# Patient Record
Sex: FEMALE | Race: WHITE | Employment: UNEMPLOYED | URBAN - METROPOLITAN AREA
[De-identification: names, ages, dates, MRNs, and addresses within clinical notes are randomized per-mention and may not be internally consistent; named-entity substitution may affect disease eponyms.]

---

## 2017-01-19 ENCOUNTER — TELEPHONE (OUTPATIENT)
Dept: FAMILY MEDICINE CLINIC | Age: 72
End: 2017-01-19

## 2017-03-02 ENCOUNTER — HOSPITAL ENCOUNTER (OUTPATIENT)
Dept: GENERAL RADIOLOGY | Age: 72
Discharge: HOME | End: 2017-03-02
Attending: FAMILY MEDICINE | Admitting: FAMILY MEDICINE

## 2017-03-02 DIAGNOSIS — Z13.820 SCREENING FOR OSTEOPOROSIS: ICD-10-CM

## 2017-03-02 DIAGNOSIS — Z13.820 ENCOUNTER FOR SCREENING FOR OSTEOPOROSIS: ICD-10-CM

## 2017-03-06 ENCOUNTER — OFFICE VISIT (OUTPATIENT)
Dept: FAMILY MEDICINE CLINIC | Age: 72
End: 2017-03-06

## 2017-03-06 VITALS
RESPIRATION RATE: 16 BRPM | OXYGEN SATURATION: 97 % | HEART RATE: 65 BPM | HEIGHT: 63 IN | DIASTOLIC BLOOD PRESSURE: 78 MMHG | TEMPERATURE: 98 F | SYSTOLIC BLOOD PRESSURE: 122 MMHG

## 2017-03-06 DIAGNOSIS — G89.29 CHRONIC LEFT-SIDED LOW BACK PAIN WITH LEFT-SIDED SCIATICA: ICD-10-CM

## 2017-03-06 DIAGNOSIS — F41.9 ANXIETY: ICD-10-CM

## 2017-03-06 DIAGNOSIS — J06.9 UPPER RESPIRATORY TRACT INFECTION, UNSPECIFIED TYPE: Primary | ICD-10-CM

## 2017-03-06 DIAGNOSIS — M54.42 CHRONIC LEFT-SIDED LOW BACK PAIN WITH LEFT-SIDED SCIATICA: ICD-10-CM

## 2017-03-06 DIAGNOSIS — M81.0 OSTEOPOROSIS: ICD-10-CM

## 2017-03-06 PROCEDURE — 99214 OFFICE O/P EST MOD 30 MIN: CPT | Performed by: FAMILY MEDICINE

## 2017-03-06 RX ORDER — DIAZEPAM 5 MG/1
5 TABLET ORAL EVERY 8 HOURS PRN
Qty: 30 TABLET | Refills: 0 | Status: SHIPPED | OUTPATIENT
Start: 2017-03-06 | End: 2017-07-13 | Stop reason: SDUPTHER

## 2017-03-06 RX ORDER — CIPROFLOXACIN 500 MG/1
500 TABLET, FILM COATED ORAL 2 TIMES DAILY
Qty: 20 TABLET | Refills: 0 | Status: SHIPPED | OUTPATIENT
Start: 2017-03-06 | End: 2017-03-16

## 2017-03-06 ASSESSMENT — ENCOUNTER SYMPTOMS
STRIDOR: 0
TROUBLE SWALLOWING: 0
CHEST TIGHTNESS: 0
GASTROINTESTINAL NEGATIVE: 1
SHORTNESS OF BREATH: 0
WHEEZING: 0
EYES NEGATIVE: 1
BACK PAIN: 1
RHINORRHEA: 1
CHOKING: 0
SINUS PRESSURE: 0
APNEA: 0
FACIAL SWELLING: 0
COUGH: 1
SORE THROAT: 1

## 2017-03-06 ASSESSMENT — PATIENT HEALTH QUESTIONNAIRE - PHQ9
SUM OF ALL RESPONSES TO PHQ9 QUESTIONS 1 & 2: 0
2. FEELING DOWN, DEPRESSED OR HOPELESS: 0
SUM OF ALL RESPONSES TO PHQ QUESTIONS 1-9: 0
1. LITTLE INTEREST OR PLEASURE IN DOING THINGS: 0

## 2017-04-10 ENCOUNTER — OFFICE VISIT (OUTPATIENT)
Dept: FAMILY MEDICINE CLINIC | Age: 72
End: 2017-04-10

## 2017-04-10 VITALS
DIASTOLIC BLOOD PRESSURE: 70 MMHG | HEART RATE: 79 BPM | SYSTOLIC BLOOD PRESSURE: 122 MMHG | TEMPERATURE: 97.9 F | OXYGEN SATURATION: 98 % | HEIGHT: 63 IN | RESPIRATION RATE: 16 BRPM

## 2017-04-10 DIAGNOSIS — M54.42 LOW BACK PAIN WITH LEFT-SIDED SCIATICA, UNSPECIFIED BACK PAIN LATERALITY, UNSPECIFIED CHRONICITY: ICD-10-CM

## 2017-04-10 DIAGNOSIS — L30.9 DERMATITIS: Primary | ICD-10-CM

## 2017-04-10 PROCEDURE — 99212 OFFICE O/P EST SF 10 MIN: CPT | Performed by: FAMILY MEDICINE

## 2017-04-10 ASSESSMENT — ENCOUNTER SYMPTOMS
BACK PAIN: 1
EYES NEGATIVE: 1
COLOR CHANGE: 0

## 2017-07-10 ENCOUNTER — TELEPHONE (OUTPATIENT)
Dept: FAMILY MEDICINE CLINIC | Age: 72
End: 2017-07-10

## 2017-07-13 ENCOUNTER — OFFICE VISIT (OUTPATIENT)
Dept: FAMILY MEDICINE CLINIC | Age: 72
End: 2017-07-13

## 2017-07-13 VITALS
WEIGHT: 205 LBS | BODY MASS INDEX: 36.32 KG/M2 | OXYGEN SATURATION: 97 % | DIASTOLIC BLOOD PRESSURE: 80 MMHG | SYSTOLIC BLOOD PRESSURE: 122 MMHG | TEMPERATURE: 98 F | RESPIRATION RATE: 16 BRPM | HEIGHT: 63 IN | HEART RATE: 81 BPM

## 2017-07-13 DIAGNOSIS — Z85.3 HISTORY OF BREAST CANCER: ICD-10-CM

## 2017-07-13 DIAGNOSIS — I10 ESSENTIAL HYPERTENSION: ICD-10-CM

## 2017-07-13 DIAGNOSIS — F41.9 ANXIETY: ICD-10-CM

## 2017-07-13 DIAGNOSIS — R31.9 HEMATURIA: Primary | ICD-10-CM

## 2017-07-13 DIAGNOSIS — R60.0 BILATERAL EDEMA OF LOWER EXTREMITY: ICD-10-CM

## 2017-07-13 LAB
BILIRUBIN, POC: NORMAL
BLOOD URINE, POC: NORMAL
CLARITY, POC: NORMAL
COLOR, POC: YELLOW
GLUCOSE URINE, POC: NORMAL
KETONES, POC: NORMAL
LEUKOCYTE EST, POC: NORMAL
NITRITE, POC: NORMAL
PH, POC: 6
PROTEIN, POC: NORMAL
SPECIFIC GRAVITY, POC: 1.01
UROBILINOGEN, POC: 0.2

## 2017-07-13 PROCEDURE — 81002 URINALYSIS NONAUTO W/O SCOPE: CPT | Performed by: FAMILY MEDICINE

## 2017-07-13 PROCEDURE — 99214 OFFICE O/P EST MOD 30 MIN: CPT | Performed by: FAMILY MEDICINE

## 2017-07-13 RX ORDER — FUROSEMIDE 40 MG/1
TABLET ORAL
Qty: 90 TABLET | Refills: 2 | Status: SHIPPED | OUTPATIENT
Start: 2017-07-13 | End: 2018-04-06 | Stop reason: SDUPTHER

## 2017-07-13 RX ORDER — DIAZEPAM 5 MG/1
5 TABLET ORAL EVERY 8 HOURS PRN
Qty: 60 TABLET | Refills: 0 | Status: SHIPPED | OUTPATIENT
Start: 2017-07-13 | End: 2019-11-26

## 2017-07-13 RX ORDER — ATENOLOL 25 MG/1
TABLET ORAL
Qty: 90 TABLET | Refills: 2 | Status: SHIPPED | OUTPATIENT
Start: 2017-07-13 | End: 2018-04-06 | Stop reason: SDUPTHER

## 2017-07-24 ENCOUNTER — HOSPITAL ENCOUNTER (OUTPATIENT)
Dept: ULTRASOUND IMAGING | Age: 72
Discharge: HOME | End: 2017-07-24
Attending: OBSTETRICS & GYNECOLOGY | Admitting: OBSTETRICS & GYNECOLOGY

## 2017-07-24 DIAGNOSIS — N95.0 HEMORRHAGE, POSTMENOPAUSAL: ICD-10-CM

## 2017-07-24 DIAGNOSIS — N93.8 OTHER SPECIFIED ABNORMAL UTERINE AND VAGINAL BLEEDING: ICD-10-CM

## 2017-08-03 ENCOUNTER — OFFICE VISIT (OUTPATIENT)
Dept: FAMILY MEDICINE CLINIC | Age: 72
End: 2017-08-03

## 2017-08-03 VITALS
RESPIRATION RATE: 16 BRPM | HEART RATE: 74 BPM | DIASTOLIC BLOOD PRESSURE: 78 MMHG | SYSTOLIC BLOOD PRESSURE: 120 MMHG | OXYGEN SATURATION: 97 % | WEIGHT: 204.8 LBS | HEIGHT: 63 IN | TEMPERATURE: 98.3 F | BODY MASS INDEX: 36.29 KG/M2

## 2017-08-03 DIAGNOSIS — N93.9 VAGINAL BLEEDING: ICD-10-CM

## 2017-08-03 DIAGNOSIS — Z78.0 POST-MENOPAUSAL: ICD-10-CM

## 2017-08-03 DIAGNOSIS — Z01.818 PREOPERATIVE TESTING: Primary | ICD-10-CM

## 2017-08-03 LAB
BASOPHILS ABSOLUTE: 0.1 K/UL (ref 0–0.2)
BASOPHILS RELATIVE PERCENT: 0.8 %
EOSINOPHILS ABSOLUTE: 0.1 K/UL (ref 0–0.6)
EOSINOPHILS RELATIVE PERCENT: 1.3 %
HCT VFR BLD CALC: 42.2 % (ref 36–48)
HEMOGLOBIN: 14.2 G/DL (ref 12–16)
INR BLD: 1.11 (ref 0.85–1.15)
LYMPHOCYTES ABSOLUTE: 2.6 K/UL (ref 1–5.1)
LYMPHOCYTES RELATIVE PERCENT: 30.8 %
MCH RBC QN AUTO: 31.2 PG (ref 26–34)
MCHC RBC AUTO-ENTMCNC: 33.7 G/DL (ref 31–36)
MCV RBC AUTO: 92.5 FL (ref 80–100)
MONOCYTES ABSOLUTE: 0.5 K/UL (ref 0–1.3)
MONOCYTES RELATIVE PERCENT: 5.4 %
NEUTROPHILS ABSOLUTE: 5.2 K/UL (ref 1.7–7.7)
NEUTROPHILS RELATIVE PERCENT: 61.7 %
PDW BLD-RTO: 13.2 % (ref 12.4–15.4)
PLATELET # BLD: 268 K/UL (ref 135–450)
PMV BLD AUTO: 9.3 FL (ref 5–10.5)
PROTHROMBIN TIME: 12.5 SEC (ref 9.6–13)
RBC # BLD: 4.56 M/UL (ref 4–5.2)
WBC # BLD: 8.5 K/UL (ref 4–11)

## 2017-08-03 PROCEDURE — 36415 COLL VENOUS BLD VENIPUNCTURE: CPT | Performed by: FAMILY MEDICINE

## 2017-08-03 PROCEDURE — 93000 ELECTROCARDIOGRAM COMPLETE: CPT | Performed by: FAMILY MEDICINE

## 2017-08-03 PROCEDURE — 99214 OFFICE O/P EST MOD 30 MIN: CPT | Performed by: FAMILY MEDICINE

## 2017-08-04 ENCOUNTER — TELEPHONE (OUTPATIENT)
Dept: FAMILY MEDICINE CLINIC | Age: 72
End: 2017-08-04

## 2017-08-30 ENCOUNTER — OFFICE VISIT (OUTPATIENT)
Dept: FAMILY MEDICINE CLINIC | Age: 72
End: 2017-08-30

## 2017-08-30 VITALS
TEMPERATURE: 98.1 F | WEIGHT: 203 LBS | OXYGEN SATURATION: 97 % | SYSTOLIC BLOOD PRESSURE: 122 MMHG | DIASTOLIC BLOOD PRESSURE: 78 MMHG | HEART RATE: 77 BPM | BODY MASS INDEX: 35.97 KG/M2 | HEIGHT: 63 IN | RESPIRATION RATE: 16 BRPM

## 2017-08-30 DIAGNOSIS — M94.0 COSTOCHONDRITIS, ACUTE: ICD-10-CM

## 2017-08-30 DIAGNOSIS — K29.00 ACUTE GASTRITIS WITHOUT HEMORRHAGE, UNSPECIFIED GASTRITIS TYPE: Primary | ICD-10-CM

## 2017-08-30 PROCEDURE — 99212 OFFICE O/P EST SF 10 MIN: CPT | Performed by: FAMILY MEDICINE

## 2017-09-26 ENCOUNTER — OFFICE VISIT (OUTPATIENT)
Dept: FAMILY MEDICINE CLINIC | Age: 72
End: 2017-09-26

## 2017-09-26 VITALS
OXYGEN SATURATION: 97 % | TEMPERATURE: 98.2 F | SYSTOLIC BLOOD PRESSURE: 148 MMHG | BODY MASS INDEX: 36.32 KG/M2 | DIASTOLIC BLOOD PRESSURE: 76 MMHG | HEIGHT: 63 IN | HEART RATE: 75 BPM | WEIGHT: 205 LBS | RESPIRATION RATE: 18 BRPM

## 2017-09-26 DIAGNOSIS — J06.9 UPPER RESPIRATORY TRACT INFECTION, UNSPECIFIED TYPE: Primary | ICD-10-CM

## 2017-09-26 PROCEDURE — 99213 OFFICE O/P EST LOW 20 MIN: CPT | Performed by: FAMILY MEDICINE

## 2017-09-26 RX ORDER — CIPROFLOXACIN 500 MG/1
500 TABLET, FILM COATED ORAL 2 TIMES DAILY
Qty: 20 TABLET | Refills: 0 | Status: SHIPPED | OUTPATIENT
Start: 2017-09-26 | End: 2017-10-06

## 2017-11-27 ENCOUNTER — OFFICE VISIT (OUTPATIENT)
Dept: INTERNAL MEDICINE CLINIC | Age: 72
End: 2017-11-27

## 2017-11-27 VITALS
BODY MASS INDEX: 36.14 KG/M2 | WEIGHT: 204 LBS | RESPIRATION RATE: 20 BRPM | SYSTOLIC BLOOD PRESSURE: 160 MMHG | HEIGHT: 63 IN | HEART RATE: 80 BPM | DIASTOLIC BLOOD PRESSURE: 90 MMHG

## 2017-11-27 DIAGNOSIS — K21.00 GASTROESOPHAGEAL REFLUX DISEASE WITH ESOPHAGITIS: Primary | ICD-10-CM

## 2017-11-27 DIAGNOSIS — J30.89 ENVIRONMENTAL AND SEASONAL ALLERGIES: ICD-10-CM

## 2017-11-27 DIAGNOSIS — M81.8 OTHER OSTEOPOROSIS WITHOUT CURRENT PATHOLOGICAL FRACTURE: ICD-10-CM

## 2017-11-27 DIAGNOSIS — R60.0 BILATERAL LEG EDEMA: ICD-10-CM

## 2017-11-27 DIAGNOSIS — E55.9 VITAMIN D DEFICIENCY: ICD-10-CM

## 2017-11-27 DIAGNOSIS — Z85.3 H/O MALIGNANT NEOPLASM OF BREAST: ICD-10-CM

## 2017-11-27 DIAGNOSIS — F41.0 ANXIETY ATTACK: ICD-10-CM

## 2017-11-27 DIAGNOSIS — I10 ESSENTIAL HYPERTENSION: Chronic | ICD-10-CM

## 2017-11-27 PROCEDURE — 99215 OFFICE O/P EST HI 40 MIN: CPT | Performed by: INTERNAL MEDICINE

## 2017-11-27 RX ORDER — MELATONIN
COMMUNITY
End: 2019-11-26

## 2017-11-27 RX ORDER — LANSOPRAZOLE 30 MG/1
30 CAPSULE, DELAYED RELEASE ORAL 2 TIMES DAILY
COMMUNITY
End: 2021-09-23 | Stop reason: CLARIF

## 2017-11-27 NOTE — PROGRESS NOTES
systems was negative except for what was noted in the HPI. Physical Exam:   Vitals:    11/27/17 1121   BP: (!) 172/100   Pulse: 80   Resp: 20   Weight: 204 lb (92.5 kg)   Height: 5' 2.5\" (1.588 m)     Body mass index is 36.72 kg/m². Constitutional: She is oriented to person, place, and time. She appears well-developed and well-nourished. No distress. HEENT:   Head: Normocephalic and atraumatic. Right Ear: Tympanic membrane, external ear and ear canal normal.   Left Ear: Tympanic membrane, external ear and ear canal normal.   Mouth/Throat: Oropharynx is clear and moist, and mucous membranes are normal.  There is no cervical adenopathy. Eyes: Conjunctivae and extraocular motions are normal. Pupils are equal, round, and reactive to light. Neck: Supple. No JVD present. Carotid bruit is not present. No mass and no thyromegaly present. Cardiovascular: Normal rate, regular rhythm, normal heart sounds and intact distal pulses. Exam reveals no gallop and no friction rub. No murmur heard. Pulmonary/Chest: Effort normal and breath sounds normal. No respiratory distress. She has no wheezes, rhonchi or rales. Abdominal: Soft, non-tender. Bowel sounds and aorta are normal. She exhibits no organomegaly, mass or bruit. Genitourinary: performed by gynecologist.  Breast exam:  performed by specialist.  Musculoskeletal: Normal range of motion, no synovitis. She exhibits no edema. Neurological: She is alert and oriented to person, place, and time. She has normal reflexes. No cranial nerve deficit. Coordination normal.   Skin: Skin is warm and dry. There is no rash or erythema. No suspicious lesions noted. Psychiatric: She has a normal mood and affect.  Her speech is normal and behavior is normal. Judgment, cognition and memory are normal.       Lab Results   Component Value Date    LABMICR SEE BELOW 12/29/2015     Lab Results   Component Value Date     12/08/2016    K 4.4 12/08/2016    CL 98 (L) 12/08/2016    CO2 27 12/08/2016    BUN 8 12/08/2016    CREATININE <0.5 (L) 12/08/2016    GLUCOSE 99 12/08/2016    CALCIUM 9.3 12/08/2016     Lab Results   Component Value Date    CHOL 197 12/08/2016    TRIG 104 12/08/2016    HDL 35 12/08/2016    LDLCALC 141 12/08/2016     Lab Results   Component Value Date    ALT 17 12/08/2016    AST 15 12/08/2016     Lab Results   Component Value Date    TSH 1.38 12/08/2016    TSH 1.25 12/07/2015    T4FREE 1.1 12/08/2016    T4FREE 1.2 12/07/2015     Lab Results   Component Value Date    WBC 8.5 08/03/2017    HGB 14.2 08/03/2017    HCT 42.2 08/03/2017    MCV 92.5 08/03/2017     08/03/2017     Lab Results   Component Value Date    INR 1.11 08/03/2017      No results found for: PSA   No results found for: [facility]         Preventive Care:  Health Maintenance   Topic Date Due    Hepatitis C screen  1945    DTaP/Tdap/Td vaccine (1 - Tdap) 12/13/1964    Zostavax vaccine  12/13/2005    Pneumococcal low/med risk (1 of 2 - PCV13) 12/13/2010    Flu vaccine (1) 09/01/2017    Breast cancer screen  04/17/2018    Colon cancer screen colonoscopy  12/28/2019    Lipid screen  12/08/2021    DEXA (modify frequency per FRAX score)  Completed      Hx abnormal PAP: no  Sexual activity: none   Last eye exam: 2017, normal  Exercise: walks 3 time(s) per week  Seatbelt use: yes       Preventive plan of care for Sanjana Silver        11/27/2017           Preventive Measures Status       Recommendations for screening   Colon Cancer Screen   Colonoscopy Repeat yearly   Breast Cancer Screen  Mammogram Repeat yearly   Cervical Cancer Screen   PAP smear:  Test is due per GYN   Osteoporosis Screen   DEXA scan  Repeat every 3 years   Diabetes Screen  Glucose (mg/dL)   Date Value   12/08/2016 99    Test recommended and ordered   Cholesterol Screen  Lab Results   Component Value Date    CHOL 197 12/08/2016    TRIG 104 12/08/2016    HDL 35 (L) 12/08/2016    LDLCALC 141 (H) 12/08/2016    Test

## 2017-12-07 ENCOUNTER — OFFICE VISIT (OUTPATIENT)
Dept: INTERNAL MEDICINE CLINIC | Age: 72
End: 2017-12-07

## 2017-12-07 VITALS
DIASTOLIC BLOOD PRESSURE: 90 MMHG | HEIGHT: 63 IN | BODY MASS INDEX: 36.14 KG/M2 | HEART RATE: 78 BPM | TEMPERATURE: 98.6 F | SYSTOLIC BLOOD PRESSURE: 162 MMHG | RESPIRATION RATE: 20 BRPM | WEIGHT: 204 LBS

## 2017-12-07 DIAGNOSIS — B96.89 SINUSITIS, BACTERIAL: Primary | ICD-10-CM

## 2017-12-07 DIAGNOSIS — J32.9 SINUSITIS, BACTERIAL: Primary | ICD-10-CM

## 2017-12-07 PROCEDURE — 99213 OFFICE O/P EST LOW 20 MIN: CPT | Performed by: INTERNAL MEDICINE

## 2017-12-07 RX ORDER — CIPROFLOXACIN 500 MG/1
500 TABLET, FILM COATED ORAL 2 TIMES DAILY
Qty: 20 TABLET | Refills: 0 | Status: SHIPPED | OUTPATIENT
Start: 2017-12-07 | End: 2017-12-17

## 2017-12-13 ENCOUNTER — OFFICE VISIT (OUTPATIENT)
Dept: INTERNAL MEDICINE CLINIC | Age: 72
End: 2017-12-13

## 2017-12-13 VITALS
DIASTOLIC BLOOD PRESSURE: 90 MMHG | BODY MASS INDEX: 36.5 KG/M2 | HEIGHT: 63 IN | SYSTOLIC BLOOD PRESSURE: 156 MMHG | WEIGHT: 206 LBS | HEART RATE: 78 BPM | RESPIRATION RATE: 18 BRPM

## 2017-12-13 DIAGNOSIS — J32.9 SINUSITIS, BACTERIAL: Primary | ICD-10-CM

## 2017-12-13 DIAGNOSIS — B96.89 SINUSITIS, BACTERIAL: Primary | ICD-10-CM

## 2017-12-13 PROCEDURE — 99213 OFFICE O/P EST LOW 20 MIN: CPT | Performed by: INTERNAL MEDICINE

## 2017-12-13 RX ORDER — LEVOFLOXACIN 500 MG/1
500 TABLET, FILM COATED ORAL DAILY
Qty: 10 TABLET | Refills: 0 | Status: SHIPPED | OUTPATIENT
Start: 2017-12-13 | End: 2018-02-28 | Stop reason: SDUPTHER

## 2017-12-13 NOTE — PROGRESS NOTES
Sanjana Silver  YOB: 1945    Date of Service:  12/13/2017    Chief Complaint:      Chief Complaint   Patient presents with    Sinus Problem       HPI:  Sanjana Silver is a 67 y.o. She's complain of left neck pain, left ear pain and left cheek pain. Cough has improved to a light yellow phlegm. No fever/chills. Still has about 3 days left.     Lab Results   Component Value Date    LABMICR SEE BELOW 12/29/2015     Lab Results   Component Value Date     12/08/2016    K 4.4 12/08/2016    CL 98 (L) 12/08/2016    CO2 27 12/08/2016    BUN 8 12/08/2016    CREATININE <0.5 (L) 12/08/2016    GLUCOSE 99 12/08/2016    CALCIUM 9.3 12/08/2016     Lab Results   Component Value Date    CHOL 197 12/08/2016    TRIG 104 12/08/2016    HDL 35 12/08/2016    LDLCALC 141 12/08/2016     Lab Results   Component Value Date    ALT 17 12/08/2016    AST 15 12/08/2016     Lab Results   Component Value Date    TSH 1.38 12/08/2016    TSH 1.25 12/07/2015    T4FREE 1.1 12/08/2016    T4FREE 1.2 12/07/2015     Lab Results   Component Value Date    WBC 8.5 08/03/2017    HGB 14.2 08/03/2017    HCT 42.2 08/03/2017    MCV 92.5 08/03/2017     08/03/2017     Lab Results   Component Value Date    INR 1.11 08/03/2017      No results found for: PSA   No results found for: LABURIC     Patient Active Problem List   Diagnosis    Gastroesophageal reflux disease with esophagitis    Essential hypertension    H/O malignant neoplasm of breast    Allergic reaction    Anxiety attack    Pes anserine bursitis    Right knee pain    Tear of right rotator cuff    Shoulder impingement syndrome    Shoulder pain, right    Peripheral neuropathic pain    Vitamin D deficiency    Environmental and seasonal allergies    Bilateral leg edema    Other osteoporosis without current pathological fracture       Allergies   Allergen Reactions    Actifed Cold-Allergy [Chlorpheniramine-Phenylephrine] Other (See Comments)     Tachy,head rush    Medrol [Methylprednisolone]      Chest pain    Prednisone      Chest pain    Amoxicillin      Had hives when taken with Lisinopril and not sure what caused what since she was on both in the past for many years    Antihistamines, Chlorpheniramine-Type     Benadryl [Diphenhydramine]     Ceftin [Cefuroxime]      bleeding    Cefuroxime Axetil Diarrhea    Desonide Crea-Wound Dress Crea     Iv Contrast [Iodides] Hives    Lisinopril Hives    Naprelan [Naproxen]     Norvasc [Amlodipine Besylate] Swelling    Other      Sultin vaginal cream    Pcn [Penicillins]     Singulair [Montelukast Sodium]     Sudafed [Pseudoephedrine Hcl] Other (See Comments)     Tachy,head rush    Tape [Adhesive Tape]     Augmentin [Amoxicillin-Pot Clavulanate] Rash    Augmentin [Amoxicillin-Pot Clavulanate]      Pt states it makes her break out in red patches    Keflex [Cephalexin] Rash    Lidocaine Anxiety    Zithromax [Azithromycin]      Pt states it makes her break out in red patches  It has the least reaction  And she still can take it.      Outpatient Prescriptions Marked as Taking for the 12/13/17 encounter (Office Visit) with Miguel Dominguez MD   Medication Sig Dispense Refill    ciprofloxacin (CIPRO) 500 MG tablet Take 1 tablet by mouth 2 times daily for 10 days 20 tablet 0    Cholecalciferol (VITAMIN D3) 1000 units TABS Take by mouth      lansoprazole (PREVACID) 30 MG delayed release capsule Take 30 mg by mouth 2 times daily       atenolol (TENORMIN) 25 MG tablet TAKE ONE TABLET BY MOUTH DAILY 90 tablet 2    diazepam (VALIUM) 5 MG tablet Take 1 tablet by mouth every 8 hours as needed for Anxiety or Sleep 60 tablet 0    furosemide (LASIX) 40 MG tablet TAKE ONE-HALF TABLET BY MOUTH EVERY MORNING AND TAKE ONE- HALF (1/2) TABLET BY MOUTH ONCE NIGHTLY 90 tablet 2    acetaminophen (TYLENOL) 325 MG tablet Take 650 mg by mouth every 6 hours as needed for Pain      sucralfate (CARAFATE) 1 GM tablet Take 1 tablet by mouth 4

## 2017-12-18 ENCOUNTER — OFFICE VISIT (OUTPATIENT)
Dept: INTERNAL MEDICINE CLINIC | Age: 72
End: 2017-12-18

## 2017-12-18 VITALS
WEIGHT: 206 LBS | HEIGHT: 63 IN | DIASTOLIC BLOOD PRESSURE: 80 MMHG | RESPIRATION RATE: 20 BRPM | SYSTOLIC BLOOD PRESSURE: 170 MMHG | BODY MASS INDEX: 36.5 KG/M2 | HEART RATE: 88 BPM

## 2017-12-18 DIAGNOSIS — I10 ESSENTIAL HYPERTENSION: Primary | Chronic | ICD-10-CM

## 2017-12-18 PROCEDURE — 99213 OFFICE O/P EST LOW 20 MIN: CPT | Performed by: INTERNAL MEDICINE

## 2017-12-18 NOTE — PROGRESS NOTES
Chest pain    Prednisone      Chest pain    Amoxicillin      Had hives when taken with Lisinopril and not sure what caused what since she was on both in the past for many years    Antihistamines, Chlorpheniramine-Type     Benadryl [Diphenhydramine]     Ceftin [Cefuroxime]      bleeding    Cefuroxime Axetil Diarrhea    Desonide Crea-Wound Dress Crea     Iv Contrast [Iodides] Hives    Lisinopril Hives    Naprelan [Naproxen]     Norvasc [Amlodipine Besylate] Swelling    Other      Sultin vaginal cream    Pcn [Penicillins]     Singulair [Montelukast Sodium]     Sudafed [Pseudoephedrine Hcl] Other (See Comments)     Tachy,head rush    Tape [Adhesive Tape]     Augmentin [Amoxicillin-Pot Clavulanate] Rash    Augmentin [Amoxicillin-Pot Clavulanate]      Pt states it makes her break out in red patches    Keflex [Cephalexin] Rash    Lidocaine Anxiety    Zithromax [Azithromycin]      Pt states it makes her break out in red patches  It has the least reaction  And she still can take it.      Outpatient Prescriptions Marked as Taking for the 12/18/17 encounter (Office Visit) with Ron Dominguez MD   Medication Sig Dispense Refill    levofloxacin (LEVAQUIN) 500 MG tablet Take 1 tablet by mouth daily for 10 days 10 tablet 0    Cholecalciferol (VITAMIN D3) 1000 units TABS Take by mouth      lansoprazole (PREVACID) 30 MG delayed release capsule Take 30 mg by mouth 2 times daily       atenolol (TENORMIN) 25 MG tablet TAKE ONE TABLET BY MOUTH DAILY 90 tablet 2    diazepam (VALIUM) 5 MG tablet Take 1 tablet by mouth every 8 hours as needed for Anxiety or Sleep 60 tablet 0    furosemide (LASIX) 40 MG tablet TAKE ONE-HALF TABLET BY MOUTH EVERY MORNING AND TAKE ONE- HALF (1/2) TABLET BY MOUTH ONCE NIGHTLY 90 tablet 2    acetaminophen (TYLENOL) 325 MG tablet Take 650 mg by mouth every 6 hours as needed for Pain      sucralfate (CARAFATE) 1 GM tablet Take 1 tablet by mouth 4 times daily 120 tablet 3    dicyclomine (BENTYL) 10 MG capsule Take 10 mg by mouth as needed       docusate sodium (COLACE) 100 MG capsule Take 100 mg by mouth daily.  azelastine (ASTELIN) 137 MCG/SPRAY nasal spray 1 spray by Nasal route 2 times daily. Use in each nostril as directed (Patient taking differently: 1 spray by Nasal route as needed Use in each nostril as directed) 1 Bottle 5         Review of Systems: 14 systems were negative except of what was stated on HPI    Nursing note and vitals reviewed. Vitals:    12/18/17 1501 12/18/17 1513   BP: (!) 168/90 (!) 170/80   Pulse: 88    Resp: 20    Weight: 206 lb (93.4 kg)    Height: 5' 2.5\" (1.588 m)      Wt Readings from Last 3 Encounters:   12/18/17 206 lb (93.4 kg)   12/13/17 206 lb (93.4 kg)   12/07/17 204 lb (92.5 kg)     BP Readings from Last 3 Encounters:   12/18/17 (!) 170/80   12/13/17 (!) 156/90   12/07/17 (!) 162/90     Body mass index is 37.08 kg/m². Constitutional: Patient appears well-developed and well-nourished. No distress. Head: Normocephalic and atraumatic. Neck: Normal range of motion. Neck supple. No thyroidmegaly. Cardiovascular: Normal rate, regular rhythm, normal heart sounds and intact distal pulses. Pulmonary/Chest: Effort normal and breath sounds normal. No stridor. No respiratory distress. No wheezes and no rales. Abdominal: Soft. Bowel sounds are normal. No distension and no mass. No tenderness. No rebound and no guarding. Musculoskeletal: No edema and no tenderness. Skin: No rash or erythema. Psychiatric: Normal mood and affect. Behavior is normal.     Assessment/Plan:  Lurena Dance was seen today for blood pressure check. Diagnoses and all orders for this visit:    Essential hypertension    increase Atenolol 25 mg bid temporary and monitor BP daily. Return if symptoms worsen or fail to improve.

## 2017-12-21 ENCOUNTER — HOSPITAL ENCOUNTER (OUTPATIENT)
Dept: GENERAL RADIOLOGY | Age: 72
Discharge: HOME | End: 2017-12-21
Attending: INTERNAL MEDICINE | Admitting: INTERNAL MEDICINE

## 2017-12-21 DIAGNOSIS — R60.0 BILATERAL LEG EDEMA: ICD-10-CM

## 2017-12-21 DIAGNOSIS — I10 ESSENTIAL HYPERTENSION: Chronic | ICD-10-CM

## 2017-12-21 LAB
A/G RATIO: 1.3 (ref 1.1–2.2)
ALBUMIN SERPL-MCNC: 4.3 G/DL (ref 3.4–5)
ALP BLD-CCNC: 88 U/L (ref 40–129)
ALT SERPL-CCNC: 13 U/L (ref 10–40)
ANION GAP SERPL CALCULATED.3IONS-SCNC: 16 MMOL/L (ref 3–16)
AST SERPL-CCNC: 16 U/L (ref 15–37)
BASOPHILS ABSOLUTE: 0 K/UL (ref 0–0.2)
BASOPHILS RELATIVE PERCENT: 0.5 %
BILIRUB SERPL-MCNC: 0.5 MG/DL (ref 0–1)
BUN BLDV-MCNC: 7 MG/DL (ref 7–20)
CALCIUM SERPL-MCNC: 9.2 MG/DL (ref 8.3–10.6)
CHLORIDE BLD-SCNC: 98 MMOL/L (ref 99–110)
CHOLESTEROL, TOTAL: 172 MG/DL (ref 0–199)
CO2: 28 MMOL/L (ref 21–32)
CREAT SERPL-MCNC: <0.5 MG/DL (ref 0.6–1.2)
EOSINOPHILS ABSOLUTE: 0.2 K/UL (ref 0–0.6)
EOSINOPHILS RELATIVE PERCENT: 1.7 %
GFR AFRICAN AMERICAN: >60
GFR NON-AFRICAN AMERICAN: >60
GLOBULIN: 3.2 G/DL
GLUCOSE BLD-MCNC: 91 MG/DL (ref 70–99)
HCT VFR BLD CALC: 38.8 % (ref 36–48)
HDLC SERPL-MCNC: 32 MG/DL (ref 40–60)
HEMOGLOBIN: 13.3 G/DL (ref 12–16)
LDL CHOLESTEROL CALCULATED: 120 MG/DL
LYMPHOCYTES ABSOLUTE: 2.4 K/UL (ref 1–5.1)
LYMPHOCYTES RELATIVE PERCENT: 25.7 %
MCH RBC QN AUTO: 31 PG (ref 26–34)
MCHC RBC AUTO-ENTMCNC: 34.3 G/DL (ref 31–36)
MCV RBC AUTO: 90.3 FL (ref 80–100)
MONOCYTES ABSOLUTE: 0.6 K/UL (ref 0–1.3)
MONOCYTES RELATIVE PERCENT: 6 %
NEUTROPHILS ABSOLUTE: 6.2 K/UL (ref 1.7–7.7)
NEUTROPHILS RELATIVE PERCENT: 66.1 %
PDW BLD-RTO: 13 % (ref 12.4–15.4)
PLATELET # BLD: 286 K/UL (ref 135–450)
PMV BLD AUTO: 9.1 FL (ref 5–10.5)
POTASSIUM SERPL-SCNC: 4.1 MMOL/L (ref 3.5–5.1)
RBC # BLD: 4.29 M/UL (ref 4–5.2)
SODIUM BLD-SCNC: 142 MMOL/L (ref 136–145)
TOTAL PROTEIN: 7.5 G/DL (ref 6.4–8.2)
TRIGL SERPL-MCNC: 102 MG/DL (ref 0–150)
VLDLC SERPL CALC-MCNC: 20 MG/DL
WBC # BLD: 9.4 K/UL (ref 4–11)

## 2017-12-26 NOTE — PROGRESS NOTES
Pt has been informed. Regarding-  Your blood count, sugar, kidneys, liver and cholesterol are normal so continue the same medications.

## 2018-01-02 ENCOUNTER — OFFICE VISIT (OUTPATIENT)
Dept: INTERNAL MEDICINE CLINIC | Age: 73
End: 2018-01-02

## 2018-01-02 VITALS
DIASTOLIC BLOOD PRESSURE: 94 MMHG | WEIGHT: 206 LBS | RESPIRATION RATE: 18 BRPM | HEART RATE: 80 BPM | BODY MASS INDEX: 36.5 KG/M2 | HEIGHT: 63 IN | SYSTOLIC BLOOD PRESSURE: 130 MMHG

## 2018-01-02 DIAGNOSIS — I10 ESSENTIAL HYPERTENSION: Chronic | ICD-10-CM

## 2018-01-02 DIAGNOSIS — R60.0 BILATERAL LEG EDEMA: ICD-10-CM

## 2018-01-02 DIAGNOSIS — K21.00 GASTROESOPHAGEAL REFLUX DISEASE WITH ESOPHAGITIS: ICD-10-CM

## 2018-01-02 DIAGNOSIS — E55.9 VITAMIN D DEFICIENCY: ICD-10-CM

## 2018-01-02 DIAGNOSIS — Z00.00 MEDICARE ANNUAL WELLNESS VISIT, INITIAL: Primary | ICD-10-CM

## 2018-01-02 DIAGNOSIS — F41.0 ANXIETY ATTACK: ICD-10-CM

## 2018-01-02 PROCEDURE — G0438 PPPS, INITIAL VISIT: HCPCS | Performed by: INTERNAL MEDICINE

## 2018-01-02 ASSESSMENT — PATIENT HEALTH QUESTIONNAIRE - PHQ9: SUM OF ALL RESPONSES TO PHQ QUESTIONS 1-9: 0

## 2018-01-02 ASSESSMENT — LIFESTYLE VARIABLES: HOW OFTEN DO YOU HAVE A DRINK CONTAINING ALCOHOL: 0

## 2018-01-02 ASSESSMENT — ANXIETY QUESTIONNAIRES: GAD7 TOTAL SCORE: 2

## 2018-01-02 NOTE — PROGRESS NOTES
Early Death Paternal Grandfather 52    Breast Cancer Paternal Aunt     Breast Cancer Paternal Aunt        CareTeam (Including outside providers/suppliers regularly involved in providing care):   Patient Care Team:  Vicenta Tyler MD as PCP - Sandy Stapleton MD as PCP - SHANNON Attributed Provider    Wt Readings from Last 3 Encounters:   01/02/18 206 lb (93.4 kg)   12/18/17 206 lb (93.4 kg)   12/13/17 206 lb (93.4 kg)     Vitals:    01/02/18 1049 01/02/18 1128   BP: (!) 140/100 (!) 130/94   Pulse: 80    Resp: 18    Weight: 206 lb (93.4 kg)    Height: 5' 2.5\" (1.588 m)        General Appearance: alert and oriented to person, place and time, well developed and well- nourished, in no acute distress  Skin: warm and dry, no rash or erythema  Head: normocephalic and atraumatic  Eyes: pupils equal, round, and reactive to light, extraocular eye movements intact, conjunctivae normal  ENT: tympanic membrane, external ear and ear canal normal bilaterally, nose without deformity, nasal mucosa and turbinates normal without polyps  Neck: supple and non-tender without mass, no thyromegaly or thyroid nodules, no cervical lymphadenopathy  Pulmonary/Chest: clear to auscultation bilaterally- no wheezes, rales or rhonchi, normal air movement, no respiratory distress  Cardiovascular: normal rate, regular rhythm, normal S1 and S2, no murmurs, rubs, clicks, or gallops, distal pulses intact, no carotid bruits  Abdomen: soft, non-tender, non-distended, normal bowel sounds, no masses or organomegaly  Extremities: no cyanosis, clubbing or edema  Musculoskeletal: normal range of motion, no joint swelling, deformity or tenderness  Neurologic: reflexes normal and symmetric, no cranial nerve deficit, gait, coordination and speech normal    Patient's complete Health Risk Assessment and screening values have been reviewed and are found in Flowsheets.  The following problems were reviewed today and where indicated follow up appointments were made and/or referrals ordered. Positive Risk Factor Screenings with Interventions:     General Health:  General  In general, how would you say your health is?: Good  In the past 7 days, have you experienced any of the following?: None of These  Do you get the social and emotional support that you need?: Yes  Do you have a Living Will?: (!) No  General Health Risk Interventions:  · None indicated    Health Habits/Nutrition:  Health Habits/Nutrition  Do you exercise for at least 20 minutes 2-3 times per week?: Yes  Have you lost any weight without trying in the past 3 months?: No  Do you eat fewer than 2 meals per day?: No  Have you seen a dentist within the past year?: Yes  Body mass index is 37.08 kg/m². Health Habits/Nutrition Interventions:  · None indicated    Hearing/Vision:  Hearing/Vision  Do you or your family notice any trouble with your hearing?: No  Do you have difficulty driving, watching TV, or doing any of your daily activities because of your eyesight?: (!) Yes  Have you had an eye exam within the past year?: Yes  Hearing/Vision Interventions:  · Hearing concerns:  ENT referral provided    ADL:  ADLs  In the past 7 days, did you need help from others to perform any of the following everyday activities?: None  In the past 7 days, did you need help from others to take care of any of the following?: (!) Housekeeping  ADL Interventions:  · None indicated      Personalized Preventive Plan   Current Health Maintenance Status    There is no immunization history on file for this patient.      Health Maintenance   Topic Date Due    Hepatitis C screen  1945    DTaP/Tdap/Td vaccine (1 - Tdap) 12/13/1964    Zostavax vaccine  12/13/2005    Pneumococcal low/med risk (1 of 2 - PCV13) 12/13/2010    Flu vaccine (1) 09/01/2017    Breast cancer screen  04/17/2018    Colon cancer screen colonoscopy  12/28/2019    Lipid screen  12/21/2022    DEXA (modify frequency per FRAX score)  Completed Recommendations for Preventive Services Due: see orders. Recommended screening schedule for the next 5-10 years is provided to the patient in written form: see Patient Sarabjit Martini was seen today for medicare awv. Diagnoses and all orders for this visit:    Medicare annual wellness visit, initial    Essential hypertension    Gastroesophageal reflux disease with esophagitis    Vitamin D deficiency    Bilateral leg edema    Anxiety attack    Return 6 months BP.

## 2018-02-28 ENCOUNTER — OFFICE VISIT (OUTPATIENT)
Dept: INTERNAL MEDICINE CLINIC | Age: 73
End: 2018-02-28

## 2018-02-28 VITALS
SYSTOLIC BLOOD PRESSURE: 168 MMHG | BODY MASS INDEX: 37.03 KG/M2 | OXYGEN SATURATION: 98 % | TEMPERATURE: 98.3 F | HEIGHT: 63 IN | DIASTOLIC BLOOD PRESSURE: 70 MMHG | WEIGHT: 209 LBS | HEART RATE: 89 BPM | RESPIRATION RATE: 16 BRPM

## 2018-02-28 DIAGNOSIS — R68.89 FLU-LIKE SYMPTOMS: ICD-10-CM

## 2018-02-28 DIAGNOSIS — J40 BRONCHITIS: Primary | ICD-10-CM

## 2018-02-28 LAB
INFLUENZA A ANTIBODY: NORMAL
INFLUENZA B ANTIBODY: NORMAL

## 2018-02-28 PROCEDURE — 99213 OFFICE O/P EST LOW 20 MIN: CPT | Performed by: INTERNAL MEDICINE

## 2018-02-28 PROCEDURE — 87804 INFLUENZA ASSAY W/OPTIC: CPT | Performed by: INTERNAL MEDICINE

## 2018-02-28 RX ORDER — LEVOFLOXACIN 500 MG/1
500 TABLET, FILM COATED ORAL DAILY
Qty: 10 TABLET | Refills: 0 | Status: SHIPPED | OUTPATIENT
Start: 2018-02-28 | End: 2018-03-10

## 2018-02-28 NOTE — PROGRESS NOTES
Faye Amado  YOB: 1945    Date of Service:  2/28/2018    Chief Complaint:      Chief Complaint   Patient presents with    Cough     pt states she has this cough for 8-9 days        HPI:  Faye Amado is a 67 y.o. She complain of coughing up yellowish phlegm for couple of days. Feels cold all the time but not sure if had a temperature. No myalgia.     Lab Results   Component Value Date    LABMICR SEE BELOW 12/29/2015     Lab Results   Component Value Date     12/21/2017    K 4.1 12/21/2017    CL 98 (L) 12/21/2017    CO2 28 12/21/2017    BUN 7 12/21/2017    CREATININE <0.5 (L) 12/21/2017    GLUCOSE 91 12/21/2017    CALCIUM 9.2 12/21/2017     Lab Results   Component Value Date    CHOL 172 12/21/2017    TRIG 102 12/21/2017    HDL 32 12/21/2017    LDLCALC 120 12/21/2017     Lab Results   Component Value Date    ALT 13 12/21/2017    AST 16 12/21/2017     Lab Results   Component Value Date    TSH 1.38 12/08/2016    TSH 1.25 12/07/2015    T4FREE 1.1 12/08/2016    T4FREE 1.2 12/07/2015     Lab Results   Component Value Date    WBC 9.4 12/21/2017    HGB 13.3 12/21/2017    HCT 38.8 12/21/2017    MCV 90.3 12/21/2017     12/21/2017     Lab Results   Component Value Date    INR 1.11 08/03/2017      No results found for: PSA   No results found for: LABURIC     Patient Active Problem List   Diagnosis    Gastroesophageal reflux disease with esophagitis    Essential hypertension    H/O malignant neoplasm of breast    Allergic reaction    Anxiety attack    Pes anserine bursitis    Right knee pain    Tear of right rotator cuff    Shoulder impingement syndrome    Shoulder pain, right    Peripheral neuropathic pain    Vitamin D deficiency    Environmental and seasonal allergies    Bilateral leg edema    Other osteoporosis without current pathological fracture       Allergies   Allergen Reactions    Actifed Cold-Allergy [Chlorpheniramine-Phenylephrine] Other (See Comments) each nostril as directed (Patient taking differently: 1 spray by Nasal route as needed Use in each nostril as directed) 1 Bottle 5         Review of Systems: 14 systems were negative except of what was stated on HPI    Nursing note and vitals reviewed. Vitals:    02/28/18 1220   BP: (!) 168/70   Site: Left Arm   Position: Sitting   Cuff Size: Large Adult   Pulse: 89   Resp: 16   Temp: 98.3 °F (36.8 °C)   SpO2: 98%   Weight: 209 lb (94.8 kg)   Height: 5' 2.5\" (1.588 m)     Wt Readings from Last 3 Encounters:   02/28/18 209 lb (94.8 kg)   01/02/18 206 lb (93.4 kg)   12/18/17 206 lb (93.4 kg)     BP Readings from Last 3 Encounters:   02/28/18 (!) 168/70   01/02/18 (!) 130/94   12/18/17 (!) 170/80     Body mass index is 37.62 kg/m². Constitutional: Patient appears well-developed and well-nourished. No distress. Head: Normocephalic and atraumatic. Neck: Normal range of motion. Neck supple. No thyroidmegaly. Cardiovascular: Normal rate, regular rhythm, normal heart sounds and intact distal pulses. Pulmonary/Chest: Effort normal and breath sounds normal. No stridor. No respiratory distress. No wheezes and no rales. Abdominal: Soft. Bowel sounds are normal. No distension and no mass. No tenderness. No rebound and no guarding. Musculoskeletal: No edema and no tenderness. Skin: No rash or erythema. Psychiatric: Normal mood and affect. Behavior is normal.     Assessment/Plan:  Elina Irby was seen today for cough. Diagnoses and all orders for this visit:    Bronchitis  -     levofloxacin (LEVAQUIN) 500 MG tablet; Take 1 tablet by mouth daily for 10 days    Flu-like symptoms  -     POCT Influenza A/B        Return if symptoms worsen or fail to improve.

## 2018-04-06 DIAGNOSIS — R60.0 BILATERAL EDEMA OF LOWER EXTREMITY: ICD-10-CM

## 2018-04-06 DIAGNOSIS — I10 ESSENTIAL HYPERTENSION: ICD-10-CM

## 2018-04-06 RX ORDER — FUROSEMIDE 40 MG/1
TABLET ORAL
Qty: 90 TABLET | Refills: 2 | Status: SHIPPED | OUTPATIENT
Start: 2018-04-06 | End: 2019-01-03 | Stop reason: SDUPTHER

## 2018-04-06 RX ORDER — ATENOLOL 25 MG/1
TABLET ORAL
Qty: 90 TABLET | Refills: 2 | Status: SHIPPED | OUTPATIENT
Start: 2018-04-06 | End: 2018-12-04 | Stop reason: DRUGHIGH

## 2018-11-29 ENCOUNTER — TELEPHONE (OUTPATIENT)
Dept: INTERNAL MEDICINE CLINIC | Age: 73
End: 2018-11-29

## 2018-12-04 ENCOUNTER — OFFICE VISIT (OUTPATIENT)
Dept: INTERNAL MEDICINE CLINIC | Age: 73
End: 2018-12-04
Payer: MEDICARE

## 2018-12-04 VITALS
DIASTOLIC BLOOD PRESSURE: 86 MMHG | OXYGEN SATURATION: 98 % | SYSTOLIC BLOOD PRESSURE: 176 MMHG | HEIGHT: 63 IN | HEART RATE: 81 BPM | WEIGHT: 216 LBS | BODY MASS INDEX: 38.27 KG/M2

## 2018-12-04 DIAGNOSIS — R60.0 BILATERAL LEG EDEMA: ICD-10-CM

## 2018-12-04 DIAGNOSIS — E55.9 VITAMIN D DEFICIENCY: ICD-10-CM

## 2018-12-04 DIAGNOSIS — J30.89 ENVIRONMENTAL AND SEASONAL ALLERGIES: ICD-10-CM

## 2018-12-04 DIAGNOSIS — K21.00 GASTROESOPHAGEAL REFLUX DISEASE WITH ESOPHAGITIS: ICD-10-CM

## 2018-12-04 DIAGNOSIS — I10 ESSENTIAL HYPERTENSION: Primary | Chronic | ICD-10-CM

## 2018-12-04 PROCEDURE — 99214 OFFICE O/P EST MOD 30 MIN: CPT | Performed by: INTERNAL MEDICINE

## 2018-12-04 RX ORDER — ATENOLOL 50 MG/1
50 TABLET ORAL DAILY
Qty: 30 TABLET | Refills: 0 | Status: SHIPPED | OUTPATIENT
Start: 2018-12-04 | End: 2019-01-03 | Stop reason: SDUPTHER

## 2018-12-04 RX ORDER — TRIAMCINOLONE ACETONIDE 55 UG/1
2 SPRAY, METERED NASAL DAILY
COMMUNITY

## 2018-12-17 ENCOUNTER — OFFICE VISIT (OUTPATIENT)
Dept: INTERNAL MEDICINE CLINIC | Age: 73
End: 2018-12-17
Payer: MEDICARE

## 2018-12-17 VITALS
BODY MASS INDEX: 37.92 KG/M2 | OXYGEN SATURATION: 99 % | DIASTOLIC BLOOD PRESSURE: 84 MMHG | SYSTOLIC BLOOD PRESSURE: 160 MMHG | HEART RATE: 102 BPM | WEIGHT: 214 LBS | HEIGHT: 63 IN

## 2018-12-17 DIAGNOSIS — F41.9 ANXIETY: Primary | ICD-10-CM

## 2018-12-17 DIAGNOSIS — I10 ESSENTIAL HYPERTENSION: Chronic | ICD-10-CM

## 2018-12-17 PROCEDURE — 99213 OFFICE O/P EST LOW 20 MIN: CPT | Performed by: INTERNAL MEDICINE

## 2018-12-17 RX ORDER — SERTRALINE HYDROCHLORIDE 25 MG/1
25 TABLET, FILM COATED ORAL DAILY
Qty: 30 TABLET | Refills: 0 | Status: SHIPPED | OUTPATIENT
Start: 2018-12-17 | End: 2019-02-05 | Stop reason: DRUGHIGH

## 2018-12-17 NOTE — PROGRESS NOTES
Blessing Wilson  YOB: 1945    Date of Service:  12/17/2018    Chief Complaint:      Chief Complaint   Patient presents with    Anxiety       HPI:  Blessing Wilson is a 68 y.o. Anxiety:  She's been more anxious daily and more than usual with  being ill and she's now considering starting Zoloft we talked about last time but she is hesitant due to fear of side effects. He BP has been high due to anxiety. She's been on Valium 5 mg 1/4 prn 3-4 times a week.      Hypertension:  Home blood pressure monitoring: Yes - 130-140/70-80 on Atenolol 50 mg PM.  She is adherent to a low sodium diet. Patient denies chest pain, shortness of breath, headache, lightheadedness, blurred vision, peripheral edema, palpitations, dry cough and fatigue.  Antihypertensive medication side effects: no medication side effects noted.  Use of agents associated with hypertension: none.    Bilateral leg edema:  Stable on Lasix 40 mg 1/2 bid. Vit D Def:  Unable to take the Vit D 50,000 due to constipation                                 GERD:  Stable on Prevacid 30 mg bid per Dr. Conception Krabbe  Abd spasm:  Stable on Bentyl 10 mg prn about 2 a month.     Lab Results   Component Value Date    LABMICR SEE BELOW 12/29/2015     Lab Results   Component Value Date     12/21/2017    K 4.1 12/21/2017    CL 98 (L) 12/21/2017    CO2 28 12/21/2017    BUN 7 12/21/2017    CREATININE <0.5 (L) 12/21/2017    GLUCOSE 91 12/21/2017    CALCIUM 9.2 12/21/2017     Lab Results   Component Value Date    CHOL 172 12/21/2017    TRIG 102 12/21/2017    HDL 32 12/21/2017    LDLCALC 120 12/21/2017     Lab Results   Component Value Date    ALT 13 12/21/2017    AST 16 12/21/2017     Lab Results   Component Value Date    TSH 1.38 12/08/2016    TSH 1.25 12/07/2015    T4FREE 1.1 12/08/2016    T4FREE 1.2 12/07/2015     Lab Results   Component Value Date    WBC 9.4 12/21/2017    HGB 13.3 12/21/2017    HCT 38.8 12/21/2017    MCV 90.3 12/21/2017     12/21/2017

## 2019-01-03 ENCOUNTER — OFFICE VISIT (OUTPATIENT)
Dept: INTERNAL MEDICINE CLINIC | Age: 74
End: 2019-01-03
Payer: MEDICARE

## 2019-01-03 VITALS
OXYGEN SATURATION: 99 % | HEART RATE: 113 BPM | SYSTOLIC BLOOD PRESSURE: 152 MMHG | BODY MASS INDEX: 37.56 KG/M2 | DIASTOLIC BLOOD PRESSURE: 88 MMHG | WEIGHT: 212 LBS | HEIGHT: 63 IN

## 2019-01-03 DIAGNOSIS — Z00.00 ROUTINE GENERAL MEDICAL EXAMINATION AT A HEALTH CARE FACILITY: ICD-10-CM

## 2019-01-03 DIAGNOSIS — I10 ESSENTIAL HYPERTENSION: Chronic | ICD-10-CM

## 2019-01-03 DIAGNOSIS — Z00.00 MEDICARE ANNUAL WELLNESS VISIT, SUBSEQUENT: Primary | ICD-10-CM

## 2019-01-03 DIAGNOSIS — K21.00 GASTROESOPHAGEAL REFLUX DISEASE WITH ESOPHAGITIS: ICD-10-CM

## 2019-01-03 DIAGNOSIS — F41.9 ANXIETY: ICD-10-CM

## 2019-01-03 DIAGNOSIS — R60.0 BILATERAL LEG EDEMA: ICD-10-CM

## 2019-01-03 LAB
A/G RATIO: 1.7 (ref 1.1–2.2)
ALBUMIN SERPL-MCNC: 4.5 G/DL (ref 3.4–5)
ALP BLD-CCNC: 96 U/L (ref 40–129)
ALT SERPL-CCNC: 17 U/L (ref 10–40)
ANION GAP SERPL CALCULATED.3IONS-SCNC: 18 MMOL/L (ref 3–16)
AST SERPL-CCNC: 14 U/L (ref 15–37)
BASOPHILS ABSOLUTE: 0.1 K/UL (ref 0–0.2)
BASOPHILS RELATIVE PERCENT: 0.8 %
BILIRUB SERPL-MCNC: 0.5 MG/DL (ref 0–1)
BUN BLDV-MCNC: 9 MG/DL (ref 7–20)
CALCIUM SERPL-MCNC: 9.2 MG/DL (ref 8.3–10.6)
CHLORIDE BLD-SCNC: 101 MMOL/L (ref 99–110)
CHOLESTEROL, TOTAL: 186 MG/DL (ref 0–199)
CO2: 28 MMOL/L (ref 21–32)
CREAT SERPL-MCNC: <0.5 MG/DL (ref 0.6–1.2)
EOSINOPHILS ABSOLUTE: 0.1 K/UL (ref 0–0.6)
EOSINOPHILS RELATIVE PERCENT: 1 %
GFR AFRICAN AMERICAN: >60
GFR NON-AFRICAN AMERICAN: >60
GLOBULIN: 2.7 G/DL
GLUCOSE BLD-MCNC: 96 MG/DL (ref 70–99)
HCT VFR BLD CALC: 43.7 % (ref 36–48)
HDLC SERPL-MCNC: 38 MG/DL (ref 40–60)
HEMOGLOBIN: 14.5 G/DL (ref 12–16)
LDL CHOLESTEROL CALCULATED: 133 MG/DL
LYMPHOCYTES ABSOLUTE: 2.3 K/UL (ref 1–5.1)
LYMPHOCYTES RELATIVE PERCENT: 24.8 %
MCH RBC QN AUTO: 30.1 PG (ref 26–34)
MCHC RBC AUTO-ENTMCNC: 33.2 G/DL (ref 31–36)
MCV RBC AUTO: 90.6 FL (ref 80–100)
MONOCYTES ABSOLUTE: 0.4 K/UL (ref 0–1.3)
MONOCYTES RELATIVE PERCENT: 4.7 %
NEUTROPHILS ABSOLUTE: 6.3 K/UL (ref 1.7–7.7)
NEUTROPHILS RELATIVE PERCENT: 68.7 %
PDW BLD-RTO: 13.3 % (ref 12.4–15.4)
PLATELET # BLD: 279 K/UL (ref 135–450)
PMV BLD AUTO: 9.3 FL (ref 5–10.5)
POTASSIUM SERPL-SCNC: 3.5 MMOL/L (ref 3.5–5.1)
RBC # BLD: 4.82 M/UL (ref 4–5.2)
SODIUM BLD-SCNC: 147 MMOL/L (ref 136–145)
TOTAL PROTEIN: 7.2 G/DL (ref 6.4–8.2)
TRIGL SERPL-MCNC: 75 MG/DL (ref 0–150)
VLDLC SERPL CALC-MCNC: 15 MG/DL
WBC # BLD: 9.1 K/UL (ref 4–11)

## 2019-01-03 PROCEDURE — G0439 PPPS, SUBSEQ VISIT: HCPCS | Performed by: INTERNAL MEDICINE

## 2019-01-03 PROCEDURE — 99214 OFFICE O/P EST MOD 30 MIN: CPT | Performed by: INTERNAL MEDICINE

## 2019-01-03 PROCEDURE — 36415 COLL VENOUS BLD VENIPUNCTURE: CPT | Performed by: INTERNAL MEDICINE

## 2019-01-03 RX ORDER — ATENOLOL 50 MG/1
50 TABLET ORAL DAILY
Qty: 90 TABLET | Refills: 3 | Status: SHIPPED | OUTPATIENT
Start: 2019-01-03 | End: 2019-06-18

## 2019-01-03 RX ORDER — FUROSEMIDE 40 MG/1
40 TABLET ORAL DAILY
Qty: 90 TABLET | Refills: 3 | Status: SHIPPED | OUTPATIENT
Start: 2019-01-03 | End: 2020-01-07 | Stop reason: SDUPTHER

## 2019-01-03 ASSESSMENT — LIFESTYLE VARIABLES: HOW OFTEN DO YOU HAVE A DRINK CONTAINING ALCOHOL: 0

## 2019-01-03 ASSESSMENT — PATIENT HEALTH QUESTIONNAIRE - PHQ9
SUM OF ALL RESPONSES TO PHQ QUESTIONS 1-9: 2
SUM OF ALL RESPONSES TO PHQ QUESTIONS 1-9: 2

## 2019-01-03 ASSESSMENT — ANXIETY QUESTIONNAIRES: GAD7 TOTAL SCORE: 6

## 2019-02-05 ENCOUNTER — OFFICE VISIT (OUTPATIENT)
Dept: INTERNAL MEDICINE CLINIC | Age: 74
End: 2019-02-05
Payer: MEDICARE

## 2019-02-05 VITALS
OXYGEN SATURATION: 98 % | SYSTOLIC BLOOD PRESSURE: 150 MMHG | HEIGHT: 63 IN | BODY MASS INDEX: 38.09 KG/M2 | HEART RATE: 84 BPM | DIASTOLIC BLOOD PRESSURE: 86 MMHG | WEIGHT: 215 LBS

## 2019-02-05 DIAGNOSIS — R60.0 BILATERAL LEG EDEMA: ICD-10-CM

## 2019-02-05 DIAGNOSIS — F41.9 ANXIETY: Primary | ICD-10-CM

## 2019-02-05 DIAGNOSIS — I10 ESSENTIAL HYPERTENSION: Chronic | ICD-10-CM

## 2019-02-05 DIAGNOSIS — K21.00 GASTROESOPHAGEAL REFLUX DISEASE WITH ESOPHAGITIS: ICD-10-CM

## 2019-02-05 PROCEDURE — 99214 OFFICE O/P EST MOD 30 MIN: CPT | Performed by: INTERNAL MEDICINE

## 2019-02-05 RX ORDER — PROMETHAZINE HYDROCHLORIDE 25 MG/1
25 TABLET ORAL EVERY 6 HOURS PRN
COMMUNITY
End: 2019-11-26

## 2019-02-18 ENCOUNTER — TELEPHONE (OUTPATIENT)
Dept: INTERNAL MEDICINE CLINIC | Age: 74
End: 2019-02-18

## 2019-02-20 ENCOUNTER — OFFICE VISIT (OUTPATIENT)
Dept: INTERNAL MEDICINE CLINIC | Age: 74
End: 2019-02-20
Payer: MEDICARE

## 2019-02-20 VITALS
BODY MASS INDEX: 38.27 KG/M2 | SYSTOLIC BLOOD PRESSURE: 132 MMHG | WEIGHT: 216 LBS | HEART RATE: 71 BPM | OXYGEN SATURATION: 98 % | DIASTOLIC BLOOD PRESSURE: 76 MMHG | HEIGHT: 63 IN

## 2019-02-20 DIAGNOSIS — F41.9 ANXIETY: ICD-10-CM

## 2019-02-20 PROCEDURE — 99213 OFFICE O/P EST LOW 20 MIN: CPT | Performed by: INTERNAL MEDICINE

## 2019-03-19 ENCOUNTER — OFFICE VISIT (OUTPATIENT)
Dept: INTERNAL MEDICINE CLINIC | Age: 74
End: 2019-03-19
Payer: MEDICARE

## 2019-03-19 VITALS
HEART RATE: 76 BPM | DIASTOLIC BLOOD PRESSURE: 80 MMHG | BODY MASS INDEX: 38.52 KG/M2 | SYSTOLIC BLOOD PRESSURE: 150 MMHG | OXYGEN SATURATION: 97 % | WEIGHT: 214 LBS

## 2019-03-19 DIAGNOSIS — M54.31 RIGHT SIDED SCIATICA: ICD-10-CM

## 2019-03-19 DIAGNOSIS — F41.9 ANXIETY: Primary | ICD-10-CM

## 2019-03-19 PROCEDURE — 99213 OFFICE O/P EST LOW 20 MIN: CPT | Performed by: INTERNAL MEDICINE

## 2019-04-15 ENCOUNTER — OFFICE VISIT (OUTPATIENT)
Dept: INTERNAL MEDICINE CLINIC | Age: 74
End: 2019-04-15
Payer: MEDICARE

## 2019-04-15 VITALS
WEIGHT: 215 LBS | BODY MASS INDEX: 38.09 KG/M2 | HEART RATE: 74 BPM | OXYGEN SATURATION: 98 % | HEIGHT: 63 IN | TEMPERATURE: 97.9 F | SYSTOLIC BLOOD PRESSURE: 144 MMHG | DIASTOLIC BLOOD PRESSURE: 72 MMHG

## 2019-04-15 DIAGNOSIS — I10 ESSENTIAL HYPERTENSION: Chronic | ICD-10-CM

## 2019-04-15 DIAGNOSIS — F41.9 ANXIETY: Primary | ICD-10-CM

## 2019-04-15 DIAGNOSIS — J06.9 URI, ACUTE: ICD-10-CM

## 2019-04-15 PROCEDURE — 99214 OFFICE O/P EST MOD 30 MIN: CPT | Performed by: INTERNAL MEDICINE

## 2019-04-15 RX ORDER — LEVOFLOXACIN 500 MG/1
500 TABLET, FILM COATED ORAL DAILY
Qty: 7 TABLET | Refills: 0 | Status: SHIPPED | OUTPATIENT
Start: 2019-04-15 | End: 2019-04-22

## 2019-04-15 RX ORDER — AZITHROMYCIN 250 MG/1
250 TABLET, FILM COATED ORAL SEE ADMIN INSTRUCTIONS
Qty: 6 TABLET | Refills: 0 | Status: SHIPPED | OUTPATIENT
Start: 2019-04-15 | End: 2019-04-15 | Stop reason: SINTOL

## 2019-04-15 RX ORDER — SERTRALINE HYDROCHLORIDE 100 MG/1
100 TABLET, FILM COATED ORAL DAILY
Qty: 30 TABLET | Refills: 0 | Status: SHIPPED | OUTPATIENT
Start: 2019-04-15 | End: 2019-05-20 | Stop reason: SDUPTHER

## 2019-04-15 NOTE — PROGRESS NOTES
Santosh Ortez  YOB: 1945    Date of Service:  4/15/2019    Chief Complaint:      Chief Complaint   Patient presents with    Anxiety    Cough    Otalgia     bilateral        HPI:  Santosh Ortez is a 68 y.o. She also complains of coughing up some greenish phlegm for 3 days and postnasal drainage. No fever or chills. Been taking Robitussin prn and Nasacort and Astelin prn. Anxiety:  improved on Zoloft 50 mg 1 1/2 qd and would like to increase dose since still feel anxious but not last as long as it used to since being on medication. Still has Valium 5 mg 1/4 prn        She complains of flare of right buttocks pain shooting down right leg for the past 3 days with helping lift her son and going to grocery more often. She gets this intermittently about 3x a year. She can't take prednisone, NSAID or flexeril due to side effects. She can take Tyl but not beeen using it.     Hypertension:  Home blood pressure monitoring: No on Atenolol 50 mg PM.  She is adherent to a low sodium diet. Patient denies chest pain, shortness of breath, headache, lightheadedness, blurred vision, peripheral edema, palpitations, dry cough and fatigue.  Antihypertensive medication side effects: no medication side effects noted.  Use of agents associated with hypertension: none.    Bilateral leg edema:  Stable on Lasix 40 mg 1/2-1 qd.   Vit D Def:  Unable to take the Vit D 50,000 due to constipation                                 GERD:  Stable on Prevacid 30 mg bid but will change to OTC Nexium 20 mg bid and Carafate and phenergan 25 mg prn per Dr. Edilia Jimenez  The 10-year ASCVD risk score (Binu Hartley, et al., 2013) is: 23.6%    Lab Results   Component Value Date    LABMICR SEE BELOW 12/29/2015     Lab Results   Component Value Date     (H) 01/03/2019    K 3.5 01/03/2019     01/03/2019    CO2 28 01/03/2019    BUN 9 01/03/2019    CREATININE <0.5 (L) 01/03/2019    GLUCOSE 96 01/03/2019    CALCIUM 9.2 01/03/2019 Lab Results   Component Value Date    CHOL 186 01/03/2019    TRIG 75 01/03/2019    HDL 38 01/03/2019    LDLCALC 133 01/03/2019     Lab Results   Component Value Date    ALT 17 01/03/2019    AST 14 (L) 01/03/2019     Lab Results   Component Value Date    TSH 1.38 12/08/2016    TSH 1.25 12/07/2015    T4FREE 1.1 12/08/2016    T4FREE 1.2 12/07/2015     Lab Results   Component Value Date    WBC 9.1 01/03/2019    HGB 14.5 01/03/2019    HCT 43.7 01/03/2019    MCV 90.6 01/03/2019     01/03/2019     Lab Results   Component Value Date    INR 1.11 08/03/2017      No results found for: PSA   No results found for: [facility]     Patient Active Problem List   Diagnosis    Gastroesophageal reflux disease with esophagitis    Essential hypertension    H/O malignant neoplasm of breast    Allergic reaction    Anxiety attack    Pes anserine bursitis    Right knee pain    Tear of right rotator cuff    Shoulder impingement syndrome    Shoulder pain, right    Peripheral neuropathic pain    Vitamin D deficiency    Environmental and seasonal allergies    Bilateral leg edema    Other osteoporosis without current pathological fracture    Anxiety       Allergies   Allergen Reactions    Actifed Cold-Allergy [Chlorpheniramine-Phenylephrine] Other (See Comments)     Tachy,head rush    Medrol [Methylprednisolone]      Chest pain    Prednisone      Chest pain    Amoxicillin      Had hives when taken with Lisinopril and not sure what caused what since she was on both in the past for many years    Antihistamines, Chlorpheniramine-Type     Benadryl [Diphenhydramine]     Ceftin [Cefuroxime]      bleeding    Cefuroxime Axetil Diarrhea    Desonide Crea-Wound Dress Crea     Iv Contrast [Iodides] Hives    Lisinopril Hives    Naprelan [Naproxen]     Norvasc [Amlodipine Besylate] Swelling    Other      Sultin vaginal cream    Pcn [Penicillins]     Singulair [Montelukast Sodium]     Sudafed [Pseudoephedrine Hcl] Other (See Comments)     Tachy,head iqbal    Tape Donzell Cobb Tape]     Augmentin [Amoxicillin-Pot Clavulanate] Rash    Augmentin [Amoxicillin-Pot Clavulanate]      Pt states it makes her break out in red patches    Keflex [Cephalexin] Rash    Lidocaine Anxiety    Zithromax [Azithromycin]      Pt states it makes her break out in red patches  It has the least reaction  And she still can take it. Outpatient Medications Marked as Taking for the 4/15/19 encounter (Office Visit) with Tasha Chen MD   Medication Sig Dispense Refill    sertraline (ZOLOFT) 50 MG tablet Take 1 tablet by mouth 2 times daily 60 tablet 0    promethazine (PHENERGAN) 25 MG tablet Take 25 mg by mouth every 6 hours as needed for Nausea      atenolol (TENORMIN) 50 MG tablet Take 1 tablet by mouth daily 90 tablet 3    furosemide (LASIX) 40 MG tablet Take 1 tablet by mouth daily 90 tablet 3    triamcinolone (NASACORT) 55 MCG/ACT nasal inhaler 2 sprays by Nasal route daily      Cholecalciferol (VITAMIN D3) 1000 units TABS Take by mouth      lansoprazole (PREVACID) 30 MG delayed release capsule Take 30 mg by mouth 2 times daily       diazepam (VALIUM) 5 MG tablet Take 1 tablet by mouth every 8 hours as needed for Anxiety or Sleep 60 tablet 0    acetaminophen (TYLENOL) 325 MG tablet Take 650 mg by mouth every 6 hours as needed for Pain      sucralfate (CARAFATE) 1 GM tablet Take 1 tablet by mouth 4 times daily 120 tablet 3    docusate sodium (COLACE) 100 MG capsule Take 100 mg by mouth daily.  azelastine (ASTELIN) 137 MCG/SPRAY nasal spray 1 spray by Nasal route 2 times daily. Use in each nostril as directed (Patient taking differently: 1 spray by Nasal route as needed Use in each nostril as directed) 1 Bottle 5         Review of Systems: 14 systems were negative except of what was stated on HPI    Nursing note and vitals reviewed.     Vitals:    04/15/19 1028   BP: (!) 144/72   Pulse: 74   Temp: 97.9 °F (36.6 °C)   TempSrc: Oral SpO2: 98%   Weight: 215 lb (97.5 kg)   Height: 5' 2.5\" (1.588 m)     Wt Readings from Last 3 Encounters:   04/15/19 215 lb (97.5 kg)   03/19/19 214 lb (97.1 kg)   02/20/19 216 lb (98 kg)     BP Readings from Last 3 Encounters:   04/15/19 (!) 144/72   03/19/19 (!) 150/80   02/20/19 132/76     Body mass index is 38.7 kg/m². Constitutional: Patient appears well-developed and well-nourished. No distress. Head: Normocephalic and atraumatic. Neck: Normal range of motion. Neck supple. No thyroidmegaly. Cardiovascular: Normal rate, regular rhythm, normal heart sounds and intact distal pulses. Pulmonary/Chest: Effort normal and breath sounds normal. No stridor. No respiratory distress. No wheezes and no rales. Abdominal: Soft. Bowel sounds are normal. No distension and no mass. No tenderness. No rebound and no guarding. Musculoskeletal: No edema and no tenderness. Skin: No rash or erythema. Psychiatric: Normal mood and affect. Behavior is normal.     Assessment/Plan:  Yunior Mendoza was seen today for anxiety, cough and otalgia. Diagnoses and all orders for this visit:    Anxiety  Increase sertraline (ZOLOFT) 100 MG tablet; Take 1 tablet by mouth daily    Essential hypertension  Continue Atenolol 50 mg qd    URI, acute  -     Discontinue: azithromycin (ZITHROMAX) 250 MG tablet; Take 1 tablet by mouth See Admin Instructions for 5 days 500mg on day 1 followed by 250mg on days 2 - 5  -     levofloxacin (LEVAQUIN) 500 MG tablet; Take 1 tablet by mouth daily for 7 days        Return 1 month on anxiety and BP.

## 2019-05-20 ENCOUNTER — OFFICE VISIT (OUTPATIENT)
Dept: INTERNAL MEDICINE CLINIC | Age: 74
End: 2019-05-20
Payer: MEDICARE

## 2019-05-20 VITALS
WEIGHT: 214.8 LBS | DIASTOLIC BLOOD PRESSURE: 88 MMHG | SYSTOLIC BLOOD PRESSURE: 172 MMHG | BODY MASS INDEX: 38.06 KG/M2 | HEIGHT: 63 IN | OXYGEN SATURATION: 95 % | HEART RATE: 84 BPM

## 2019-05-20 DIAGNOSIS — F41.9 ANXIETY: Primary | ICD-10-CM

## 2019-05-20 DIAGNOSIS — I10 ESSENTIAL HYPERTENSION: Chronic | ICD-10-CM

## 2019-05-20 PROCEDURE — 99213 OFFICE O/P EST LOW 20 MIN: CPT | Performed by: INTERNAL MEDICINE

## 2019-05-20 RX ORDER — SERTRALINE HYDROCHLORIDE 100 MG/1
100 TABLET, FILM COATED ORAL 2 TIMES DAILY
Qty: 60 TABLET | Refills: 0 | Status: SHIPPED | OUTPATIENT
Start: 2019-05-20 | End: 2019-06-18 | Stop reason: SDUPTHER

## 2019-05-20 NOTE — PROGRESS NOTES
Xiomara Plata  YOB: 1945    Date of Service:  5/20/2019    Chief Complaint:      Chief Complaint   Patient presents with    Anxiety     pt is herefor 1 mo f/u     Hypertension       HPI:  Xiomara Plata is a 68 y.o. Anxiety:  improved on Zoloft 100 mg qhs since she thinks it may make her tired.  Still has Valium 5 mg 1/4 prn        She complains of flare of right buttocks pain shooting down right leg for the past 3 days with helping lift her son and going to grocery more often. Aydin Goldberg gets this intermittently about 3x a year. Aydin Goldberg can't take prednisone, NSAID or flexeril due to side effects.  She can take Tyl but not beeen using it.     Hypertension:  Home blood pressure monitoring: No on Atenolol 50 mg PM.  She is adherent to a low sodium diet. Patient denies chest pain, shortness of breath, headache, lightheadedness, blurred vision, peripheral edema, palpitations, dry cough and fatigue.  Antihypertensive medication side effects: no medication side effects noted.  Use of agents associated with hypertension: none.    Bilateral leg edema:  Stable on Lasix 40 mg 1/2-1 qd.   Vit D Def:  Unable to take the Vit D 50,000 due to constipation                                 GERD:  Stable on Prevacid 30 mg bid but will change to OTC Nexium 20 mg bid and Carafate and phenergan 25 mg prn per Dr. Tanisha Kelley    Lab Results   Component Value Date    LABMICR SEE BELOW 12/29/2015     Lab Results   Component Value Date     (H) 01/03/2019    K 3.5 01/03/2019     01/03/2019    CO2 28 01/03/2019    BUN 9 01/03/2019    CREATININE <0.5 (L) 01/03/2019    GLUCOSE 96 01/03/2019    CALCIUM 9.2 01/03/2019     Lab Results   Component Value Date    CHOL 186 01/03/2019    TRIG 75 01/03/2019    HDL 38 01/03/2019    LDLCALC 133 01/03/2019     Lab Results   Component Value Date    ALT 17 01/03/2019    AST 14 (L) 01/03/2019     Lab Results   Component Value Date    TSH 1.38 12/08/2016    TSH 1.25 12/07/2015    T4FREE 1.1 12/08/2016    T4FREE 1.2 12/07/2015     Lab Results   Component Value Date    WBC 9.1 01/03/2019    HGB 14.5 01/03/2019    HCT 43.7 01/03/2019    MCV 90.6 01/03/2019     01/03/2019     Lab Results   Component Value Date    INR 1.11 08/03/2017      No results found for: PSA   No results found for: [facility]     Patient Active Problem List   Diagnosis    Gastroesophageal reflux disease with esophagitis    Essential hypertension    H/O malignant neoplasm of breast    Allergic reaction    Anxiety attack    Pes anserine bursitis    Right knee pain    Tear of right rotator cuff    Shoulder impingement syndrome    Shoulder pain, right    Peripheral neuropathic pain    Vitamin D deficiency    Environmental and seasonal allergies    Bilateral leg edema    Other osteoporosis without current pathological fracture    Anxiety       Allergies   Allergen Reactions    Actifed Cold-Allergy [Chlorpheniramine-Phenylephrine] Other (See Comments)     Tachy,head rush    Medrol [Methylprednisolone]      Chest pain    Prednisone      Chest pain    Amoxicillin      Had hives when taken with Lisinopril and not sure what caused what since she was on both in the past for many years    Antihistamines, Chlorpheniramine-Type     Benadryl [Diphenhydramine]     Ceftin [Cefuroxime]      bleeding    Cefuroxime Axetil Diarrhea    Desonide Crea-Wound Dress Crea     Iv Contrast [Iodides] Hives    Lisinopril Hives    Naprelan [Naproxen]     Norvasc [Amlodipine Besylate] Swelling    Other      Sultin vaginal cream    Pcn [Penicillins]     Singulair [Montelukast Sodium]     Sudafed [Pseudoephedrine Hcl] Other (See Comments)     Borishead rush    Tape [Adhesive Tape]     Augmentin [Amoxicillin-Pot Clavulanate] Rash    Augmentin [Amoxicillin-Pot Clavulanate]      Pt states it makes her break out in red patches    Keflex [Cephalexin] Rash    Lidocaine Anxiety    Zithromax [Azithromycin]      Pt states it makes her break out in red patches  It has the least reaction  And she still can take it. Outpatient Medications Marked as Taking for the 5/20/19 encounter (Office Visit) with Antoni Dominguez MD   Medication Sig Dispense Refill    sertraline (ZOLOFT) 100 MG tablet Take 1 tablet by mouth daily 30 tablet 0    atenolol (TENORMIN) 50 MG tablet Take 1 tablet by mouth daily 90 tablet 3    furosemide (LASIX) 40 MG tablet Take 1 tablet by mouth daily 90 tablet 3    Cholecalciferol (VITAMIN D3) 1000 units TABS Take by mouth      lansoprazole (PREVACID) 30 MG delayed release capsule Take 30 mg by mouth 2 times daily       diazepam (VALIUM) 5 MG tablet Take 1 tablet by mouth every 8 hours as needed for Anxiety or Sleep 60 tablet 0    acetaminophen (TYLENOL) 325 MG tablet Take 650 mg by mouth every 6 hours as needed for Pain      sucralfate (CARAFATE) 1 GM tablet Take 1 tablet by mouth 4 times daily 120 tablet 3    docusate sodium (COLACE) 100 MG capsule Take 100 mg by mouth daily.  azelastine (ASTELIN) 137 MCG/SPRAY nasal spray 1 spray by Nasal route 2 times daily. Use in each nostril as directed (Patient taking differently: 1 spray by Nasal route as needed Use in each nostril as directed) 1 Bottle 5         Review of Systems: 14 systems were negative except of what was stated on HPI    Nursing note and vitals reviewed. Vitals:    05/20/19 1125   BP: (!) 172/88   Pulse: 84   SpO2: 95%   Weight: 214 lb 12.8 oz (97.4 kg)   Height: 5' 2.5\" (1.588 m)     Wt Readings from Last 3 Encounters:   05/20/19 214 lb 12.8 oz (97.4 kg)   04/15/19 215 lb (97.5 kg)   03/19/19 214 lb (97.1 kg)     BP Readings from Last 3 Encounters:   05/20/19 (!) 172/88   04/15/19 (!) 144/72   03/19/19 (!) 150/80     Body mass index is 38.66 kg/m². Constitutional: Patient appears well-developed and well-nourished. No distress. Head: Normocephalic and atraumatic. Neck: Normal range of motion. Neck supple. No thyroidmegaly.    Cardiovascular: Normal rate, regular rhythm, normal heart sounds and intact distal pulses. Pulmonary/Chest: Effort normal and breath sounds normal. No stridor. No respiratory distress. No wheezes and no rales. Abdominal: Soft. Bowel sounds are normal. No distension and no mass. No tenderness. No rebound and no guarding. Musculoskeletal: No edema and no tenderness. Skin: No rash or erythema. Psychiatric: Normal mood and affect. Behavior is normal.     Assessment/Plan:  Medardo Most was seen today for anxiety and hypertension. Diagnoses and all orders for this visit:    Anxiety  Increase sertraline (ZOLOFT) 100 MG tablet; Take 1/2-1 tablet by mouth 2 times daily    Essential hypertension  If still very high due to stress and anxiety, can increase atenolol 50 mg 1/2 during the day      Return 1 month on anxiety and BP.

## 2019-05-20 NOTE — PROGRESS NOTES
Called Pharm[facility] with prescription info & verbal to fill as prescribed here if not received electronically

## 2019-06-18 ENCOUNTER — OFFICE VISIT (OUTPATIENT)
Dept: INTERNAL MEDICINE CLINIC | Age: 74
End: 2019-06-18
Payer: MEDICARE

## 2019-06-18 VITALS
HEIGHT: 63 IN | WEIGHT: 213 LBS | SYSTOLIC BLOOD PRESSURE: 160 MMHG | OXYGEN SATURATION: 98 % | DIASTOLIC BLOOD PRESSURE: 80 MMHG | HEART RATE: 90 BPM | BODY MASS INDEX: 37.74 KG/M2

## 2019-06-18 DIAGNOSIS — I10 ESSENTIAL HYPERTENSION: Chronic | ICD-10-CM

## 2019-06-18 DIAGNOSIS — F41.9 ANXIETY: ICD-10-CM

## 2019-06-18 PROCEDURE — 99213 OFFICE O/P EST LOW 20 MIN: CPT | Performed by: INTERNAL MEDICINE

## 2019-06-18 RX ORDER — ATENOLOL 100 MG/1
100 TABLET ORAL DAILY
Qty: 30 TABLET | Refills: 0 | Status: SHIPPED | OUTPATIENT
Start: 2019-06-18 | End: 2019-07-23 | Stop reason: SDUPTHER

## 2019-06-18 RX ORDER — SERTRALINE HYDROCHLORIDE 100 MG/1
100 TABLET, FILM COATED ORAL 2 TIMES DAILY
Qty: 180 TABLET | Refills: 0 | Status: SHIPPED | OUTPATIENT
Start: 2019-06-18 | End: 2019-09-18 | Stop reason: SDUPTHER

## 2019-06-18 NOTE — PROGRESS NOTES
Problem List   Diagnosis    Gastroesophageal reflux disease with esophagitis    Essential hypertension    H/O malignant neoplasm of breast    Allergic reaction    Anxiety attack    Pes anserine bursitis    Right knee pain    Tear of right rotator cuff    Shoulder impingement syndrome    Shoulder pain, right    Peripheral neuropathic pain    Vitamin D deficiency    Environmental and seasonal allergies    Bilateral leg edema    Other osteoporosis without current pathological fracture    Anxiety       Allergies   Allergen Reactions    Actifed Cold-Allergy [Chlorpheniramine-Phenylephrine] Other (See Comments)     Tachalicia,head rush    Medrol [Methylprednisolone]      Chest pain    Prednisone      Chest pain    Amoxicillin      Had hives when taken with Lisinopril and not sure what caused what since she was on both in the past for many years    Antihistamines, Chlorpheniramine-Type     Benadryl [Diphenhydramine]     Ceftin [Cefuroxime]      bleeding    Cefuroxime Axetil Diarrhea    Desonide Crea-Wound Dress Crea     Iv Contrast [Iodides] Hives    Lisinopril Hives    Naprelan [Naproxen]     Norvasc [Amlodipine Besylate] Swelling    Other      Sultin vaginal cream    Pcn [Penicillins]     Singulair [Montelukast Sodium]     Sudafed [Pseudoephedrine Hcl] Other (See Comments)     Borishead rush    Tape [Adhesive Tape]     Augmentin [Amoxicillin-Pot Clavulanate] Rash    Augmentin [Amoxicillin-Pot Clavulanate]      Pt states it makes her break out in red patches    Keflex [Cephalexin] Rash    Lidocaine Anxiety    Zithromax [Azithromycin]      Pt states it makes her break out in red patches  It has the least reaction  And she still can take it.      Outpatient Medications Marked as Taking for the 6/18/19 encounter (Office Visit) with Steven Dominguez MD   Medication Sig Dispense Refill    sertraline (ZOLOFT) 100 MG tablet Take 1 tablet by mouth 2 times daily 60 tablet 0    promethazine (PHENERGAN) 25 MG tablet Take 25 mg by mouth every 6 hours as needed for Nausea      atenolol (TENORMIN) 50 MG tablet Take 1 tablet by mouth daily 90 tablet 3    furosemide (LASIX) 40 MG tablet Take 1 tablet by mouth daily 90 tablet 3    triamcinolone (NASACORT) 55 MCG/ACT nasal inhaler 2 sprays by Nasal route daily      Cholecalciferol (VITAMIN D3) 1000 units TABS Take by mouth      lansoprazole (PREVACID) 30 MG delayed release capsule Take 30 mg by mouth 2 times daily       diazepam (VALIUM) 5 MG tablet Take 1 tablet by mouth every 8 hours as needed for Anxiety or Sleep 60 tablet 0    acetaminophen (TYLENOL) 325 MG tablet Take 650 mg by mouth every 6 hours as needed for Pain      sucralfate (CARAFATE) 1 GM tablet Take 1 tablet by mouth 4 times daily 120 tablet 3    docusate sodium (COLACE) 100 MG capsule Take 100 mg by mouth daily.  azelastine (ASTELIN) 137 MCG/SPRAY nasal spray 1 spray by Nasal route 2 times daily. Use in each nostril as directed (Patient taking differently: 1 spray by Nasal route as needed Use in each nostril as directed) 1 Bottle 5         Review of Systems: 14 systems were negative except of what was stated on HPI    Nursing note and vitals reviewed. Vitals:    06/18/19 1017 06/18/19 1034   BP: (!) 158/98 (!) 160/80   Pulse: 90    SpO2: 98%    Weight: 213 lb (96.6 kg)    Height: 5' 2.5\" (1.588 m)      Wt Readings from Last 3 Encounters:   06/18/19 213 lb (96.6 kg)   05/20/19 214 lb 12.8 oz (97.4 kg)   04/15/19 215 lb (97.5 kg)     BP Readings from Last 3 Encounters:   06/18/19 (!) 158/98   05/20/19 (!) 172/88   04/15/19 (!) 144/72     Body mass index is 38.34 kg/m². Constitutional: Patient appears well-developed and well-nourished. No distress. Head: Normocephalic and atraumatic. Neck: Normal range of motion. Neck supple. No thyroidmegaly. Cardiovascular: Normal rate, regular rhythm, normal heart sounds and intact distal pulses.    Pulmonary/Chest: Effort normal and breath sounds normal. No stridor. No respiratory distress. No wheezes and no rales. Abdominal: Soft. Bowel sounds are normal. No distension and no mass. No tenderness. No rebound and no guarding. Musculoskeletal: No edema and no tenderness. Skin: No rash or erythema. Psychiatric: Normal mood and affect. Behavior is normal.     Assessment/Plan:  Sera Henderson was seen today for anxiety and hypertension. Diagnoses and all orders for this visit:    Anxiety  Increase sertraline (ZOLOFT) 100 MG tablet; Take 1 tablet by mouth 2 times daily    Essential hypertension  Increase atenolol (TENORMIN) 100 MG tablet; Take 1 tablet by mouth daily        Return 1 month on anxiety and BP.

## 2019-07-01 ENCOUNTER — TELEPHONE (OUTPATIENT)
Dept: INTERNAL MEDICINE CLINIC | Age: 74
End: 2019-07-01

## 2019-07-23 ENCOUNTER — OFFICE VISIT (OUTPATIENT)
Dept: INTERNAL MEDICINE CLINIC | Age: 74
End: 2019-07-23
Payer: MEDICARE

## 2019-07-23 VITALS
WEIGHT: 212 LBS | OXYGEN SATURATION: 98 % | DIASTOLIC BLOOD PRESSURE: 90 MMHG | BODY MASS INDEX: 37.56 KG/M2 | HEART RATE: 67 BPM | HEIGHT: 63 IN | SYSTOLIC BLOOD PRESSURE: 152 MMHG

## 2019-07-23 DIAGNOSIS — F41.9 ANXIETY: ICD-10-CM

## 2019-07-23 DIAGNOSIS — I10 ESSENTIAL HYPERTENSION: Primary | ICD-10-CM

## 2019-07-23 PROCEDURE — 99213 OFFICE O/P EST LOW 20 MIN: CPT | Performed by: INTERNAL MEDICINE

## 2019-07-23 RX ORDER — ATENOLOL 100 MG/1
100 TABLET ORAL DAILY
Qty: 90 TABLET | Refills: 0 | Status: SHIPPED | OUTPATIENT
Start: 2019-07-23 | End: 2019-10-24 | Stop reason: SDUPTHER

## 2019-07-23 RX ORDER — BUSPIRONE HYDROCHLORIDE 5 MG/1
5 TABLET ORAL 2 TIMES DAILY
Qty: 60 TABLET | Refills: 0 | Status: SHIPPED | OUTPATIENT
Start: 2019-07-23 | End: 2019-10-12 | Stop reason: SDUPTHER

## 2019-07-23 NOTE — PROGRESS NOTES
Efrem Casanova  YOB: 1945    Date of Service:  2019    Chief Complaint:      Chief Complaint   Patient presents with    Anxiety    Hypertension       HPI:  Efrem Casanova is a 68 y.o. Anxiety:  improved but still anxious on Zoloft 100 mg 1/2 AM and 1 qhs.  Still has Valium 5 mg 1/4 prn         Hypertension:  Home blood pressure monitorin-180/76-84 on Atenolol 50 mg 1 1/2 PM.  She is adherent to a low sodium diet. Patient denies chest pain, shortness of breath, headache, lightheadedness, blurred vision, peripheral edema, palpitations, dry cough and fatigue.  Antihypertensive medication side effects: no medication side effects noted.  Use of agents associated with hypertension: none.    Bilateral leg edema:  Stable on Lasix 40 mg 1/2 qd.   Vit D Def:  Unable to take the Vit D 50,000 due to constipation                                 GERD:  Stable on Prevacid 30 mg bid but will change to OTC Nexium 20 mg bid and Carafate and phenergan 25 mg prn per Dr. Thelma Luciano    Lab Results   Component Value Date    LABMICR SEE BELOW 2015     Lab Results   Component Value Date     (H) 2019    K 3.5 2019     2019    CO2 28 2019    BUN 9 2019    CREATININE <0.5 (L) 2019    GLUCOSE 96 2019    CALCIUM 9.2 2019     Lab Results   Component Value Date    CHOL 186 2019    TRIG 75 2019    HDL 38 2019    LDLCALC 133 2019     Lab Results   Component Value Date    ALT 17 2019    AST 14 (L) 2019     Lab Results   Component Value Date    TSH 1.38 2016    TSH 1.25 2015    T4FREE 1.1 2016    T4FREE 1.2 2015     Lab Results   Component Value Date    WBC 9.1 2019    HGB 14.5 2019    HCT 43.7 2019    MCV 90.6 2019     2019     Lab Results   Component Value Date    INR 1.11 2017      No results found for: PSA   No results found for: [facility]     Patient Active

## 2019-08-22 ENCOUNTER — OFFICE VISIT (OUTPATIENT)
Dept: INTERNAL MEDICINE CLINIC | Age: 74
End: 2019-08-22
Payer: MEDICARE

## 2019-08-22 VITALS
OXYGEN SATURATION: 98 % | HEIGHT: 63 IN | WEIGHT: 215 LBS | DIASTOLIC BLOOD PRESSURE: 86 MMHG | BODY MASS INDEX: 38.09 KG/M2 | HEART RATE: 72 BPM | SYSTOLIC BLOOD PRESSURE: 138 MMHG

## 2019-08-22 DIAGNOSIS — I10 ESSENTIAL HYPERTENSION: Chronic | ICD-10-CM

## 2019-08-22 DIAGNOSIS — M79.672 FOOT PAIN, LEFT: ICD-10-CM

## 2019-08-22 DIAGNOSIS — R60.0 BILATERAL LEG EDEMA: ICD-10-CM

## 2019-08-22 DIAGNOSIS — F41.9 ANXIETY: Primary | ICD-10-CM

## 2019-08-22 PROCEDURE — 36415 COLL VENOUS BLD VENIPUNCTURE: CPT | Performed by: INTERNAL MEDICINE

## 2019-08-22 PROCEDURE — 99214 OFFICE O/P EST MOD 30 MIN: CPT | Performed by: INTERNAL MEDICINE

## 2019-08-22 NOTE — PROGRESS NOTES
Henok Jay  YOB: 1945    Date of Service:  2019    Chief Complaint:      Chief Complaint   Patient presents with    Anxiety    Hypertension       HPI:  Henok Jay is a 68 y.o. She complains of left foot swelling for 2-3 months and not better with increasing Lasix 40 mg 1 qd so now she's back to 1/2 qd. She's not been taking any NSAID. She was on vacation and was eating more beef than usual.    Anxiety:  still anxious on Zoloft 100 mg 1/2 AM and 1 qhs since can't increase dose anymore. She has not started on Buspar 5 mg yet.  Still has Valium 5 mg 1/4 prn         Hypertension:  Home blood pressure monitorin/62-83 on Atenolol 100 mg PM.  She is adherent to a low sodium diet. Patient denies chest pain, shortness of breath, headache, lightheadedness, blurred vision, peripheral edema, palpitations, dry cough and fatigue.  Antihypertensive medication side effects: no medication side effects noted.  Use of agents associated with hypertension: none.    Bilateral leg edema:  Stable on Lasix 40 mg 1/2 qd.   Vit D Def:  Unable to take the Vit D 50,000 due to constipation                                 GERD:  Stable on Prevacid 30 mg bid but will change to OTC Nexium 20 mg bid and Carafate and phenergan 25 mg prn per Dr. Santos Postin    Lab Results   Component Value Date    LABMICR SEE BELOW 2015     Lab Results   Component Value Date     (H) 2019    K 3.5 2019     2019    CO2 28 2019    BUN 9 2019    CREATININE <0.5 (L) 2019    GLUCOSE 96 2019    CALCIUM 9.2 2019     Lab Results   Component Value Date    CHOL 186 2019    TRIG 75 2019    HDL 38 2019    LDLCALC 133 2019     Lab Results   Component Value Date    ALT 17 2019    AST 14 (L) 2019     Lab Results   Component Value Date    TSH 1.38 2016    TSH 1.25 2015    T4FREE 1.1 2016    T4FREE 1.2 2015     Lab Results

## 2019-08-23 LAB — URIC ACID, SERUM: 3.6 MG/DL (ref 2.6–6)

## 2019-09-18 ENCOUNTER — OFFICE VISIT (OUTPATIENT)
Dept: INTERNAL MEDICINE CLINIC | Age: 74
End: 2019-09-18
Payer: MEDICARE

## 2019-09-18 VITALS
SYSTOLIC BLOOD PRESSURE: 154 MMHG | HEART RATE: 97 BPM | HEIGHT: 63 IN | WEIGHT: 214 LBS | BODY MASS INDEX: 37.92 KG/M2 | OXYGEN SATURATION: 98 % | DIASTOLIC BLOOD PRESSURE: 98 MMHG

## 2019-09-18 DIAGNOSIS — F41.9 ANXIETY: Primary | ICD-10-CM

## 2019-09-18 DIAGNOSIS — I10 ESSENTIAL HYPERTENSION: ICD-10-CM

## 2019-09-18 PROCEDURE — 99213 OFFICE O/P EST LOW 20 MIN: CPT | Performed by: INTERNAL MEDICINE

## 2019-09-18 RX ORDER — BUSPIRONE HYDROCHLORIDE 5 MG/1
5 TABLET ORAL DAILY
COMMUNITY
End: 2019-10-14

## 2019-09-18 RX ORDER — SERTRALINE HYDROCHLORIDE 100 MG/1
100 TABLET, FILM COATED ORAL 2 TIMES DAILY
Qty: 180 TABLET | Refills: 1 | Status: SHIPPED | OUTPATIENT
Start: 2019-09-18 | End: 2020-03-12 | Stop reason: SDUPTHER

## 2019-09-18 NOTE — PROGRESS NOTES
MCV 90.6 01/03/2019     01/03/2019     Lab Results   Component Value Date    INR 1.11 08/03/2017      No results found for: PSA   Lab Results   Component Value Date    LABURIC 3.6 08/22/2019        Patient Active Problem List   Diagnosis    Gastroesophageal reflux disease with esophagitis    Essential hypertension    H/O malignant neoplasm of breast    Allergic reaction    Anxiety attack    Pes anserine bursitis    Right knee pain    Tear of right rotator cuff    Shoulder impingement syndrome    Shoulder pain, right    Peripheral neuropathic pain    Vitamin D deficiency    Environmental and seasonal allergies    Bilateral leg edema    Other osteoporosis without current pathological fracture    Anxiety       Allergies   Allergen Reactions    Actifed Cold-Allergy [Chlorpheniramine-Phenylephrine] Other (See Comments)     Tachy,head rush    Medrol [Methylprednisolone]      Chest pain    Prednisone      Chest pain    Amoxicillin      Had hives when taken with Lisinopril and not sure what caused what since she was on both in the past for many years    Antihistamines, Chlorpheniramine-Type     Benadryl [Diphenhydramine]     Ceftin [Cefuroxime]      bleeding    Cefuroxime Axetil Diarrhea    Desonide Crea-Wound Dress Crea     Iv Contrast [Iodides] Hives    Lisinopril Hives    Naprelan [Naproxen]     Norvasc [Amlodipine Besylate] Swelling    Other      Sultin vaginal cream    Pcn [Penicillins]     Singulair [Montelukast Sodium]     Sudafed [Pseudoephedrine Hcl] Other (See Comments)     Tachy,head rush    Tape [Adhesive Tape]     Augmentin [Amoxicillin-Pot Clavulanate] Rash    Augmentin [Amoxicillin-Pot Clavulanate]      Pt states it makes her break out in red patches    Keflex [Cephalexin] Rash    Lidocaine Anxiety    Zithromax [Azithromycin]      Pt states it makes her break out in red patches  It has the least reaction  And she still can take it.      Outpatient Medications

## 2019-10-12 DIAGNOSIS — F41.9 ANXIETY: ICD-10-CM

## 2019-10-14 RX ORDER — BUSPIRONE HYDROCHLORIDE 5 MG/1
TABLET ORAL
Qty: 60 TABLET | Refills: 0 | Status: SHIPPED | OUTPATIENT
Start: 2019-10-14 | End: 2019-10-24 | Stop reason: DRUGHIGH

## 2019-10-24 ENCOUNTER — OFFICE VISIT (OUTPATIENT)
Dept: INTERNAL MEDICINE CLINIC | Age: 74
End: 2019-10-24
Payer: MEDICARE

## 2019-10-24 VITALS
WEIGHT: 217 LBS | SYSTOLIC BLOOD PRESSURE: 142 MMHG | DIASTOLIC BLOOD PRESSURE: 84 MMHG | OXYGEN SATURATION: 98 % | HEIGHT: 63 IN | HEART RATE: 86 BPM | BODY MASS INDEX: 38.45 KG/M2

## 2019-10-24 DIAGNOSIS — I10 ESSENTIAL HYPERTENSION: ICD-10-CM

## 2019-10-24 DIAGNOSIS — F41.9 ANXIETY: Primary | ICD-10-CM

## 2019-10-24 PROCEDURE — 99213 OFFICE O/P EST LOW 20 MIN: CPT | Performed by: INTERNAL MEDICINE

## 2019-10-24 RX ORDER — BUSPIRONE HYDROCHLORIDE 5 MG/1
5 TABLET ORAL 3 TIMES DAILY
Qty: 90 TABLET | Refills: 0 | Status: SHIPPED | OUTPATIENT
Start: 2019-10-24 | End: 2019-12-09 | Stop reason: SDUPTHER

## 2019-10-24 RX ORDER — ATENOLOL 100 MG/1
100 TABLET ORAL DAILY
Qty: 90 TABLET | Refills: 0 | Status: SHIPPED
Start: 2019-10-24 | End: 2020-02-13 | Stop reason: DRUGHIGH

## 2019-11-26 ENCOUNTER — OFFICE VISIT (OUTPATIENT)
Dept: INTERNAL MEDICINE CLINIC | Age: 74
End: 2019-11-26
Payer: MEDICARE

## 2019-11-26 VITALS
BODY MASS INDEX: 39.24 KG/M2 | OXYGEN SATURATION: 98 % | DIASTOLIC BLOOD PRESSURE: 88 MMHG | RESPIRATION RATE: 20 BRPM | WEIGHT: 218 LBS | SYSTOLIC BLOOD PRESSURE: 180 MMHG | HEART RATE: 68 BPM

## 2019-11-26 DIAGNOSIS — R60.0 BILATERAL LEG EDEMA: ICD-10-CM

## 2019-11-26 DIAGNOSIS — I10 ESSENTIAL HYPERTENSION: Chronic | ICD-10-CM

## 2019-11-26 DIAGNOSIS — F41.9 ANXIETY: Primary | ICD-10-CM

## 2019-11-26 PROCEDURE — 99214 OFFICE O/P EST MOD 30 MIN: CPT | Performed by: INTERNAL MEDICINE

## 2019-11-26 RX ORDER — POTASSIUM CHLORIDE 750 MG/1
10 TABLET, EXTENDED RELEASE ORAL DAILY
Qty: 90 TABLET | Refills: 0 | Status: SHIPPED | OUTPATIENT
Start: 2019-11-26 | End: 2020-04-08 | Stop reason: ALTCHOICE

## 2019-12-09 DIAGNOSIS — F41.9 ANXIETY: ICD-10-CM

## 2019-12-09 RX ORDER — BUSPIRONE HYDROCHLORIDE 5 MG/1
5 TABLET ORAL 3 TIMES DAILY
Qty: 90 TABLET | Refills: 0 | Status: SHIPPED | OUTPATIENT
Start: 2019-12-09 | End: 2020-01-07 | Stop reason: DRUGHIGH

## 2020-01-07 ENCOUNTER — OFFICE VISIT (OUTPATIENT)
Dept: INTERNAL MEDICINE CLINIC | Age: 75
End: 2020-01-07
Payer: MEDICARE

## 2020-01-07 VITALS
DIASTOLIC BLOOD PRESSURE: 94 MMHG | HEART RATE: 72 BPM | WEIGHT: 221 LBS | BODY MASS INDEX: 39.16 KG/M2 | HEIGHT: 63 IN | OXYGEN SATURATION: 98 % | SYSTOLIC BLOOD PRESSURE: 156 MMHG

## 2020-01-07 PROCEDURE — G8510 SCR DEP NEG, NO PLAN REQD: HCPCS | Performed by: INTERNAL MEDICINE

## 2020-01-07 PROCEDURE — 3288F FALL RISK ASSESSMENT DOCD: CPT | Performed by: INTERNAL MEDICINE

## 2020-01-07 PROCEDURE — 99214 OFFICE O/P EST MOD 30 MIN: CPT | Performed by: INTERNAL MEDICINE

## 2020-01-07 RX ORDER — ATENOLOL AND CHLORTHALIDONE TABLET 100; 25 MG/1; MG/1
1 TABLET ORAL DAILY
Qty: 90 TABLET | Refills: 3 | Status: SHIPPED | OUTPATIENT
Start: 2020-01-07 | End: 2020-01-15 | Stop reason: ALTCHOICE

## 2020-01-07 RX ORDER — FUROSEMIDE 40 MG/1
40 TABLET ORAL DAILY
Qty: 90 TABLET | Refills: 3 | Status: SHIPPED | OUTPATIENT
Start: 2020-01-07 | End: 2021-02-15 | Stop reason: SDUPTHER

## 2020-01-07 RX ORDER — BUSPIRONE HYDROCHLORIDE 10 MG/1
10 TABLET ORAL 3 TIMES DAILY
Qty: 90 TABLET | Refills: 0 | Status: SHIPPED | OUTPATIENT
Start: 2020-01-07 | End: 2020-02-06

## 2020-01-07 ASSESSMENT — PATIENT HEALTH QUESTIONNAIRE - PHQ9
SUM OF ALL RESPONSES TO PHQ9 QUESTIONS 1 & 2: 0
SUM OF ALL RESPONSES TO PHQ QUESTIONS 1-9: 0
1. LITTLE INTEREST OR PLEASURE IN DOING THINGS: 0
SUM OF ALL RESPONSES TO PHQ QUESTIONS 1-9: 0
2. FEELING DOWN, DEPRESSED OR HOPELESS: 0

## 2020-01-07 NOTE — PROGRESS NOTES
respiratory distress. No wheezes and no rales. Abdominal: Soft. Bowel sounds are normal. No distension and no mass. No tenderness. No rebound and no guarding. Musculoskeletal: No edema and no tenderness. Skin: No rash or erythema. Psychiatric: Normal mood and affect. Behavior is normal.     Assessment/Plan:  Danielle Suarez was seen today for anxiety and hypertension. Diagnoses and all orders for this visit:    Anxiety  Increase busPIRone (BUSPAR) 10 MG tablet; Take 1 tablet by mouth 3 times daily    Essential hypertension  Change to atenolol-chlorthalidone (TENORETIC) 100-25 MG per tablet; Take 1 tablet by mouth daily    Bilateral leg edema  -     furosemide (LASIX) 40 MG tablet; Take 1 tablet by mouth daily        Return Fasting Medicare Wellness and f/u 1 month.

## 2020-01-15 ENCOUNTER — OFFICE VISIT (OUTPATIENT)
Dept: INTERNAL MEDICINE CLINIC | Age: 75
End: 2020-01-15
Payer: MEDICARE

## 2020-01-15 VITALS
DIASTOLIC BLOOD PRESSURE: 100 MMHG | BODY MASS INDEX: 38.98 KG/M2 | WEIGHT: 220 LBS | HEART RATE: 74 BPM | HEIGHT: 63 IN | OXYGEN SATURATION: 96 % | SYSTOLIC BLOOD PRESSURE: 150 MMHG

## 2020-01-15 PROCEDURE — 99213 OFFICE O/P EST LOW 20 MIN: CPT | Performed by: INTERNAL MEDICINE

## 2020-01-15 NOTE — PROGRESS NOTES
Component Value Date    WBC 9.1 01/03/2019    HGB 14.5 01/03/2019    HCT 43.7 01/03/2019    MCV 90.6 01/03/2019     01/03/2019     Lab Results   Component Value Date    INR 1.11 08/03/2017      No results found for: PSA   Lab Results   Component Value Date    LABURIC 3.6 08/22/2019        Patient Active Problem List   Diagnosis    Gastroesophageal reflux disease with esophagitis    Essential hypertension    H/O malignant neoplasm of breast    Allergic reaction    Anxiety attack    Pes anserine bursitis    Right knee pain    Tear of right rotator cuff    Shoulder impingement syndrome    Shoulder pain, right    Peripheral neuropathic pain    Vitamin D deficiency    Environmental and seasonal allergies    Bilateral leg edema    Other osteoporosis without current pathological fracture    Anxiety       Allergies   Allergen Reactions    Actifed Cold-Allergy [Chlorpheniramine-Phenylephrine] Other (See Comments)     Tachy,head rush    Medrol [Methylprednisolone]      Chest pain    Prednisone      Chest pain    Amoxicillin      Had hives when taken with Lisinopril and not sure what caused what since she was on both in the past for many years    Antihistamines, Chlorpheniramine-Type     Benadryl [Diphenhydramine]     Ceftin [Cefuroxime]      bleeding    Cefuroxime Axetil Diarrhea    Desonide Crea-Wound Dress Crea     Iv Contrast [Iodides] Hives    Lisinopril Hives    Naprelan [Naproxen]     Norvasc [Amlodipine Besylate] Swelling    Other      Sultin vaginal cream    Pcn [Penicillins]     Singulair [Montelukast Sodium]     Sudafed [Pseudoephedrine Hcl] Other (See Comments)     Tachy,head rush    Tape [Adhesive Tape]     Augmentin [Amoxicillin-Pot Clavulanate] Rash    Augmentin [Amoxicillin-Pot Clavulanate]      Pt states it makes her break out in red patches    Keflex [Cephalexin] Rash    Lidocaine Anxiety    Zithromax [Azithromycin]      Pt states it makes her break out in red

## 2020-01-16 ENCOUNTER — TELEPHONE (OUTPATIENT)
Dept: INTERNAL MEDICINE CLINIC | Age: 75
End: 2020-01-16

## 2020-02-13 ENCOUNTER — OFFICE VISIT (OUTPATIENT)
Dept: INTERNAL MEDICINE CLINIC | Age: 75
End: 2020-02-13
Payer: MEDICARE

## 2020-02-13 VITALS
SYSTOLIC BLOOD PRESSURE: 160 MMHG | HEART RATE: 80 BPM | HEIGHT: 63 IN | DIASTOLIC BLOOD PRESSURE: 95 MMHG | BODY MASS INDEX: 38.8 KG/M2 | OXYGEN SATURATION: 98 % | WEIGHT: 219 LBS

## 2020-02-13 LAB
A/G RATIO: 1.6 (ref 1.1–2.2)
ALBUMIN SERPL-MCNC: 4.3 G/DL (ref 3.4–5)
ALP BLD-CCNC: 89 U/L (ref 40–129)
ALT SERPL-CCNC: 15 U/L (ref 10–40)
ANION GAP SERPL CALCULATED.3IONS-SCNC: 13 MMOL/L (ref 3–16)
AST SERPL-CCNC: 13 U/L (ref 15–37)
BASOPHILS ABSOLUTE: 0.1 K/UL (ref 0–0.2)
BASOPHILS RELATIVE PERCENT: 0.8 %
BILIRUB SERPL-MCNC: 0.6 MG/DL (ref 0–1)
BUN BLDV-MCNC: 8 MG/DL (ref 7–20)
CALCIUM SERPL-MCNC: 9.2 MG/DL (ref 8.3–10.6)
CHLORIDE BLD-SCNC: 99 MMOL/L (ref 99–110)
CHOLESTEROL, TOTAL: 180 MG/DL (ref 0–199)
CO2: 27 MMOL/L (ref 21–32)
CREAT SERPL-MCNC: 0.5 MG/DL (ref 0.6–1.2)
EOSINOPHILS ABSOLUTE: 0.1 K/UL (ref 0–0.6)
EOSINOPHILS RELATIVE PERCENT: 1.7 %
GFR AFRICAN AMERICAN: >60
GFR NON-AFRICAN AMERICAN: >60
GLOBULIN: 2.7 G/DL
GLUCOSE BLD-MCNC: 100 MG/DL (ref 70–99)
HCT VFR BLD CALC: 40.9 % (ref 36–48)
HDLC SERPL-MCNC: 35 MG/DL (ref 40–60)
HEMOGLOBIN: 14.1 G/DL (ref 12–16)
LDL CHOLESTEROL CALCULATED: 125 MG/DL
LYMPHOCYTES ABSOLUTE: 1.9 K/UL (ref 1–5.1)
LYMPHOCYTES RELATIVE PERCENT: 25.1 %
MCH RBC QN AUTO: 30.4 PG (ref 26–34)
MCHC RBC AUTO-ENTMCNC: 34.6 G/DL (ref 31–36)
MCV RBC AUTO: 88 FL (ref 80–100)
MONOCYTES ABSOLUTE: 0.4 K/UL (ref 0–1.3)
MONOCYTES RELATIVE PERCENT: 5.3 %
NEUTROPHILS ABSOLUTE: 4.9 K/UL (ref 1.7–7.7)
NEUTROPHILS RELATIVE PERCENT: 67.1 %
PDW BLD-RTO: 13.6 % (ref 12.4–15.4)
PLATELET # BLD: 245 K/UL (ref 135–450)
PMV BLD AUTO: 8.7 FL (ref 5–10.5)
POTASSIUM SERPL-SCNC: 4.3 MMOL/L (ref 3.5–5.1)
RBC # BLD: 4.64 M/UL (ref 4–5.2)
SODIUM BLD-SCNC: 139 MMOL/L (ref 136–145)
TOTAL PROTEIN: 7 G/DL (ref 6.4–8.2)
TRIGL SERPL-MCNC: 100 MG/DL (ref 0–150)
VLDLC SERPL CALC-MCNC: 20 MG/DL
WBC # BLD: 7.4 K/UL (ref 4–11)

## 2020-02-13 PROCEDURE — 36415 COLL VENOUS BLD VENIPUNCTURE: CPT | Performed by: INTERNAL MEDICINE

## 2020-02-13 PROCEDURE — G0439 PPPS, SUBSEQ VISIT: HCPCS | Performed by: INTERNAL MEDICINE

## 2020-02-13 PROCEDURE — 99214 OFFICE O/P EST MOD 30 MIN: CPT | Performed by: INTERNAL MEDICINE

## 2020-02-13 RX ORDER — BUSPIRONE HYDROCHLORIDE 10 MG/1
10 TABLET ORAL 3 TIMES DAILY
COMMUNITY
End: 2020-02-13 | Stop reason: DRUGHIGH

## 2020-02-13 RX ORDER — BUSPIRONE HYDROCHLORIDE 15 MG/1
15 TABLET ORAL 4 TIMES DAILY
Qty: 120 TABLET | Refills: 0 | Status: SHIPPED | OUTPATIENT
Start: 2020-02-13 | End: 2020-03-12 | Stop reason: SDUPTHER

## 2020-02-13 RX ORDER — LOSARTAN POTASSIUM 100 MG/1
100 TABLET ORAL DAILY
Qty: 30 TABLET | Refills: 0 | Status: SHIPPED | OUTPATIENT
Start: 2020-02-13 | End: 2020-03-12 | Stop reason: SDUPTHER

## 2020-02-13 ASSESSMENT — PATIENT HEALTH QUESTIONNAIRE - PHQ9
SUM OF ALL RESPONSES TO PHQ QUESTIONS 1-9: 1
SUM OF ALL RESPONSES TO PHQ QUESTIONS 1-9: 1

## 2020-02-13 ASSESSMENT — LIFESTYLE VARIABLES: HOW OFTEN DO YOU HAVE A DRINK CONTAINING ALCOHOL: 0

## 2020-02-13 NOTE — PROGRESS NOTES
Vaccine (1 of 1 - PPSV23) 12/13/2010    Annual Wellness Visit (AWV)  05/29/2019    Flu vaccine (1) 09/01/2019    Potassium monitoring  01/03/2020    Creatinine monitoring  01/03/2020    Breast cancer screen  02/03/2021    Colon cancer screen colonoscopy  01/28/2023    Lipid screen  01/03/2024    DEXA (modify frequency per FRAX score)  Completed    Hepatitis A vaccine  Aged Out    Hepatitis B vaccine  Aged Out    Hib vaccine  Aged Out    Meningococcal (ACWY) vaccine  Aged Out     Recommendations for [place] Due: see orders and patient instructions/AVS.  . Recommended screening schedule for the next 5-10 years is provided to the patient in written form: see Patient Raphael Kraft was seen today for medicare awv and hypertension.     Diagnoses and all orders for this visit:    Routine general medical examination at a health care facility

## 2020-02-13 NOTE — PROGRESS NOTES
08/03/2017      No results found for: PSA   Lab Results   Component Value Date    LABURIC 3.6 08/22/2019        Patient Active Problem List   Diagnosis    Gastroesophageal reflux disease with esophagitis    Essential hypertension    H/O malignant neoplasm of breast    Allergic reaction    Anxiety attack    Pes anserine bursitis    Right knee pain    Tear of right rotator cuff    Shoulder impingement syndrome    Shoulder pain, right    Peripheral neuropathic pain    Vitamin D deficiency    Environmental and seasonal allergies    Bilateral leg edema    Other osteoporosis without current pathological fracture    Anxiety       Allergies   Allergen Reactions    Actifed Cold-Allergy [Chlorpheniramine-Phenylephrine] Other (See Comments)     Borishead rush    Medrol [Methylprednisolone]      Chest pain    Prednisone      Chest pain    Amoxicillin      Had hives when taken with Lisinopril and not sure what caused what since she was on both in the past for many years    Antihistamines, Chlorpheniramine-Type     Benadryl [Diphenhydramine]     Ceftin [Cefuroxime]      bleeding    Cefuroxime Axetil Diarrhea    Desonide Crea-Wound Dress Crea     Iv Contrast [Iodides] Hives    Lisinopril Hives    Naprelan [Naproxen]     Norvasc [Amlodipine Besylate] Swelling    Other      Sultin vaginal cream    Pcn [Penicillins]     Singulair [Montelukast Sodium]     Sudafed [Pseudoephedrine Hcl] Other (See Comments)     Chinmayaliciahead rush    Tape [Adhesive Tape]     Augmentin [Amoxicillin-Pot Clavulanate] Rash    Augmentin [Amoxicillin-Pot Clavulanate]      Pt states it makes her break out in red patches    Keflex [Cephalexin] Rash    Lidocaine Anxiety    Zithromax [Azithromycin]      Pt states it makes her break out in red patches  It has the least reaction  And she still can take it.      Outpatient Medications Marked as Taking for the 2/13/20 encounter (Office Visit) with Chio Maria MD   Medication Sig stridor. No respiratory distress. No wheezes and no rales. Abdominal: Soft. Bowel sounds are normal. No distension and no mass. No tenderness. No rebound and no guarding. Musculoskeletal: No edema and no tenderness. Skin: No rash or erythema. Psychiatric: Normal mood and affect. Behavior is normal.     Assessment/Plan:  Nancy Prasad was seen today for medicare awv and hypertension. Diagnoses and all orders for this visit:    Routine general medical examination at a health care facility    Essential hypertension  Start losartan (COZAAR) 100 MG tablet; Take 1/2-1 tablet by mouth daily  -     Lipid Panel  -     Comprehensive Metabolic Panel  -     CBC Auto Differential  Start atenolol/hctz 100/25 mg 1/2 qd since she had 90 pills at home. Anxiety  Increase busPIRone (BUSPAR) 15 MG tablet; Take 15 mg by mouth 4 times daily    Gastroesophageal reflux disease with esophagitis  Stable and continue on current medications. Bilateral leg edema  Check renal and continue current meds      Return 1 month on BP and anxiety.

## 2020-02-14 NOTE — RESULT ENCOUNTER NOTE
Inform patient:  Your cholesterol, sugar, kidneys, liver and blood count are normal so continue the same medications.

## 2020-03-05 ENCOUNTER — TELEPHONE (OUTPATIENT)
Dept: INTERNAL MEDICINE CLINIC | Age: 75
End: 2020-03-05

## 2020-03-05 NOTE — TELEPHONE ENCOUNTER
Called patient & informed that \"She is suppose to start the Losartan 100 mg 1/2 tonight and continue the Atenolol 1/2 in the AM for 1 week and if BP still high > 150/90, increase Losartan 100 mg to 1 daily\"

## 2020-03-06 ENCOUNTER — HOSPITAL ENCOUNTER (OUTPATIENT)
Age: 75
Discharge: HOME | End: 2020-03-06
Payer: MEDICARE

## 2020-03-06 PROCEDURE — 83835 ASSAY OF METANEPHRINES: CPT

## 2020-03-09 LAB
METANEPH/PLASMA INTERP: NORMAL
METANEPHRINE FREE PLASMA: 0.14 NMOL/L (ref 0–0.49)
NORMETANEPHRINE FREE PLASMA: 0.63 NMOL/L (ref 0–0.89)

## 2020-03-12 ENCOUNTER — HOSPITAL ENCOUNTER (OUTPATIENT)
Age: 75
Discharge: HOME | End: 2020-03-12
Payer: MEDICARE

## 2020-03-12 ENCOUNTER — OFFICE VISIT (OUTPATIENT)
Dept: INTERNAL MEDICINE CLINIC | Age: 75
End: 2020-03-12
Payer: MEDICARE

## 2020-03-12 VITALS
BODY MASS INDEX: 39.87 KG/M2 | WEIGHT: 225 LBS | SYSTOLIC BLOOD PRESSURE: 148 MMHG | DIASTOLIC BLOOD PRESSURE: 86 MMHG | HEART RATE: 84 BPM | HEIGHT: 63 IN | OXYGEN SATURATION: 98 %

## 2020-03-12 PROCEDURE — 82088 ASSAY OF ALDOSTERONE: CPT

## 2020-03-12 PROCEDURE — 99214 OFFICE O/P EST MOD 30 MIN: CPT | Performed by: INTERNAL MEDICINE

## 2020-03-12 PROCEDURE — 84244 ASSAY OF RENIN: CPT

## 2020-03-12 RX ORDER — SERTRALINE HYDROCHLORIDE 100 MG/1
100 TABLET, FILM COATED ORAL 2 TIMES DAILY
Qty: 180 TABLET | Refills: 3 | Status: SHIPPED | OUTPATIENT
Start: 2020-03-12 | End: 2021-02-15 | Stop reason: SDUPTHER

## 2020-03-12 RX ORDER — BUSPIRONE HYDROCHLORIDE 15 MG/1
15 TABLET ORAL 4 TIMES DAILY
Qty: 360 TABLET | Refills: 3 | Status: SHIPPED | OUTPATIENT
Start: 2020-03-12 | End: 2021-02-08

## 2020-03-12 RX ORDER — ATENOLOL AND CHLORTHALIDONE TABLET 100; 25 MG/1; MG/1
TABLET ORAL DAILY
COMMUNITY
End: 2020-06-09 | Stop reason: ALTCHOICE

## 2020-03-12 RX ORDER — LOSARTAN POTASSIUM 100 MG/1
100 TABLET ORAL DAILY
Qty: 90 TABLET | Refills: 3 | Status: SHIPPED
Start: 2020-03-12 | End: 2020-06-09 | Stop reason: DRUGHIGH

## 2020-03-12 NOTE — PROGRESS NOTES
Saima Gerber  YOB: 1945    Date of Service:  3/12/2020    Chief Complaint:      Chief Complaint   Patient presents with    Hypertension    Anxiety       HPI:  Saima Gerber is a 76 y.o. Anxiety:  slowly improving with Buspar 15 mg qid and Zoloft 100 mg 1/2 AM and 1 qhs since can't increase dose anymore.  Still has Valium 5 mg 1/4 prn     Hypertension:  Home blood pressure monitorin-160/74-90 on Losartan 100 mg 1/2 qd and Atenolol 100 mg 1/2 PM due to SOB right after taking in on higher dose.  She is adherent to a low sodium diet. Patient denies chest pain, shortness of breath, headache, lightheadedness, blurred vision, peripheral edema, palpitations, dry cough and fatigue.  Antihypertensive medication side effects: no medication side effects noted.  Use of agents associated with hypertension: none.    Bilateral leg edema:  Stable on Lasix 40 mg 1/2 bid and Klor-con 10 mEq qod.   Vit D Def:  Unable to take the Vit D 50,000 due to constipation                                 GERD:  Stable on OTC Prevacid 30 mg bid and Carafate and phenergan 25 mg prn per Dr. Yohana Weir    Lab Results   Component Value Date    LABMICR SEE BELOW 2015     Lab Results   Component Value Date     2020    K 4.3 2020    CL 99 2020    CO2 27 2020    BUN 8 2020    CREATININE 0.5 (L) 2020    GLUCOSE 100 (H) 2020    CALCIUM 9.2 2020     Lab Results   Component Value Date    CHOL 180 2020    TRIG 100 2020    HDL 35 2020    LDLCALC 125 2020     Lab Results   Component Value Date    ALT 15 2020    AST 13 (L) 2020     Lab Results   Component Value Date    TSH 1.38 2016    TSH 1.25 2015    T4FREE 1.1 2016    T4FREE 1.2 2015     Lab Results   Component Value Date    WBC 7.4 2020    HGB 14.1 2020    HCT 40.9 2020    MCV 88.0 2020     2020     Lab Results   Component Value Date Medication Sig Dispense Refill    atenolol-chlorthalidone (TENORETIC) 100-25 MG per tablet Take 1 tablet by mouth daily 1/2 tablet daily.  losartan (COZAAR) 100 MG tablet Take 1 tablet by mouth daily (Patient taking differently: Take 100 mg by mouth daily 1/2 pill daily) 30 tablet 0    busPIRone (BUSPAR) 15 MG tablet Take 15 mg by mouth 4 times daily 120 tablet 0    furosemide (LASIX) 40 MG tablet Take 1 tablet by mouth daily 90 tablet 3    potassium chloride (KLOR-CON M) 10 MEQ extended release tablet Take 1 tablet by mouth daily 90 tablet 0    sertraline (ZOLOFT) 100 MG tablet Take 1 tablet by mouth 2 times daily (Patient taking differently: Take 100 mg by mouth 2 times daily 1.5 tabs daily. ) 180 tablet 1    triamcinolone (NASACORT) 55 MCG/ACT nasal inhaler 2 sprays by Nasal route daily      lansoprazole (PREVACID) 30 MG delayed release capsule Take 30 mg by mouth 2 times daily       acetaminophen (TYLENOL) 325 MG tablet Take 650 mg by mouth every 6 hours as needed for Pain      sucralfate (CARAFATE) 1 GM tablet Take 1 tablet by mouth 4 times daily 120 tablet 3    docusate sodium (COLACE) 100 MG capsule Take 100 mg by mouth daily.  azelastine (ASTELIN) 137 MCG/SPRAY nasal spray 1 spray by Nasal route 2 times daily. Use in each nostril as directed (Patient taking differently: 1 spray by Nasal route as needed Use in each nostril as directed) 1 Bottle 5         Review of Systems: 14 systems were negative except of what was stated on HPI    Nursing note and vitals reviewed. Vitals:    03/12/20 1047   BP: (!) 148/86   Pulse: 84   SpO2: 98%   Weight: 225 lb (102.1 kg)   Height: 5' 2.5\" (1.588 m)     Wt Readings from Last 3 Encounters:   03/12/20 225 lb (102.1 kg)   02/13/20 219 lb (99.3 kg)   01/15/20 220 lb (99.8 kg)     BP Readings from Last 3 Encounters:   03/12/20 (!) 148/86   02/13/20 (!) 160/95   01/15/20 (!) 150/100     Body mass index is 40.5 kg/m².   Constitutional: Patient appears

## 2020-03-13 LAB — ALDOSTERONE: 9 NG/DL

## 2020-03-14 LAB — RENIN ACTIVITY: 0.3 NG/ML/HR

## 2020-04-08 ENCOUNTER — VIRTUAL VISIT (OUTPATIENT)
Dept: INTERNAL MEDICINE CLINIC | Age: 75
End: 2020-04-08
Payer: MEDICARE

## 2020-04-08 VITALS
BODY MASS INDEX: 39.87 KG/M2 | DIASTOLIC BLOOD PRESSURE: 84 MMHG | WEIGHT: 225 LBS | HEIGHT: 63 IN | SYSTOLIC BLOOD PRESSURE: 150 MMHG | HEART RATE: 69 BPM

## 2020-04-08 PROCEDURE — 99443 PR PHYS/QHP TELEPHONE EVALUATION 21-30 MIN: CPT | Performed by: INTERNAL MEDICINE

## 2020-04-08 RX ORDER — SPIRONOLACTONE 50 MG/1
50 TABLET, FILM COATED ORAL DAILY
Qty: 30 TABLET | Refills: 0 | Status: SHIPPED
Start: 2020-04-08 | End: 2020-04-21 | Stop reason: SINTOL

## 2020-04-08 NOTE — PROGRESS NOTES
Date of Service:  2020    Chief Complaint:      Chief Complaint   Patient presents with    Hypertension       HPI:  Clara Johnson is a 76 y.o. Clara Johnson is a 76 y.o. female evaluated via telephone on 2020 due to [place] outbreak. Consent:  She and/or health care decision maker is aware that that she may receive a bill for this telephone service, depending on her insurance coverage, and has provided verbal consent to proceed: I AFFIRM/DO this is a Patient Initiated Episode with an Established Patient who has not had a related appointment within my department in the past 7 days or scheduled within the next 24 hours. Anxiety:  slowly improving with Buspar 15 mg qid and Zoloft 100 mg 1/2 AM and 1 qhs since can't increase dose anymore.  Still has Valium 5 mg 1/4 prn     Hypertension:  Home blood pressure monitorin-160/74-90 on Losartan 100 mg qhs and Atenolol 100 mg 1/2 PM but wants to be off since it causes SOB.  She is adherent to a low sodium diet. Patient denies chest pain, shortness of breath, headache, lightheadedness, blurred vision, peripheral edema, palpitations, dry cough and fatigue.  Antihypertensive medication side effects: no medication side effects noted.  Use of agents associated with hypertension: none.    Bilateral leg edema:  Stable on Lasix 40 mg 1/2 bid and Klor-con 10 mEq twice a week.   Vit D Def:  Unable to take the Vit D 50,000 due to constipation                                 GERD:  Stable on OTC Prevacid 30 mg bid and Carafate and phenergan 25 mg prn per Dr. Carlee Scherer    Total Time: minutes: 21-30 minutes      Lab Results   Component Value Date    LABMICR SEE BELOW 2015     Lab Results   Component Value Date     2020    K 4.3 2020    CL 99 2020    CO2 27 2020    BUN 8 2020    CREATININE 0.5 (L) 2020    GLUCOSE 100 (H) 2020    CALCIUM 9.2 2020     Lab Results   Component Value Date    CHOL 180 2020

## 2020-04-14 ENCOUNTER — TELEPHONE (OUTPATIENT)
Dept: INTERNAL MEDICINE CLINIC | Age: 75
End: 2020-04-14

## 2020-04-14 NOTE — TELEPHONE ENCOUNTER
Called patient and answered questions. Let patient know per Dr. Evert Cerda note to quit taking the tenorectic unless blood pressure is over 150/90 if so take 1/4 tablet daily. Start the spironolactone and to call us with any problem. Patient voiced understanding.

## 2020-04-14 NOTE — TELEPHONE ENCOUNTER
Pt was prescribed new blood pressure medication, she did not start taking them because she was sick. Pt is now feeling better but has questions about this medication. Pt would like a call back.

## 2020-04-21 ENCOUNTER — TELEPHONE (OUTPATIENT)
Dept: INTERNAL MEDICINE CLINIC | Age: 75
End: 2020-04-21

## 2020-05-07 ENCOUNTER — VIRTUAL VISIT (OUTPATIENT)
Dept: INTERNAL MEDICINE CLINIC | Age: 75
End: 2020-05-07
Payer: MEDICARE

## 2020-05-07 VITALS
HEIGHT: 63 IN | HEART RATE: 68 BPM | BODY MASS INDEX: 38.98 KG/M2 | WEIGHT: 220 LBS | DIASTOLIC BLOOD PRESSURE: 73 MMHG | SYSTOLIC BLOOD PRESSURE: 136 MMHG

## 2020-05-07 PROCEDURE — 99442 PR PHYS/QHP TELEPHONE EVALUATION 11-20 MIN: CPT | Performed by: INTERNAL MEDICINE

## 2020-05-07 NOTE — PROGRESS NOTES
Date of Service:  2020    Chief Complaint:      Chief Complaint   Patient presents with    Hypertension       HPI:  Андрей Wolf is a 76 y.o. Андрей Wolf is a 76 y.o. female evaluated via telephone on 2020 due to [place] outbreak. Consent:  She and/or health care decision maker is aware that that she may receive a bill for this telephone service, depending on her insurance coverage, and has provided verbal consent to proceed: I AFFIRM/DO this is a Patient Initiated Episode with an Established Patient who has not had a related appointment within my department in the past 7 days or scheduled within the next 24 hours. Anxiety:  stable on Buspar 15 mg qid and Zoloft 100 mg 1/2 AM and 1 qhs since can't increase dose anymore.  Still has Valium 5 mg 1/4 prn     Hypertension:  Home blood pressure monitorin-140/74-90 on Losartan 100 mg qhs and Atenolol 100 mg 1/2 PM.  She is adherent to a low sodium diet. Patient denies chest pain, shortness of breath, headache, lightheadedness, blurred vision, peripheral edema, palpitations, dry cough and fatigue.  Antihypertensive medication side effects: no medication side effects noted.  Use of agents associated with hypertension: none.    Bilateral leg edema:  Stable on Lasix 40 mg 1/2 bid and Klor-con 10 mEq twice a week.   Vit D Def:  Unable to take the Vit D 50,000 due to constipation                                 GERD:  Stable on OTC Prevacid 30 mg bid and Carafate and phenergan 25 mg prn per Dr. Hubbard Base    Total Time: minutes: 15 minutes      Lab Results   Component Value Date    LABMICR SEE BELOW 2015     Lab Results   Component Value Date     2020    K 4.3 2020    CL 99 2020    CO2 27 2020    BUN 8 2020    CREATININE 0.5 (L) 2020    GLUCOSE 100 (H) 2020    CALCIUM 9.2 2020     Lab Results   Component Value Date    CHOL 180 2020    TRIG 100 2020    HDL 35 2020    [facility] 125 02/13/2020     Lab Results   Component Value Date    ALT 15 02/13/2020    AST 13 (L) 02/13/2020     Lab Results   Component Value Date    TSH 1.38 12/08/2016    TSH 1.25 12/07/2015    T4FREE 1.1 12/08/2016    T4FREE 1.2 12/07/2015     Lab Results   Component Value Date    WBC 7.4 02/13/2020    HGB 14.1 02/13/2020    HCT 40.9 02/13/2020    MCV 88.0 02/13/2020     02/13/2020     Lab Results   Component Value Date    INR 1.11 08/03/2017      No results found for: PSA   Lab Results   Component Value Date    LABURIC 3.6 08/22/2019        Patient Active Problem List   Diagnosis    Gastroesophageal reflux disease with esophagitis    Essential hypertension    H/O malignant neoplasm of breast    Allergic reaction    Anxiety attack    Pes anserine bursitis    Right knee pain    Tear of right rotator cuff    Shoulder impingement syndrome    Shoulder pain, right    Peripheral neuropathic pain    Vitamin D deficiency    Environmental and seasonal allergies    Bilateral leg edema    Other osteoporosis without current pathological fracture    Anxiety       Allergies   Allergen Reactions    Actifed Cold-Allergy [Chlorpheniramine-Phenylephrine] Other (See Comments)     Tachy,head rush    Medrol [Methylprednisolone]      Chest pain    Prednisone      Chest pain    Amoxicillin      Had hives when taken with Lisinopril and not sure what caused what since she was on both in the past for many years    Antihistamines, Chlorpheniramine-Type     Benadryl [Diphenhydramine]     Ceftin [Cefuroxime]      bleeding    Cefuroxime Axetil Diarrhea    Desonide Crea-Wound Dress Crea     Iv Contrast [Iodides] Hives    Lisinopril Hives    Naprelan [Naproxen]     Norvasc [Amlodipine Besylate] Swelling    Other      Sultin vaginal cream    Pcn [Penicillins]     Singulair [Montelukast Sodium]     Sudafed [Pseudoephedrine Hcl] Other (See Comments)     Tachy,head rush    Tape [Adhesive Tape]     Augmentin [Amoxicillin-Pot Clavulanate] Rash    Augmentin [Amoxicillin-Pot Clavulanate]      Pt states it makes her break out in red patches    Keflex [Cephalexin] Rash    Lidocaine Anxiety    Zithromax [Azithromycin]      Pt states it makes her break out in red patches  It has the least reaction  And she still can take it. Outpatient Medications Marked as Taking for the 5/7/20 encounter (Virtual Visit) with Shannan Dominguez MD   Medication Sig Dispense Refill    atenolol-chlorthalidone (TENORETIC) 100-25 MG per tablet Take by mouth daily 1/2 tablet daily.  losartan (COZAAR) 100 MG tablet Take 1 tablet by mouth daily 90 tablet 3    busPIRone (BUSPAR) 15 MG tablet Take 15 mg by mouth 4 times daily 360 tablet 3    sertraline (ZOLOFT) 100 MG tablet Take 1 tablet by mouth 2 times daily 180 tablet 3    furosemide (LASIX) 40 MG tablet Take 1 tablet by mouth daily 90 tablet 3    triamcinolone (NASACORT) 55 MCG/ACT nasal inhaler 2 sprays by Nasal route daily      lansoprazole (PREVACID) 30 MG delayed release capsule Take 30 mg by mouth 2 times daily       acetaminophen (TYLENOL) 325 MG tablet Take 650 mg by mouth every 6 hours as needed for Pain      sucralfate (CARAFATE) 1 GM tablet Take 1 tablet by mouth 4 times daily 120 tablet 3    docusate sodium (COLACE) 100 MG capsule Take 100 mg by mouth daily.  azelastine (ASTELIN) 137 MCG/SPRAY nasal spray 1 spray by Nasal route 2 times daily. Use in each nostril as directed (Patient taking differently: 1 spray by Nasal route as needed Use in each nostril as directed) 1 Bottle 5         Review of Systems: 14 systems were negative except of what was stated on HPI    Nursing note and vitals reviewed.     Vitals:    05/07/20 1052   BP: 136/73   Pulse: 68   Weight: 220 lb (99.8 kg)   Height: 5' 2.5\" (1.588 m)     Wt Readings from Last 3 Encounters:   05/07/20 220 lb (99.8 kg)   04/08/20 225 lb (102.1 kg)   03/12/20 225 lb (102.1 kg)     BP Readings from Last 3 Frequent falls

## 2020-06-09 ENCOUNTER — VIRTUAL VISIT (OUTPATIENT)
Dept: INTERNAL MEDICINE CLINIC | Age: 75
End: 2020-06-09
Payer: MEDICARE

## 2020-06-09 PROCEDURE — 99442 PR PHYS/QHP TELEPHONE EVALUATION 11-20 MIN: CPT | Performed by: INTERNAL MEDICINE

## 2020-06-09 RX ORDER — LOSARTAN POTASSIUM AND HYDROCHLOROTHIAZIDE 25; 100 MG/1; MG/1
1 TABLET ORAL DAILY
Qty: 90 TABLET | Refills: 2 | Status: SHIPPED | OUTPATIENT
Start: 2020-06-09 | End: 2021-02-08

## 2020-06-09 RX ORDER — ATENOLOL 50 MG/1
50 TABLET ORAL DAILY
Qty: 90 TABLET | Refills: 2 | Status: SHIPPED | OUTPATIENT
Start: 2020-06-09 | End: 2021-02-08

## 2020-07-08 ENCOUNTER — VIRTUAL VISIT (OUTPATIENT)
Dept: INTERNAL MEDICINE CLINIC | Age: 75
End: 2020-07-08
Payer: MEDICARE

## 2020-07-08 PROCEDURE — 99442 PR PHYS/QHP TELEPHONE EVALUATION 11-20 MIN: CPT | Performed by: INTERNAL MEDICINE

## 2020-07-08 NOTE — PROGRESS NOTES
Date of Service:  2020    Chief Complaint:      Chief Complaint   Patient presents with    Hypertension    Anxiety       HPI:  Jennifer Newton is a 76 y.o. Jennifer Newton is a 76 y.o. female evaluated via telephone on 2020 due to [place] outbreak. Consent:  She and/or health care decision maker is aware that that she may receive a bill for this telephone service, depending on her insurance coverage, and has provided verbal consent to proceed: I AFFIRM/DO this is a Patient Initiated Episode with an Established Patient who has not had a related appointment within my department in the past 7 days or scheduled within the next 24 hours. Anxiety:  stable on Buspar 15 mg qid and Zoloft 100 mg 1/2 AM and 1 qhs since can't increase dose anymore.  Still has Valium 5 mg 1/4 prn     Hypertension:  Home blood pressure monitorin-150/74-90 on Losartanhctz 100/25 mg qhs and Atenolol 50 mg PM.  She is adherent to a low sodium diet. Patient denies chest pain, shortness of breath, headache, lightheadedness, blurred vision, peripheral edema, palpitations, dry cough and fatigue.  Antihypertensive medication side effects: no medication side effects noted.  Use of agents associated with hypertension: none.    Bilateral leg edema:  Stable on Lasix 40 mg 1/2 bid and Klor-con 10 mEq twice a week.   Vit D Def:  Unable to take the Vit D 50,000 due to constipation                                 GERD:  Stable on OTC Prevacid 30 mg bid and Carafate and phenergan 25 mg prn per Dr. Brooke Flower    Total Time: minutes: 11-20 minutes      Lab Results   Component Value Date    LABMICR SEE BELOW 2015     Lab Results   Component Value Date     2020    K 4.3 2020    CL 99 2020    CO2 27 2020    BUN 8 2020    CREATININE 0.5 (L) 2020    GLUCOSE 100 (H) 2020    CALCIUM 9.2 2020     Lab Results   Component Value Date    CHOL 180 2020    TRIG 100 2020    HDL 35 02/13/2020    LDLCALC 125 02/13/2020     Lab Results   Component Value Date    ALT 15 02/13/2020    AST 13 (L) 02/13/2020     Lab Results   Component Value Date    TSH 1.38 12/08/2016    TSH 1.25 12/07/2015    T4FREE 1.1 12/08/2016    T4FREE 1.2 12/07/2015     Lab Results   Component Value Date    WBC 7.4 02/13/2020    HGB 14.1 02/13/2020    HCT 40.9 02/13/2020    MCV 88.0 02/13/2020     02/13/2020     Lab Results   Component Value Date    INR 1.11 08/03/2017      No results found for: PSA   Lab Results   Component Value Date    LABURIC 3.6 08/22/2019        Patient Active Problem List   Diagnosis    Gastroesophageal reflux disease with esophagitis    Essential hypertension    H/O malignant neoplasm of breast    Allergic reaction    Anxiety attack    Pes anserine bursitis    Right knee pain    Tear of right rotator cuff    Shoulder impingement syndrome    Shoulder pain, right    Peripheral neuropathic pain    Vitamin D deficiency    Environmental and seasonal allergies    Bilateral leg edema    Other osteoporosis without current pathological fracture    Anxiety       Allergies   Allergen Reactions    Actifed Cold-Allergy [Chlorpheniramine-Phenylephrine] Other (See Comments)     Tachy,head rush    Medrol [Methylprednisolone]      Chest pain    Prednisone      Chest pain    Amoxicillin      Had hives when taken with Lisinopril and not sure what caused what since she was on both in the past for many years    Antihistamines, Chlorpheniramine-Type     Benadryl [Diphenhydramine]     Ceftin [Cefuroxime]      bleeding    Cefuroxime Axetil Diarrhea    Desonide Crea-Wound Dress Crea     Iv Contrast [Iodides] Hives    Lisinopril Hives    Naprelan [Naproxen]     Norvasc [Amlodipine Besylate] Swelling    Other      Sultin vaginal cream    Pcn [Penicillins]     Singulair [Montelukast Sodium]     Sudafed [Pseudoephedrine Hcl] Other (See Comments)     [place] iqbal    Tape Belinda Hosteller Tape]  Augmentin [Amoxicillin-Pot Clavulanate] Rash    Augmentin [Amoxicillin-Pot Clavulanate]      Pt states it makes her break out in red patches    Keflex [Cephalexin] Rash    Lidocaine Anxiety    Zithromax [Azithromycin]      Pt states it makes her break out in red patches  It has the least reaction  And she still can take it. Outpatient Medications Marked as Taking for the 7/8/20 encounter (Virtual Visit) with Thad Berger MD   Medication Sig Dispense Refill    atenolol (TENORMIN) 50 MG tablet Take 1 tablet by mouth daily 90 tablet 2    losartan-hydrochlorothiazide (HYZAAR) 100-25 MG per tablet Take 1 tablet by mouth daily 90 tablet 2    busPIRone (BUSPAR) 15 MG tablet Take 15 mg by mouth 4 times daily 360 tablet 3    sertraline (ZOLOFT) 100 MG tablet Take 1 tablet by mouth 2 times daily 180 tablet 3    furosemide (LASIX) 40 MG tablet Take 1 tablet by mouth daily 90 tablet 3    triamcinolone (NASACORT) 55 MCG/ACT nasal inhaler 2 sprays by Nasal route daily      lansoprazole (PREVACID) 30 MG delayed release capsule Take 30 mg by mouth 2 times daily       acetaminophen (TYLENOL) 325 MG tablet Take 650 mg by mouth every 6 hours as needed for Pain      sucralfate (CARAFATE) 1 GM tablet Take 1 tablet by mouth 4 times daily 120 tablet 3    docusate sodium (COLACE) 100 MG capsule Take 100 mg by mouth daily.  azelastine (ASTELIN) 137 MCG/SPRAY nasal spray 1 spray by Nasal route 2 times daily. Use in each nostril as directed (Patient taking differently: 1 spray by Nasal route as needed Use in each nostril as directed) 1 Bottle 5         Review of Systems: 14 systems were negative except of what was stated on HPI    Nursing note and vitals reviewed. There were no vitals filed for this visit.   Wt Readings from Last 3 Encounters:   05/07/20 220 lb (99.8 kg)   04/08/20 225 lb (102.1 kg)   03/12/20 225 lb (102.1 kg)     BP Readings from Last 3 Encounters:   05/07/20 136/73   04/08/20 (!) 150/84 03/12/20 (!) 148/86     There is no height or weight on file to calculate BMI. Constitutional: Patient sounded well and in no distress. Psychiatric: Normal mood on the phone. Assessment/Plan:  Anna Garber was seen today for hypertension and anxiety. Diagnoses and all orders for this visit:    Essential hypertension    Anxiety        Return 2/15 at 12 AM for Fasting Medicare Wellness and f/u .

## 2020-09-08 ENCOUNTER — HOSPITAL ENCOUNTER (OUTPATIENT)
Dept: SPEECH THERAPY | Age: 75
Setting detail: THERAPIES SERIES
Discharge: HOME | End: 2020-09-08
Payer: MEDICARE

## 2020-09-08 ENCOUNTER — HOSPITAL ENCOUNTER (OUTPATIENT)
Dept: GENERAL RADIOLOGY | Age: 75
Discharge: HOME | End: 2020-09-08
Payer: MEDICARE

## 2020-09-08 PROCEDURE — 74230 X-RAY XM SWLNG FUNCJ C+: CPT

## 2020-09-08 PROCEDURE — 92611 MOTION FLUOROSCOPY/SWALLOW: CPT

## 2020-09-08 NOTE — PROCEDURES
safe dietary consistencies, effective compensatory strategies, and safe eating environment. Patient complaints: Pt reports hoarse vocal quality and occasional difficulty managing secretions with \"sticking\" sensation in throat. General Comment  Comments: Pt with hx of GERD and recent esophageal dilation per pt. Pt referred by GI. Pt with hiatal hernia and gastritis per chart. Behavior/Cognition/Vision/Hearing:  Behavior/Cognition: Alert;Pleasant mood; Cooperative  Vision: Impaired  Vision Exceptions: Wears glasses at all times  Hearing: Exceptions to [facility]  Hearing Exceptions: Hard of hearing/hearing concerns    Impressions:  Treatment Dx and ICD 10: Oropharyngeal dysphagia (R13.12)  Instance of silent deep penetration before the swallow with consecutive thin liquid trials via straw. Penetration cleared with cued cough. No penetration / aspiration noted with remainder of PO trials during study (thin liquid trials via cup or solid PO trials). No significant pharyngeal residue noted post-swallow with any PO trials. Oral Preparation / Oral Phase  Oral Phase: Impaired  Oral Phase - Major Contributing Deficits  Piecemeal Swallowing: Reg solid  Premature Bolus Loss to Pharynx: All    Pharyngeal Phase  Pharyngeal Phase: Impaired  Pharyngeal Phase - Major Contributing Deficits  Premature Spillage to Valleculae: All  Reduced Laryngeal Elevation: All  Reduced Anterior Laryngeal Movement: All  Reduced Airway/laryngeal Closure: Thin straw  Deep Penetration During: Thin straw(To level of the vocal folds)  Partial Retrieval (deep): Thin straw  No Cough Reflex: Thin straw    Esophageal Phase  Appears WFL when viewed at the cervical level throughout the duration of the study       Patient Position: Lateral and Patient Degrees: 90, [facility]    Consistencies Administered: Reg solid; Dysphagia Pureed (Dysphagia I); Thin straw; Thin cup    Dysphagia Outcome Severity Scale: Level 5: Mild dysphagia- Distant supervision. May need one diet consistency restricted  Penetration-Aspiration Scale (PAS): 4 - Material enters the airway, contacts the vocal folds, and is ejected from the airway    Recommended Diet:  Solid consistency: Regular  Liquid consistency: Thin(No straws)  Liquid administration via: Cup  Medication administration: PO    Safe Swallow Protocol:  Supervision: Independent  Compensatory Swallowing Strategies: Alternate solids and liquids;Eat/Feed slowly;Upright as possible for all oral intake;Remain upright for 30-45 minutes after meals; No straws;Small bites/sips    Recommendations/Treatment  Requires SLP Intervention: No  Recommendations: ENT Eval  D/C Recommendations: Home independently    Recommended Exercises:    Therapeutic Interventions: Patient/Family education    Referral To: GI;ENT    Education: Images and recommendations were reviewed with pt following this exam.   Patient Education: Pt educated on MBSS procedure, nature of oropharyngeal deficits, assessment results and recommendations. Patient Education Response: Verbalizes understanding    Prognosis  Prognosis for safe diet advancement: good  Safety Devices  Safety Devices in place: Yes  Type of devices:  All fall risk precautions in place      Goals:  Further ST not indicated at this time      Pain   Patient Currently in Pain: No         Therapy Time:   Individual Concurrent Group Co-treatment   Time In 0945         Time Out 1000         Minutes 15           MBSS procedure    Marta Dial M.S. Mathew Goldberg  Speech-language pathologist  [id number]

## 2020-12-14 LAB
ALBUMIN SERPL-MCNC: 4.2 G/DL
BUN / CREAT RATIO: 15
BUN BLDV-MCNC: 10 MG/DL
CALCIUM SERPL-MCNC: 9.2 MG/DL
CHLORIDE BLD-SCNC: 92 MMOL/L
CO2: 25 MMOL/L
CREAT SERPL-MCNC: 0.68 MG/DL
GLUCOSE: 101
IRON: 80
PHOSPHORUS: 3.4 MG/DL
POTASSIUM SERPL-SCNC: 3.7 MMOL/L
SODIUM BLD-SCNC: 131 MMOL/L
TOTAL IRON BINDING CAPACITY: 286
VITAMIN B-12: 440

## 2021-02-15 ENCOUNTER — VIRTUAL VISIT (OUTPATIENT)
Dept: INTERNAL MEDICINE CLINIC | Age: 76
End: 2021-02-15
Payer: MEDICARE

## 2021-02-15 DIAGNOSIS — M79.2 PERIPHERAL NEUROPATHIC PAIN: ICD-10-CM

## 2021-02-15 DIAGNOSIS — Z00.00 ROUTINE GENERAL MEDICAL EXAMINATION AT A HEALTH CARE FACILITY: Primary | ICD-10-CM

## 2021-02-15 DIAGNOSIS — I10 ESSENTIAL HYPERTENSION: Chronic | ICD-10-CM

## 2021-02-15 DIAGNOSIS — K21.00 GASTROESOPHAGEAL REFLUX DISEASE WITH ESOPHAGITIS WITHOUT HEMORRHAGE: ICD-10-CM

## 2021-02-15 DIAGNOSIS — F41.9 ANXIETY: ICD-10-CM

## 2021-02-15 DIAGNOSIS — M81.8 OTHER OSTEOPOROSIS WITHOUT CURRENT PATHOLOGICAL FRACTURE: ICD-10-CM

## 2021-02-15 DIAGNOSIS — R60.0 BILATERAL LEG EDEMA: ICD-10-CM

## 2021-02-15 DIAGNOSIS — J30.89 ENVIRONMENTAL AND SEASONAL ALLERGIES: ICD-10-CM

## 2021-02-15 PROCEDURE — 99443 PR PHYS/QHP TELEPHONE EVALUATION 21-30 MIN: CPT | Performed by: INTERNAL MEDICINE

## 2021-02-15 PROCEDURE — G0439 PPPS, SUBSEQ VISIT: HCPCS | Performed by: INTERNAL MEDICINE

## 2021-02-15 RX ORDER — SERTRALINE HYDROCHLORIDE 100 MG/1
100 TABLET, FILM COATED ORAL 2 TIMES DAILY
Qty: 180 TABLET | Refills: 3 | Status: SHIPPED | OUTPATIENT
Start: 2021-02-15 | End: 2022-03-14 | Stop reason: SDUPTHER

## 2021-02-15 RX ORDER — FUROSEMIDE 40 MG/1
40 TABLET ORAL DAILY
Qty: 90 TABLET | Refills: 3 | Status: SHIPPED | OUTPATIENT
Start: 2021-02-15 | End: 2022-02-28

## 2021-02-15 SDOH — ECONOMIC STABILITY: TRANSPORTATION INSECURITY
IN THE PAST 12 MONTHS, HAS THE LACK OF TRANSPORTATION KEPT YOU FROM MEDICAL APPOINTMENTS OR FROM GETTING MEDICATIONS?: NO

## 2021-02-15 SDOH — ECONOMIC STABILITY: TRANSPORTATION INSECURITY
IN THE PAST 12 MONTHS, HAS LACK OF TRANSPORTATION KEPT YOU FROM MEETINGS, WORK, OR FROM GETTING THINGS NEEDED FOR DAILY LIVING?: NO

## 2021-02-15 SDOH — ECONOMIC STABILITY: INCOME INSECURITY: HOW HARD IS IT FOR YOU TO PAY FOR THE VERY BASICS LIKE FOOD, HOUSING, MEDICAL CARE, AND HEATING?: NOT HARD AT ALL

## 2021-02-15 SDOH — ECONOMIC STABILITY: FOOD INSECURITY: WITHIN THE PAST 12 MONTHS, YOU WORRIED THAT YOUR FOOD WOULD RUN OUT BEFORE YOU GOT MONEY TO BUY MORE.: NEVER TRUE

## 2021-02-15 ASSESSMENT — PATIENT HEALTH QUESTIONNAIRE - PHQ9
SUM OF ALL RESPONSES TO PHQ QUESTIONS 1-9: 0
SUM OF ALL RESPONSES TO PHQ QUESTIONS 1-9: 0

## 2021-02-15 ASSESSMENT — LIFESTYLE VARIABLES: HOW OFTEN DO YOU HAVE A DRINK CONTAINING ALCOHOL: 0

## 2021-02-15 NOTE — PROGRESS NOTES
Date of Service:  2/15/2021    Chief Complaint:      Chief Complaint   Patient presents with    Medicare AW    Follow-up       HPI:  Raymundo Lombard is a 76 y.o. Raymundo Lombard is a 76 y.o. female evaluated via telephone on 2/15/2021 due to [place] outbreak. Consent:  She and/or health care decision maker is aware that that she may receive a bill for this telephone service, depending on her insurance coverage, and has provided verbal consent to proceed: I AFFIRM/DO this is a Patient Initiated Episode with an Established Patient who has not had a related appointment within my department in the past 7 days or scheduled within the next 24 hours. Anxiety:  stable on Buspar 15 mg qid and Zoloft 100 mg 1/2 AM and 1 qhs since can't increase dose anymore.  Still has Valium 5 mg 1/4 prn     Hypertension:  Home blood pressure monitorin-140/750 on Losartanhctz 100/25 mg qhs and Atenolol 50 mg PM.  She is adherent to a low sodium diet. Patient denies chest pain, shortness of breath, headache, lightheadedness, blurred vision, peripheral edema, palpitations, dry cough and fatigue.  Antihypertensive medication side effects: no medication side effects noted.  Use of agents associated with hypertension: none.    Bilateral leg edema:  Stable on Lasix 40 mg 1/2 bid and Klor-con 10 mEq twice a week.   Vit D Def:  Unable to take the Vit D 50,000 due to constipation                                 GERD:  Stable on OTC Prevacid 30 mg bid and Carafate and phenergan 25 mg prn per Dr. Wilbur Valencia    Total Time: minutes: 21-30 minutes      Lab Results   Component Value Date    LABMICR SEE BELOW 2015     Lab Results   Component Value Date     2020    K 3.7 2020    CL 92 2020    CO2 25 2020    BUN 10 2020    CREATININE 0.68 2020    GLUCOSE 101 2020    CALCIUM 9.2 2020     Lab Results   Component Value Date    CHOL 180 2020    TRIG 100 2020    HDL 35 02/13/2020    LDLCALC 125 02/13/2020     Lab Results   Component Value Date    ALT 15 02/13/2020    AST 13 (L) 02/13/2020     Lab Results   Component Value Date    TSH 1.38 12/08/2016    TSH 1.25 12/07/2015    T4FREE 1.1 12/08/2016    T4FREE 1.2 12/07/2015     Lab Results   Component Value Date    WBC 7.4 02/13/2020    HGB 14.1 02/13/2020    HCT 40.9 02/13/2020    MCV 88.0 02/13/2020     02/13/2020     Lab Results   Component Value Date    INR 1.11 08/03/2017      No results found for: PSA   Lab Results   Component Value Date    LABURIC 3.6 08/22/2019        Patient Active Problem List   Diagnosis    Gastroesophageal reflux disease with esophagitis    Essential hypertension    H/O malignant neoplasm of breast    Allergic reaction    Anxiety attack    Pes anserine bursitis    Right knee pain    Tear of right rotator cuff    Shoulder impingement syndrome    Shoulder pain, right    Peripheral neuropathic pain    Vitamin D deficiency    Environmental and seasonal allergies    Bilateral leg edema    Other osteoporosis without current pathological fracture    Anxiety       Allergies   Allergen Reactions    Actifed Cold-Allergy [Chlorpheniramine-Phenylephrine] Other (See Comments)     Tachy,head rush    Medrol [Methylprednisolone]      Chest pain    Prednisone      Chest pain    Amoxicillin      Had hives when taken with Lisinopril and not sure what caused what since she was on both in the past for many years    Antihistamines, Chlorpheniramine-Type     Benadryl [Diphenhydramine]     Ceftin [Cefuroxime]      bleeding    Cefuroxime Axetil Diarrhea    Desonide Crea-Wound Dress Crea     Iv Contrast [Iodides] Hives    Lisinopril Hives    Naprelan [Naproxen]     Norvasc [Amlodipine Besylate] Swelling    Other      Sultin vaginal cream    Pcn [Penicillins]     Singulair [Montelukast Sodium]     Sudafed [Pseudoephedrine Hcl] Other (See Comments)     Tachy,head iqbal    Tape Virl Poplin Tape] 150/84   03/12/20 (!) 148/86     There is no height or weight on file to calculate BMI. Constitutional: Patient sounded well and in no distress. Psychiatric: Normal mood on the phone. Assessment/Plan:  Marcia Wharton was seen today for medicare awv and follow-up. Diagnoses and all orders for this visit:    Routine general medical examination at a health care facility    Bilateral leg edema  -     furosemide (LASIX) 40 MG tablet; Take 1 tablet by mouth daily    Anxiety  -     sertraline (ZOLOFT) 100 MG tablet; Take 1 tablet by mouth 2 times daily    Environmental and seasonal allergies    Essential hypertension    Gastroesophageal reflux disease with esophagitis without hemorrhage    Other osteoporosis without current pathological fracture    Peripheral neuropathic pain        Return Aug 2 at 11:30 BP, anxiety,. ...

## 2021-02-15 NOTE — PROGRESS NOTES
Medicare Annual Wellness Visit  Are Name: Flakita Goyal Date: 2/15/2021   MRN: [id number] Sex: Female   Age: 76 y.o. Ethnicity: Non-/Non    : 1945 Race: Vic Delgadillo is here for Medicare AWV    Screenings for behavioral, psychosocial and functional/safety risks, and cognitive dysfunction are all negative except as indicated below. These results, as well as other patient data from the [address] [facility] form, are documented in Flowsheets linked to this Encounter. Allergies   Allergen Reactions    Actifed Cold-Allergy [Chlorpheniramine-Phenylephrine] Other (See Comments)     Tachy,head rush    Medrol [Methylprednisolone]      Chest pain    Prednisone      Chest pain    Amoxicillin      Had hives when taken with Lisinopril and not sure what caused what since she was on both in the past for many years    Antihistamines, Chlorpheniramine-Type     Benadryl [Diphenhydramine]     Ceftin [Cefuroxime]      bleeding    Cefuroxime Axetil Diarrhea    Desonide Crea-Wound Dress Crea     Iv Contrast [Iodides] Hives    Lisinopril Hives    Naprelan [Naproxen]     Norvasc [Amlodipine Besylate] Swelling    Other      Sultin vaginal cream    Pcn [Penicillins]     Singulair [Montelukast Sodium]     Sudafed [Pseudoephedrine Hcl] Other (See Comments)     Tachy,head rush    Tape [Adhesive Tape]     Augmentin [Amoxicillin-Pot Clavulanate] Rash    Augmentin [Amoxicillin-Pot Clavulanate]      Pt states it makes her break out in red patches    Keflex [Cephalexin] Rash    Lidocaine Anxiety    Zithromax [Azithromycin]      Pt states it makes her break out in red patches  It has the least reaction  And she still can take it. Prior to Visit Medications    Medication Sig Taking?  Authorizing Provider   losartan-hydroCHLOROthiazide (HYZAAR) 100-25 MG per tablet TAKE ONE TABLET BY MOUTH DAILY Yes Desi Dominguez MD   atenolol (TENORMIN) 50 MG tablet TAKE ONE TABLET BY MOUTH DAILY Yes Desi Dominguez MD   busPIRone (BUSPAR) 15 MG tablet TAKE ONE TABLET BY MOUTH FOUR TIMES A DAY Yes Desi Dominguez MD   sertraline (ZOLOFT) 100 MG tablet Take 1 tablet by mouth 2 times daily Yes Desi Dominguez MD   furosemide (LASIX) 40 MG tablet Take 1 tablet by mouth daily Yes Desi Dominguez MD   triamcinolone (NASACORT) 55 MCG/ACT nasal inhaler 2 sprays by Nasal route daily Yes Historical Provider, MD   lansoprazole (PREVACID) 30 MG delayed release capsule Take 30 mg by mouth 2 times daily  Yes Historical Provider, MD   acetaminophen (TYLENOL) 325 MG tablet Take 650 mg by mouth every 6 hours as needed for Pain Yes Historical Provider, MD   sucralfate (CARAFATE) 1 GM tablet Take 1 tablet by mouth 4 times daily Yes Emma Milian MD   docusate sodium (COLACE) 100 MG capsule Take 100 mg by mouth daily. Yes Historical Provider, MD   azelastine (ASTELIN) 137 MCG/SPRAY nasal spray 1 spray by Nasal route 2 times daily.  Use in each nostril as directed  Patient taking differently: 1 spray by Nasal route as needed Use in each nostril as directed Yes Lynne Walker MD       Past Medical History:   Diagnosis Date    Anxiety attack 2015    Arthritis     Cancer [facility]) [phone]    breast bilateral    Diastolic dysfunction     Diverticulitis     GERD (gastroesophageal reflux disease)     Hyperlipidemia     Hypertension     Left breast mass     Miscarriage     PONV (postoperative nausea and vomiting)     Spastic colon        Past Surgical History:   Procedure Laterality Date    APPENDECTOMY      BREAST LUMPECTOMY Left     lumpectomy    BREAST RECONSTRUCTION Right     BREAST REDUCTION SURGERY Left      SECTION      x 2    CHOLECYSTECTOMY  12/20/10    COLONOSCOPY  11    COLONOSCOPY  06    diverticula, spastic colon    COLONOSCOPY  3/13/08    polyp, diverticula, internal hemorrhoids, spastic colon    COLONOSCOPY  28 dEC 2016    polyps    DILATION AND CURETTAGE OF UTERUS  08/2017    several    MASTECTOMY Right 1990    modified radical    OTHER SURGICAL HISTORY      benign mass under arm   [address] or 76    UPPER GASTROINTESTINAL ENDOSCOPY  11/7/11    UPPER GASTROINTESTINAL ENDOSCOPY  6/5/06    hiatal hernia, gastritis with erosion    UPPER GASTROINTESTINAL ENDOSCOPY  3/13/08    hiatal hernia, gastritis with erosion    UPPER GASTROINTESTINAL ENDOSCOPY  11/5/2014    UPPER GASTROINTESTINAL ENDOSCOPY  1/6/16       Family History   Problem Relation Age of Onset    Cancer Father         Brain    Diabetes Father     Heart Disease Father     Arthritis Mother     High Blood Pressure Mother     Heart Disease Mother     Tuberculosis Maternal Uncle     Cancer Paternal Aunt         Right face/bone    Breast Cancer Paternal Aunt     Breast Cancer Maternal Grandmother     High Cholesterol Sister     High Blood Pressure Sister     Diabetes Brother     High Blood Pressure Brother     High Cholesterol Brother     Cancer Paternal Uncle         Brain    Diabetes Maternal Grandfather     Emphysema Maternal Grandfather     Other Maternal Grandfather 66        die of Lung blood clot    Heart Disease Paternal Grandfather     Heart Attack Paternal Grandfather 52    Early Death Paternal Grandfather 52    Breast Cancer Paternal Aunt     Breast Cancer Paternal Aunt        CareTeam (Including outside providers/suppliers regularly involved in providing care):   Patient Care Team:  Amanda Dominguez MD as PCP - General  Amanda Dominguez MD as PCP - [facility] Empaneled Provider    Wt Readings from Last 3 Encounters:   05/07/20 220 lb (99.8 kg)   04/08/20 225 lb (102.1 kg)   03/12/20 225 lb (102.1 kg)      Patient-Reported Vitals 2/15/2021   Patient-Reported Weight 220 lb   Patient-Reported Height -   Patient-Reported Systolic 164   Patient-Reported Diastolic 80   Patient-Reported Pulse -      There is no height or weight on file to calculate BMI.    Based upon direct observation of the patient, evaluation of cognition reveals recent and remote memory intact. Patient's complete Health Risk Assessment and screening values have been reviewed and are found in Flowsheets. The following problems were reviewed today and where indicated follow up appointments were made and/or referrals ordered. Positive Risk Factor Screenings with Interventions:          General Health and ACP:  General  In general, how would you say your health is?: Good  In the past 7 days, have you experienced any of the following? New or Increased Pain, New or Increased Fatigue, Loneliness, Social Isolation, Stress or Anger?: None of These  Do you get the social and emotional support that you need?: Yes  Do you have a Living Will?: (!) No  Advance Directives     Power of [address] [place] [address] Will ACP-Advance Directive ACP-Power of     Not on File Not on File Not on File Not on File      General Health Risk Interventions:  · Full Code    Health Habits/Nutrition:  Health Habits/Nutrition  Do you exercise for at least 20 minutes 2-3 times per week?: (!) No(not able to get out of house.)  Have you lost any weight without trying in the past 3 months?: No  Do you eat only one meal per day?: No  Have you seen the dentist within the past year?: Yes     Health Habits/Nutrition Interventions:  · Inadequate physical activity:  patient agrees to exercise for at least 150 minutes/week       Personalized Preventive Plan   Current Health Maintenance Status    There is no immunization history on file for this patient.      Health Maintenance   Topic Date Due    Hepatitis C screen  1945    COVID-19 Vaccine (1 of 2) 12/13/1961    DTaP/Tdap/Td vaccine (1 - Tdap) 12/13/1964    Shingles Vaccine (1 of 2) 12/13/1995    Pneumococcal 65+ years Vaccine (1 of 1 - PPSV23) 12/13/2010    Annual Wellness Visit (AWV)  05/29/2019    Flu vaccine (1) 09/01/2020    Potassium monitoring  12/14/2021    note.

## 2021-03-04 ENCOUNTER — OFFICE VISIT (OUTPATIENT)
Dept: ENT CLINIC | Age: 76
End: 2021-03-04
Payer: MEDICARE

## 2021-03-04 VITALS — TEMPERATURE: 96.9 F | BODY MASS INDEX: 45 KG/M2 | WEIGHT: 250 LBS

## 2021-03-04 DIAGNOSIS — R06.02 SOB (SHORTNESS OF BREATH) ON EXERTION: Primary | ICD-10-CM

## 2021-03-04 DIAGNOSIS — K21.9 GASTROESOPHAGEAL REFLUX DISEASE, UNSPECIFIED WHETHER ESOPHAGITIS PRESENT: ICD-10-CM

## 2021-03-04 DIAGNOSIS — R49.0 DYSPHONIA: ICD-10-CM

## 2021-03-04 PROCEDURE — 31575 DIAGNOSTIC LARYNGOSCOPY: CPT | Performed by: OTOLARYNGOLOGY

## 2021-03-04 PROCEDURE — 99204 OFFICE O/P NEW MOD 45 MIN: CPT | Performed by: OTOLARYNGOLOGY

## 2021-03-04 ASSESSMENT — ENCOUNTER SYMPTOMS
APNEA: 0
SORE THROAT: 0
SHORTNESS OF BREATH: 1
COUGH: 0
FACIAL SWELLING: 0
TROUBLE SWALLOWING: 1
SINUS PRESSURE: 0
VOICE CHANGE: 1
EYE ITCHING: 0

## 2021-07-27 ENCOUNTER — OFFICE VISIT (OUTPATIENT)
Dept: PULMONOLOGY | Age: 76
End: 2021-07-27
Payer: MEDICARE

## 2021-07-27 VITALS
OXYGEN SATURATION: 94 % | WEIGHT: 244.6 LBS | BODY MASS INDEX: 45.01 KG/M2 | SYSTOLIC BLOOD PRESSURE: 160 MMHG | RESPIRATION RATE: 18 BRPM | HEIGHT: 62 IN | HEART RATE: 92 BPM | DIASTOLIC BLOOD PRESSURE: 80 MMHG | TEMPERATURE: 97.3 F

## 2021-07-27 DIAGNOSIS — J45.901 PERSISTENT ASTHMA WITH ACUTE EXACERBATION, UNSPECIFIED ASTHMA SEVERITY: Primary | ICD-10-CM

## 2021-07-27 PROCEDURE — 99204 OFFICE O/P NEW MOD 45 MIN: CPT | Performed by: INTERNAL MEDICINE

## 2021-07-27 RX ORDER — ALBUTEROL SULFATE 2.5 MG/3ML
1.25 SOLUTION RESPIRATORY (INHALATION) EVERY 4 HOURS PRN
Qty: 60 EACH | Refills: 1 | Status: SHIPPED | OUTPATIENT
Start: 2021-07-27 | End: 2022-01-11

## 2021-07-27 RX ORDER — PANTOPRAZOLE SODIUM 40 MG/1
TABLET, DELAYED RELEASE ORAL
COMMUNITY
Start: 2021-07-08

## 2021-07-27 NOTE — LETTER
[facility] Pulmonary, Critical Care, and Sleep  [address] [place] [address] [address] [address]  Phone: [phone]  Fax: [phone]           Rachel Herrera MD      July 27, 2021     Patient: Eloy Broderick   MR Number: [id number]   YOB: 1945   Date of Visit: 7/27/2021       Dear Dr. Alejandro Degree:    I saw Matilde Carter today for her unusual symptom of feeling like her airway is compromised at the level of the sternal notch. I note she was seen by ENT. I will review their records, give the patient a trial of low dose albuterol and then make a decision about additional studies/imaging. If you have questions, please do not hesitate to call me. I look forward to following Matilde Carter along with you.     Sincerely,        Rachel Herrera MD     CC Jasmina Honeycutt with ENT

## 2021-07-27 NOTE — PROGRESS NOTES
PULMONARY, CRITICAL CARE AND SLEEP MEDICINE     CC/REFERRING PROVIDER:  Patient is being seen at the request of Dr. Vandana Rodriguez for a consultation for gurgling in throat     PRESENTING HPI: This is a 66-year-old female with a remote history of tobacco abuse who presents with a many month to year history of gurgling in the back of her throat, is severely disturbing to herm associated with shortness of breath and occasional sputum production, hoarseness. She has been seen by ENT and was told there is no problem. She was seen by GI and treated with esophageal dilation without improvement. All of her symptoms are located at the level of the sternal notch. She had a barium swallow which was overall nonrevealing. She has heartburn and anxiety.     Past Medical History:   Diagnosis Date    Anxiety attack 2015    Arthritis     Cancer [facility]) [id number]    breast bilateral    Diastolic dysfunction     Diverticulitis     GERD (gastroesophageal reflux disease)     Hearing loss     Hyperlipidemia     Hypertension     Left breast mass     Miscarriage     PONV (postoperative nausea and vomiting)     Spastic colon     Tinnitus        Past Surgical History:   Procedure Laterality Date    APPENDECTOMY      BREAST LUMPECTOMY Left     lumpectomy    BREAST RECONSTRUCTION Right     BREAST REDUCTION SURGERY Left      SECTION      x 2    CHOLECYSTECTOMY  12/20/10    COLONOSCOPY  11    COLONOSCOPY  06    diverticula, spastic colon    COLONOSCOPY  3/13/08    polyp, diverticula, internal hemorrhoids, spastic colon    COLONOSCOPY  28 dEC 2016    polyps    DILATION AND CURETTAGE OF UTERUS  2017    several    MASTECTOMY Right     modified radical    OTHER SURGICAL HISTORY      benign mass under arm    POLYPECTOMY  1986    TONSILLECTOMY  1973 or 74    UPPER GASTROINTESTINAL ENDOSCOPY  11    UPPER GASTROINTESTINAL ENDOSCOPY  06    hiatal hernia, gastritis with erosion    UPPER GASTROINTESTINAL ENDOSCOPY  3/13/08    hiatal hernia, gastritis with erosion    UPPER GASTROINTESTINAL ENDOSCOPY  11/5/2014    UPPER GASTROINTESTINAL ENDOSCOPY  1/6/16       Allergies   Allergen Reactions    Actifed Cold-Allergy [Chlorpheniramine-Phenylephrine] Other (See Comments)     Chinmayaliciahead rush    Medrol [Methylprednisolone]      Chest pain    Prednisone      Chest pain    Amoxicillin      Had hives when taken with Lisinopril and not sure what caused what since she was on both in the past for many years    Antihistamines, Chlorpheniramine-Type     Benadryl [Diphenhydramine]     Ceftin [Cefuroxime]      bleeding    Cefuroxime Axetil Diarrhea    Desonide Crea-Wound Dress Crea     Iv Contrast [Iodides] Hives    Lisinopril Hives    Naprelan [Naproxen]     Norvasc [Amlodipine Besylate] Swelling    Other      Sultin vaginal cream    Pcn [Penicillins]     Singulair [Montelukast Sodium]     Sudafed [Pseudoephedrine Hcl] Other (See Comments)     Borishead rush    Tape [Adhesive Tape]     Augmentin [Amoxicillin-Pot Clavulanate] Rash    Augmentin [Amoxicillin-Pot Clavulanate]      Pt states it makes her break out in red patches    Keflex [Cephalexin] Rash    Lidocaine Anxiety    Zithromax [Azithromycin]      Pt states it makes her break out in red patches  It has the least reaction  And she still can take it. Medication list was reviewed and updated as needed in Epic     reports that she quit smoking about 31 years ago. She has a 22.50 pack-year smoking history. She has never used smokeless tobacco.    family history includes Arthritis in her mother; Breast Cancer in her maternal grandmother, paternal aunt, paternal aunt, and paternal aunt;  Cancer in her father, paternal aunt, and paternal uncle; Diabetes in her brother, father, and maternal grandfather; Early Death (age of onset: 52) in her paternal grandfather; Emphysema in her maternal grandfather; Heart Attack (age of onset: 52) in her paternal grandfather; Heart Disease in her father, mother, and paternal grandfather; High Blood Pressure in her brother, mother, and sister; High Cholesterol in her brother and sister; Other (age of onset: 66) in her maternal grandfather; Tuberculosis in her maternal uncle. REVIEW OF SYSTEMS: Complete Review of system reviewed with patient and noted on attached review of system sheet. PHYSICAL EXAM:  Blood pressure (!) 160/80, pulse 92, temperature 97.3 °F (36.3 °C), resp. rate 18, height 5' 2\" (1.575 m), weight 244 lb 9.6 oz (110.9 kg), SpO2 94 %, not currently breastfeeding.'  CONSTITUTIONAL:  No acute distress. HENT:  Oropharynx is clear and moist. No thyromegaly. EYES:  Conjunctivae are normal. Pupils equal, round, and reactive to light. No scleral icterus. NECK:  No tracheal deviation present. No obvious thyroid mass. CV: Normal rate, regular rhythm, normal heart sounds. No right ventricular heave. No lower extremity edema. PULM/CHEST: Upper airway wheezing. No rales. Chest wall is not dull to percussion. No accessory muscle usage or stridor. ABDOMINAL: Soft. Bowel sounds present. No distension or hernia. No tenderness. MUSCULOSKELETAL: No cyanosis. No clubbing. No obvious joint deformity. LYMPHATIC: No cervical or supraclavicular adenopathy. SKIN: Skin is warm and dry. No rash or nodules on the exposed extremities. PSYCHIATRIC: Normal mood and affect. Behavior is normal.  No anxiety. NEUROLOGIC: Alert, awake and oriented. PERRL. 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   May need CXR/imaging, VCD evaluation, PFT     PATIENT INSTRUCTIONS:  Try using albuterol one half vial of nebulized solution approximately once per day and record whether it is helpful at all for symptoms of shortness of breath, cough, congestion  Albuterol only lasts about 2 and 1/2 hours, so benefit is likely to be short, but very important to know.   Keep a symptom diary/journal.

## 2021-07-27 NOTE — PATIENT INSTRUCTIONS
Try using albuterol one half vial of nebulized solution approximately once per day and record whether it is helpful at all for symptoms of shortness of breath, cough, congestion  Albuterol only lasts about 2 and 1/2 hours, so benefit is likely to be short, but very important to know.   Keep a symptom diary/journal.

## 2021-08-02 ENCOUNTER — OFFICE VISIT (OUTPATIENT)
Dept: INTERNAL MEDICINE CLINIC | Age: 76
End: 2021-08-02
Payer: MEDICARE

## 2021-08-02 VITALS
OXYGEN SATURATION: 93 % | HEIGHT: 62 IN | DIASTOLIC BLOOD PRESSURE: 82 MMHG | SYSTOLIC BLOOD PRESSURE: 132 MMHG | BODY MASS INDEX: 44.9 KG/M2 | WEIGHT: 244 LBS | HEART RATE: 90 BPM

## 2021-08-02 DIAGNOSIS — I10 ESSENTIAL HYPERTENSION: Chronic | ICD-10-CM

## 2021-08-02 DIAGNOSIS — F41.9 ANXIETY: Primary | ICD-10-CM

## 2021-08-02 DIAGNOSIS — K21.00 GASTROESOPHAGEAL REFLUX DISEASE WITH ESOPHAGITIS WITHOUT HEMORRHAGE: ICD-10-CM

## 2021-08-02 DIAGNOSIS — R60.0 BILATERAL LEG EDEMA: ICD-10-CM

## 2021-08-02 PROCEDURE — 99214 OFFICE O/P EST MOD 30 MIN: CPT | Performed by: INTERNAL MEDICINE

## 2021-08-02 RX ORDER — BUSPIRONE HYDROCHLORIDE 15 MG/1
TABLET ORAL
Qty: 360 TABLET | Refills: 3 | Status: SHIPPED | OUTPATIENT
Start: 2021-08-02 | End: 2022-08-17 | Stop reason: SDUPTHER

## 2021-08-02 RX ORDER — ATENOLOL 50 MG/1
TABLET ORAL
Qty: 90 TABLET | Refills: 3 | Status: SHIPPED | OUTPATIENT
Start: 2021-08-02 | End: 2022-08-17 | Stop reason: SDUPTHER

## 2021-08-02 NOTE — PROGRESS NOTES
Emili Antonio  YOB: 1945    Date of Service:  2021    Chief Complaint:      Chief Complaint   Patient presents with    Hypertension    Anxiety       Assessment/Plan:  Daniel Susan was seen today for hypertension and anxiety. Diagnoses and all orders for this visit:    Anxiety  -     busPIRone (BUSPAR) 15 MG tablet; TAKE ONE TABLET BY MOUTH FOUR TIMES A DAY    Essential hypertension  -     atenolol (TENORMIN) 50 MG tablet; TAKE ONE TABLET BY MOUTH DAILY    Bilateral leg edema    Gastroesophageal reflux disease with esophagitis without hemorrhage      Stable and continue on current medications. Return Feb 15 or after Fasting Medicare Wellness and f/u. HPI:  Emili Antonio is a 76 y.o. She's been seeing a pulmonologist for shortness of breath and GI for ? Dysphagia with negative work up so far. She has tried getting off Atenolol without improvement and she does not want to change med since she's been on atenolol for a long time. Anxiety:  stable on Buspar 15 mg qid and Zoloft 100 mg 1/2 AM and 1 qhs since can't increase dose anymore.  Still has Valium 5 mg 1/4 prn     Hypertension:  Home blood pressure monitorin-140/750 on Losartanhctz 100/25 mg qhs and Atenolol 50 mg PM.  She is adherent to a low sodium diet. Patient denies chest pain, shortness of breath, headache, lightheadedness, blurred vision, peripheral edema, palpitations, dry cough and fatigue.  Antihypertensive medication side effects: no medication side effects noted.  Use of agents associated with hypertension: none.    Bilateral leg edema:  Stable on Lasix 40 mg 1/2 bid and Klor-con 10 mEq twice a week.   Vit D Def:  Unable to take the Vit D 50,000 due to constipation                                 GERD:  Stable on Protonix 40 mg bid, Carafate and phenergan 25 mg prn per Dr. Berlin Tan    Lab Results   Component Value Date    LABMICR SEE BELOW 2015     Lab Results   Component Value Date     2020    K 3.7 12/14/2020    CL 92 12/14/2020    CO2 25 12/14/2020    BUN 10 12/14/2020    CREATININE 0.68 12/14/2020    GLUCOSE 101 12/14/2020    CALCIUM 9.2 12/14/2020     Lab Results   Component Value Date    CHOL 180 02/13/2020    TRIG 100 02/13/2020    HDL 35 02/13/2020    LDLCALC 125 02/13/2020     Lab Results   Component Value Date    ALT 15 02/13/2020    AST 13 (L) 02/13/2020     Lab Results   Component Value Date    TSH 1.38 12/08/2016    TSH 1.25 12/07/2015    T4FREE 1.1 12/08/2016    T4FREE 1.2 12/07/2015     Lab Results   Component Value Date    WBC 7.4 02/13/2020    HGB 14.1 02/13/2020    HCT 40.9 02/13/2020    MCV 88.0 02/13/2020     02/13/2020     Lab Results   Component Value Date    INR 1.11 08/03/2017      No results found for: PSA   Lab Results   Component Value Date    LABURIC 3.6 08/22/2019        Patient Active Problem List   Diagnosis    Gastroesophageal reflux disease with esophagitis    Essential hypertension    H/O malignant neoplasm of breast    Allergic reaction    Anxiety attack    Pes anserine bursitis    Right knee pain    Tear of right rotator cuff    Shoulder impingement syndrome    Shoulder pain, right    Peripheral neuropathic pain    Vitamin D deficiency    Environmental and seasonal allergies    Bilateral leg edema    Other osteoporosis without current pathological fracture    Anxiety       Allergies   Allergen Reactions    Actifed Cold-Allergy [Chlorpheniramine-Phenylephrine] Other (See Comments)     Tachy,head rush    Medrol [Methylprednisolone]      Chest pain    Prednisone      Chest pain    Amoxicillin      Had hives when taken with Lisinopril and not sure what caused what since she was on both in the past for many years    Antihistamines, Chlorpheniramine-Type     Benadryl [Diphenhydramine]     Ceftin [Cefuroxime]      bleeding    Cefuroxime Axetil Diarrhea    Desonide Crea-Wound Dress Crea     Iv Contrast [Iodides] Hives    Lisinopril Hives    Naprelan [Naproxen]     Norvasc [Amlodipine Besylate] Swelling    Other      Sultin vaginal cream    Pcn [Penicillins]     Singulair [Montelukast Sodium]     Sudafed [Pseudoephedrine Hcl] Other (See Comments)     Tachy,head rush    Tape [place] Just Tape]     Augmentin [Amoxicillin-Pot Clavulanate] Rash    Augmentin [Amoxicillin-Pot Clavulanate]      Pt states it makes her break out in red patches    Keflex [Cephalexin] Rash    Lidocaine Anxiety    Zithromax [Azithromycin]      Pt states it makes her break out in red patches  It has the least reaction  And she still can take it. Outpatient Medications Marked as Taking for the 8/2/21 encounter (Office Visit) with Yamel Dominguez MD   Medication Sig Dispense Refill    pantoprazole (PROTONIX) 40 MG tablet       albuterol (PROVENTIL) (2.5 MG/3ML) 0.083% nebulizer solution Take 1.5 mLs by nebulization every 4 hours as needed for Wheezing or Shortness of Breath Use only 1/2 neb for each treatment, discard remainder 60 each 1    furosemide (LASIX) 40 MG tablet Take 1 tablet by mouth daily 90 tablet 3    sertraline (ZOLOFT) 100 MG tablet Take 1 tablet by mouth 2 times daily 180 tablet 3    losartan-hydroCHLOROthiazide (HYZAAR) 100-25 MG per tablet TAKE ONE TABLET BY MOUTH DAILY 90 tablet 3    atenolol (TENORMIN) 50 MG tablet TAKE ONE TABLET BY MOUTH DAILY 90 tablet 1    busPIRone (BUSPAR) 15 MG tablet TAKE ONE TABLET BY MOUTH FOUR TIMES A  tablet 1    triamcinolone (NASACORT) 55 MCG/ACT nasal inhaler 2 sprays by Nasal route daily       acetaminophen (TYLENOL) 325 MG tablet Take 650 mg by mouth every 6 hours as needed for Pain      sucralfate (CARAFATE) 1 GM tablet Take 1 tablet by mouth 4 times daily 120 tablet 3    docusate sodium (COLACE) 100 MG capsule Take 100 mg by mouth daily.  azelastine (ASTELIN) 137 MCG/SPRAY nasal spray 1 spray by Nasal route 2 times daily.  Use in each nostril as directed (Patient taking differently: 1 spray by Nasal route as needed Use in each nostril as directed) 1 Bottle 5         Review of Systems: 14 systems were negative except of what was stated on HPI    Nursing note and vitals reviewed. Vitals:    08/02/21 1124   BP: (!) 150/92   Pulse: 90   SpO2: 93%   Weight: 244 lb (110.7 kg)   Height: 5' 2\" (1.575 m)     Wt Readings from Last 3 Encounters:   08/02/21 244 lb (110.7 kg)   07/27/21 244 lb 9.6 oz (110.9 kg)   03/04/21 250 lb (113.4 kg)     BP Readings from Last 3 Encounters:   08/02/21 (!) 150/92   07/27/21 (!) 160/80   05/07/20 136/73     Body mass index is 44.63 kg/m². Constitutional: Patient appears well-developed and well-nourished. No distress. Head: Normocephalic and atraumatic. Neck: Normal range of motion. Neck supple. No thyroidmegaly. Cardiovascular: Normal rate, regular rhythm, normal heart sounds and intact distal pulses. Pulmonary/Chest: Effort normal and breath sounds normal. No stridor. No respiratory distress. No wheezes and no rales. Abdominal: Soft. Bowel sounds are normal. No distension and no mass. No tenderness. No rebound and no guarding. Musculoskeletal: No edema and no tenderness. Skin: No rash or erythema. Psychiatric: Normal mood and affect.  Behavior is normal.

## 2021-08-27 ENCOUNTER — TELEPHONE (OUTPATIENT)
Dept: PULMONOLOGY | Age: 76
End: 2021-08-27

## 2021-08-27 ENCOUNTER — OFFICE VISIT (OUTPATIENT)
Dept: PULMONOLOGY | Age: 76
End: 2021-08-27
Payer: MEDICARE

## 2021-08-27 VITALS
TEMPERATURE: 95 F | OXYGEN SATURATION: 95 % | SYSTOLIC BLOOD PRESSURE: 130 MMHG | HEART RATE: 78 BPM | HEIGHT: 63 IN | BODY MASS INDEX: 43.05 KG/M2 | DIASTOLIC BLOOD PRESSURE: 72 MMHG | WEIGHT: 243 LBS | RESPIRATION RATE: 22 BRPM

## 2021-08-27 DIAGNOSIS — J45.901 PERSISTENT ASTHMA WITH ACUTE EXACERBATION, UNSPECIFIED ASTHMA SEVERITY: Primary | ICD-10-CM

## 2021-08-27 PROCEDURE — 99213 OFFICE O/P EST LOW 20 MIN: CPT | Performed by: INTERNAL MEDICINE

## 2021-08-27 NOTE — PROGRESS NOTES
PULMONARY, CRITICAL CARE AND SLEEP MEDICINE     CC/REFERRING PROVIDER:  Patient is being seen at the request of Dr. Mukesh Burgos for a consultation for gurgling in throat     Interval History:   Albuterol is helpful, can get a deeper breath, still feels like she has secretions and mucus in the upper airway. PRESENTING HPI: This is a 49-year-old female with a remote history of tobacco abuse who presents with a many month to year history of gurgling in the back of her throat, is severely disturbing to herm associated with shortness of breath and occasional sputum production, hoarseness. She has been seen by ENT and was told there is no problem. She was seen by GI and treated with esophageal dilation without improvement. All of her symptoms are located at the level of the sternal notch. She had a barium swallow which was overall nonrevealing. She has heartburn and anxiety. reports that she quit smoking about 31 years ago. She has a 22.50 pack-year smoking history. She has never used smokeless tobacco.    PHYSICAL EXAM:  Blood pressure 130/72, pulse 78, temperature 95 °F (35 °C), resp. rate 22, height 5' 2.5\" (1.588 m), weight 243 lb (110.2 kg), SpO2 95 %, not currently breastfeeding.'  Constitutional:  No acute distress. HENT:  Oropharynx is clear and moist.   Neck: No tracheal deviation present. Cardiovascular: Normal heart sounds. No lower extremity edema. Pulmonary/Chest: No wheezes. No rhonchi. No rales. No decreased breath sounds. No accessory muscle usage or stridor. Musculoskeletal: No cyanosis. No clubbing. Skin: Skin is warm and dry. Psychiatric: Normal mood and affect.   Neurologic: speech fluent, alert and oriented, strength symmetric      DATA:  No new    ASSESSMENT:  · Persistent asthma   · Upper airway wheezing: VCD v anatomic issue, latter less likely given h/o ENT evaluation    Not addressed today:   · Shortness of breath   · S/P esophageal dilation   · 20 pack year tobacco in remission X

## 2021-09-23 ENCOUNTER — OFFICE VISIT (OUTPATIENT)
Dept: PULMONOLOGY | Age: 76
End: 2021-09-23
Payer: MEDICARE

## 2021-09-23 VITALS
OXYGEN SATURATION: 97 % | HEART RATE: 63 BPM | WEIGHT: 243.6 LBS | RESPIRATION RATE: 24 BRPM | BODY MASS INDEX: 43.16 KG/M2 | HEIGHT: 63 IN | DIASTOLIC BLOOD PRESSURE: 92 MMHG | SYSTOLIC BLOOD PRESSURE: 148 MMHG

## 2021-09-23 DIAGNOSIS — R06.02 SHORTNESS OF BREATH: ICD-10-CM

## 2021-09-23 DIAGNOSIS — J45.40 MODERATE PERSISTENT ASTHMA WITHOUT COMPLICATION: Primary | ICD-10-CM

## 2021-09-23 PROCEDURE — 99214 OFFICE O/P EST MOD 30 MIN: CPT | Performed by: INTERNAL MEDICINE

## 2021-09-23 RX ORDER — FLUTICASONE FUROATE, UMECLIDINIUM BROMIDE AND VILANTEROL TRIFENATATE 200; 62.5; 25 UG/1; UG/1; UG/1
1 POWDER RESPIRATORY (INHALATION) DAILY
Qty: 1 EACH | Refills: 5 | Status: SHIPPED | OUTPATIENT
Start: 2021-09-23 | End: 2022-03-23

## 2021-09-23 RX ORDER — ALBUTEROL SULFATE 2.5 MG/3ML
2.5 SOLUTION RESPIRATORY (INHALATION) EVERY 4 HOURS PRN
Qty: 60 EACH | Refills: 5 | Status: SHIPPED | OUTPATIENT
Start: 2021-09-23 | End: 2022-09-26

## 2021-09-23 RX ORDER — FLUTICASONE FUROATE, UMECLIDINIUM BROMIDE AND VILANTEROL TRIFENATATE 200; 62.5; 25 UG/1; UG/1; UG/1
1 POWDER RESPIRATORY (INHALATION) DAILY
Qty: 2 EACH | Refills: 0 | COMMUNITY
Start: 2021-09-23 | End: 2022-01-11

## 2021-09-23 NOTE — PROGRESS NOTES
PULMONARY, CRITICAL CARE AND SLEEP MEDICINE     CC/REFERRING PROVIDER:  Patient is being seen at the request of Dr. Clinton Stanton for a consultation for gurgling in throat     Interval History:   Albuterol is helpful, can get a deeper breath, still feels like she has secretions and mucus in the upper airway. Used trelegy for four weeks and there was definite improvement in shortness of breath, still feels like secretions are stuck in upper airways. She is out of trelegy     PRESENTING HPI: This is a 57-year-old female with a remote history of tobacco abuse who presents with a many month to year history of gurgling in the back of her throat, is severely disturbing to herm associated with shortness of breath and occasional sputum production, hoarseness. She has been seen by ENT and was told there is no problem. She was seen by GI and treated with esophageal dilation without improvement. All of her symptoms are located at the level of the sternal notch. She had a barium swallow which was overall nonrevealing. She has heartburn and anxiety. reports that she quit smoking about 31 years ago. She has a 22.50 pack-year smoking history. She has never used smokeless tobacco.    PHYSICAL EXAM:  Blood pressure (!) 148/92, pulse 63, resp. rate 24, height 5' 2.5\" (1.588 m), weight 243 lb 9.6 oz (110.5 kg), SpO2 97 %, not currently breastfeeding.'  Constitutional:  No acute distress. HENT:  Oropharynx is clear and moist.   Neck: No tracheal deviation present. Cardiovascular: Normal heart sounds. No lower extremity edema. Pulmonary/Chest: No wheezes. No rhonchi. No rales. No decreased breath sounds. No accessory muscle usage or stridor. Upper airway wheezing is less, still a type of inspiratory grunting. Musculoskeletal: No cyanosis. No clubbing. Skin: Skin is warm and dry. Psychiatric: Normal mood and affect.   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 If upper airway issue not markedly improved, will refer to VCD testing.

## 2021-09-28 ENCOUNTER — HOSPITAL ENCOUNTER (OUTPATIENT)
Age: 76
Discharge: HOME | End: 2021-09-28
Payer: MEDICARE

## 2021-09-28 ENCOUNTER — HOSPITAL ENCOUNTER (OUTPATIENT)
Dept: GENERAL RADIOLOGY | Age: 76
Discharge: HOME | End: 2021-09-28
Payer: MEDICARE

## 2021-09-28 DIAGNOSIS — R06.02 SHORTNESS OF BREATH: ICD-10-CM

## 2021-09-28 PROCEDURE — 71046 X-RAY EXAM CHEST 2 VIEWS: CPT

## 2021-10-19 ENCOUNTER — TELEPHONE (OUTPATIENT)
Dept: INTERNAL MEDICINE CLINIC | Age: 76
End: 2021-10-19

## 2021-10-19 DIAGNOSIS — M25.561 CHRONIC PAIN OF RIGHT KNEE: ICD-10-CM

## 2021-10-19 DIAGNOSIS — R60.0 BILATERAL LEG EDEMA: Primary | ICD-10-CM

## 2021-10-19 DIAGNOSIS — G89.29 CHRONIC PAIN OF RIGHT KNEE: ICD-10-CM

## 2021-10-19 NOTE — TELEPHONE ENCOUNTER
Pt called stating that she needs a new letter so she can get her handicap placard.   Please write the note and let the Pt know when it is ready

## 2022-01-11 ENCOUNTER — TELEPHONE (OUTPATIENT)
Dept: PULMONOLOGY | Age: 77
End: 2022-01-11

## 2022-01-11 ENCOUNTER — VIRTUAL VISIT (OUTPATIENT)
Dept: PULMONOLOGY | Age: 77
End: 2022-01-11
Payer: MEDICARE

## 2022-01-11 DIAGNOSIS — J45.40 MODERATE PERSISTENT ASTHMA WITHOUT COMPLICATION: Primary | ICD-10-CM

## 2022-01-11 PROCEDURE — 99442 PR PHYS/QHP TELEPHONE EVALUATION 11-20 MIN: CPT | Performed by: INTERNAL MEDICINE

## 2022-01-11 NOTE — TELEPHONE ENCOUNTER
limited to the following:    Your Provider(s) may not able to provide medical treatment for your particular condition and you may be required to seek alternative healthcare or emergency care services.  The electronic systems or other security protocols or safeguards used in the Service could fail, causing a breach of privacy of your medical or other information.  Given regulatory requirements in certain jurisdictions, your Provider(s) diagnosis and/or treatment options, especially pertaining to certain prescriptions, may be limited. Acceptance   1. You understand that Services will be provided via Telehealth. This process involves the use of HIPAA compliant and secure, real-time audio-visual interfacing with a qualified and appropriately trained provider located at [facility]. 2. You understand that, under no circumstances, will this session be recorded. 3. You understand that the Provider(s) at [facility] and other clinical participants will be party to the information obtained during the Telehealth session in accordance with best medical practices. 4. You understand that the information obtained during the Telehealth session will be used to help determine the most appropriate treatment options. 5. You understand that You have the right to revoke this consent at any point in time. 6. You understand that Telehealth is voluntary, and that continued treatment is not dependent upon consent. 7. You understand that, in the event of non-consent to Telehealth services and/or technical difficulties, you will obtain services as typically provided in the absence of Telehealth technology. 8. You understand that this consent will be kept in Your medical record. 9. No potential benefits from the use of Telehealth or specific results can be guaranteed. Your condition may not be cured or improved and, in some cases, may get worse.    10. There are limitations in the provision of medical care and treatment via Telehealth and the Service and you may not be able to receive diagnosis and/or treatment through the Service for every condition for which you seek diagnosis and/or treatment. 11. There are potential risks to the use of Telehealth, including but not limited to the risks described in this Telehealth Consent. 12. Your Provider(s) have discussed the use of Telehealth and the Service with you, including the benefits and risks of such and you have provided oral consent to your Provider(s) for the use of Telehealth and the Service. 15. You understand that it is your duty to provide your Provider(s) truthful, accurate and complete information, including all relevant information regarding care that you may have received or may be receiving from other healthcare providers outside of the Service. 14. You understand that each of your Provider(s) may determine in his or sole discretion that your condition is not suitable for diagnosis and/or treatment using the Service, and that you may need to seek medical care and treatment a specialist or other healthcare provider, outside of the Service. 15. You understand that you are fully responsible for payment for all services provided by Provider(s) or through use of the Service and that you may not be able to use third-party insurance. 16. You represent that (a) you have read this Telehealth Consent carefully, (b) you understand the risks and benefits of the Service and the use of Telehealth in the medical care and treatment provided to you by Provider(s) using the Service, and (c) you have the legal capacity and authority to provide this consent for yourself and/or the minor for which you are consenting under applicable federal and state laws, including laws relating to the age of [de-identified] and/or parental/guardian consent.    17. You give your informed consent to the use of Telehealth by Provider(s) using the Service under

## 2022-01-11 NOTE — TELEPHONE ENCOUNTER
Within this Telehealth Consent, the terms you and yours refer to the person using the Telehealth Service (Service), or in the case of a use of the Service by or on behalf of a minor, you and yours refer to and include (i) the parent or legal guardian who provides consent to the use of the Service by such minor or uses the Service on behalf of such minor, and (ii) the minor for whom consent is being provided or on whose behalf the Service is being utilized. When using Service, you will be consulting with your health care providers via the use of Telehealth.   Telehealth involves the delivery of healthcare services using electronic communications, information technology or other means between a healthcare provider and a patient who are not in the same physical location. Telehealth may be used for diagnosis, treatment, follow-up and/or patient education, and may include, but is not limited to, one or more of the following:    Electronic transmission of medical records, photo images, personal health information or other data between a patient and a healthcare provider    Interactions between a patient and healthcare provider via audio, video and/or data communications    Use of output data from medical devices, sound and video files    Anticipated Benefits   The use of Telehealth by your Provider(s) through the Service may have the following possible benefits:    Making it easier and more efficient for you to access medical care and treatment for the conditions treated by such Provider(s) utilizing the Service    Allowing you to obtain medical care and treatment by Provider(s) at times that are convenient for you    Enabling you to interact with Provider(s) without the necessity of an in-office appointment     Possible Risks   While the use of Telehealth can provide potential benefits for you, there are also potential risks associated with the use of Telehealth.  These risks include, but may not be limited to the following:    Your Provider(s) may not able to provide medical treatment for your particular condition and you may be required to seek alternative healthcare or emergency care services.  The electronic systems or other security protocols or safeguards used in the Service could fail, causing a breach of privacy of your medical or other information.  Given regulatory requirements in certain jurisdictions, your Provider(s) diagnosis and/or treatment options, especially pertaining to certain prescriptions, may be limited. Acceptance   1. You understand that Services will be provided via Telehealth. This process involves the use of HIPAA compliant and secure, real-time audio-visual interfacing with a qualified and appropriately trained provider located at [facility]. 2. You understand that, under no circumstances, will this session be recorded. 3. You understand that the Provider(s) at [facility] and other clinical participants will be party to the information obtained during the Telehealth session in accordance with best medical practices. 4. You understand that the information obtained during the Telehealth session will be used to help determine the most appropriate treatment options. 5. You understand that You have the right to revoke this consent at any point in time. 6. You understand that Telehealth is voluntary, and that continued treatment is not dependent upon consent. 7. You understand that, in the event of non-consent to Telehealth services and/or technical difficulties, you will obtain services as typically provided in the absence of Telehealth technology. 8. You understand that this consent will be kept in Your medical record. 9. No potential benefits from the use of Telehealth or specific results can be guaranteed. Your condition may not be cured or improved and, in some cases, may get worse.    10. There are limitations in the provision of medical care and treatment via Telehealth and the Service and you may not be able to receive diagnosis and/or treatment through the Service for every condition for which you seek diagnosis and/or treatment. 11. There are potential risks to the use of Telehealth, including but not limited to the risks described in this Telehealth Consent. 12. Your Provider(s) have discussed the use of Telehealth and the Service with you, including the benefits and risks of such and you have provided oral consent to your Provider(s) for the use of Telehealth and the Service. 15. You understand that it is your duty to provide your Provider(s) truthful, accurate and complete information, including all relevant information regarding care that you may have received or may be receiving from other healthcare providers outside of the Service. 14. You understand that each of your Provider(s) may determine in his or sole discretion that your condition is not suitable for diagnosis and/or treatment using the Service, and that you may need to seek medical care and treatment a specialist or other healthcare provider, outside of the Service. 15. You understand that you are fully responsible for payment for all services provided by Provider(s) or through use of the Service and that you may not be able to use third-party insurance. 16. You represent that (a) you have read this Telehealth Consent carefully, (b) you understand the risks and benefits of the Service and the use of Telehealth in the medical care and treatment provided to you by Provider(s) using the Service, and (c) you have the legal capacity and authority to provide this consent for yourself and/or the minor for which you are consenting under applicable federal and state laws, including laws relating to the age of [de-identified] and/or parental/guardian consent.    17. You give your informed consent to the use of Telehealth by Provider(s) using the Service under the terms described in the Terms of Service and this Telehealth Consent. The patient was read the following statement and has consented to the visit as of 1/11/22. The patient has been scheduled for their first telehealth visit on 1/11/22 with Dr Roddy Dancer.

## 2022-01-11 NOTE — PROGRESS NOTES
PULMONARY, CRITICAL CARE AND SLEEP MEDICINE     CC/REFERRING PROVIDER:  Patient is being seen at the request of Dr. Adriane Chavira for a consultation for gurgling in throat     Interval History 2:   Patient says sometimes things help but with the cold weather she has more trouble. Okay to use robitussin. Interval History:   Albuterol is helpful, can get a deeper breath, still feels like she has secretions and mucus in the upper airway. Used trelegy for four weeks and there was definite improvement in shortness of breath, still feels like secretions are stuck in upper airways. She is out of trelegy     PRESENTING HPI: This is a 40-year-old female with a remote history of tobacco abuse who presents with a many month to year history of gurgling in the back of her throat, is severely disturbing to herm associated with shortness of breath and occasional sputum production, hoarseness. She has been seen by ENT and was told there is no problem. She was seen by GI and treated with esophageal dilation without improvement. All of her symptoms are located at the level of the sternal notch. She had a barium swallow which was overall nonrevealing. She has heartburn and anxiety. reports that she quit smoking about 32 years ago. She has a 22.50 pack-year smoking history. She has never used smokeless tobacco.    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 female evaluated via telephone on 1/11/2022.   Consent: She and/or health care decision maker is aware that that she may receive a bill for this telephone service, depending on her insurance coverage, and has provided verbal consent to proceed: YES. I communicated with the patient and/or health care decision maker about: see interval history above. Details of this discussion including any medical advice provided: see plan above. Total Time: minutes: 11-20 minutes. I affirm this is a Patient Initiated Episode with a Patient who has not had a related appointment within my department in the past 7 days or scheduled within the next 24 hours. Patient identification was verified at the start of the visit: YES  Pursuant to the emergency declaration under the [facility], [address] [place] [address] authority and the [employer] Act, this Telephone Visit was conducted with patient's (and/or legal guardian's) consent, to reduce the patient's risk of exposure to COVID-19 and provide necessary medical care.   The patient (and/or legal guardian) his advised to contact this office for worsening conditions or problems, and seek emergency medical treatment and/or call 911 if urgent care needed,

## 2022-02-08 ENCOUNTER — TELEPHONE (OUTPATIENT)
Dept: INTERNAL MEDICINE CLINIC | Age: 77
End: 2022-02-08

## 2022-02-25 DIAGNOSIS — R60.0 BILATERAL LEG EDEMA: ICD-10-CM

## 2022-02-28 RX ORDER — FUROSEMIDE 40 MG/1
TABLET ORAL
Qty: 90 TABLET | Refills: 0 | Status: SHIPPED
Start: 2022-02-28 | End: 2022-05-12 | Stop reason: DRUGHIGH

## 2022-03-14 ENCOUNTER — OFFICE VISIT (OUTPATIENT)
Dept: INTERNAL MEDICINE CLINIC | Age: 77
End: 2022-03-14
Payer: MEDICARE

## 2022-03-14 VITALS
BODY MASS INDEX: 43.94 KG/M2 | DIASTOLIC BLOOD PRESSURE: 74 MMHG | WEIGHT: 248 LBS | OXYGEN SATURATION: 97 % | HEIGHT: 63 IN | HEART RATE: 102 BPM | SYSTOLIC BLOOD PRESSURE: 168 MMHG

## 2022-03-14 DIAGNOSIS — E66.01 OBESITY, CLASS III, BMI 40-49.9 (MORBID OBESITY) (HCC): ICD-10-CM

## 2022-03-14 DIAGNOSIS — R60.0 BILATERAL LEG EDEMA: ICD-10-CM

## 2022-03-14 DIAGNOSIS — K21.00 GASTROESOPHAGEAL REFLUX DISEASE WITH ESOPHAGITIS WITHOUT HEMORRHAGE: ICD-10-CM

## 2022-03-14 DIAGNOSIS — F41.9 ANXIETY: ICD-10-CM

## 2022-03-14 DIAGNOSIS — Z00.00 MEDICARE ANNUAL WELLNESS VISIT, SUBSEQUENT: Primary | ICD-10-CM

## 2022-03-14 DIAGNOSIS — I10 ESSENTIAL HYPERTENSION: ICD-10-CM

## 2022-03-14 DIAGNOSIS — J45.40 MODERATE PERSISTENT ASTHMA WITHOUT COMPLICATION: ICD-10-CM

## 2022-03-14 LAB
A/G RATIO: 1.6 (ref 1.1–2.2)
ALBUMIN SERPL-MCNC: 4.7 G/DL (ref 3.4–5)
ALP BLD-CCNC: 97 U/L (ref 40–129)
ALT SERPL-CCNC: 16 U/L (ref 10–40)
ANION GAP SERPL CALCULATED.3IONS-SCNC: 21 MMOL/L (ref 3–16)
AST SERPL-CCNC: 16 U/L (ref 15–37)
BASOPHILS ABSOLUTE: 0.1 K/UL (ref 0–0.2)
BASOPHILS RELATIVE PERCENT: 0.7 %
BILIRUB SERPL-MCNC: 0.6 MG/DL (ref 0–1)
BUN BLDV-MCNC: 13 MG/DL (ref 7–20)
CALCIUM SERPL-MCNC: 10 MG/DL (ref 8.3–10.6)
CHLORIDE BLD-SCNC: 92 MMOL/L (ref 99–110)
CHOLESTEROL, TOTAL: 171 MG/DL (ref 0–199)
CO2: 23 MMOL/L (ref 21–32)
CREAT SERPL-MCNC: 0.7 MG/DL (ref 0.6–1.2)
EOSINOPHILS ABSOLUTE: 0.1 K/UL (ref 0–0.6)
EOSINOPHILS RELATIVE PERCENT: 0.7 %
GFR AFRICAN AMERICAN: >60
GFR NON-AFRICAN AMERICAN: >60
GLUCOSE BLD-MCNC: 118 MG/DL (ref 70–99)
HCT VFR BLD CALC: 40.5 % (ref 36–48)
HDLC SERPL-MCNC: 38 MG/DL (ref 40–60)
HEMOGLOBIN: 13.9 G/DL (ref 12–16)
LDL CHOLESTEROL CALCULATED: 117 MG/DL
LYMPHOCYTES ABSOLUTE: 1.1 K/UL (ref 1–5.1)
LYMPHOCYTES RELATIVE PERCENT: 14.1 %
MCH RBC QN AUTO: 29.6 PG (ref 26–34)
MCHC RBC AUTO-ENTMCNC: 34.2 G/DL (ref 31–36)
MCV RBC AUTO: 86.5 FL (ref 80–100)
MONOCYTES ABSOLUTE: 0.5 K/UL (ref 0–1.3)
MONOCYTES RELATIVE PERCENT: 6.3 %
NEUTROPHILS ABSOLUTE: 6 K/UL (ref 1.7–7.7)
NEUTROPHILS RELATIVE PERCENT: 78.2 %
PDW BLD-RTO: 14.1 % (ref 12.4–15.4)
PLATELET # BLD: 269 K/UL (ref 135–450)
PMV BLD AUTO: 7.6 FL (ref 5–10.5)
POTASSIUM SERPL-SCNC: 4.2 MMOL/L (ref 3.5–5.1)
RBC # BLD: 4.68 M/UL (ref 4–5.2)
SODIUM BLD-SCNC: 136 MMOL/L (ref 136–145)
TOTAL PROTEIN: 7.6 G/DL (ref 6.4–8.2)
TRIGL SERPL-MCNC: 79 MG/DL (ref 0–150)
VLDLC SERPL CALC-MCNC: 16 MG/DL
WBC # BLD: 7.7 K/UL (ref 4–11)

## 2022-03-14 PROCEDURE — 36415 COLL VENOUS BLD VENIPUNCTURE: CPT | Performed by: INTERNAL MEDICINE

## 2022-03-14 PROCEDURE — G0439 PPPS, SUBSEQ VISIT: HCPCS | Performed by: INTERNAL MEDICINE

## 2022-03-14 PROCEDURE — 99214 OFFICE O/P EST MOD 30 MIN: CPT | Performed by: INTERNAL MEDICINE

## 2022-03-14 RX ORDER — SERTRALINE HYDROCHLORIDE 100 MG/1
100 TABLET, FILM COATED ORAL 2 TIMES DAILY
Qty: 180 TABLET | Refills: 3 | Status: SHIPPED | OUTPATIENT
Start: 2022-03-14

## 2022-03-14 RX ORDER — LOSARTAN POTASSIUM AND HYDROCHLOROTHIAZIDE 25; 100 MG/1; MG/1
1 TABLET ORAL DAILY
Qty: 90 TABLET | Refills: 3 | Status: SHIPPED | OUTPATIENT
Start: 2022-03-14

## 2022-03-14 SDOH — ECONOMIC STABILITY: FOOD INSECURITY: WITHIN THE PAST 12 MONTHS, YOU WORRIED THAT YOUR FOOD WOULD RUN OUT BEFORE YOU GOT MONEY TO BUY MORE.: NEVER TRUE

## 2022-03-14 SDOH — ECONOMIC STABILITY: FOOD INSECURITY: WITHIN THE PAST 12 MONTHS, THE FOOD YOU BOUGHT JUST DIDN'T LAST AND YOU DIDN'T HAVE MONEY TO GET MORE.: NEVER TRUE

## 2022-03-14 ASSESSMENT — SOCIAL DETERMINANTS OF HEALTH (SDOH): HOW HARD IS IT FOR YOU TO PAY FOR THE VERY BASICS LIKE FOOD, HOUSING, MEDICAL CARE, AND HEATING?: NOT HARD AT ALL

## 2022-03-14 ASSESSMENT — PATIENT HEALTH QUESTIONNAIRE - PHQ9
SUM OF ALL RESPONSES TO PHQ QUESTIONS 1-9: 0
1. LITTLE INTEREST OR PLEASURE IN DOING THINGS: 0
2. FEELING DOWN, DEPRESSED OR HOPELESS: 0
SUM OF ALL RESPONSES TO PHQ QUESTIONS 1-9: 0
SUM OF ALL RESPONSES TO PHQ9 QUESTIONS 1 & 2: 0

## 2022-03-14 ASSESSMENT — LIFESTYLE VARIABLES: HOW OFTEN DO YOU HAVE A DRINK CONTAINING ALCOHOL: NEVER

## 2022-03-14 NOTE — PATIENT INSTRUCTIONS
Personalized Preventive Plan for Ezequiel Hidden - 3/14/2022  Medicare offers a range of preventive health benefits. Some of the tests and screenings are paid in full while other may be subject to a deductible, co-insurance, and/or copay. Some of these benefits include a comprehensive review of your medical history including lifestyle, illnesses that may run in your family, and various assessments and screenings as appropriate. After reviewing your medical record and screening and assessments performed today your provider may have ordered immunizations, labs, imaging, and/or referrals for you. A list of these orders (if applicable) as well as your Preventive Care list are included within your After Visit Summary for your review. Other Preventive Recommendations:    · A preventive eye exam performed by an eye specialist is recommended every 1-2 years to screen for glaucoma; cataracts, macular degeneration, and other eye disorders. · A preventive dental visit is recommended every 6 months. · Try to get at least 150 minutes of exercise per week or 10,000 steps per day on a pedometer . · Order or download the FREE \"Exercise & Physical Activity: Your Everyday Guide\" from [employer]. Call 4-197.832.3501 or search [employer] online. · You need 0649-1368 mg of calcium and 8930-5890 IU of vitamin D per day. It is possible to meet your calcium requirement with diet alone, but a vitamin D supplement is usually necessary to meet this goal.  · When exposed to the sun, use a sunscreen that protects against both UVA and UVB radiation with an SPF of 30 or greater. Reapply every 2 to 3 hours or after sweating, drying off with a towel, or swimming. · Always wear a seat belt when traveling in a car. Always wear a helmet when riding a bicycle or motorcycle.

## 2022-03-14 NOTE — PROGRESS NOTES
Medicare Annual Wellness Visit    Evelina Sepulveda is here for Medicare AWV, Gastroesophageal Reflux, Anxiety, and Hypertension    Assessment & Plan   Medicare annual wellness visit, subsequent  -     Full code  Obesity, Class III, BMI 40-49.9 (morbid obesity) ([address]      Recommendations for Preventive Services Due: see orders and patient instructions/AVS.  Recommended screening schedule for the next 5-10 years is provided to the patient in written form: see Patient Instructions/AVS.     No follow-ups on file. Subjective       Patient's complete Health Risk Assessment and screening values have been reviewed and are found in Flowsheets. The following problems were reviewed today and where indicated follow up appointments were made and/or referrals ordered.     Positive Risk Factor Screenings with Interventions:             General Health and ACP:  General  In general, how would you say your health is?: Good  In the past 7 days, have you experienced any of the following: New or Increased Pain, New or Increased Fatigue, Loneliness, Social Isolation, Stress or Anger?: No  Do you get the social and emotional support that you need?: Yes  Do you have a Living Will?: (!) No (needs to make one)    Advance Directives     Power of [facility] Will ACP-Advance Directive ACP-Power of     Not on File Not on File Not on File Not on File      General Health Risk Interventions:  · full code    Health Habits/Nutrition:     Physical Activity: Insufficiently Active    Days of Exercise per Week: 3 days    Minutes of Exercise per Session: 10 min     Have you lost any weight without trying in the past 3 months?: No  Body mass index: (!) 44.63  Have you seen the dentist within the past year?: Yes    Health Habits/Nutrition Interventions:  · Inadequate physical activity:  patient agrees to exercise for at least 150 minutes/week      ADLs:  In the past 7 days, did you need help from others to perform any of the following everyday activities: Eating, dressing, grooming, bathing, toileting, or walking/balance?: No  In the past 7 days, did you need help from others to take care of any of the following: Laundry, housekeeping, banking/finances, shopping, telephone use, food preparation, transportation, or taking medications?: (!) Yes  Select all that apply: (!) Housekeeping ( helps)    ADL Interventions:             Objective   Vitals:    03/14/22 0943 03/14/22 0953   BP: (!) 210/96 (!) 168/74   Pulse: 102    SpO2: 97%    Weight: 248 lb (112.5 kg)    Height: 5' 2.5\" (1.588 m)       Body mass index is 44.64 kg/m². Allergies   Allergen Reactions    Actifed Cold-Allergy [Chlorpheniramine-Phenylephrine] Other (See Comments)     [place] rush    Medrol [Methylprednisolone]      Chest pain    Prednisone      Chest pain    Amoxicillin      Had hives when taken with Lisinopril and not sure what caused what since she was on both in the past for many years    Antihistamines, Chlorpheniramine-Type     Benadryl [Diphenhydramine]     Ceftin [Cefuroxime]      bleeding    Cefuroxime Axetil Diarrhea    Desonide Crea-Wound Dress Crea     Iv Contrast [Iodides] Hives    Lisinopril Hives    Naprelan [Naproxen]     Norvasc [Amlodipine Besylate] Swelling    Other      Sultin vaginal cream    Pcn [Penicillins]     Singulair [Montelukast Sodium]     Sudafed [Pseudoephedrine Hcl] Other (See Comments)     [place]is[place] rush    Tape [Adhesive Tape]     Augmentin [Amoxicillin-Pot Clavulanate] Rash    Augmentin [Amoxicillin-Pot Clavulanate]      Pt states it makes her break out in red patches    Keflex [Cephalexin] Rash    Lidocaine Anxiety    Zithromax [Azithromycin]      Pt states it makes her break out in red patches  It has the least reaction  And she still can take it. Prior to Visit Medications    Medication Sig Taking?  Authorizing Provider   furosemide (LASIX) 40 MG tablet TAKE ONE TABLET BY MOUTH DAILY Yes Keshia Dominguez MD   losartan-hydroCHLOROthiazide (HYZAAR) 100-25 MG per tablet TAKE ONE TABLET BY MOUTH DAILY Yes Desi Dominguez MD   Charlotte Sotelo MISC by Does not apply route EXPIRES: 11/1/2026 Yes Keshia Dominguez MD   Fluticasone-Umeclidin-Vilant (TRELEGY ELLIPTA) 200-62.5-25 MCG/INH AEPB Inhale 1 puff into the lungs daily Rinse mouth after use Yes Rajni Hinkle MD   albuterol (PROVENTIL) (2.5 MG/3ML) 0.083% nebulizer solution Take 3 mLs by nebulization every 4 hours as needed for Wheezing Yes Rajni Hinkle MD   busPIRone (BUSPAR) 15 MG tablet TAKE ONE TABLET BY MOUTH FOUR TIMES A DAY Yes Desi Dominguez MD   atenolol (TENORMIN) 50 MG tablet TAKE ONE TABLET BY MOUTH DAILY Yes Desi Dominguez MD   pantoprazole (PROTONIX) 40 MG tablet  Yes Historical Provider, MD   sertraline (ZOLOFT) 100 MG tablet Take 1 tablet by mouth 2 times daily Yes Deis Dominguez MD   triamcinolone (NASACORT) 55 MCG/ACT nasal inhaler 2 sprays by Nasal route daily  Yes Historical Provider, MD   acetaminophen (TYLENOL) 325 MG tablet Take 650 mg by mouth every 6 hours as needed for Pain Yes Historical Provider, MD   sucralfate (CARAFATE) 1 GM tablet Take 1 tablet by mouth 4 times daily Yes Clarence Galaviz MD   docusate sodium (COLACE) 100 MG capsule Take 100 mg by mouth daily. Yes Historical Provider, MD   azelastine (ASTELIN) 137 MCG/SPRAY nasal spray 1 spray by Nasal route 2 times daily.  Use in each nostril as directed  Patient taking differently: 1 spray by Nasal route as needed Use in each nostril as directed Yes MD Rafael Art (Including outside providers/suppliers regularly involved in providing care):   Patient Care Team:  Hemal Malin MD as PCP - Pricilla Dominguez MD as PCP - [facility] Empaneled Provider  Rajni Hinkle MD as Consulting Physician (Pulmonology)    Reviewed and updated this visit:  Tobacco  Allergies  Meds  Problems  Med Hx  Surg Hx  Soc Hx  Fam Hx

## 2022-03-14 NOTE — PROGRESS NOTES
Sandie Curiel  YOB: 1945    Date of Service:  3/14/2022    Chief Complaint:      Chief Complaint   Patient presents with    Medicare AWV    Gastroesophageal Reflux    Anxiety    Hypertension       Assessment/Plan:  [facility] was seen today for medicare awv, gastroesophageal reflux, anxiety and hypertension. Diagnoses and all orders for this visit:    Medicare annual wellness visit, subsequent  -     Full code    Essential hypertension  -     Lipid Panel  -     Comprehensive Metabolic Panel  -     CBC with Auto Differential  -     losartan-hydroCHLOROthiazide (HYZAAR) 100-25 MG per tablet; Take 1 tablet by mouth daily  Monitor blood pressure at home and bring a log in    Anxiety  Increase sertraline (ZOLOFT) 100 MG tablet; Take 1 tablet by mouth 2 times daily    Bilateral leg edema  Can change to lasix 20 mg twice a day once run out of 40 mg 1/2 bud    Moderate persistent asthma without complication  Stable and continue on current medications. Gastroesophageal reflux disease with esophagitis without hemorrhage  Stable and continue on current medications. Obesity, Class III, BMI 40-49.9 (morbid obesity) ([facility])  Goals:   -Eat small amounts of food 4-5 times daily  -Avoid high fat and high sugar foods  -Include protein with all meals and snacks  -Avoid carbonation and caffeine  -Avoid calorie containing beverages  -Increase physical activity as tolerated        Return 1 month on BP.      HPI:  Sandie Curiel is a 68 y.o.    Maria Fernanda Claw has not taken any of her medications today and states that's why her blood pressure is hgih.     Anxiety:  stable on Buspar 15 mg qid and Zoloft 100 mg 1/2 AM and 1 qhs since can't increase dose anymore.  Still has Valium 5 mg 1/4 prn     Hypertension:  Home blood pressure monitorin-140/75 on Losartanhctz 100/25 mg qhs and Atenolol 50 mg PM.  She is adherent to a low sodium diet.  Patient denies chest pain, shortness of breath, headache, lightheadedness, blurred vision, peripheral edema, palpitations, dry cough and fatigue.  Antihypertensive medication side effects: no medication side effects noted.  Use of agents associated with hypertension: none.    Bilateral leg edema:  Stable on Lasix 40 mg 1/2 bid and Klor-con 10 mEq twice a week.   Vit D Def:  Unable to take the Vit D 50,000 due to constipation                                 GERD:  Stable on Protonix 40 mg bid, Carafate and phenergan 25 mg prn per Dr. Enoch Sawyer  Asthma:  Stable on Trelegy and albuterol twice a day as needed per pulmonary    Lab Results   Component Value Date    LABMICR SEE BELOW 12/29/2015     Lab Results   Component Value Date     12/14/2020    K 3.7 12/14/2020    CL 92 12/14/2020    CO2 25 12/14/2020    BUN 10 12/14/2020    CREATININE 0.68 12/14/2020    GLUCOSE 101 12/14/2020    CALCIUM 9.2 12/14/2020     Lab Results   Component Value Date    CHOL 180 02/13/2020    TRIG 100 02/13/2020    HDL 35 02/13/2020    LDLCALC 125 02/13/2020     Lab Results   Component Value Date    ALT 15 02/13/2020    AST 13 (L) 02/13/2020     Lab Results   Component Value Date    TSH 1.38 12/08/2016    TSH 1.25 12/07/2015    T4FREE 1.1 12/08/2016    T4FREE 1.2 12/07/2015     Lab Results   Component Value Date    WBC 7.4 02/13/2020    HGB 14.1 02/13/2020    HCT 40.9 02/13/2020    MCV 88.0 02/13/2020     02/13/2020     Lab Results   Component Value Date    INR 1.11 08/03/2017      No results found for: PSA   Lab Results   Component Value Date    LABURIC 3.6 08/22/2019        Patient Active Problem List   Diagnosis    Gastroesophageal reflux disease with esophagitis    Essential hypertension    H/O malignant neoplasm of breast    Allergic reaction    Anxiety attack    Pes anserine bursitis    Right knee pain    Tear of right rotator cuff    Shoulder impingement syndrome    Shoulder pain, right    Peripheral neuropathic pain    Vitamin D deficiency    Environmental and seasonal allergies    Bilateral leg edema    Other osteoporosis without current pathological fracture    Anxiety       Allergies   Allergen Reactions    Actifed Cold-Allergy [Chlorpheniramine-Phenylephrine] Other (See Comments)     Tachy,head rush    Medrol [Methylprednisolone]      Chest pain    Prednisone      Chest pain    Amoxicillin      Had hives when taken with Lisinopril and not sure what caused what since she was on both in the past for many years    Antihistamines, Chlorpheniramine-Type     Benadryl [Diphenhydramine]     Ceftin [Cefuroxime]      bleeding    Cefuroxime Axetil Diarrhea    Desonide Crea-Wound Dress Crea     Iv Contrast [Iodides] Hives    Lisinopril Hives    Naprelan [Naproxen]     Norvasc [Amlodipine Besylate] Swelling    Other      Sultin vaginal cream    Pcn [Penicillins]     Singulair [Montelukast Sodium]     Sudafed [Pseudoephedrine Hcl] Other (See Comments)     Tachalicia,head rush    Tape [Adhesive Tape]     Augmentin [Amoxicillin-Pot Clavulanate] Rash    Augmentin [Amoxicillin-Pot Clavulanate]      Pt states it makes her break out in red patches    Keflex [Cephalexin] Rash    Lidocaine Anxiety    Zithromax [Azithromycin]      Pt states it makes her break out in red patches  It has the least reaction  And she still can take it.      Outpatient Medications Marked as Taking for the 3/14/22 encounter (Office Visit) with Evelyn Dominguez MD   Medication Sig Dispense Refill    furosemide (LASIX) 40 MG tablet TAKE ONE TABLET BY MOUTH DAILY 90 tablet 0    losartan-hydroCHLOROthiazide (HYZAAR) 100-25 MG per tablet TAKE ONE TABLET BY MOUTH DAILY 90 tablet 0    Handicap Placard MISC by Does not apply route EXPIRES: 11/1/2026 1 each 0    Fluticasone-Umeclidin-Vilant (TRELEGY ELLIPTA) 200-62.5-25 MCG/INH AEPB Inhale 1 puff into the lungs daily Rinse mouth after use 1 each 5    albuterol (PROVENTIL) (2.5 MG/3ML) 0.083% nebulizer solution Take 3 mLs by nebulization every 4 hours as needed for Wheezing 60 each 5    busPIRone (BUSPAR) 15 MG tablet TAKE ONE TABLET BY MOUTH FOUR TIMES A  tablet 3    atenolol (TENORMIN) 50 MG tablet TAKE ONE TABLET BY MOUTH DAILY 90 tablet 3    pantoprazole (PROTONIX) 40 MG tablet       sertraline (ZOLOFT) 100 MG tablet Take 1 tablet by mouth 2 times daily 180 tablet 3    triamcinolone (NASACORT) 55 MCG/ACT nasal inhaler 2 sprays by Nasal route daily       acetaminophen (TYLENOL) 325 MG tablet Take 650 mg by mouth every 6 hours as needed for Pain      sucralfate (CARAFATE) 1 GM tablet Take 1 tablet by mouth 4 times daily 120 tablet 3    docusate sodium (COLACE) 100 MG capsule Take 100 mg by mouth daily.  azelastine (ASTELIN) 137 MCG/SPRAY nasal spray 1 spray by Nasal route 2 times daily. Use in each nostril as directed (Patient taking differently: 1 spray by Nasal route as needed Use in each nostril as directed) 1 Bottle 5         Review of Systems: 14 systems were negative except of what was stated on HPI    Nursing note and vitals reviewed. Vitals:    03/14/22 0943 03/14/22 0953   BP: (!) 210/96 (!) 168/74   Pulse: 102    SpO2: 97%    Weight: 248 lb (112.5 kg)    Height: 5' 2.5\" (1.588 m)      Wt Readings from Last 3 Encounters:   03/14/22 248 lb (112.5 kg)   09/23/21 243 lb 9.6 oz (110.5 kg)   08/27/21 243 lb (110.2 kg)     BP Readings from Last 3 Encounters:   03/14/22 (!) 168/74   09/23/21 (!) 148/92   08/27/21 130/72     Body mass index is 44.64 kg/m². Constitutional: Patient appears well-developed and well-nourished. No distress. Head: Normocephalic and atraumatic. Neck: Normal range of motion. Neck supple. No thyroidmegaly. Cardiovascular: Normal rate, regular rhythm, normal heart sounds and intact distal pulses. Pulmonary/Chest: Effort normal and breath sounds normal. No stridor. No respiratory distress. No wheezes and no rales. Abdominal: Soft. Bowel sounds are normal. No distension and no mass. No tenderness. No rebound and no guarding. Musculoskeletal: No edema and no tenderness. Skin: No rash or erythema.

## 2022-03-15 NOTE — RESULT ENCOUNTER NOTE
Inform patient:  Your sugar is a little high which means your body has a difficult time digesting carbohydrates due to insulin resistance which may lead to diabetes in the future. Exercising more at least 30 minutes 5 days a week and cutting down on portion size along with low carbohydrates (avoiding sweets/alcohol) to lose weight will help reduce your sugar.   Your cholesterol, kidneys, liver and blood count are normal.

## 2022-03-23 RX ORDER — FLUTICASONE FUROATE, UMECLIDINIUM BROMIDE AND VILANTEROL TRIFENATATE 200; 62.5; 25 UG/1; UG/1; UG/1
POWDER RESPIRATORY (INHALATION)
Qty: 60 EACH | Refills: 5 | Status: SHIPPED
Start: 2022-03-23 | End: 2022-05-25

## 2022-04-13 ENCOUNTER — OFFICE VISIT (OUTPATIENT)
Dept: INTERNAL MEDICINE CLINIC | Age: 77
End: 2022-04-13
Payer: MEDICARE

## 2022-04-13 ENCOUNTER — TELEPHONE (OUTPATIENT)
Dept: ADMINISTRATIVE | Age: 77
End: 2022-04-13

## 2022-04-13 VITALS
BODY MASS INDEX: 44.47 KG/M2 | DIASTOLIC BLOOD PRESSURE: 84 MMHG | WEIGHT: 251 LBS | SYSTOLIC BLOOD PRESSURE: 146 MMHG | OXYGEN SATURATION: 94 % | HEIGHT: 63 IN | HEART RATE: 88 BPM

## 2022-04-13 DIAGNOSIS — I10 ESSENTIAL HYPERTENSION: ICD-10-CM

## 2022-04-13 DIAGNOSIS — F41.9 ANXIETY: Primary | ICD-10-CM

## 2022-04-13 PROCEDURE — 99213 OFFICE O/P EST LOW 20 MIN: CPT | Performed by: INTERNAL MEDICINE

## 2022-04-13 RX ORDER — HYDROXYZINE PAMOATE 25 MG/1
25 CAPSULE ORAL 3 TIMES DAILY PRN
Qty: 90 CAPSULE | Refills: 0 | Status: SHIPPED | OUTPATIENT
Start: 2022-04-13 | End: 2022-05-13

## 2022-04-13 NOTE — PROGRESS NOTES
Sandra Hanson  YOB: 1945    Date of Service:  4/13/2022    Chief Complaint:      Chief Complaint   Patient presents with    Hypertension       Assessment/Plan:  Xiomara Smith was seen today for hypertension. Diagnoses and all orders for this visit:    Anxiety  -     hydrOXYzine (VISTARIL) 25 MG capsule; Take 1 capsule by mouth 3 times daily as needed for Anxiety    Essential hypertension    patient wants to hold off on adding another blood pressure medication since she thinks it's all due to her anxiety    Return 1 month on anxiety and BP.      HPI:  Sandra Hanson is a 68 y.o. Anxiety:  still feel very anxious all the time on Buspar 15 mg qid and Zoloft 100 mg bid and would like to add another medication. Darshana Dunaway has Valium 5 mg 1/4 prn     Hypertension:  Home blood pressure monitoring: 160-170/90 on Losartanhctz 100/25 mg qhs and Atenolol 50 mg PM.  She is adherent to a low sodium diet. Patient denies chest pain, shortness of breath, headache, lightheadedness, blurred vision, peripheral edema, palpitations, dry cough and fatigue.  Antihypertensive medication side effects: no medication side effects noted.  Use of agents associated with hypertension: none.    Bilateral leg edema:  Stable on Lasix 40 mg 1/2 bid and Klor-con 10 mEq twice a week.   Vit D Def:  Unable to take the Vit D 50,000 due to constipation                                 GERD:  Stable on Protonix 40 mg bid, Carafate and phenergan 25 mg prn per Dr. Mortimer Diamond  Asthma:  Stable on Trelegy and albuterol twice a day as needed per pulmonary    Lab Results   Component Value Date    LABMICR SEE BELOW 12/29/2015     Lab Results   Component Value Date     03/14/2022    K 4.2 03/14/2022    CL 92 (L) 03/14/2022    CO2 23 03/14/2022    BUN 13 03/14/2022    CREATININE 0.7 03/14/2022    GLUCOSE 118 (H) 03/14/2022    CALCIUM 10.0 03/14/2022     Lab Results   Component Value Date    CHOL 171 03/14/2022    TRIG 79 03/14/2022    HDL 38 03/14/2022 [address] 117 03/14/2022     Lab Results   Component Value Date    ALT 16 03/14/2022    AST 16 03/14/2022     Lab Results   Component Value Date    TSH 1.38 12/08/2016    TSH 1.25 12/07/2015    T4FREE 1.1 12/08/2016    T4FREE 1.2 12/07/2015     Lab Results   Component Value Date    WBC 7.7 03/14/2022    HGB 13.9 03/14/2022    HCT 40.5 03/14/2022    MCV 86.5 03/14/2022     03/14/2022     Lab Results   Component Value Date    INR 1.11 08/03/2017      No results found for: PSA   Lab Results   Component Value Date    LABURIC 3.6 08/22/2019        Patient Active Problem List   Diagnosis    Gastroesophageal reflux disease with esophagitis    Essential hypertension    H/O malignant neoplasm of breast    Allergic reaction    Anxiety attack    Pes anserine bursitis    Right knee pain    Tear of right rotator cuff    Shoulder impingement syndrome    Shoulder pain, right    Peripheral neuropathic pain    Vitamin D deficiency    Environmental and seasonal allergies    Bilateral leg edema    Other osteoporosis without current pathological fracture    Anxiety    Moderate persistent asthma without complication       Allergies   Allergen Reactions    Actifed Cold-Allergy [Chlorpheniramine-Phenylephrine] Other (See Comments)     Tachy,head rush    Medrol [Methylprednisolone]      Chest pain    Prednisone      Chest pain    Amoxicillin      Had hives when taken with Lisinopril and not sure what caused what since she was on both in the past for many years    Antihistamines, Chlorpheniramine-Type     Benadryl [Diphenhydramine]     Ceftin [Cefuroxime]      bleeding    Cefuroxime Axetil Diarrhea    Desonide Crea-Wound Dress Crea     Iv Contrast [Iodides] Hives    Lisinopril Hives    Naprelan [Naproxen]     Norvasc [Amlodipine Besylate] Swelling    Other      Sultin vaginal cream    Pcn [Penicillins]     Singulair [Montelukast Sodium]     Sudafed [Pseudoephedrine Hcl] Other (See Comments) Tachy,head rush    Tape Jennifer Mean Tape]     Augmentin [Amoxicillin-Pot Clavulanate] Rash    Augmentin [Amoxicillin-Pot Clavulanate]      Pt states it makes her break out in red patches    Keflex [Cephalexin] Rash    Lidocaine Anxiety    Zithromax [Azithromycin]      Pt states it makes her break out in red patches  It has the least reaction  And she still can take it. Outpatient Medications Marked as Taking for the 4/13/22 encounter (Office Visit) with Maria Del Carmen Dominguez MD   Medication Sig Dispense Refill    TRELEGY ELLIPTA 200-62.5-25 MCG/INH AEPB INHALE ONE PUFF BY MOUTH DAILY RINSE MOUTH AFTER USE 60 each 5    losartan-hydroCHLOROthiazide (HYZAAR) 100-25 MG per tablet Take 1 tablet by mouth daily 90 tablet 3    sertraline (ZOLOFT) 100 MG tablet Take 1 tablet by mouth 2 times daily 180 tablet 3    furosemide (LASIX) 40 MG tablet TAKE ONE TABLET BY MOUTH DAILY 90 tablet 0    Handicap Placard MISC by Does not apply route EXPIRES: 11/1/2026 1 each 0    albuterol (PROVENTIL) (2.5 MG/3ML) 0.083% nebulizer solution Take 3 mLs by nebulization every 4 hours as needed for Wheezing 60 each 5    busPIRone (BUSPAR) 15 MG tablet TAKE ONE TABLET BY MOUTH FOUR TIMES A  tablet 3    atenolol (TENORMIN) 50 MG tablet TAKE ONE TABLET BY MOUTH DAILY 90 tablet 3    pantoprazole (PROTONIX) 40 MG tablet       triamcinolone (NASACORT) 55 MCG/ACT nasal inhaler 2 sprays by Nasal route daily       acetaminophen (TYLENOL) 325 MG tablet Take 650 mg by mouth every 6 hours as needed for Pain      sucralfate (CARAFATE) 1 GM tablet Take 1 tablet by mouth 4 times daily 120 tablet 3    docusate sodium (COLACE) 100 MG capsule Take 100 mg by mouth daily.  azelastine (ASTELIN) 137 MCG/SPRAY nasal spray 1 spray by Nasal route 2 times daily.  Use in each nostril as directed (Patient taking differently: 1 spray by Nasal route as needed Use in each nostril as directed) 1 Bottle 5         Review of Systems: 14 systems were negative except of what was stated on HPI    Nursing note and vitals reviewed. Vitals:    04/13/22 0953   BP: (!) 146/84   Pulse: 88   SpO2: 94%   Weight: 251 lb (113.9 kg)   Height: 5' 2.5\" (1.588 m)     Wt Readings from Last 3 Encounters:   04/13/22 251 lb (113.9 kg)   03/14/22 248 lb (112.5 kg)   09/23/21 243 lb 9.6 oz (110.5 kg)     BP Readings from Last 3 Encounters:   04/13/22 (!) 146/84   03/14/22 (!) 168/74   09/23/21 (!) 148/92     Body mass index is 45.18 kg/m². Constitutional: Patient appears well-developed and well-nourished. No distress. Head: Normocephalic and atraumatic. Neck: Normal range of motion. Neck supple. No thyroidmegaly. Cardiovascular: Normal rate, regular rhythm, normal heart sounds and intact distal pulses. Pulmonary/Chest: Effort normal and breath sounds normal. No stridor. No respiratory distress. No wheezes and no rales. Abdominal: Soft. Bowel sounds are normal. No distension and no mass. No tenderness. No rebound and no guarding. Musculoskeletal: No edema and no tenderness. Skin: No rash or erythema.

## 2022-04-13 NOTE — TELEPHONE ENCOUNTER
Submitted PA for hydrOXYzine Pamoate 25MG capsules, Key: [id number]. Medication has been APPROVED through 12/31/2022. Please notify patient. Thank you.

## 2022-05-12 ENCOUNTER — OFFICE VISIT (OUTPATIENT)
Dept: INTERNAL MEDICINE CLINIC | Age: 77
End: 2022-05-12
Payer: MEDICARE

## 2022-05-12 VITALS
SYSTOLIC BLOOD PRESSURE: 146 MMHG | HEART RATE: 81 BPM | OXYGEN SATURATION: 97 % | BODY MASS INDEX: 45 KG/M2 | DIASTOLIC BLOOD PRESSURE: 84 MMHG | WEIGHT: 250 LBS

## 2022-05-12 DIAGNOSIS — R60.0 BILATERAL LEG EDEMA: ICD-10-CM

## 2022-05-12 DIAGNOSIS — I10 ESSENTIAL HYPERTENSION: Primary | ICD-10-CM

## 2022-05-12 DIAGNOSIS — F41.9 ANXIETY: ICD-10-CM

## 2022-05-12 PROCEDURE — 99214 OFFICE O/P EST MOD 30 MIN: CPT | Performed by: INTERNAL MEDICINE

## 2022-05-12 RX ORDER — FUROSEMIDE 20 MG/1
20 TABLET ORAL 2 TIMES DAILY
Qty: 180 TABLET | Refills: 2 | Status: SHIPPED | OUTPATIENT
Start: 2022-05-12

## 2022-05-12 NOTE — PROGRESS NOTES
Karina Thomas  YOB: 1945    Date of Service:  2022    Chief Complaint:      Chief Complaint   Patient presents with    Anxiety    Hypertension       Assessment/Plan:  Vasiliy Hubbard was seen today for anxiety and hypertension. Diagnoses and all orders for this visit:    Essential hypertension  Stable and continue on current treatment    Anxiety  Stable and continue on current treatment    Bilateral leg edema  -     furosemide (LASIX) 20 MG tablet; Take 1 tablet by mouth 2 times daily    Return Aug 17 at 11:10 BP, Anxiety, GERD, leg edema. HPI:  Karina Thomas is a 68 y.o. Anxiety:  still feel very anxious all the time on Buspar 15 mg qid and Zoloft 100 mg bid and have not tried vistaril yet and has Valium 5 mg 1/4 prn     Hypertension:  Home blood pressure monitorin-150/70-80 on Losartanhctz 100/25 mg qhs and Atenolol 50 mg PM.  She is adherent to a low sodium diet. Patient denies chest pain, shortness of breath, headache, lightheadedness, blurred vision, peripheral edema, palpitations, dry cough and fatigue.  Antihypertensive medication side effects: no medication side effects noted.  Use of agents associated with hypertension: none.    Bilateral leg edema:  Stable on Lasix 40 mg 1/2 bid and Klor-con 10 mEq twice a week.   Vit D Def:  Unable to take the Vit D 50,000 due to constipation                                 GERD:  Stable on Protonix 40 mg bid, Carafate and phenergan 25 mg prn per Dr. Lexy Ortega  Asthma: Nicki Crafts on Trelegy and albuterol twice a day as needed per pulmonary    Lab Results   Component Value Date    LABMICR SEE BELOW 2015     Lab Results   Component Value Date     2022    K 4.2 2022    CL 92 (L) 2022    CO2 23 2022    BUN 13 2022    CREATININE 0.7 2022    GLUCOSE 118 (H) 2022    CALCIUM 10.0 2022     Lab Results   Component Value Date    CHOL 171 2022    TRIG 79 2022    HDL 38 2022    [address] 03/14/2022     Lab Results   Component Value Date    ALT 16 03/14/2022    AST 16 03/14/2022     Lab Results   Component Value Date    TSH 1.38 12/08/2016    TSH 1.25 12/07/2015    T4FREE 1.1 12/08/2016    T4FREE 1.2 12/07/2015     Lab Results   Component Value Date    WBC 7.7 03/14/2022    HGB 13.9 03/14/2022    HCT 40.5 03/14/2022    MCV 86.5 03/14/2022     03/14/2022     Lab Results   Component Value Date    INR 1.11 08/03/2017      No results found for: PSA   Lab Results   Component Value Date    LABURIC 3.6 08/22/2019        Patient Active Problem List   Diagnosis    Gastroesophageal reflux disease with esophagitis    Essential hypertension    H/O malignant neoplasm of breast    Allergic reaction    Anxiety attack    Pes anserine bursitis    Right knee pain    Tear of right rotator cuff    Shoulder impingement syndrome    Shoulder pain, right    Peripheral neuropathic pain    Vitamin D deficiency    Environmental and seasonal allergies    Bilateral leg edema    Other osteoporosis without current pathological fracture    Anxiety    Moderate persistent asthma without complication       Allergies   Allergen Reactions    Actifed Cold-Allergy [Chlorpheniramine-Phenylephrine] Other (See Comments)     Tachy,head rush    Medrol [Methylprednisolone]      Chest pain    Prednisone      Chest pain    Amoxicillin      Had hives when taken with Lisinopril and not sure what caused what since she was on both in the past for many years    Antihistamines, Chlorpheniramine-Type     Benadryl [Diphenhydramine]     Ceftin [Cefuroxime]      bleeding    Cefuroxime Axetil Diarrhea    Desonide Crea-Wound Dress Crea     Iv Contrast [Iodides] Hives    Lisinopril Hives    Naprelan [Naproxen]     Norvasc [Amlodipine Besylate] Swelling    Other      Sultin vaginal cream    Pcn [Penicillins]     Singulair [Montelukast Sodium]     Sudafed [Pseudoephedrine Hcl] Other (See Comments)     Tachy,head rush  Tape Michelle Cower Tape]     Augmentin [Amoxicillin-Pot Clavulanate] Rash    Augmentin [Amoxicillin-Pot Clavulanate]      Pt states it makes her break out in red patches    Keflex [Cephalexin] Rash    Lidocaine Anxiety    Zithromax [Azithromycin]      Pt states it makes her break out in red patches  It has the least reaction  And she still can take it. Outpatient Medications Marked as Taking for the 5/12/22 encounter (Office Visit) with Lina Dominguez MD   Medication Sig Dispense Refill    hydrOXYzine (VISTARIL) 25 MG capsule Take 1 capsule by mouth 3 times daily as needed for Anxiety 90 capsule 0    TRELEGY ELLIPTA 200-62.5-25 MCG/INH AEPB INHALE ONE PUFF BY MOUTH DAILY RINSE MOUTH AFTER USE 60 each 5    losartan-hydroCHLOROthiazide (HYZAAR) 100-25 MG per tablet Take 1 tablet by mouth daily 90 tablet 3    sertraline (ZOLOFT) 100 MG tablet Take 1 tablet by mouth 2 times daily 180 tablet 3    furosemide (LASIX) 40 MG tablet TAKE ONE TABLET BY MOUTH DAILY 90 tablet 0    Handicap Placard MISC by Does not apply route EXPIRES: 11/1/2026 1 each 0    albuterol (PROVENTIL) (2.5 MG/3ML) 0.083% nebulizer solution Take 3 mLs by nebulization every 4 hours as needed for Wheezing 60 each 5    busPIRone (BUSPAR) 15 MG tablet TAKE ONE TABLET BY MOUTH FOUR TIMES A  tablet 3    atenolol (TENORMIN) 50 MG tablet TAKE ONE TABLET BY MOUTH DAILY 90 tablet 3    pantoprazole (PROTONIX) 40 MG tablet       triamcinolone (NASACORT) 55 MCG/ACT nasal inhaler 2 sprays by Nasal route daily       acetaminophen (TYLENOL) 325 MG tablet Take 650 mg by mouth every 6 hours as needed for Pain      sucralfate (CARAFATE) 1 GM tablet Take 1 tablet by mouth 4 times daily 120 tablet 3    docusate sodium (COLACE) 100 MG capsule Take 100 mg by mouth daily.  azelastine (ASTELIN) 137 MCG/SPRAY nasal spray 1 spray by Nasal route 2 times daily.  Use in each nostril as directed (Patient taking differently: 1 spray by Nasal route as needed Use in each nostril as directed) 1 Bottle 5         Review of Systems: 14 systems were negative except of what was stated on HPI    Nursing note and vitals reviewed. Vitals:    05/12/22 1105   BP: (!) 146/84   Site: Left Upper Arm   Cuff Size: Large Adult   Pulse: 81   SpO2: 97%   Weight: 250 lb (113.4 kg)     Wt Readings from Last 3 Encounters:   05/12/22 250 lb (113.4 kg)   04/13/22 251 lb (113.9 kg)   03/14/22 248 lb (112.5 kg)     BP Readings from Last 3 Encounters:   05/12/22 (!) 146/84   04/13/22 (!) 146/84   03/14/22 (!) 168/74     Body mass index is 45 kg/m². Constitutional: Patient appears well-developed and well-nourished. No distress. Head: Normocephalic and atraumatic. Neck: Normal range of motion. Neck supple. No thyroidmegaly. Cardiovascular: Normal rate, regular rhythm, normal heart sounds and intact distal pulses. Pulmonary/Chest: Effort normal and breath sounds normal. No stridor. No respiratory distress. No wheezes and no rales. Abdominal: Soft. Bowel sounds are normal. No distension and no mass. No tenderness. No rebound and no guarding. Musculoskeletal: No edema and no tenderness. Skin: No rash or erythema.

## 2022-05-17 ENCOUNTER — HOSPITAL ENCOUNTER (OUTPATIENT)
Dept: PULMONOLOGY | Age: 77
Discharge: HOME | End: 2022-05-17
Payer: MEDICARE

## 2022-05-17 ENCOUNTER — OFFICE VISIT (OUTPATIENT)
Dept: PULMONOLOGY | Age: 77
End: 2022-05-17
Payer: MEDICARE

## 2022-05-17 VITALS
BODY MASS INDEX: 44.47 KG/M2 | DIASTOLIC BLOOD PRESSURE: 76 MMHG | WEIGHT: 251 LBS | OXYGEN SATURATION: 98 % | RESPIRATION RATE: 16 BRPM | HEIGHT: 63 IN | HEART RATE: 77 BPM | SYSTOLIC BLOOD PRESSURE: 138 MMHG

## 2022-05-17 VITALS — OXYGEN SATURATION: 98 %

## 2022-05-17 DIAGNOSIS — J45.40 MODERATE PERSISTENT ASTHMA WITHOUT COMPLICATION: Primary | ICD-10-CM

## 2022-05-17 DIAGNOSIS — J45.40 MODERATE PERSISTENT ASTHMA WITHOUT COMPLICATION: ICD-10-CM

## 2022-05-17 LAB
DLCO %PRED: 66 %
DLCO PRED: NORMAL
DLCO/VA %PRED: NORMAL
DLCO/VA PRED: NORMAL
DLCO/VA: NORMAL
DLCO: NORMAL
EXPIRATORY TIME-POST: NORMAL
EXPIRATORY TIME: NORMAL
FEF 25-75% %CHNG: NORMAL
FEF 25-75% %PRED-POST: NORMAL
FEF 25-75% %PRED-PRE: NORMAL
FEF 25-75% PRED: NORMAL
FEF 25-75%-POST: NORMAL
FEF 25-75%-PRE: NORMAL
FEV1 %PRED-POST: 87 %
FEV1 %PRED-PRE: 90 %
FEV1 PRED: NORMAL
FEV1-POST: NORMAL
FEV1-PRE: NORMAL
FEV1/FVC %PRED-POST: NORMAL
FEV1/FVC %PRED-PRE: NORMAL
FEV1/FVC PRED: NORMAL
FEV1/FVC-POST: 78 %
FEV1/FVC-PRE: 81 %
FVC %PRED-POST: NORMAL
FVC %PRED-PRE: NORMAL
FVC PRED: NORMAL
FVC-POST: NORMAL
FVC-PRE: NORMAL
GAW %PRED: NORMAL
GAW PRED: NORMAL
GAW: NORMAL
IC %PRED: NORMAL
IC PRED: NORMAL
IC: NORMAL
MEP: NORMAL
MIP: NORMAL
MVV %PRED-PRE: NORMAL
MVV PRED: NORMAL
MVV-PRE: NORMAL
PEF %PRED-POST: NORMAL
PEF %PRED-PRE: NORMAL
PEF PRED: NORMAL
PEF%CHNG: NORMAL
PEF-POST: NORMAL
PEF-PRE: NORMAL
RAW %PRED: NORMAL
RAW PRED: NORMAL
RAW: NORMAL
RV %PRED: NORMAL
RV PRED: NORMAL
RV: NORMAL
SVC %PRED: NORMAL
SVC PRED: NORMAL
SVC: NORMAL
TLC %PRED: 75 %
TLC PRED: NORMAL
TLC: NORMAL
VA %PRED: NORMAL
VA PRED: NORMAL
VA: NORMAL
VTG %PRED: NORMAL
VTG PRED: NORMAL
VTG: NORMAL

## 2022-05-17 PROCEDURE — 94060 EVALUATION OF WHEEZING: CPT

## 2022-05-17 PROCEDURE — 94729 DIFFUSING CAPACITY: CPT

## 2022-05-17 PROCEDURE — 94727 GAS DIL/WSHOT DETER LNG VOL: CPT

## 2022-05-17 PROCEDURE — 6370000000 HC RX 637 (ALT 250 FOR IP): Performed by: INTERNAL MEDICINE

## 2022-05-17 PROCEDURE — 94640 AIRWAY INHALATION TREATMENT: CPT

## 2022-05-17 PROCEDURE — 99213 OFFICE O/P EST LOW 20 MIN: CPT | Performed by: INTERNAL MEDICINE

## 2022-05-17 PROCEDURE — 94760 N-INVAS EAR/PLS OXIMETRY 1: CPT

## 2022-05-17 RX ORDER — ALBUTEROL SULFATE 90 UG/1
4 AEROSOL, METERED RESPIRATORY (INHALATION) ONCE
Status: COMPLETED | OUTPATIENT
Start: 2022-05-17 | End: 2022-05-17

## 2022-05-17 RX ADMIN — Medication 4 PUFF: at 12:26

## 2022-05-17 ASSESSMENT — PULMONARY FUNCTION TESTS
FEV1_PERCENT_PREDICTED_POST: 87
FEV1_PERCENT_PREDICTED_PRE: 90
FEV1/FVC_PRE: 81
FEV1/FVC_POST: 78

## 2022-05-17 NOTE — PATIENT INSTRUCTIONS
Try Breztri 2 puffs twice daily to replace trelegy    Options for inhalers are:  Trelegy 200 or Trelegy 100  Breztri  Advair 250 plus Spiriva.     Stiolto

## 2022-05-17 NOTE — PROGRESS NOTES
PULMONARY, CRITICAL CARE AND SLEEP MEDICINE     CC/REFERRING PROVIDER:  Patient is being seen at the request of Dr. Yolanda Ovalles for a consultation for gurgling in throat     Interval History 3:   Says trelegy helpful, but no longer can get 2/2 insurer. Says she has bad allergies, cannot take AH or singulair, on nasocort. Interval History 2:   Patient says sometimes things help but with the cold weather she has more trouble. Okay to use robitussin. Interval History:   Albuterol is helpful, can get a deeper breath, still feels like she has secretions and mucus in the upper airway. Used trelegy for four weeks and there was definite improvement in shortness of breath, still feels like secretions are stuck in upper airways. She is out of trelegy     PRESENTING HPI: This is a 22-year-old female with a remote history of tobacco abuse who presents with a many month to year history of gurgling in the back of her throat, is severely disturbing to herm associated with shortness of breath and occasional sputum production, hoarseness. She has been seen by ENT and was told there is no problem. She was seen by GI and treated with esophageal dilation without improvement. All of her symptoms are located at the level of the sternal notch. She had a barium swallow which was overall nonrevealing. She has heartburn and anxiety. reports that she quit smoking about 32 years ago. She has a 22.50 pack-year smoking history. She has never used smokeless tobacco.    PHYSICAL EXAM:  Blood pressure 138/76, pulse 77, resp. rate 16, height 5' 2.5\" (1.588 m), weight 251 lb (113.9 kg), SpO2 98 %, not currently breastfeeding.'  Constitutional:  No acute distress. HENT:  Oropharynx is clear and moist.   Neck: No tracheal deviation present. Cardiovascular: Normal heart sounds. No lower extremity edema. Pulmonary/Chest: + expiratory  wheezes. No rhonchi. No rales. No decreased breath sounds. No accessory muscle usage or stridor. Musculoskeletal: No cyanosis. No clubbing. Skin: Skin is warm and dry. Psychiatric: Normal mood and affect. 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     Stiolto

## 2022-05-18 NOTE — PROCEDURES
[address]                 [address] [address]                               PULMONARY FUNCTION    PATIENT NAME: Garrison Penn                      :        1945  MED REC NO:   [id number]                          ROOM:  ACCOUNT NO:   [de-identified]                           ADMIT DATE: 2022  PROVIDER:     Molly Lopez MD    DATE OF PROCEDURE:  2022    INDICATION:  Moderate persistent asthma. FINDINGS:  1. Spirometry revealed no evidence of obstructive defect. FEV1 is 2.15  liters, which is 53% of predicted. No significant response to  bronchodilators. FEV1/FVC ratio of 51%. 2.  Lung volume revealed mild restrictive defect. Total lung capacity  3.5 liters, which is 75% of predicted. 3.  Diffusion capacity is mildly diffused at 13.5 now, which is 66% of  predicted. 4.  Flow volume loops appear to be normal.    CONCLUSION:  1. No evidence of obstructive defect or restrictive defect. 2.  Mild restrictive defect with moderately diffused diffusion capacity,  of note the patient was not able to perform plethysmography due to  claustrophobia and lung volumes done via nitrogen washout.         Erin Acharya MD    D: 2022 15:48:32       T: 2022 21:32:46     [id number]  Job#: [id number]     Doc#: [id number]    CC:

## 2022-05-25 ENCOUNTER — TELEPHONE (OUTPATIENT)
Dept: PULMONOLOGY | Age: 77
End: 2022-05-25

## 2022-05-25 RX ORDER — FLUTICASONE PROPIONATE 220 UG/1
2 AEROSOL, METERED RESPIRATORY (INHALATION) 2 TIMES DAILY
Qty: 1 EACH | Refills: 5 | Status: SHIPPED | OUTPATIENT
Start: 2022-05-25 | End: 2023-05-25

## 2022-05-25 NOTE — TELEPHONE ENCOUNTER
D/w Dr. Latasha Stewart. Sent Flovent, this is a great option for asthma. After 2-3 weeks, if Flovent is not helping your symptoms as much as Kristin did, then please schedule appt with me.

## 2022-05-26 NOTE — TELEPHONE ENCOUNTER
Spoke with [facility] pharmacy and confirmed with [facility] that script went through.

## 2022-07-29 DIAGNOSIS — F41.9 ANXIETY: ICD-10-CM

## 2022-07-29 DIAGNOSIS — I10 ESSENTIAL HYPERTENSION: Chronic | ICD-10-CM

## 2022-08-01 RX ORDER — ATENOLOL 50 MG/1
TABLET ORAL
Qty: 90 TABLET | Refills: 3 | OUTPATIENT
Start: 2022-08-01

## 2022-08-01 RX ORDER — BUSPIRONE HYDROCHLORIDE 15 MG/1
TABLET ORAL
Qty: 360 TABLET | Refills: 3 | OUTPATIENT
Start: 2022-08-01

## 2022-08-01 NOTE — TELEPHONE ENCOUNTER
Spoke with patient. She states that she can't make it in any sooner and she will make it work until her appointment.

## 2022-08-17 ENCOUNTER — OFFICE VISIT (OUTPATIENT)
Dept: INTERNAL MEDICINE CLINIC | Age: 77
End: 2022-08-17
Payer: MEDICARE

## 2022-08-17 VITALS
OXYGEN SATURATION: 97 % | BODY MASS INDEX: 44.4 KG/M2 | HEART RATE: 110 BPM | WEIGHT: 250.6 LBS | SYSTOLIC BLOOD PRESSURE: 140 MMHG | DIASTOLIC BLOOD PRESSURE: 84 MMHG | HEIGHT: 63 IN

## 2022-08-17 DIAGNOSIS — R60.0 BILATERAL LEG EDEMA: ICD-10-CM

## 2022-08-17 DIAGNOSIS — J45.40 MODERATE PERSISTENT ASTHMA WITHOUT COMPLICATION: ICD-10-CM

## 2022-08-17 DIAGNOSIS — K21.00 GASTROESOPHAGEAL REFLUX DISEASE WITH ESOPHAGITIS, UNSPECIFIED WHETHER HEMORRHAGE: ICD-10-CM

## 2022-08-17 DIAGNOSIS — F41.9 ANXIETY: ICD-10-CM

## 2022-08-17 DIAGNOSIS — I10 ESSENTIAL HYPERTENSION: Primary | Chronic | ICD-10-CM

## 2022-08-17 DIAGNOSIS — E55.9 VITAMIN D DEFICIENCY: ICD-10-CM

## 2022-08-17 PROCEDURE — 99214 OFFICE O/P EST MOD 30 MIN: CPT | Performed by: INTERNAL MEDICINE

## 2022-08-17 PROCEDURE — 1123F ACP DISCUSS/DSCN MKR DOCD: CPT | Performed by: INTERNAL MEDICINE

## 2022-08-17 RX ORDER — ATENOLOL 50 MG/1
TABLET ORAL
Qty: 90 TABLET | Refills: 3 | Status: SHIPPED | OUTPATIENT
Start: 2022-08-17

## 2022-08-17 RX ORDER — BUSPIRONE HYDROCHLORIDE 15 MG/1
TABLET ORAL
Qty: 360 TABLET | Refills: 3 | Status: SHIPPED | OUTPATIENT
Start: 2022-08-17

## 2022-08-17 NOTE — PROGRESS NOTES
Demond Davila  YOB: 1945    Date of Service:  2022    Chief Complaint:      Chief Complaint   Patient presents with    Anxiety    Gastroesophageal Reflux    Leg Swelling       Assessment/Plan:  Christen Paniagua was seen today for anxiety, gastroesophageal reflux and leg swelling. Diagnoses and all orders for this visit:    Essential hypertension  -     atenolol (TENORMIN) 50 MG tablet; TAKE ONE TABLET BY MOUTH DAILY  Stable and continue on current treatment since blood pressure at home is good    Anxiety  -     busPIRone (BUSPAR) 15 MG tablet; TAKE ONE TABLET BY MOUTH FOUR TIMES A DAY    Bilateral leg edema    Gastroesophageal reflux disease with esophagitis, unspecified whether hemorrhage    Moderate persistent asthma without complication    Vitamin D deficiency    Stable and continue on current medications. Return 3/14 or after Fasting Medicare Wellness and f/u. HPI:  Demond Davila is a 68 y.o. She walked up 3 flights of stairs to our office today and gets anxious when here in the office. Anxiety:  still feel very anxious all the time on Buspar 15 mg qid and Zoloft 100 mg bid and have not tried vistaril yet and has Valium 5 mg 1/4 prn     Hypertension:  Home blood pressure monitorin-150/74-80 on Losartanhctz 100/25 mg qhs and Atenolol 50 mg PM.  She is adherent to a low sodium diet. Patient denies chest pain, shortness of breath, headache, lightheadedness, blurred vision, peripheral edema, palpitations, dry cough and fatigue. Antihypertensive medication side effects: no medication side effects noted. Use of agents associated with hypertension: none. Bilateral leg edema:  Stable on Lasix 40 mg 1/2 bid and Klor-con 10 mEq twice a week.   Vit D Def:  Unable to take the Vit D 50,000 due to constipation                                 GERD:  Stable on Protonix 40 mg bid, Carafate and phenergan 25 mg prn per Dr. Felicia Contreras  Asthma:  Stable on Flovent 220 bid and albuterol twice a day as needed per pulmonary    Lab Results   Component Value Date    LABMICR SEE BELOW 12/29/2015     Lab Results   Component Value Date     03/14/2022    K 4.2 03/14/2022    CL 92 (L) 03/14/2022    CO2 23 03/14/2022    BUN 13 03/14/2022    CREATININE 0.7 03/14/2022    GLUCOSE 118 (H) 03/14/2022    CALCIUM 10.0 03/14/2022     Lab Results   Component Value Date/Time    CHOL 171 03/14/2022 10:09 AM    TRIG 79 03/14/2022 10:09 AM    HDL 38 03/14/2022 10:09 AM    LDLCALC 117 03/14/2022 10:09 AM     Lab Results   Component Value Date    ALT 16 03/14/2022    AST 16 03/14/2022     Lab Results   Component Value Date    TSH 1.38 12/08/2016    TSH 1.25 12/07/2015    T4FREE 1.1 12/08/2016    T4FREE 1.2 12/07/2015     Lab Results   Component Value Date    WBC 7.7 03/14/2022    HGB 13.9 03/14/2022    HCT 40.5 03/14/2022    MCV 86.5 03/14/2022     03/14/2022     Lab Results   Component Value Date    INR 1.11 08/03/2017      No results found for: PSA   Lab Results   Component Value Date    LABURIC 3.6 08/22/2019        Patient Active Problem List   Diagnosis    Gastroesophageal reflux disease with esophagitis    Essential hypertension    H/O malignant neoplasm of breast    Allergic reaction    Anxiety attack    Pes anserine bursitis    Right knee pain    Tear of right rotator cuff    Shoulder impingement syndrome    Shoulder pain, right    Peripheral neuropathic pain    Vitamin D deficiency    Environmental and seasonal allergies    Bilateral leg edema    Other osteoporosis without current pathological fracture    Anxiety    Moderate persistent asthma without complication       Allergies   Allergen Reactions    Actifed Cold-Allergy [Chlorpheniramine-Phenylephrine] Other (See Comments)     Tachy,head rush    Medrol [Methylprednisolone]      Chest pain    Prednisone      Chest pain    Amoxicillin      Had hives when taken with Lisinopril and not sure what caused what since she was on both in the past for many years Antihistamines, Chlorpheniramine-Type     Benadryl [Diphenhydramine]     Ceftin [Cefuroxime]      bleeding    Cefuroxime Axetil Diarrhea    Desonide Crea-Wound Dress Crea     Iv Contrast [Iodides] Hives    Lisinopril Hives    Naprelan [Naproxen]     Norvasc [Amlodipine Besylate] Swelling    Other      Sultin vaginal cream    Pcn [Penicillins]     Singulair [Montelukast Sodium]     Sudafed [Pseudoephedrine Hcl] Other (See Comments)     Tachy,head rush    Tape Wayne Blackbird Tape]     Augmentin [Amoxicillin-Pot Clavulanate] Rash    Augmentin [Amoxicillin-Pot Clavulanate]      Pt states it makes her break out in red patches    Keflex [Cephalexin] Rash    Lidocaine Anxiety    Zithromax [Azithromycin]      Pt states it makes her break out in red patches  It has the least reaction  And she still can take it.      Outpatient Medications Marked as Taking for the 8/17/22 encounter (Office Visit) with Jose Dominguez MD   Medication Sig Dispense Refill    fluticasone (FLOVENT HFA) 220 MCG/ACT inhaler Inhale 2 puffs into the lungs 2 times daily 1 each 5    furosemide (LASIX) 20 MG tablet Take 1 tablet by mouth 2 times daily 180 tablet 2    losartan-hydroCHLOROthiazide (HYZAAR) 100-25 MG per tablet Take 1 tablet by mouth daily 90 tablet 3    sertraline (ZOLOFT) 100 MG tablet Take 1 tablet by mouth 2 times daily 180 tablet 3    Handicap Placard MISC by Does not apply route EXPIRES: 11/1/2026 1 each 0    albuterol (PROVENTIL) (2.5 MG/3ML) 0.083% nebulizer solution Take 3 mLs by nebulization every 4 hours as needed for Wheezing 60 each 5    busPIRone (BUSPAR) 15 MG tablet TAKE ONE TABLET BY MOUTH FOUR TIMES A  tablet 3    atenolol (TENORMIN) 50 MG tablet TAKE ONE TABLET BY MOUTH DAILY 90 tablet 3    pantoprazole (PROTONIX) 40 MG tablet       triamcinolone (NASACORT) 55 MCG/ACT nasal inhaler 2 sprays by Nasal route daily       acetaminophen (TYLENOL) 325 MG tablet Take 650 mg by mouth every 6 hours as needed for Pain      sucralfate (CARAFATE) 1 GM tablet Take 1 tablet by mouth 4 times daily 120 tablet 3    docusate sodium (COLACE) 100 MG capsule Take 100 mg by mouth daily. azelastine (ASTELIN) 137 MCG/SPRAY nasal spray 1 spray by Nasal route 2 times daily. Use in each nostril as directed (Patient taking differently: 1 spray by Nasal route as needed Use in each nostril as directed) 1 Bottle 5         Review of Systems: 14 systems were negative except of what was stated on HPI    Nursing note and vitals reviewed. Vitals:    08/17/22 1053 08/17/22 1119 08/17/22 1121   BP: (!) 200/100 (!) 188/88 (!) 140/84   Site:  Left Upper Arm    Position:  Sitting    Cuff Size:  Large Adult    Pulse: (!) 110     SpO2: 97%     Weight: 250 lb 9.6 oz (113.7 kg)     Height: 5' 2.5\" (1.588 m)       Wt Readings from Last 3 Encounters:   08/17/22 250 lb 9.6 oz (113.7 kg)   05/17/22 251 lb (113.9 kg)   05/12/22 250 lb (113.4 kg)     BP Readings from Last 3 Encounters:   08/17/22 (!) 200/100   05/17/22 138/76   05/12/22 (!) 146/84     Body mass index is 45.1 kg/m². Constitutional: Patient appears well-developed and well-nourished. No distress. Head: Normocephalic and atraumatic. Neck: Normal range of motion. Neck supple. No thyroidmegaly. Cardiovascular: Normal rate, regular rhythm, normal heart sounds and intact distal pulses. Pulmonary/Chest: Effort normal and breath sounds normal. No stridor. No respiratory distress. No wheezes and no rales. Abdominal: Soft. Bowel sounds are normal. No distension and no mass. No tenderness. No rebound and no guarding. Musculoskeletal: No edema and no tenderness. Skin: No rash or erythema.

## 2022-09-26 RX ORDER — ALBUTEROL SULFATE 2.5 MG/3ML
SOLUTION RESPIRATORY (INHALATION)
Qty: 180 ML | Refills: 6 | Status: SHIPPED | OUTPATIENT
Start: 2022-09-26

## 2023-03-19 DIAGNOSIS — R60.0 BILATERAL LEG EDEMA: ICD-10-CM

## 2023-03-20 ENCOUNTER — OFFICE VISIT (OUTPATIENT)
Dept: INTERNAL MEDICINE CLINIC | Age: 78
End: 2023-03-20

## 2023-03-20 VITALS
HEART RATE: 76 BPM | WEIGHT: 247 LBS | SYSTOLIC BLOOD PRESSURE: 138 MMHG | OXYGEN SATURATION: 94 % | BODY MASS INDEX: 43.77 KG/M2 | HEIGHT: 63 IN | DIASTOLIC BLOOD PRESSURE: 84 MMHG

## 2023-03-20 DIAGNOSIS — E66.01 OBESITY, CLASS III, BMI 40-49.9 (MORBID OBESITY) (HCC): ICD-10-CM

## 2023-03-20 DIAGNOSIS — E55.9 VITAMIN D DEFICIENCY: ICD-10-CM

## 2023-03-20 DIAGNOSIS — M79.2 PERIPHERAL NEUROPATHIC PAIN: ICD-10-CM

## 2023-03-20 DIAGNOSIS — F41.9 ANXIETY: ICD-10-CM

## 2023-03-20 DIAGNOSIS — R60.0 BILATERAL LEG EDEMA: ICD-10-CM

## 2023-03-20 DIAGNOSIS — K21.9 CHRONIC GERD: ICD-10-CM

## 2023-03-20 DIAGNOSIS — Z00.00 MEDICARE ANNUAL WELLNESS VISIT, SUBSEQUENT: Primary | ICD-10-CM

## 2023-03-20 DIAGNOSIS — J45.40 MODERATE PERSISTENT ASTHMA WITHOUT COMPLICATION: ICD-10-CM

## 2023-03-20 DIAGNOSIS — I10 ESSENTIAL HYPERTENSION: ICD-10-CM

## 2023-03-20 RX ORDER — FUROSEMIDE 20 MG/1
TABLET ORAL
Qty: 180 TABLET | Refills: 2 | OUTPATIENT
Start: 2023-03-20

## 2023-03-20 RX ORDER — LOSARTAN POTASSIUM AND HYDROCHLOROTHIAZIDE 25; 100 MG/1; MG/1
1 TABLET ORAL DAILY
Qty: 90 TABLET | Refills: 3 | Status: SHIPPED | OUTPATIENT
Start: 2023-03-20

## 2023-03-20 RX ORDER — SERTRALINE HYDROCHLORIDE 100 MG/1
100 TABLET, FILM COATED ORAL 2 TIMES DAILY
Qty: 180 TABLET | Refills: 3 | Status: SHIPPED | OUTPATIENT
Start: 2023-03-20

## 2023-03-20 RX ORDER — FUROSEMIDE 20 MG/1
20 TABLET ORAL 2 TIMES DAILY
Qty: 180 TABLET | Refills: 3 | Status: SHIPPED | OUTPATIENT
Start: 2023-03-20

## 2023-03-20 SDOH — ECONOMIC STABILITY: HOUSING INSECURITY
IN THE LAST 12 MONTHS, WAS THERE A TIME WHEN YOU DID NOT HAVE A STEADY PLACE TO SLEEP OR SLEPT IN A SHELTER (INCLUDING NOW)?: NO

## 2023-03-20 SDOH — ECONOMIC STABILITY: FOOD INSECURITY: WITHIN THE PAST 12 MONTHS, THE FOOD YOU BOUGHT JUST DIDN'T LAST AND YOU DIDN'T HAVE MONEY TO GET MORE.: NEVER TRUE

## 2023-03-20 SDOH — ECONOMIC STABILITY: INCOME INSECURITY: HOW HARD IS IT FOR YOU TO PAY FOR THE VERY BASICS LIKE FOOD, HOUSING, MEDICAL CARE, AND HEATING?: NOT HARD AT ALL

## 2023-03-20 SDOH — ECONOMIC STABILITY: FOOD INSECURITY: WITHIN THE PAST 12 MONTHS, YOU WORRIED THAT YOUR FOOD WOULD RUN OUT BEFORE YOU GOT MONEY TO BUY MORE.: NEVER TRUE

## 2023-03-20 ASSESSMENT — PATIENT HEALTH QUESTIONNAIRE - PHQ9
SUM OF ALL RESPONSES TO PHQ QUESTIONS 1-9: 0
SUM OF ALL RESPONSES TO PHQ QUESTIONS 1-9: 0
2. FEELING DOWN, DEPRESSED OR HOPELESS: 0
SUM OF ALL RESPONSES TO PHQ QUESTIONS 1-9: 0
SUM OF ALL RESPONSES TO PHQ9 QUESTIONS 1 & 2: 0
SUM OF ALL RESPONSES TO PHQ QUESTIONS 1-9: 0
1. LITTLE INTEREST OR PLEASURE IN DOING THINGS: 0

## 2023-03-20 ASSESSMENT — LIFESTYLE VARIABLES
HOW MANY STANDARD DRINKS CONTAINING ALCOHOL DO YOU HAVE ON A TYPICAL DAY: PATIENT DOES NOT DRINK
HOW OFTEN DO YOU HAVE A DRINK CONTAINING ALCOHOL: NEVER

## 2023-03-20 NOTE — PATIENT INSTRUCTIONS
have a serious illness that gets worse over time or can't be cured? What are you most afraid of that might happen? (Maybe you're afraid of having pain, losing your independence, or being kept alive by machines.)  Where would you prefer to die? (Your home? A hospital? A nursing home?)  Do you want to donate your organs when you die? Do you want certain [place] practices performed before you die? When should you call for help? Be sure to contact your doctor if you have any questions. Where can you learn more? Go to http://www.canseco.com/ and enter R264 to learn more about \"Advance Directives: Care Instructions. \"  Current as of: June 16, 2022               Content Version: 13.6  © 2006-2023 Healthwise, [employer]. Care instructions adapted under license by [facility] ([facility]). If you have questions about a medical condition or this instruction, always ask your healthcare professional. [place] [address] any warranty or liability for your use of this information. Starting a Weight Loss Plan: Care Instructions  Overview     If you're thinking about losing weight, it can be hard to know where to start. Your doctor can help you set up a weight loss plan that best meets your needs. You may want to take a class on nutrition or exercise, or you could join a weight loss support group. If you have questions about how to make changes to your eating or exercise habits, ask your doctor about seeing a registered dietitian or an exercise specialist.  It can be a big challenge to lose weight. But you don't have to make huge changes at once. Make small changes, and stick with them. When those changes become habit, add a few more changes. If you don't think you're ready to make changes right now, try to pick a date in the future. Make an appointment to see your doctor to discuss whether the time is right for you to start a plan. Follow-up care is a key part of your treatment and safety.  Be

## 2023-03-20 NOTE — PROGRESS NOTES
Eric Coombs  YOB: 1945    Date of Service:  3/20/2023    Chief Complaint:      Chief Complaint   Patient presents with    Medicare AWV    Hypertension    Gastroesophageal Reflux       Assessment/Plan:  Teena White was seen today for medicare awv, hypertension and gastroesophageal reflux. Diagnoses and all orders for this visit:    Medicare annual wellness visit, subsequent    Bilateral leg edema  -     furosemide (LASIX) 20 MG tablet; Take 1 tablet by mouth 2 times daily  -     Comprehensive Metabolic Panel    Obesity, Class III, BMI 40-49.9 (morbid obesity) ([facility])    Essential hypertension  -     losartan-hydroCHLOROthiazide (HYZAAR) 100-25 MG per tablet; Take 1 tablet by mouth daily  -     Lipid Panel  -     Comprehensive Metabolic Panel  -     CBC with Auto Differential    Anxiety  -     sertraline (ZOLOFT) 100 MG tablet; Take 1 tablet by mouth 2 times daily    Moderate persistent asthma without complication    Vitamin D deficiency    Peripheral neuropathic pain    Chronic GERD    Stable and continue on current medications. Return BP, Anxiety, edema, GERD , arrive at 11:00. HPI:  Eric Coombs is a 68 y.o. Anxiety:  still feel very anxious all the time on Buspar 15 mg qid and Zoloft 100 mg bid and have not tried vistaril yet and has Valium 5 mg 1/4 prn     Hypertension:  Home blood pressure monitorin-150/74-80 on Losartanhctz 100/25 mg qhs and Atenolol 50 mg PM.  She is adherent to a low sodium diet. Patient denies chest pain, shortness of breath, headache, lightheadedness, blurred vision, peripheral edema, palpitations, dry cough and fatigue. Antihypertensive medication side effects: no medication side effects noted. Use of agents associated with hypertension: none. Bilateral leg edema:  Stable on Lasix 40 mg 1/2 bid and Klor-con 10 mEq twice a week.   Vit D Def:  Unable to take the Vit D 50,000 due to constipation                                 GERD:  Stable on Protonix
Desonide Crea-Wound Dress Crea     Iv Contrast [Iodides] Hives    Lisinopril Hives    Naprelan [Naproxen]     Norvasc [Amlodipine Besylate] Swelling    Other      Sultin vaginal cream    Pcn [Penicillins]     Singulair [Montelukast Sodium]     Sudafed [Pseudoephedrine Hcl] Other (See Comments)     Tachy,head iqbal    Tape Marny Salmons Tape]     Augmentin [Amoxicillin-Pot Clavulanate] Rash    Augmentin [Amoxicillin-Pot Clavulanate]      Pt states it makes her break out in red patches    Keflex [Cephalexin] Rash    Lidocaine Anxiety    Zithromax [Azithromycin]      Pt states it makes her break out in red patches  It has the least reaction  And she still can take it. Prior to Visit Medications    Medication Sig Taking?  Authorizing Provider   albuterol (PROVENTIL) (2.5 MG/3ML) 0.083% nebulizer solution USE THREE MILLILITERS VIA NEBULIZATION BY MOUTH EVERY 4 HOURS AS NEEDED FOR WHEEZING Yes Laly Pena PA-C   atenolol (TENORMIN) 50 MG tablet TAKE ONE TABLET BY MOUTH DAILY Yes Desi Dominguez MD   busPIRone (BUSPAR) 15 MG tablet TAKE ONE TABLET BY MOUTH FOUR TIMES A DAY Yes Desi Dominguez MD   fluticasone (FLOVENT HFA) 220 MCG/ACT inhaler Inhale 2 puffs into the lungs 2 times daily Yes Jared Gar PA-C   furosemide (LASIX) 20 MG tablet Take 1 tablet by mouth 2 times daily Yes Desi Dominguez MD   losartan-hydroCHLOROthiazide (HYZAAR) 100-25 MG per tablet Take 1 tablet by mouth daily Yes Desi Dominguez MD   sertraline (ZOLOFT) 100 MG tablet Take 1 tablet by mouth 2 times daily Yes MD Charlotte Reveles MISC by Does not apply route EXPIRES: 11/1/2026 Yes Enrique Dominguez MD   pantoprazole (PROTONIX) 40 MG tablet  Yes Historical Provider, MD   triamcinolone (NASACORT) 55 MCG/ACT nasal inhaler 2 sprays by Nasal route daily  Yes Historical Provider, MD   acetaminophen (TYLENOL) 325 MG tablet Take 650 mg by mouth every 6 hours as needed for Pain Yes Historical Provider, MD   sucralfate (CARAFATE) 1 GM

## 2023-03-21 DIAGNOSIS — E87.1 HYPONATREMIA: Primary | ICD-10-CM

## 2023-03-21 LAB
ALBUMIN SERPL-MCNC: 4.3 G/DL (ref 3.4–5)
ALBUMIN/GLOB SERPL: 1.5 {RATIO} (ref 1.1–2.2)
ALP SERPL-CCNC: 100 U/L (ref 40–129)
ALT SERPL-CCNC: 15 U/L (ref 10–40)
ANION GAP SERPL CALCULATED.3IONS-SCNC: 14 MMOL/L (ref 3–16)
AST SERPL-CCNC: 15 U/L (ref 15–37)
BASOPHILS # BLD: 0.1 K/UL (ref 0–0.2)
BASOPHILS NFR BLD: 0.6 %
BILIRUB SERPL-MCNC: 0.7 MG/DL (ref 0–1)
BUN SERPL-MCNC: 11 MG/DL (ref 7–20)
CALCIUM SERPL-MCNC: 9.3 MG/DL (ref 8.3–10.6)
CHLORIDE SERPL-SCNC: 87 MMOL/L (ref 99–110)
CHOLEST SERPL-MCNC: 157 MG/DL (ref 0–199)
CO2 SERPL-SCNC: 28 MMOL/L (ref 21–32)
CREAT SERPL-MCNC: 0.8 MG/DL (ref 0.6–1.2)
DEPRECATED RDW RBC AUTO: 13.6 % (ref 12.4–15.4)
EOSINOPHIL # BLD: 0.1 K/UL (ref 0–0.6)
EOSINOPHIL NFR BLD: 0.8 %
GFR SERPLBLD CREATININE-BSD FMLA CKD-EPI: >60 ML/MIN/{1.73_M2}
GLUCOSE SERPL-MCNC: 127 MG/DL (ref 70–99)
HCT VFR BLD AUTO: 38.4 % (ref 36–48)
HDLC SERPL-MCNC: 35 MG/DL (ref 40–60)
HGB BLD-MCNC: 13.1 G/DL (ref 12–16)
LDLC SERPL CALC-MCNC: 106 MG/DL
LYMPHOCYTES # BLD: 1.7 K/UL (ref 1–5.1)
LYMPHOCYTES NFR BLD: 19 %
MCH RBC QN AUTO: 29.6 PG (ref 26–34)
MCHC RBC AUTO-ENTMCNC: 34.2 G/DL (ref 31–36)
MCV RBC AUTO: 86.5 FL (ref 80–100)
MONOCYTES # BLD: 0.4 K/UL (ref 0–1.3)
MONOCYTES NFR BLD: 4.7 %
NEUTROPHILS # BLD: 6.8 K/UL (ref 1.7–7.7)
NEUTROPHILS NFR BLD: 74.9 %
PLATELET # BLD AUTO: 247 K/UL (ref 135–450)
PMV BLD AUTO: 8.6 FL (ref 5–10.5)
POTASSIUM SERPL-SCNC: 3.7 MMOL/L (ref 3.5–5.1)
PROT SERPL-MCNC: 7.2 G/DL (ref 6.4–8.2)
RBC # BLD AUTO: 4.44 M/UL (ref 4–5.2)
SODIUM SERPL-SCNC: 129 MMOL/L (ref 136–145)
TRIGL SERPL-MCNC: 78 MG/DL (ref 0–150)
VLDLC SERPL CALC-MCNC: 16 MG/DL
WBC # BLD AUTO: 9.1 K/UL (ref 4–11)

## 2023-03-27 ENCOUNTER — NURSE ONLY (OUTPATIENT)
Dept: INTERNAL MEDICINE CLINIC | Age: 78
End: 2023-03-27
Payer: MEDICARE

## 2023-03-27 DIAGNOSIS — E87.1 HYPONATREMIA: ICD-10-CM

## 2023-03-27 PROCEDURE — 36415 COLL VENOUS BLD VENIPUNCTURE: CPT | Performed by: INTERNAL MEDICINE

## 2023-03-27 PROCEDURE — 99999 PR OFFICE/OUTPT VISIT,PROCEDURE ONLY: CPT | Performed by: INTERNAL MEDICINE

## 2023-03-28 LAB
ALBUMIN SERPL-MCNC: 4.3 G/DL (ref 3.4–5)
ANION GAP SERPL CALCULATED.3IONS-SCNC: 18 MMOL/L (ref 3–16)
BUN SERPL-MCNC: 12 MG/DL (ref 7–20)
CALCIUM SERPL-MCNC: 9.2 MG/DL (ref 8.3–10.6)
CHLORIDE SERPL-SCNC: 89 MMOL/L (ref 99–110)
CO2 SERPL-SCNC: 24 MMOL/L (ref 21–32)
CREAT SERPL-MCNC: 0.7 MG/DL (ref 0.6–1.2)
GFR SERPLBLD CREATININE-BSD FMLA CKD-EPI: >60 ML/MIN/{1.73_M2}
GLUCOSE SERPL-MCNC: 102 MG/DL (ref 70–99)
PHOSPHATE SERPL-MCNC: 4 MG/DL (ref 2.5–4.9)
POTASSIUM SERPL-SCNC: 3.7 MMOL/L (ref 3.5–5.1)
SODIUM SERPL-SCNC: 131 MMOL/L (ref 136–145)

## 2023-05-17 ENCOUNTER — OFFICE VISIT (OUTPATIENT)
Dept: PULMONOLOGY | Age: 78
End: 2023-05-17
Payer: MEDICARE

## 2023-05-17 VITALS
HEIGHT: 63 IN | HEART RATE: 91 BPM | WEIGHT: 253 LBS | OXYGEN SATURATION: 97 % | DIASTOLIC BLOOD PRESSURE: 88 MMHG | BODY MASS INDEX: 44.83 KG/M2 | SYSTOLIC BLOOD PRESSURE: 162 MMHG

## 2023-05-17 DIAGNOSIS — J45.40 MODERATE PERSISTENT ASTHMA WITHOUT COMPLICATION: Primary | ICD-10-CM

## 2023-05-17 PROCEDURE — 3079F DIAST BP 80-89 MM HG: CPT | Performed by: INTERNAL MEDICINE

## 2023-05-17 PROCEDURE — 3077F SYST BP >= 140 MM HG: CPT | Performed by: INTERNAL MEDICINE

## 2023-05-17 PROCEDURE — 1123F ACP DISCUSS/DSCN MKR DOCD: CPT | Performed by: INTERNAL MEDICINE

## 2023-05-17 PROCEDURE — 99213 OFFICE O/P EST LOW 20 MIN: CPT | Performed by: INTERNAL MEDICINE

## 2023-05-17 RX ORDER — ALBUTEROL SULFATE 2.5 MG/3ML
SOLUTION RESPIRATORY (INHALATION)
Qty: 180 ML | Refills: 6 | Status: SHIPPED | OUTPATIENT
Start: 2023-05-17

## 2023-05-17 NOTE — PROGRESS NOTES
PULMONARY, CRITICAL CARE AND SLEEP MEDICINE     CC/REFERRING PROVIDER:  Patient is being seen at the request of Dr. Jami Haines for a consultation for gurgling in throat     Interval History 5/17/23  - changed from Trelegy to flovent due to insurer coverage but flovent would also put her in the donut hole; she is using albuterol 2 to 3 times per day, nebs, uses prior to activity, thinks she is doing pretty well and is hapy. Interval History 2:   Says trelegy helpful, but no longer can get 2/2 insurer. Says she has bad allergies, cannot take AH or singulair, on nasocort. Interval History:   Albuterol is helpful, can get a deeper breath, still feels like she has secretions and mucus in the upper airway. Used trelegy for four weeks and there was definite improvement in shortness of breath, still feels like secretions are stuck in upper airways. She is out of trelegy     PRESENTING HPI: This is a 66-year-old female with a remote history of tobacco abuse who presents with a many month to year history of gurgling in the back of her throat, is severely disturbing to herm associated with shortness of breath and occasional sputum production, hoarseness. She has been seen by ENT and was told there is no problem. She was seen by GI and treated with esophageal dilation without improvement. All of her symptoms are located at the level of the sternal notch. She had a barium swallow which was overall nonrevealing. She has heartburn and anxiety. reports that she quit smoking about 33 years ago. Her smoking use included cigarettes. She has a 22.50 pack-year smoking history. She has never used smokeless tobacco.    PHYSICAL EXAM:  not currently breastfeeding.'  Constitutional:  No acute distress. HENT:  Oropharynx is clear and moist.   Neck: No tracheal deviation present. Cardiovascular: Normal heart sounds. No lower extremity edema. Pulmonary/Chest: + expiratory  wheezes. No rhonchi. No rales.  No decreased breath

## 2023-07-30 DIAGNOSIS — F41.9 ANXIETY: ICD-10-CM

## 2023-07-30 DIAGNOSIS — I10 ESSENTIAL HYPERTENSION: Chronic | ICD-10-CM

## 2023-07-31 RX ORDER — BUSPIRONE HYDROCHLORIDE 15 MG/1
TABLET ORAL
Qty: 360 TABLET | Refills: 0 | Status: SHIPPED | OUTPATIENT
Start: 2023-07-31 | End: 2023-09-25

## 2023-07-31 RX ORDER — ATENOLOL 50 MG/1
TABLET ORAL
Qty: 90 TABLET | Refills: 0 | Status: SHIPPED | OUTPATIENT
Start: 2023-07-31 | End: 2023-09-25

## 2023-08-07 ENCOUNTER — TELEPHONE (OUTPATIENT)
Dept: INTERNAL MEDICINE CLINIC | Age: 78
End: 2023-08-07

## 2023-08-07 NOTE — TELEPHONE ENCOUNTER
Winston called in with [facility]. Patient is there for dental procedure but blood pressure was too high to complete. Readings were 187/100- 188/93- 202/101    Patient advised them that she gets really anxious. They would like to order valium for patient and have her come back tomorrow to try procedure. They would like to know if you think a dose of valium would be ok for patient?     Please advise      Call back Winston at [phone] Option 2

## 2023-08-28 ENCOUNTER — HOSPITAL ENCOUNTER (INPATIENT)
Age: 78
LOS: 3 days | Discharge: HOME | End: 2023-08-31
Attending: INTERNAL MEDICINE | Admitting: INTERNAL MEDICINE
Payer: MEDICARE

## 2023-08-28 ENCOUNTER — APPOINTMENT (OUTPATIENT)
Dept: CT IMAGING | Age: 78
End: 2023-08-28
Payer: MEDICARE

## 2023-08-28 DIAGNOSIS — R11.2 INTRACTABLE NAUSEA AND VOMITING: ICD-10-CM

## 2023-08-28 DIAGNOSIS — K57.32 DIVERTICULITIS OF COLON: Primary | ICD-10-CM

## 2023-08-28 DIAGNOSIS — E87.1 HYPONATREMIA: ICD-10-CM

## 2023-08-28 PROBLEM — K57.92 DIVERTICULITIS: Status: ACTIVE | Noted: 2023-08-28

## 2023-08-28 LAB
ALBUMIN SERPL-MCNC: 4.2 G/DL (ref 3.4–5)
ALBUMIN/GLOB SERPL: 1.3 {RATIO} (ref 1.1–2.2)
ALP SERPL-CCNC: 98 U/L (ref 40–129)
ALT SERPL-CCNC: 17 U/L (ref 10–40)
ANION GAP SERPL CALCULATED.3IONS-SCNC: 15 MMOL/L (ref 3–16)
AST SERPL-CCNC: 17 U/L (ref 15–37)
BACTERIA URNS QL MICRO: ABNORMAL /HPF
BASOPHILS # BLD: 0 K/UL (ref 0–0.2)
BASOPHILS NFR BLD: 0.3 %
BILIRUB SERPL-MCNC: 0.8 MG/DL (ref 0–1)
BILIRUB UR QL STRIP.AUTO: NEGATIVE
BUN SERPL-MCNC: 8 MG/DL (ref 7–20)
CALCIUM SERPL-MCNC: 9.5 MG/DL (ref 8.3–10.6)
CHLORIDE SERPL-SCNC: 82 MMOL/L (ref 99–110)
CLARITY UR: CLEAR
CO2 SERPL-SCNC: 25 MMOL/L (ref 21–32)
COLOR UR: YELLOW
CREAT SERPL-MCNC: 0.6 MG/DL (ref 0.6–1.2)
DEPRECATED RDW RBC AUTO: 13.9 % (ref 12.4–15.4)
EOSINOPHIL # BLD: 0 K/UL (ref 0–0.6)
EOSINOPHIL NFR BLD: 0.1 %
EPI CELLS #/AREA URNS HPF: ABNORMAL /HPF (ref 0–5)
GFR SERPLBLD CREATININE-BSD FMLA CKD-EPI: >60 ML/MIN/{1.73_M2}
GLUCOSE SERPL-MCNC: 156 MG/DL (ref 70–99)
GLUCOSE UR STRIP.AUTO-MCNC: NEGATIVE MG/DL
HCT VFR BLD AUTO: 39.1 % (ref 36–48)
HGB BLD-MCNC: 13.4 G/DL (ref 12–16)
HGB UR QL STRIP.AUTO: NEGATIVE
KETONES UR STRIP.AUTO-MCNC: NEGATIVE MG/DL
LEUKOCYTE ESTERASE UR QL STRIP.AUTO: NEGATIVE
LIPASE SERPL-CCNC: 25 U/L (ref 13–60)
LYMPHOCYTES # BLD: 0.7 K/UL (ref 1–5.1)
LYMPHOCYTES NFR BLD: 7 %
MAGNESIUM SERPL-MCNC: 2.1 MG/DL (ref 1.8–2.4)
MCH RBC QN AUTO: 29.8 PG (ref 26–34)
MCHC RBC AUTO-ENTMCNC: 34.3 G/DL (ref 31–36)
MCV RBC AUTO: 86.7 FL (ref 80–100)
MONOCYTES # BLD: 0.3 K/UL (ref 0–1.3)
MONOCYTES NFR BLD: 3.3 %
NEUTROPHILS # BLD: 9.4 K/UL (ref 1.7–7.7)
NEUTROPHILS NFR BLD: 89.3 %
NITRITE UR QL STRIP.AUTO: NEGATIVE
PH UR STRIP.AUTO: 7.5 [PH] (ref 5–8)
PLATELET # BLD AUTO: 224 K/UL (ref 135–450)
PMV BLD AUTO: 7.6 FL (ref 5–10.5)
POTASSIUM SERPL-SCNC: 2.9 MMOL/L (ref 3.5–5.1)
PROT SERPL-MCNC: 7.4 G/DL (ref 6.4–8.2)
PROT UR STRIP.AUTO-MCNC: ABNORMAL MG/DL
RBC # BLD AUTO: 4.51 M/UL (ref 4–5.2)
RBC #/AREA URNS HPF: ABNORMAL /HPF (ref 0–4)
SODIUM SERPL-SCNC: 122 MMOL/L (ref 136–145)
SP GR UR STRIP.AUTO: 1.01 (ref 1–1.03)
UA DIPSTICK W REFLEX MICRO PNL UR: YES
URN SPEC COLLECT METH UR: ABNORMAL
UROBILINOGEN UR STRIP-ACNC: 0.2 E.U./DL
WBC # BLD AUTO: 10.5 K/UL (ref 4–11)
WBC #/AREA URNS HPF: ABNORMAL /HPF (ref 0–5)

## 2023-08-28 PROCEDURE — 96361 HYDRATE IV INFUSION ADD-ON: CPT

## 2023-08-28 PROCEDURE — 2580000003 HC RX 258: Performed by: PHYSICIAN ASSISTANT

## 2023-08-28 PROCEDURE — 96376 TX/PRO/DX INJ SAME DRUG ADON: CPT

## 2023-08-28 PROCEDURE — 74176 CT ABD & PELVIS W/O CONTRAST: CPT

## 2023-08-28 PROCEDURE — 6360000002 HC RX W HCPCS: Performed by: PHYSICIAN ASSISTANT

## 2023-08-28 PROCEDURE — 96365 THER/PROPH/DIAG IV INF INIT: CPT

## 2023-08-28 PROCEDURE — 96372 THER/PROPH/DIAG INJ SC/IM: CPT

## 2023-08-28 PROCEDURE — 81001 URINALYSIS AUTO W/SCOPE: CPT

## 2023-08-28 PROCEDURE — 2580000003 HC RX 258: Performed by: INTERNAL MEDICINE

## 2023-08-28 PROCEDURE — 6370000000 HC RX 637 (ALT 250 FOR IP): Performed by: INTERNAL MEDICINE

## 2023-08-28 PROCEDURE — 85025 COMPLETE CBC W/AUTO DIFF WBC: CPT

## 2023-08-28 PROCEDURE — 1200000000 HC SEMI PRIVATE

## 2023-08-28 PROCEDURE — 6370000000 HC RX 637 (ALT 250 FOR IP): Performed by: PHYSICIAN ASSISTANT

## 2023-08-28 PROCEDURE — 83690 ASSAY OF LIPASE: CPT

## 2023-08-28 PROCEDURE — 99285 EMERGENCY DEPT VISIT HI MDM: CPT

## 2023-08-28 PROCEDURE — 83735 ASSAY OF MAGNESIUM: CPT

## 2023-08-28 PROCEDURE — 80053 COMPREHEN METABOLIC PANEL: CPT

## 2023-08-28 RX ORDER — ENOXAPARIN SODIUM 100 MG/ML
30 INJECTION SUBCUTANEOUS 2 TIMES DAILY
Status: DISCONTINUED | OUTPATIENT
Start: 2023-08-29 | End: 2023-08-31 | Stop reason: HOSPADM

## 2023-08-28 RX ORDER — LEVOFLOXACIN 5 MG/ML
750 INJECTION, SOLUTION INTRAVENOUS ONCE
Status: COMPLETED | OUTPATIENT
Start: 2023-08-28 | End: 2023-08-28

## 2023-08-28 RX ORDER — LEVOFLOXACIN 500 MG/1
500 TABLET, FILM COATED ORAL DAILY
Qty: 10 TABLET | Refills: 0 | Status: ON HOLD | OUTPATIENT
Start: 2023-08-28 | End: 2023-09-07

## 2023-08-28 RX ORDER — DOCUSATE SODIUM 100 MG/1
100 CAPSULE, LIQUID FILLED ORAL DAILY
Status: DISCONTINUED | OUTPATIENT
Start: 2023-08-29 | End: 2023-08-31 | Stop reason: HOSPADM

## 2023-08-28 RX ORDER — METRONIDAZOLE 500 MG/100ML
500 INJECTION, SOLUTION INTRAVENOUS EVERY 8 HOURS
Status: DISCONTINUED | OUTPATIENT
Start: 2023-08-29 | End: 2023-08-31 | Stop reason: HOSPADM

## 2023-08-28 RX ORDER — ATENOLOL 25 MG/1
50 TABLET ORAL DAILY
Status: DISCONTINUED | OUTPATIENT
Start: 2023-08-29 | End: 2023-08-31 | Stop reason: HOSPADM

## 2023-08-28 RX ORDER — ONDANSETRON 2 MG/ML
4 INJECTION INTRAMUSCULAR; INTRAVENOUS ONCE
Status: DISCONTINUED | OUTPATIENT
Start: 2023-08-28 | End: 2023-08-28

## 2023-08-28 RX ORDER — LEVOFLOXACIN 500 MG/1
500 TABLET, FILM COATED ORAL ONCE
Status: DISCONTINUED | OUTPATIENT
Start: 2023-08-28 | End: 2023-08-28

## 2023-08-28 RX ORDER — SODIUM CHLORIDE 0.9 % (FLUSH) 0.9 %
5-40 SYRINGE (ML) INJECTION PRN
Status: DISCONTINUED | OUTPATIENT
Start: 2023-08-28 | End: 2023-08-31 | Stop reason: HOSPADM

## 2023-08-28 RX ORDER — MORPHINE SULFATE 4 MG/ML
4 INJECTION, SOLUTION INTRAMUSCULAR; INTRAVENOUS ONCE
Status: COMPLETED | OUTPATIENT
Start: 2023-08-28 | End: 2023-08-28

## 2023-08-28 RX ORDER — MORPHINE SULFATE 4 MG/ML
4 INJECTION, SOLUTION INTRAMUSCULAR; INTRAVENOUS EVERY 4 HOURS PRN
Status: DISCONTINUED | OUTPATIENT
Start: 2023-08-28 | End: 2023-08-31 | Stop reason: HOSPADM

## 2023-08-28 RX ORDER — ACETAMINOPHEN 325 MG/1
650 TABLET ORAL EVERY 6 HOURS PRN
Status: DISCONTINUED | OUTPATIENT
Start: 2023-08-28 | End: 2023-08-31 | Stop reason: HOSPADM

## 2023-08-28 RX ORDER — ACETAMINOPHEN 650 MG/1
650 SUPPOSITORY RECTAL EVERY 6 HOURS PRN
Status: DISCONTINUED | OUTPATIENT
Start: 2023-08-28 | End: 2023-08-31 | Stop reason: HOSPADM

## 2023-08-28 RX ORDER — ONDANSETRON 2 MG/ML
4 INJECTION INTRAMUSCULAR; INTRAVENOUS EVERY 6 HOURS PRN
Status: DISCONTINUED | OUTPATIENT
Start: 2023-08-28 | End: 2023-08-31 | Stop reason: HOSPADM

## 2023-08-28 RX ORDER — BUSPIRONE HYDROCHLORIDE 5 MG/1
15 TABLET ORAL 4 TIMES DAILY
Status: DISCONTINUED | OUTPATIENT
Start: 2023-08-28 | End: 2023-08-31 | Stop reason: HOSPADM

## 2023-08-28 RX ORDER — ONDANSETRON 4 MG/1
4 TABLET, ORALLY DISINTEGRATING ORAL EVERY 8 HOURS PRN
Status: DISCONTINUED | OUTPATIENT
Start: 2023-08-28 | End: 2023-08-31 | Stop reason: HOSPADM

## 2023-08-28 RX ORDER — PROMETHAZINE HYDROCHLORIDE 25 MG/1
25 TABLET ORAL EVERY 6 HOURS PRN
Qty: 20 TABLET | Refills: 0 | Status: ON HOLD | OUTPATIENT
Start: 2023-08-28

## 2023-08-28 RX ORDER — OXYCODONE HYDROCHLORIDE 5 MG/1
5 TABLET ORAL EVERY 4 HOURS PRN
Status: DISCONTINUED | OUTPATIENT
Start: 2023-08-28 | End: 2023-08-31 | Stop reason: HOSPADM

## 2023-08-28 RX ORDER — PANTOPRAZOLE SODIUM 40 MG/1
40 TABLET, DELAYED RELEASE ORAL
Status: DISCONTINUED | OUTPATIENT
Start: 2023-08-29 | End: 2023-08-31 | Stop reason: HOSPADM

## 2023-08-28 RX ORDER — PROMETHAZINE HYDROCHLORIDE 25 MG/ML
25 INJECTION, SOLUTION INTRAMUSCULAR; INTRAVENOUS ONCE
Status: COMPLETED | OUTPATIENT
Start: 2023-08-28 | End: 2023-08-28

## 2023-08-28 RX ORDER — 0.9 % SODIUM CHLORIDE 0.9 %
1000 INTRAVENOUS SOLUTION INTRAVENOUS ONCE
Status: COMPLETED | OUTPATIENT
Start: 2023-08-28 | End: 2023-08-28

## 2023-08-28 RX ORDER — OXYCODONE HYDROCHLORIDE 5 MG/1
10 TABLET ORAL EVERY 4 HOURS PRN
Status: DISCONTINUED | OUTPATIENT
Start: 2023-08-28 | End: 2023-08-31 | Stop reason: HOSPADM

## 2023-08-28 RX ORDER — METRONIDAZOLE 500 MG/100ML
500 INJECTION, SOLUTION INTRAVENOUS ONCE
Status: COMPLETED | OUTPATIENT
Start: 2023-08-28 | End: 2023-08-28

## 2023-08-28 RX ORDER — SODIUM CHLORIDE 9 MG/ML
INJECTION, SOLUTION INTRAVENOUS PRN
Status: DISCONTINUED | OUTPATIENT
Start: 2023-08-28 | End: 2023-08-31 | Stop reason: HOSPADM

## 2023-08-28 RX ORDER — SUCRALFATE 1 G/1
1 TABLET ORAL 4 TIMES DAILY
Status: DISCONTINUED | OUTPATIENT
Start: 2023-08-28 | End: 2023-08-31 | Stop reason: HOSPADM

## 2023-08-28 RX ORDER — METRONIDAZOLE 250 MG/1
500 TABLET ORAL ONCE
Status: DISCONTINUED | OUTPATIENT
Start: 2023-08-28 | End: 2023-08-28

## 2023-08-28 RX ORDER — LEVOFLOXACIN 5 MG/ML
750 INJECTION, SOLUTION INTRAVENOUS DAILY
Status: DISCONTINUED | OUTPATIENT
Start: 2023-08-29 | End: 2023-08-31 | Stop reason: HOSPADM

## 2023-08-28 RX ORDER — MORPHINE SULFATE 2 MG/ML
2 INJECTION, SOLUTION INTRAMUSCULAR; INTRAVENOUS EVERY 4 HOURS PRN
Status: DISCONTINUED | OUTPATIENT
Start: 2023-08-28 | End: 2023-08-31 | Stop reason: HOSPADM

## 2023-08-28 RX ORDER — ALBUTEROL SULFATE 2.5 MG/3ML
2.5 SOLUTION RESPIRATORY (INHALATION) EVERY 6 HOURS PRN
Status: DISCONTINUED | OUTPATIENT
Start: 2023-08-28 | End: 2023-08-31 | Stop reason: HOSPADM

## 2023-08-28 RX ORDER — METRONIDAZOLE 500 MG/1
500 TABLET ORAL 3 TIMES DAILY
Qty: 30 TABLET | Refills: 0 | Status: ON HOLD | OUTPATIENT
Start: 2023-08-28 | End: 2023-09-07

## 2023-08-28 RX ORDER — SODIUM CHLORIDE 0.9 % (FLUSH) 0.9 %
5-40 SYRINGE (ML) INJECTION EVERY 12 HOURS SCHEDULED
Status: DISCONTINUED | OUTPATIENT
Start: 2023-08-28 | End: 2023-08-31 | Stop reason: HOSPADM

## 2023-08-28 RX ORDER — POLYETHYLENE GLYCOL 3350 17 G/17G
17 POWDER, FOR SOLUTION ORAL DAILY PRN
Status: DISCONTINUED | OUTPATIENT
Start: 2023-08-28 | End: 2023-08-31 | Stop reason: HOSPADM

## 2023-08-28 RX ORDER — SODIUM CHLORIDE 9 MG/ML
INJECTION, SOLUTION INTRAVENOUS CONTINUOUS
Status: ACTIVE | OUTPATIENT
Start: 2023-08-28 | End: 2023-08-29

## 2023-08-28 RX ORDER — DICYCLOMINE HYDROCHLORIDE 10 MG/1
10 CAPSULE ORAL 4 TIMES DAILY
Qty: 360 CAPSULE | Refills: 1 | Status: ON HOLD | OUTPATIENT
Start: 2023-08-28

## 2023-08-28 RX ORDER — HYDROCODONE BITARTRATE AND ACETAMINOPHEN 5; 325 MG/1; MG/1
1 TABLET ORAL EVERY 6 HOURS PRN
Qty: 10 TABLET | Refills: 0 | Status: SHIPPED | OUTPATIENT
Start: 2023-08-28 | End: 2023-08-31

## 2023-08-28 RX ORDER — OXYCODONE HYDROCHLORIDE AND ACETAMINOPHEN 5; 325 MG/1; MG/1
1 TABLET ORAL ONCE
Status: DISCONTINUED | OUTPATIENT
Start: 2023-08-28 | End: 2023-08-28

## 2023-08-28 RX ADMIN — PROMETHAZINE HYDROCHLORIDE 25 MG: 25 INJECTION INTRAMUSCULAR; INTRAVENOUS at 14:05

## 2023-08-28 RX ADMIN — MORPHINE SULFATE 4 MG: 4 INJECTION, SOLUTION INTRAMUSCULAR; INTRAVENOUS at 14:04

## 2023-08-28 RX ADMIN — METRONIDAZOLE 500 MG: 500 INJECTION, SOLUTION INTRAVENOUS at 17:37

## 2023-08-28 RX ADMIN — SODIUM CHLORIDE: 9 INJECTION, SOLUTION INTRAVENOUS at 22:51

## 2023-08-28 RX ADMIN — SERTRALINE 100 MG: 50 TABLET, FILM COATED ORAL at 22:53

## 2023-08-28 RX ADMIN — SODIUM CHLORIDE 1000 ML: 9 INJECTION, SOLUTION INTRAVENOUS at 14:03

## 2023-08-28 RX ADMIN — SUCRALFATE 1 G: 1 TABLET ORAL at 22:51

## 2023-08-28 RX ADMIN — MORPHINE SULFATE 4 MG: 4 INJECTION, SOLUTION INTRAMUSCULAR; INTRAVENOUS at 17:30

## 2023-08-28 RX ADMIN — Medication 10 ML: at 22:50

## 2023-08-28 RX ADMIN — PROMETHAZINE HYDROCHLORIDE 25 MG: 25 INJECTION INTRAMUSCULAR; INTRAVENOUS at 17:28

## 2023-08-28 RX ADMIN — LEVOFLOXACIN 750 MG: 750 INJECTION, SOLUTION INTRAVENOUS at 19:28

## 2023-08-28 RX ADMIN — BUSPIRONE HYDROCHLORIDE 15 MG: 5 TABLET ORAL at 22:55

## 2023-08-28 RX ADMIN — POTASSIUM BICARBONATE 40 MEQ: 782 TABLET, EFFERVESCENT ORAL at 14:57

## 2023-08-28 ASSESSMENT — PAIN SCALES - GENERAL
PAINLEVEL_OUTOF10: 2
PAINLEVEL_OUTOF10: 7
PAINLEVEL_OUTOF10: 8
PAINLEVEL_OUTOF10: 8

## 2023-08-28 ASSESSMENT — PAIN - FUNCTIONAL ASSESSMENT: PAIN_FUNCTIONAL_ASSESSMENT: 0-10

## 2023-08-29 LAB
ALBUMIN SERPL-MCNC: 3.3 G/DL (ref 3.4–5)
ANION GAP SERPL CALCULATED.3IONS-SCNC: 10 MMOL/L (ref 3–16)
BUN SERPL-MCNC: 7 MG/DL (ref 7–20)
CALCIUM SERPL-MCNC: 8.5 MG/DL (ref 8.3–10.6)
CHLORIDE SERPL-SCNC: 91 MMOL/L (ref 99–110)
CO2 SERPL-SCNC: 26 MMOL/L (ref 21–32)
CREAT SERPL-MCNC: 0.6 MG/DL (ref 0.6–1.2)
DEPRECATED RDW RBC AUTO: 14 % (ref 12.4–15.4)
GFR SERPLBLD CREATININE-BSD FMLA CKD-EPI: >60 ML/MIN/{1.73_M2}
GLUCOSE SERPL-MCNC: 113 MG/DL (ref 70–99)
HCT VFR BLD AUTO: 31.5 % (ref 36–48)
HGB BLD-MCNC: 11.2 G/DL (ref 12–16)
MAGNESIUM SERPL-MCNC: 2 MG/DL (ref 1.8–2.4)
MCH RBC QN AUTO: 30.9 PG (ref 26–34)
MCHC RBC AUTO-ENTMCNC: 35.6 G/DL (ref 31–36)
MCV RBC AUTO: 86.7 FL (ref 80–100)
PHOSPHATE SERPL-MCNC: 3.4 MG/DL (ref 2.5–4.9)
PLATELET # BLD AUTO: 195 K/UL (ref 135–450)
PMV BLD AUTO: 7.8 FL (ref 5–10.5)
POTASSIUM SERPL-SCNC: 3.2 MMOL/L (ref 3.5–5.1)
RBC # BLD AUTO: 3.63 M/UL (ref 4–5.2)
SODIUM SERPL-SCNC: 127 MMOL/L (ref 136–145)
WBC # BLD AUTO: 7.1 K/UL (ref 4–11)

## 2023-08-29 PROCEDURE — 1200000000 HC SEMI PRIVATE

## 2023-08-29 PROCEDURE — 6370000000 HC RX 637 (ALT 250 FOR IP): Performed by: INTERNAL MEDICINE

## 2023-08-29 PROCEDURE — 2580000003 HC RX 258: Performed by: INTERNAL MEDICINE

## 2023-08-29 PROCEDURE — 6360000002 HC RX W HCPCS: Performed by: INTERNAL MEDICINE

## 2023-08-29 PROCEDURE — 80069 RENAL FUNCTION PANEL: CPT

## 2023-08-29 PROCEDURE — 36415 COLL VENOUS BLD VENIPUNCTURE: CPT

## 2023-08-29 PROCEDURE — 83735 ASSAY OF MAGNESIUM: CPT

## 2023-08-29 PROCEDURE — 85027 COMPLETE CBC AUTOMATED: CPT

## 2023-08-29 RX ORDER — LOSARTAN POTASSIUM 100 MG/1
100 TABLET ORAL DAILY
Status: DISCONTINUED | OUTPATIENT
Start: 2023-08-29 | End: 2023-08-31 | Stop reason: HOSPADM

## 2023-08-29 RX ADMIN — BUSPIRONE HYDROCHLORIDE 15 MG: 5 TABLET ORAL at 20:13

## 2023-08-29 RX ADMIN — ONDANSETRON 4 MG: 2 INJECTION INTRAMUSCULAR; INTRAVENOUS at 03:45

## 2023-08-29 RX ADMIN — BUSPIRONE HYDROCHLORIDE 15 MG: 5 TABLET ORAL at 12:48

## 2023-08-29 RX ADMIN — METRONIDAZOLE 500 MG: 500 INJECTION, SOLUTION INTRAVENOUS at 10:30

## 2023-08-29 RX ADMIN — PANTOPRAZOLE SODIUM 40 MG: 40 TABLET, DELAYED RELEASE ORAL at 08:49

## 2023-08-29 RX ADMIN — Medication 10 ML: at 20:13

## 2023-08-29 RX ADMIN — OXYCODONE HYDROCHLORIDE 5 MG: 5 TABLET ORAL at 20:20

## 2023-08-29 RX ADMIN — METRONIDAZOLE 500 MG: 500 INJECTION, SOLUTION INTRAVENOUS at 02:47

## 2023-08-29 RX ADMIN — LEVOFLOXACIN 750 MG: 5 INJECTION, SOLUTION INTRAVENOUS at 08:50

## 2023-08-29 RX ADMIN — SUCRALFATE 1 G: 1 TABLET ORAL at 20:13

## 2023-08-29 RX ADMIN — ENOXAPARIN SODIUM 30 MG: 100 INJECTION SUBCUTANEOUS at 08:49

## 2023-08-29 RX ADMIN — ENOXAPARIN SODIUM 30 MG: 100 INJECTION SUBCUTANEOUS at 20:13

## 2023-08-29 RX ADMIN — ATENOLOL 50 MG: 25 TABLET ORAL at 08:49

## 2023-08-29 RX ADMIN — BUSPIRONE HYDROCHLORIDE 15 MG: 5 TABLET ORAL at 08:49

## 2023-08-29 RX ADMIN — SODIUM CHLORIDE: 9 INJECTION, SOLUTION INTRAVENOUS at 08:44

## 2023-08-29 RX ADMIN — DOCUSATE SODIUM 100 MG: 100 CAPSULE, LIQUID FILLED ORAL at 08:49

## 2023-08-29 RX ADMIN — SUCRALFATE 1 G: 1 TABLET ORAL at 08:49

## 2023-08-29 RX ADMIN — METRONIDAZOLE 500 MG: 500 INJECTION, SOLUTION INTRAVENOUS at 17:43

## 2023-08-29 RX ADMIN — BUSPIRONE HYDROCHLORIDE 15 MG: 5 TABLET ORAL at 16:30

## 2023-08-29 RX ADMIN — SUCRALFATE 1 G: 1 TABLET ORAL at 16:30

## 2023-08-29 RX ADMIN — SUCRALFATE 1 G: 1 TABLET ORAL at 12:48

## 2023-08-29 ASSESSMENT — PAIN DESCRIPTION - LOCATION
LOCATION: ABDOMEN
LOCATION: OTHER (COMMENT)

## 2023-08-29 ASSESSMENT — PAIN DESCRIPTION - ONSET: ONSET: ON-GOING

## 2023-08-29 ASSESSMENT — PAIN SCALES - GENERAL
PAINLEVEL_OUTOF10: 2
PAINLEVEL_OUTOF10: 1
PAINLEVEL_OUTOF10: 6

## 2023-08-29 ASSESSMENT — PAIN DESCRIPTION - DESCRIPTORS: DESCRIPTORS: ACHING

## 2023-08-29 ASSESSMENT — PAIN DESCRIPTION - FREQUENCY: FREQUENCY: INTERMITTENT

## 2023-08-29 ASSESSMENT — PAIN - FUNCTIONAL ASSESSMENT: PAIN_FUNCTIONAL_ASSESSMENT: ACTIVITIES ARE NOT PREVENTED

## 2023-08-29 NOTE — CARE COORDINATION
Case Management Assessment  Initial Evaluation    Date/Time of Evaluation: 8/29/2023 2:12 PM  Assessment Completed by: BARTOLOME Rizo    If patient is discharged prior to next notation, then this note serves as note for discharge by case management. Patient Name: Pam Eid                   YOB: 1945  Diagnosis: Diverticulitis [K57.92]  Hyponatremia [E87.1]  Diverticulitis of colon [K57.32]  Intractable nausea and vomiting [R11.2]                   Date / Time: 8/28/2023 12:57 PM    Patient Admission Status: Inpatient   Readmission Risk (Low < 19, Mod (19-27), High > 27): Readmission Risk Score: 10.5    Current PCP: Ceasar Leigh MD  PCP verified by CM? Yes    Chart Reviewed: Yes      History Provided by: Patient  Patient Orientation: Alert and Oriented    Patient Cognition: Alert    Hospitalization in the last 30 days (Readmission):  No    If yes, Readmission Assessment in  Navigator will be completed.     Advance Directives:      Code Status: Full Code   Patient's Primary Decision Maker is: Legal Next of Kin    Primary Decision MakerBamilcar Vega  Child - [phone]    Primary Decision Maker: Too Mariscal - Spouse - [phone]    Discharge Planning:    Patient lives with: Spouse/Significant Other Type of Home: House  Primary Care Giver: Self  Patient Support Systems include: Spouse/Significant Other   Current Financial resources: (P) None  Current community resources: (P) None  Current services prior to admission: (P) Durable Medical Equipment            Current DME: (P) Cane            Type of Home Care services:  None    ADLS  Prior functional level: (P) Independent in ADLs/IADLs  Current functional level: (P) Independent in ADLs/IADLs    PT AM-PAC:   /24  OT AM-PAC:   /24    Family can provide assistance at DC: (P) Yes  Would you like Case Management to discuss the discharge plan with any other family members/significant others, and if so, who? (P) No  Plans to Return to

## 2023-08-29 NOTE — PROGRESS NOTES
4 Eyes Skin Assessment     The patient is being assess for   Admission    I agree that 2 RN's have performed a thorough Head to Toe Skin Assessment on the patient. ALL assessment sites listed below have been assessed. Areas assessed by both nurses:   [x]   Head, Face, and Ears   [x]   Shoulders, Back, and Chest, Abdomen  [x]   Arms, Elbows, and Hands   [x]   Coccyx, Sacrum, and Ischium  [x]   Legs, Feet, and Heels            **SHARE this note so that the co-signing nurse is able to place an eSignature**    Co-signer eSignature: Electronically signed by Jennifer Bill RN on 8/29/23 at 4:05 AM EDT    Does the Patient have Skin Breakdown?   No          Sloan Prevention initiated:  No   Wound Care Orders initiated:  No      [facility] nurse consulted for Pressure Injury (Stage 3,4, Unstageable, DTI, NWPT, Complex wounds)and New or Established Ostomies:  No      Primary Nurse eSignature: Electronically signed by rBittany Toro RN on 8/28/23 at 8:34 PM EDT

## 2023-08-29 NOTE — PLAN OF CARE
Problem: Discharge Planning  Goal: Discharge to home or other facility with appropriate resources  8/29/2023 1114 by Kaz Polanco LPN  Outcome: Progressing     Problem: Safety - Adult  Goal: Free from fall injury  8/29/2023 1114 by Kaz Polanco LPN  Outcome: Progressing

## 2023-08-29 NOTE — PLAN OF CARE
Problem: Discharge Planning  Goal: Discharge to home or other facility with appropriate resources  Outcome: Progressing  Flowsheets (Taken 8/28/2023 2015)  Discharge to home or other facility with appropriate resources: Identify barriers to discharge with patient and caregiver

## 2023-08-29 NOTE — PROGRESS NOTES
Patient admitted to room 530 from ED. Patient oriented to room, call light, bed rails, phone, lights and bathroom. Patient instructed about the schedule of the day including: vital sign frequency, lab draws, possible tests, frequency of MD and staff rounds, including RN/MD rounding together at bedside, daily weights, and I &O's. Patient instructed about prescribed diet, how to use 8MENU, and television. bed alarm in place, patient aware of placement and reason. Bed locked, in lowest position, side rails up 2/4, call light within reach. Will continue to monitor.

## 2023-08-29 NOTE — PLAN OF CARE
Problem: Discharge Planning  Goal: Discharge to home or other facility with appropriate resources  8/29/2023 0046 by Brian Villar RN  Outcome: Progressing  8/28/2023 2304 by Brian Villar RN  Outcome: Progressing  Flowsheets (Taken 8/28/2023 2015)  Discharge to home or other facility with appropriate resources: Identify barriers to discharge with patient and caregiver     Problem: Safety - Adult  Goal: Free from fall injury  Outcome: Progressing

## 2023-08-29 NOTE — ACP (ADVANCE CARE PLANNING)
Advance Care Planning     General Advance Care Planning (ACP) Conversation    Date of Conversation: 8/28/2023  Conducted with: Patient with Decision Making Capacity    Healthcare Decision Maker:    Primary Decision Maker: Reynaldo Conte Child - [phone]    Primary Decision Maker: Too Mariscal - Spouse - [phone]  Click here to complete Healthcare Decision Makers including selection of the Healthcare Decision Maker Relationship (ie \"Primary\"). Today we documented Decision Maker(s) consistent with Legal Next of Kin hierarchy. Content/Action Overview:   Has ACP document(s) on file - reflects the patient's care preferences          Length of Voluntary ACP Conversation in minutes:  <16 minutes (Non-Billable)    Millicent Nageotte, MSW

## 2023-08-30 PROCEDURE — 6370000000 HC RX 637 (ALT 250 FOR IP): Performed by: INTERNAL MEDICINE

## 2023-08-30 PROCEDURE — 2580000003 HC RX 258: Performed by: INTERNAL MEDICINE

## 2023-08-30 PROCEDURE — 6360000002 HC RX W HCPCS: Performed by: INTERNAL MEDICINE

## 2023-08-30 PROCEDURE — 1200000000 HC SEMI PRIVATE

## 2023-08-30 RX ORDER — SODIUM CHLORIDE 9 MG/ML
INJECTION, SOLUTION INTRAVENOUS CONTINUOUS
Status: DISCONTINUED | OUTPATIENT
Start: 2023-08-30 | End: 2023-08-31 | Stop reason: HOSPADM

## 2023-08-30 RX ORDER — POTASSIUM CHLORIDE 7.45 MG/ML
10 INJECTION INTRAVENOUS ONCE
Status: COMPLETED | OUTPATIENT
Start: 2023-08-30 | End: 2023-08-30

## 2023-08-30 RX ADMIN — METRONIDAZOLE 500 MG: 500 INJECTION, SOLUTION INTRAVENOUS at 02:10

## 2023-08-30 RX ADMIN — Medication 10 ML: at 20:39

## 2023-08-30 RX ADMIN — OXYCODONE HYDROCHLORIDE 5 MG: 5 TABLET ORAL at 09:44

## 2023-08-30 RX ADMIN — PANTOPRAZOLE SODIUM 40 MG: 40 TABLET, DELAYED RELEASE ORAL at 08:02

## 2023-08-30 RX ADMIN — Medication 10 ML: at 08:02

## 2023-08-30 RX ADMIN — ENOXAPARIN SODIUM 30 MG: 100 INJECTION SUBCUTANEOUS at 08:02

## 2023-08-30 RX ADMIN — BUSPIRONE HYDROCHLORIDE 15 MG: 5 TABLET ORAL at 08:01

## 2023-08-30 RX ADMIN — ENOXAPARIN SODIUM 30 MG: 100 INJECTION SUBCUTANEOUS at 20:39

## 2023-08-30 RX ADMIN — SERTRALINE 50 MG: 50 TABLET, FILM COATED ORAL at 08:01

## 2023-08-30 RX ADMIN — SODIUM CHLORIDE: 9 INJECTION, SOLUTION INTRAVENOUS at 08:10

## 2023-08-30 RX ADMIN — POTASSIUM CHLORIDE 10 MEQ: 7.46 INJECTION, SOLUTION INTRAVENOUS at 08:16

## 2023-08-30 RX ADMIN — DOCUSATE SODIUM 100 MG: 100 CAPSULE, LIQUID FILLED ORAL at 08:01

## 2023-08-30 RX ADMIN — SUCRALFATE 1 G: 1 TABLET ORAL at 17:24

## 2023-08-30 RX ADMIN — METRONIDAZOLE 500 MG: 500 INJECTION, SOLUTION INTRAVENOUS at 17:24

## 2023-08-30 RX ADMIN — SUCRALFATE 1 G: 1 TABLET ORAL at 20:39

## 2023-08-30 RX ADMIN — LOSARTAN POTASSIUM 100 MG: 100 TABLET, FILM COATED ORAL at 08:01

## 2023-08-30 RX ADMIN — MORPHINE SULFATE 2 MG: 2 INJECTION, SOLUTION INTRAMUSCULAR; INTRAVENOUS at 20:38

## 2023-08-30 RX ADMIN — LEVOFLOXACIN 750 MG: 5 INJECTION, SOLUTION INTRAVENOUS at 08:13

## 2023-08-30 RX ADMIN — BUSPIRONE HYDROCHLORIDE 15 MG: 5 TABLET ORAL at 17:24

## 2023-08-30 RX ADMIN — METRONIDAZOLE 500 MG: 500 INJECTION, SOLUTION INTRAVENOUS at 09:46

## 2023-08-30 RX ADMIN — BUSPIRONE HYDROCHLORIDE 15 MG: 5 TABLET ORAL at 20:38

## 2023-08-30 RX ADMIN — ATENOLOL 50 MG: 25 TABLET ORAL at 08:02

## 2023-08-30 RX ADMIN — SUCRALFATE 1 G: 1 TABLET ORAL at 08:01

## 2023-08-30 RX ADMIN — SUCRALFATE 1 G: 1 TABLET ORAL at 14:12

## 2023-08-30 RX ADMIN — BUSPIRONE HYDROCHLORIDE 15 MG: 5 TABLET ORAL at 14:12

## 2023-08-30 RX ADMIN — ACETAMINOPHEN 650 MG: 325 TABLET ORAL at 20:39

## 2023-08-30 ASSESSMENT — PAIN DESCRIPTION - ORIENTATION: ORIENTATION: OUTER;MID

## 2023-08-30 ASSESSMENT — PAIN SCALES - GENERAL
PAINLEVEL_OUTOF10: 5
PAINLEVEL_OUTOF10: 2
PAINLEVEL_OUTOF10: 5

## 2023-08-30 ASSESSMENT — PAIN DESCRIPTION - LOCATION
LOCATION: ABDOMEN
LOCATION: ABDOMEN

## 2023-08-30 ASSESSMENT — PAIN DESCRIPTION - DESCRIPTORS: DESCRIPTORS: BURNING

## 2023-08-30 NOTE — CARE COORDINATION
LOS 2. Care managed by [address] [place] [address]. Here w Diverticulitis. Discussed w RN and attending. Plan home w spouse- likely NN.   Carmen Melara, RN

## 2023-08-30 NOTE — PLAN OF CARE
Problem: Discharge Planning  Goal: Discharge to home or other facility with appropriate resources  8/30/2023 0106 by Cara Rod RN  Outcome: Progressing  8/29/2023 1114 by Lui Ordaz LPN  Outcome: Progressing  8/29/2023 1114 by Lui Ordaz LPN  Outcome: Progressing     Problem: Safety - Adult  Goal: Free from fall injury  8/30/2023 0106 by Cara Rod RN  Outcome: Progressing  8/29/2023 1114 by Liu Ordaz LPN  Outcome: Progressing  8/29/2023 1114 by Lui Ordaz LPN  Outcome: Progressing     Problem: Pain  Goal: Verbalizes/displays adequate comfort level or baseline comfort level  Outcome: Progressing

## 2023-08-30 NOTE — PLAN OF CARE
Problem: Discharge Planning  Goal: Discharge to home or other facility with appropriate resources  8/30/2023 9757 by Adrianna Hays RN  Outcome: Progressing  Flowsheets (Taken 8/30/2023 0815)  Discharge to home or other facility with appropriate resources:   Identify barriers to discharge with patient and caregiver   Arrange for needed discharge resources and transportation as appropriate   Identify discharge learning needs (meds, wound care, etc)  8/30/2023 0106 by Gretta Piper, RN  Outcome: Progressing     Problem: Safety - Adult  Goal: Free from fall injury  8/30/2023 0923 by Adrianna Hays RN  Outcome: Progressing  8/30/2023 0106 by Gretta Piper, RN  Outcome: Progressing     Problem: Pain  Goal: Verbalizes/displays adequate comfort level or baseline comfort level  8/30/2023 0923 by Adrianna Hays RN  Outcome: Progressing  8/30/2023 0106 by Gretta Piper, RN  Outcome: Progressing

## 2023-08-31 VITALS
DIASTOLIC BLOOD PRESSURE: 89 MMHG | TEMPERATURE: 97.9 F | OXYGEN SATURATION: 95 % | SYSTOLIC BLOOD PRESSURE: 112 MMHG | HEIGHT: 63 IN | WEIGHT: 250 LBS | BODY MASS INDEX: 44.3 KG/M2 | RESPIRATION RATE: 18 BRPM | HEART RATE: 77 BPM

## 2023-08-31 LAB
ANION GAP SERPL CALCULATED.3IONS-SCNC: 10 MMOL/L (ref 3–16)
BUN SERPL-MCNC: 6 MG/DL (ref 7–20)
CALCIUM SERPL-MCNC: 8.3 MG/DL (ref 8.3–10.6)
CHLORIDE SERPL-SCNC: 97 MMOL/L (ref 99–110)
CO2 SERPL-SCNC: 23 MMOL/L (ref 21–32)
CREAT SERPL-MCNC: 0.7 MG/DL (ref 0.6–1.2)
GFR SERPLBLD CREATININE-BSD FMLA CKD-EPI: >60 ML/MIN/{1.73_M2}
GLUCOSE SERPL-MCNC: 105 MG/DL (ref 70–99)
POTASSIUM SERPL-SCNC: 3.8 MMOL/L (ref 3.5–5.1)
SODIUM SERPL-SCNC: 130 MMOL/L (ref 136–145)

## 2023-08-31 PROCEDURE — 97166 OT EVAL MOD COMPLEX 45 MIN: CPT

## 2023-08-31 PROCEDURE — 6370000000 HC RX 637 (ALT 250 FOR IP): Performed by: INTERNAL MEDICINE

## 2023-08-31 PROCEDURE — 97530 THERAPEUTIC ACTIVITIES: CPT

## 2023-08-31 PROCEDURE — 36415 COLL VENOUS BLD VENIPUNCTURE: CPT

## 2023-08-31 PROCEDURE — 97162 PT EVAL MOD COMPLEX 30 MIN: CPT

## 2023-08-31 PROCEDURE — 2580000003 HC RX 258: Performed by: INTERNAL MEDICINE

## 2023-08-31 PROCEDURE — 97535 SELF CARE MNGMENT TRAINING: CPT

## 2023-08-31 PROCEDURE — 6360000002 HC RX W HCPCS: Performed by: INTERNAL MEDICINE

## 2023-08-31 PROCEDURE — 80048 BASIC METABOLIC PNL TOTAL CA: CPT

## 2023-08-31 RX ADMIN — SERTRALINE 50 MG: 50 TABLET, FILM COATED ORAL at 09:43

## 2023-08-31 RX ADMIN — ONDANSETRON 4 MG: 4 TABLET, ORALLY DISINTEGRATING ORAL at 01:50

## 2023-08-31 RX ADMIN — SODIUM CHLORIDE: 9 INJECTION, SOLUTION INTRAVENOUS at 02:30

## 2023-08-31 RX ADMIN — METRONIDAZOLE 500 MG: 500 INJECTION, SOLUTION INTRAVENOUS at 12:42

## 2023-08-31 RX ADMIN — METRONIDAZOLE 500 MG: 500 INJECTION, SOLUTION INTRAVENOUS at 01:05

## 2023-08-31 RX ADMIN — SUCRALFATE 1 G: 1 TABLET ORAL at 14:06

## 2023-08-31 RX ADMIN — SUCRALFATE 1 G: 1 TABLET ORAL at 09:42

## 2023-08-31 RX ADMIN — PANTOPRAZOLE SODIUM 40 MG: 40 TABLET, DELAYED RELEASE ORAL at 09:42

## 2023-08-31 RX ADMIN — LOSARTAN POTASSIUM 100 MG: 100 TABLET, FILM COATED ORAL at 09:42

## 2023-08-31 RX ADMIN — ATENOLOL 50 MG: 25 TABLET ORAL at 09:43

## 2023-08-31 RX ADMIN — LEVOFLOXACIN 750 MG: 5 INJECTION, SOLUTION INTRAVENOUS at 09:48

## 2023-08-31 RX ADMIN — BUSPIRONE HYDROCHLORIDE 15 MG: 5 TABLET ORAL at 09:42

## 2023-08-31 RX ADMIN — BUSPIRONE HYDROCHLORIDE 15 MG: 5 TABLET ORAL at 14:05

## 2023-08-31 RX ADMIN — DOCUSATE SODIUM 100 MG: 100 CAPSULE, LIQUID FILLED ORAL at 09:43

## 2023-08-31 RX ADMIN — ENOXAPARIN SODIUM 30 MG: 100 INJECTION SUBCUTANEOUS at 09:43

## 2023-08-31 NOTE — DISCHARGE SUMMARY
Hospital Medicine Discharge Summary    Patient: Abdi Nash   : 1945     Admit Date: 2023   Discharge Date: 2023    Disposition:  [x]Home   []HHC  []SNF  []Acute Rehab  []LTAC  []Hospice  Code status:  [x]Full  []DNR/CCA  []Limited (DNR/CCA with Do Not Intubate)  []DNRCC  Condition at Discharge: Stable  Primary Care Provider: Petrona Dee MD    Admitting Provider: Ron Joy MD  Discharge Provider: Yadira Combs MD     Discharge Diagnoses: Active Hospital Problems    Diagnosis     Chronic GERD [K21.9]      Priority: Medium    Diverticulitis [K57.92]     Anxiety [F41.9]     Essential hypertension [I10]        Presenting Admission History:      68 y.o. female who presented to [facility] with a 1 week hx of generalized but currently mostly R sided abdominal pain though she has had LLQ pain as well w/ a 1 day hx of associated N/V and diarrhea w/out hematemesis or hematochezia. The patient denies any fever/chills or other signs/sxs of systemic illness or identifiable aggravating/alleviating factors. Assessment/Plan:      Diverticulitis - sigmoid diverticulitis w/out abscess on admission CT scan. Started on ABX in ED - continued. PO/IV Narcotic analgesia as ordered.    -switched to po abx on dc    HTN - w/out known CAD and no evidence of active signs/sxs of ischemia/failure. Currently controlled on home meds w/ vitals documented and reviewed.   -restarted losartan     GERD - w/out active signs/sxs of dysphagia/odynophagia. No evidence of active PUD or hx of GI bleed. Controlled on home PPI - continue. HypoNatremia - etiology clinically unable to determine but likely hypovolemic. Follow serial labs on IVF. Reviewed and documented in this note. On HCTZ - held. Also on High dose SSRI but this was continued      Depression- on sertraline, dose adjusted per pt     HypoKalemia - etiology clinically unable to determine.   Follow serial labs and replace PRN - Deepak MISC Starting Wed 10/20/2021, Disp-1 each, R-0, PrintEXPIRES: 11/1/2026      pantoprazole (PROTONIX) 40 MG tablet Historical Med      triamcinolone (NASACORT) 55 MCG/ACT nasal inhaler 2 sprays by Nasal route dailyHistorical Med      acetaminophen (TYLENOL) 325 MG tablet Take 2 tablets by mouth every 6 hours as needed for PainHistorical Med      sucralfate (CARAFATE) 1 GM tablet Take 1 tablet by mouth 4 times daily, Disp-120 tablet, R-3      docusate sodium (COLACE) 100 MG capsule Take 1 capsule by mouth dailyHistorical Med      azelastine (ASTELIN) 137 MCG/SPRAY nasal spray 1 spray by Nasal route 2 times daily. Use in each nostril as directed, Disp-1 Bottle, R-5           Discharge Medication List as of 8/31/2023  4:23 PM          Significant Test Results    CT ABDOMEN PELVIS WO CONTRAST Additional Contrast? None    Result Date: 8/28/2023  EXAMINATION: CT OF THE ABDOMEN AND PELVIS WITHOUT CONTRAST 8/28/2023 2:58 pm TECHNIQUE: CT of the abdomen and pelvis was performed without the administration of intravenous contrast. Multiplanar reformatted images are provided for review. Automated exposure control, iterative reconstruction, and/or weight based adjustment of the mA/kV was utilized to reduce the radiation dose to as low as reasonably achievable. COMPARISON: 12/29/2015. HISTORY: ORDERING SYSTEM PROVIDED HISTORY: abd pain TECHNOLOGIST PROVIDED HISTORY: Reason for exam:->abd pain Additional Contrast?->None Decision Support Exception - unselect if not a suspected or confirmed emergency medical condition->Emergency Medical Condition (MA) FINDINGS: Lower chest: Heart size is normal. The lung bases mild extrapleural lipomatosis. Probable right breast implant which is incompletely evaluated. : Nostones in the kidneys. No hydronephrosis. No renal sinus stranding. No stones along either ureter. No stones in the bladder. Organs: Liver, spleen, and adrenal glands are unremarkable. No peripancreatic stranding.   The

## 2023-08-31 NOTE — FLOWSHEET NOTE
Pt discharged to family's car via wheelchair by PCA. Pt's IV discontinued with no complications noted. Discharge instructions explained and paperwork given to pt; pt verbalizes understanding, questions encouraged and answered with no further questions at this time. Medication prescriptions sent to pt's home pharmacy. Pt sent home with belongings and discharge paperwork.

## 2023-08-31 NOTE — CARE COORDINATION
LOS 3. Care managed by [facility]. Here w Diverticulitis. Therapy recs for home. Discussed dispo w pt and attending at bedside. Plan home w spouse and NN. Ru TAN later today.  Ashley Trinh RN

## 2023-08-31 NOTE — PROGRESS NOTES
Occupational Therapy  Facility/Department: [place] [id number] - MED SURG/ORTHO  Occupational Therapy Initial Assessment/Treatment/Discharge    Name: Kristen Garcia  : 1945  MRN: [id number]  Date of Service: 2023    Discharge Recommendations:  Home with assist PRN  OT Equipment Recommendations  Equipment Needed: No     Patient Diagnosis(es): The primary encounter diagnosis was Diverticulitis of colon. Diagnoses of Intractable nausea and vomiting and Hyponatremia were also pertinent to this visit. Past Medical History:  has a past medical history of Anxiety attack, Arthritis, Cancer ([address]), Diastolic dysfunction, Diverticulitis, GERD (gastroesophageal reflux disease), Hearing loss, Hyperlipidemia, Hypertension, Left breast mass, Miscarriage, Moderate persistent asthma without complication, PONV (postoperative nausea and vomiting), Spastic colon, and Tinnitus. Past Surgical History:  has a past surgical history that includes Mastectomy (Right, ); Tonsillectomy ( or ); Appendectomy; Breast reconstruction (Right, ); polypectomy (); Cholecystectomy (12/20/10); Upper gastrointestinal endoscopy (11); Upper gastrointestinal endoscopy (06); Upper gastrointestinal endoscopy (3/13/08);  section; Dilation and curettage of uterus (2017); Upper gastrointestinal endoscopy (2014); Breast reduction surgery (Left); Upper gastrointestinal endoscopy (16); other surgical history; Colonoscopy (11); Colonoscopy (06); Colonoscopy (3/13/08); Colonoscopy (28 dEC 2016); and Breast lumpectomy (Left, ). Assessment  Pt is 69 yo F presenting with diagnosis of diverticulitis. Pt resides with  in one level house, 1 [facility]. PTA pt was ind for ADLs/transfers/ambulation.  At this time pt is presenting at baseline level of function and does not present with further needs from skilled OT services in the acute care setting, and home assist PRN is recommended at discharge to increase pt [facility]    Education  Education Given To: Patient  Education Provided: Role of Therapy;Plan of Care;ADL Adaptive Strategies;Transfer Training;Energy Conservation; Fall Prevention Strategies  Education Method: Verbal;Demonstration  Barriers to Learning: Hearing  Education Outcome: Demonstrated understanding;Verbalized understanding  Disease specific: Pt educated on benefits of OOB activity to decrease risks associated with prolonged bedrest while in hospital. Pt verbalized understanding. AM-PAC Score  AM-PAC Inpatient Daily Activity Raw Score: 23 (08/31/23 0936)  AM-PAC Inpatient ADL T-Scale Score : 51.12 (08/31/23 0936)  ADL Inpatient CMS 0-100% Score: 15.86 (08/31/23 0936)  ADL Inpatient CMS G-Code Modifier : CI (08/31/23 0936)    Goals  Short Term Goals  Time Frame for Short Term Goals: one session  Short Term Goal 1: pt will complete toileting mod I - GOAL MET 8/31/2023  Short Term Goal 2: pt will complete toileting stance sink side ADLs mod I- GOAL MET 8/31/2023    Therapy Time   Individual Concurrent Group Co-treatment   Time In 0920         Time Out 0938         Minutes 18         Timed Code Treatment Minutes: 9 Minutes (9 min eval)    Obdulio Pelaez OTR/L  If pt is unable to be seen after this session, please let this note serve as discharge summary. Please see case management note for discharge disposition. Thank you.

## 2023-08-31 NOTE — PROGRESS NOTES
Patient had one episode of being nauseous during the shift but no vomiting. Also had a normal Bowel movement that was formed during the shift as well.

## 2023-08-31 NOTE — PLAN OF CARE
Problem: Discharge Planning  Goal: Discharge to home or other facility with appropriate resources  Outcome: Progressing     Problem: Safety - Adult  Goal: Free from fall injury  8/31/2023 0647 by Jerri Chicas RN  Outcome: Progressing  8/31/2023 0646 by Jerri Chicas RN  Outcome: Progressing     Problem: Pain  Goal: Verbalizes/displays adequate comfort level or baseline comfort level  Outcome: Progressing

## 2023-08-31 NOTE — PROGRESS NOTES
has no concerns regarding mobility and safety upon d/c home. Pt with no acute PT deficits at this time and is d/c'd from PT services. Pt is safe to d/c home with PRN assist when medically cleared. Therapy Prognosis: Good  Decision Making: Medium Complexity  Requires PT Follow-Up: No  Activity Tolerance  Activity Tolerance: Patient tolerated evaluation without incident     Plan   Physcial Therapy Plan  General Plan: Discharge with evaluation only  Safety Devices  Type of Devices:  All fall risk precautions in place, Call light within reach, Gait belt, Patient at risk for falls, Left in bed, Nurse notified  Restraints  Restraints Initially in Place: No     Restrictions  Restrictions/Precautions  Restrictions/Precautions: Fall Risk, General Precautions  Required Braces or Orthoses?: No  Position Activity Restriction  Other position/activity restrictions: no BP in RUE, IV     Subjective   Pain: pt denies pain  General  Chart Reviewed: Yes  Patient assessed for rehabilitation services?: Yes  Additional Pertinent Hx: GERD, HTN, anxiety, depression, morbid obesity  Response To Previous Treatment: Not applicable  Family / Caregiver Present: No  Referring Practitioner: Montana Trujillo MD  Referral Date : 08/30/23  Diagnosis: abdominal pain, diverticulitis  General Comment  Comments: RN cleared pt for PT  Subjective  Subjective: Pt seated EOB upon arrival and agreeable to PT evaluation           Social/Functional History  Social/Functional History  Lives With: Spouse  Type of Home: House  Home Layout: One level  Home Access: Stairs to enter without rails  Entrance Stairs - Number of Steps: 1  Bathroom Shower/Tub: Walk-in shower, Doors  Bathroom Toilet: Handicap height  Bathroom Equipment: Built-in shower seat, Grab bars in shower, Grab bars around toilet  Home Equipment: Villanueva Flatter, rolling, Wheelchair-manual, Long-handled shoehorn  Has the patient had two or more falls in the past year or any fall with injury in the past year?: No  ADL Assistance: Independent  Homemaking Assistance: Independent  Homemaking Responsibilities: Yes  Ambulation Assistance: Independent (no AD for short distances, cane for long distances)  Transfer Assistance: Independent  Active : Yes  Occupation: Retired  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  Additional Comments: pt  can provide 24 hr care    Vision/Hearing  Vision  Vision: Impaired  Vision Exceptions: Wears glasses for reading;Wears glasses for distance  Hearing  Hearing: Exceptions to [facility]  Hearing Exceptions: Hard of hearing/hearing concerns;Bilateral hearing aid      Cognition   Orientation  Overall Orientation Status: Within Normal Limits  Orientation Level: Oriented X4     Objective   Pulse: 77  Heart Rate Source: Monitor  BP: 112/89  BP Location: Left upper arm  BP Method: Automatic  Patient Position: Sitting  MAP (Calculated): 97  SpO2: 95 %  O2 Device: None (Room air)       Observation/Palpation  Posture: Good           Strength RLE  Strength RLE: WFL  Strength LLE  Strength LLE: WFL          Balance  Sitting: Intact (EOB, on toilet)  Standing: Intact  Transfer Training  Overall Level of Assistance: Modified independent  Sit to Stand: Modified independent (from EOB)  Stand to Sit: Modified independent (to EOB)  Toilet Transfer: Modified independent (standard toilet, grab bars)  Gait Training  Gait Training: Yes  Gait  Overall Level of Assistance: Modified independent (pt ambulate in pt room and bathroom managing own IV pole)  Base of Support: Widened  Step Length: Left shortened;Right shortened  Gait Abnormalities: Decreased step clearance (pt ambulates with steady gait, no LOB.  Reports she has been taking herself to/from the bathroom independently)  Distance (ft): 20 Feet (+ 20ft)  Assistive Device: Gait belt                          AM-PAC Score  AM-PAC Inpatient Mobility Raw Score : 24 (08/31/23 7068)  AM-PAC Inpatient T-Scale Score : 61.14 (08/31/23 2614)  Mobility

## 2023-09-01 ENCOUNTER — APPOINTMENT (OUTPATIENT)
Dept: GENERAL RADIOLOGY | Age: 78
DRG: 870 | End: 2023-09-01
Payer: MEDICARE

## 2023-09-01 ENCOUNTER — APPOINTMENT (OUTPATIENT)
Dept: INTERVENTIONAL RADIOLOGY/VASCULAR | Age: 78
DRG: 870 | End: 2023-09-01
Payer: MEDICARE

## 2023-09-01 ENCOUNTER — HOSPITAL ENCOUNTER (INPATIENT)
Age: 78
LOS: 23 days | DRG: 870 | End: 2023-09-24
Attending: EMERGENCY MEDICINE | Admitting: INTERNAL MEDICINE
Payer: MEDICARE

## 2023-09-01 ENCOUNTER — APPOINTMENT (OUTPATIENT)
Dept: CT IMAGING | Age: 78
DRG: 870 | End: 2023-09-01
Payer: MEDICARE

## 2023-09-01 DIAGNOSIS — I44.7 NEW ONSET LEFT BUNDLE BRANCH BLOCK (LBBB): ICD-10-CM

## 2023-09-01 DIAGNOSIS — J96.01 ACUTE RESPIRATORY FAILURE WITH HYPOXIA (HCC): ICD-10-CM

## 2023-09-01 DIAGNOSIS — J96.01 ACUTE RESPIRATORY FAILURE WITH HYPOXIA AND HYPERCAPNIA (HCC): Primary | ICD-10-CM

## 2023-09-01 DIAGNOSIS — A41.9 SEPSIS, DUE TO UNSPECIFIED ORGANISM, UNSPECIFIED WHETHER ACUTE ORGAN DYSFUNCTION PRESENT (HCC): ICD-10-CM

## 2023-09-01 DIAGNOSIS — J18.9 HCAP (HEALTHCARE-ASSOCIATED PNEUMONIA): ICD-10-CM

## 2023-09-01 DIAGNOSIS — J96.02 ACUTE RESPIRATORY FAILURE WITH HYPOXIA AND HYPERCAPNIA (HCC): Primary | ICD-10-CM

## 2023-09-01 PROBLEM — I21.4 NSTEMI (NON-ST ELEVATED MYOCARDIAL INFARCTION) (HCC): Status: ACTIVE | Noted: 2023-09-01

## 2023-09-01 PROBLEM — J96.90 RESPIRATORY FAILURE (HCC): Status: ACTIVE | Noted: 2023-09-01

## 2023-09-01 PROBLEM — K57.32 SIGMOID DIVERTICULITIS: Status: ACTIVE | Noted: 2023-09-01

## 2023-09-01 PROBLEM — J45.21 MILD INTERMITTENT ASTHMA WITH EXACERBATION: Status: ACTIVE | Noted: 2023-09-01

## 2023-09-01 LAB
ABO + RH BLD: NORMAL
ALBUMIN SERPL-MCNC: 4.1 G/DL (ref 3.4–5)
ALBUMIN/GLOB SERPL: 1.2 {RATIO} (ref 1.1–2.2)
ALP SERPL-CCNC: 79 U/L (ref 40–129)
ALT SERPL-CCNC: 25 U/L (ref 10–40)
AMORPH SED URNS QL MICRO: ABNORMAL /HPF
ANION GAP SERPL CALCULATED.3IONS-SCNC: 19 MMOL/L (ref 3–16)
APTT BLD: 30.1 SEC (ref 22.7–35.9)
AST SERPL-CCNC: 37 U/L (ref 15–37)
BASE EXCESS BLDA CALC-SCNC: -4.8 MMOL/L (ref -3–3)
BASE EXCESS BLDA CALC-SCNC: -5.2 MMOL/L (ref -3–3)
BASE EXCESS BLDA CALC-SCNC: -6.8 MMOL/L (ref -3–3)
BASE EXCESS BLDV CALC-SCNC: -12.8 MMOL/L (ref -3–3)
BASOPHILS # BLD: 0.5 K/UL (ref 0–0.2)
BASOPHILS NFR BLD: 2.3 %
BILIRUB SERPL-MCNC: 0.4 MG/DL (ref 0–1)
BILIRUB UR QL STRIP.AUTO: NEGATIVE
BLD GP AB SCN SERPL QL: NORMAL
BUN SERPL-MCNC: 7 MG/DL (ref 7–20)
CALCIUM SERPL-MCNC: 9 MG/DL (ref 8.3–10.6)
CHLORIDE SERPL-SCNC: 91 MMOL/L (ref 99–110)
CLARITY UR: CLEAR
CO2 BLDA-SCNC: 21.1 MMOL/L
CO2 BLDA-SCNC: 22.3 MMOL/L
CO2 BLDA-SCNC: 22.9 MMOL/L
CO2 BLDV-SCNC: 21 MMOL/L
CO2 SERPL-SCNC: 20 MMOL/L (ref 21–32)
COHGB MFR BLDA: 0.1 % (ref 0–1.5)
COHGB MFR BLDA: 0.1 % (ref 0–1.5)
COHGB MFR BLDA: 0.5 % (ref 0–1.5)
COHGB MFR BLDV: 3.1 % (ref 0–1.5)
COLOR UR: YELLOW
CREAT SERPL-MCNC: 0.9 MG/DL (ref 0.6–1.2)
DEPRECATED RDW RBC AUTO: 14.5 % (ref 12.4–15.4)
EKG ATRIAL RATE: 127 BPM
EKG DIAGNOSIS: NORMAL
EKG P AXIS: 43 DEGREES
EKG P-R INTERVAL: 158 MS
EKG Q-T INTERVAL: 320 MS
EKG QRS DURATION: 136 MS
EKG QTC CALCULATION (BAZETT): 465 MS
EKG R AXIS: -28 DEGREES
EKG T AXIS: 116 DEGREES
EKG VENTRICULAR RATE: 127 BPM
EOSINOPHIL # BLD: 0.4 K/UL (ref 0–0.6)
EOSINOPHIL NFR BLD: 1.9 %
EPI CELLS #/AREA URNS HPF: ABNORMAL /HPF (ref 0–5)
FLUAV RNA RESP QL NAA+PROBE: NOT DETECTED
FLUBV RNA RESP QL NAA+PROBE: NOT DETECTED
GFR SERPLBLD CREATININE-BSD FMLA CKD-EPI: >60 ML/MIN/{1.73_M2}
GLUCOSE BLD-MCNC: 187 MG/DL (ref 70–99)
GLUCOSE BLD-MCNC: 205 MG/DL (ref 70–99)
GLUCOSE BLD-MCNC: 97 MG/DL (ref 70–99)
GLUCOSE SERPL-MCNC: 251 MG/DL (ref 70–99)
GLUCOSE UR STRIP.AUTO-MCNC: 100 MG/DL
HCO3 BLDA-SCNC: 19.8 MMOL/L (ref 21–29)
HCO3 BLDA-SCNC: 21 MMOL/L (ref 21–29)
HCO3 BLDA-SCNC: 21.5 MMOL/L (ref 21–29)
HCO3 BLDV-SCNC: 18.6 MMOL/L (ref 23–29)
HCT VFR BLD AUTO: 40.3 % (ref 36–48)
HGB BLD-MCNC: 13.6 G/DL (ref 12–16)
HGB BLDA-MCNC: 11.4 G/DL (ref 12–16)
HGB BLDA-MCNC: 12.2 G/DL (ref 12–16)
HGB BLDA-MCNC: 13 G/DL (ref 12–16)
HGB UR QL STRIP.AUTO: ABNORMAL
INR PPP: 1.2 (ref 0.84–1.16)
KETONES UR STRIP.AUTO-MCNC: NEGATIVE MG/DL
LACTATE BLDV-SCNC: 3.4 MMOL/L (ref 0.4–1.9)
LACTATE BLDV-SCNC: 8.7 MMOL/L (ref 0.4–1.9)
LEUKOCYTE ESTERASE UR QL STRIP.AUTO: ABNORMAL
LYMPHOCYTES # BLD: 8.3 K/UL (ref 1–5.1)
LYMPHOCYTES NFR BLD: 34.4 %
MAGNESIUM SERPL-MCNC: 2.2 MG/DL (ref 1.8–2.4)
MCH RBC QN AUTO: 30 PG (ref 26–34)
MCHC RBC AUTO-ENTMCNC: 33.8 G/DL (ref 31–36)
MCV RBC AUTO: 88.7 FL (ref 80–100)
METHGB MFR BLDA: 0 %
METHGB MFR BLDA: 0.3 %
METHGB MFR BLDA: 0.3 %
METHGB MFR BLDV: 0.3 %
MONOCYTES # BLD: 1.5 K/UL (ref 0–1.3)
MONOCYTES NFR BLD: 6.1 %
MUCOUS THREADS #/AREA URNS LPF: ABNORMAL /LPF
NEUTROPHILS # BLD: 13.3 K/UL (ref 1.7–7.7)
NEUTROPHILS NFR BLD: 55.3 %
NITRITE UR QL STRIP.AUTO: NEGATIVE
NT-PROBNP SERPL-MCNC: 2195 PG/ML (ref 0–449)
O2 CT VFR BLDA CALC: 16 ML/DL
O2 CT VFR BLDV CALC: 17 VOL %
O2 THERAPY: ABNORMAL
PCO2 BLDA: 41.6 MMHG (ref 35–45)
PCO2 BLDA: 43.9 MMHG (ref 35–45)
PCO2 BLDA: 46.5 MMHG (ref 35–45)
PCO2 BLDV: 67.8 MMHG (ref 40–50)
PERFORMED ON: ABNORMAL
PERFORMED ON: ABNORMAL
PERFORMED ON: NORMAL
PH BLDA: 7.27 [PH] (ref 7.35–7.45)
PH BLDA: 7.28 [PH] (ref 7.35–7.45)
PH BLDA: 7.32 [PH] (ref 7.35–7.45)
PH BLDV: 7.06 [PH] (ref 7.35–7.45)
PH UR STRIP.AUTO: 6 [PH] (ref 5–8)
PLATELET # BLD AUTO: 362 K/UL (ref 135–450)
PMV BLD AUTO: 8.4 FL (ref 5–10.5)
PO2 BLDA: 139.6 MMHG (ref 75–108)
PO2 BLDA: 67.6 MMHG (ref 75–108)
PO2 BLDA: 88.9 MMHG (ref 75–108)
PO2 BLDV: 65 MMHG (ref 25–40)
POTASSIUM SERPL-SCNC: 3.2 MMOL/L (ref 3.5–5.1)
PROT SERPL-MCNC: 7.5 G/DL (ref 6.4–8.2)
PROT UR STRIP.AUTO-MCNC: >=300 MG/DL
PROTHROMBIN TIME: 15.2 SEC (ref 11.5–14.8)
RBC # BLD AUTO: 4.55 M/UL (ref 4–5.2)
RBC #/AREA URNS HPF: ABNORMAL /HPF (ref 0–4)
SAO2 % BLDA: 91 %
SAO2 % BLDA: 96.2 %
SAO2 % BLDA: 98.5 %
SAO2 % BLDV: 82 %
SARS-COV-2 RNA RESP QL NAA+PROBE: NOT DETECTED
SODIUM SERPL-SCNC: 130 MMOL/L (ref 136–145)
SP GR UR STRIP.AUTO: >=1.03 (ref 1–1.03)
TRIGL SERPL-MCNC: 69 MG/DL (ref 0–150)
TROPONIN, HIGH SENSITIVITY: 17 NG/L (ref 0–14)
TROPONIN, HIGH SENSITIVITY: 224 NG/L (ref 0–14)
TROPONIN, HIGH SENSITIVITY: 372 NG/L (ref 0–14)
UA COMPLETE W REFLEX CULTURE PNL UR: ABNORMAL
UA DIPSTICK W REFLEX MICRO PNL UR: YES
URN SPEC COLLECT METH UR: ABNORMAL
UROBILINOGEN UR STRIP-ACNC: 0.2 E.U./DL
WBC # BLD AUTO: 24 K/UL (ref 4–11)
WBC #/AREA URNS HPF: ABNORMAL /HPF (ref 0–5)

## 2023-09-01 PROCEDURE — 99291 CRITICAL CARE FIRST HOUR: CPT | Performed by: INTERNAL MEDICINE

## 2023-09-01 PROCEDURE — 94761 N-INVAS EAR/PLS OXIMETRY MLT: CPT

## 2023-09-01 PROCEDURE — 71045 X-RAY EXAM CHEST 1 VIEW: CPT

## 2023-09-01 PROCEDURE — 51702 INSERT TEMP BLADDER CATH: CPT

## 2023-09-01 PROCEDURE — 85610 PROTHROMBIN TIME: CPT

## 2023-09-01 PROCEDURE — 71250 CT THORAX DX C-: CPT

## 2023-09-01 PROCEDURE — 84478 ASSAY OF TRIGLYCERIDES: CPT

## 2023-09-01 PROCEDURE — 83735 ASSAY OF MAGNESIUM: CPT

## 2023-09-01 PROCEDURE — 85730 THROMBOPLASTIN TIME PARTIAL: CPT

## 2023-09-01 PROCEDURE — 85025 COMPLETE CBC W/AUTO DIFF WBC: CPT

## 2023-09-01 PROCEDURE — 94002 VENT MGMT INPAT INIT DAY: CPT

## 2023-09-01 PROCEDURE — 03HY32Z INSERTION OF MONITORING DEVICE INTO UPPER ARTERY, PERCUTANEOUS APPROACH: ICD-10-PCS | Performed by: INTERNAL MEDICINE

## 2023-09-01 PROCEDURE — 36573 INSJ PICC RS&I 5 YR+: CPT

## 2023-09-01 PROCEDURE — 6370000000 HC RX 637 (ALT 250 FOR IP): Performed by: INTERNAL MEDICINE

## 2023-09-01 PROCEDURE — 2000000000 HC ICU R&B

## 2023-09-01 PROCEDURE — 2500000003 HC RX 250 WO HCPCS

## 2023-09-01 PROCEDURE — 2500000003 HC RX 250 WO HCPCS: Performed by: EMERGENCY MEDICINE

## 2023-09-01 PROCEDURE — 86900 BLOOD TYPING SEROLOGIC ABO: CPT

## 2023-09-01 PROCEDURE — 2700000000 HC OXYGEN THERAPY PER DAY

## 2023-09-01 PROCEDURE — 84484 ASSAY OF TROPONIN QUANT: CPT

## 2023-09-01 PROCEDURE — 2500000003 HC RX 250 WO HCPCS: Performed by: INTERNAL MEDICINE

## 2023-09-01 PROCEDURE — 31500 INSERT EMERGENCY AIRWAY: CPT

## 2023-09-01 PROCEDURE — 6360000002 HC RX W HCPCS: Performed by: INTERNAL MEDICINE

## 2023-09-01 PROCEDURE — 4A133J1 MONITORING OF ARTERIAL PULSE, PERIPHERAL, PERCUTANEOUS APPROACH: ICD-10-PCS | Performed by: INTERNAL MEDICINE

## 2023-09-01 PROCEDURE — 81001 URINALYSIS AUTO W/SCOPE: CPT

## 2023-09-01 PROCEDURE — 4A133B1 MONITORING OF ARTERIAL PRESSURE, PERIPHERAL, PERCUTANEOUS APPROACH: ICD-10-PCS | Performed by: INTERNAL MEDICINE

## 2023-09-01 PROCEDURE — 6360000002 HC RX W HCPCS: Performed by: EMERGENCY MEDICINE

## 2023-09-01 PROCEDURE — 93005 ELECTROCARDIOGRAM TRACING: CPT | Performed by: EMERGENCY MEDICINE

## 2023-09-01 PROCEDURE — 2580000003 HC RX 258: Performed by: EMERGENCY MEDICINE

## 2023-09-01 PROCEDURE — 87040 BLOOD CULTURE FOR BACTERIA: CPT

## 2023-09-01 PROCEDURE — 6360000002 HC RX W HCPCS

## 2023-09-01 PROCEDURE — 96367 TX/PROPH/DG ADDL SEQ IV INF: CPT

## 2023-09-01 PROCEDURE — 36620 INSERTION CATHETER ARTERY: CPT

## 2023-09-01 PROCEDURE — 83880 ASSAY OF NATRIURETIC PEPTIDE: CPT

## 2023-09-01 PROCEDURE — 6370000000 HC RX 637 (ALT 250 FOR IP): Performed by: EMERGENCY MEDICINE

## 2023-09-01 PROCEDURE — 87636 SARSCOV2 & INF A&B AMP PRB: CPT

## 2023-09-01 PROCEDURE — 96368 THER/DIAG CONCURRENT INF: CPT

## 2023-09-01 PROCEDURE — C9113 INJ PANTOPRAZOLE SODIUM, VIA: HCPCS | Performed by: INTERNAL MEDICINE

## 2023-09-01 PROCEDURE — 82803 BLOOD GASES ANY COMBINATION: CPT

## 2023-09-01 PROCEDURE — 86901 BLOOD TYPING SEROLOGIC RH(D): CPT

## 2023-09-01 PROCEDURE — 36415 COLL VENOUS BLD VENIPUNCTURE: CPT

## 2023-09-01 PROCEDURE — 2580000003 HC RX 258: Performed by: INTERNAL MEDICINE

## 2023-09-01 PROCEDURE — 96366 THER/PROPH/DIAG IV INF ADDON: CPT

## 2023-09-01 PROCEDURE — 96365 THER/PROPH/DIAG IV INF INIT: CPT

## 2023-09-01 PROCEDURE — 83605 ASSAY OF LACTIC ACID: CPT

## 2023-09-01 PROCEDURE — 5A1955Z RESPIRATORY VENTILATION, GREATER THAN 96 CONSECUTIVE HOURS: ICD-10-PCS | Performed by: INTERNAL MEDICINE

## 2023-09-01 PROCEDURE — C1751 CATH, INF, PER/CENT/MIDLINE: HCPCS

## 2023-09-01 PROCEDURE — 0BH17EZ INSERTION OF ENDOTRACHEAL AIRWAY INTO TRACHEA, VIA NATURAL OR ARTIFICIAL OPENING: ICD-10-PCS | Performed by: INTERNAL MEDICINE

## 2023-09-01 PROCEDURE — 93010 ELECTROCARDIOGRAM REPORT: CPT | Performed by: INTERNAL MEDICINE

## 2023-09-01 PROCEDURE — 80053 COMPREHEN METABOLIC PANEL: CPT

## 2023-09-01 PROCEDURE — 99285 EMERGENCY DEPT VISIT HI MDM: CPT

## 2023-09-01 PROCEDURE — 86850 RBC ANTIBODY SCREEN: CPT

## 2023-09-01 PROCEDURE — 94640 AIRWAY INHALATION TREATMENT: CPT

## 2023-09-01 PROCEDURE — 02HV33Z INSERTION OF INFUSION DEVICE INTO SUPERIOR VENA CAVA, PERCUTANEOUS APPROACH: ICD-10-PCS | Performed by: INTERNAL MEDICINE

## 2023-09-01 RX ORDER — SODIUM CHLORIDE 0.9 % (FLUSH) 0.9 %
5-40 SYRINGE (ML) INJECTION PRN
Status: DISCONTINUED | OUTPATIENT
Start: 2023-09-01 | End: 2023-09-24

## 2023-09-01 RX ORDER — MIDAZOLAM HYDROCHLORIDE 1 MG/ML
INJECTION INTRAMUSCULAR; INTRAVENOUS
Status: DISCONTINUED
Start: 2023-09-01 | End: 2023-09-01

## 2023-09-01 RX ORDER — MINERAL OIL AND WHITE PETROLATUM 150; 830 MG/G; MG/G
OINTMENT OPHTHALMIC EVERY 4 HOURS
Status: DISCONTINUED | OUTPATIENT
Start: 2023-09-01 | End: 2023-09-18

## 2023-09-01 RX ORDER — MINERAL OIL AND WHITE PETROLATUM 150; 830 MG/G; MG/G
OINTMENT OPHTHALMIC EVERY 4 HOURS
Status: DISCONTINUED | OUTPATIENT
Start: 2023-09-01 | End: 2023-09-01 | Stop reason: SDUPTHER

## 2023-09-01 RX ORDER — POLYETHYLENE GLYCOL 3350 17 G/17G
17 POWDER, FOR SOLUTION ORAL DAILY PRN
Status: DISCONTINUED | OUTPATIENT
Start: 2023-09-01 | End: 2023-09-18

## 2023-09-01 RX ORDER — POTASSIUM CHLORIDE 7.45 MG/ML
10 INJECTION INTRAVENOUS ONCE
Status: COMPLETED | OUTPATIENT
Start: 2023-09-01 | End: 2023-09-01

## 2023-09-01 RX ORDER — ETOMIDATE 2 MG/ML
20 INJECTION INTRAVENOUS ONCE
Status: COMPLETED | OUTPATIENT
Start: 2023-09-01 | End: 2023-09-01

## 2023-09-01 RX ORDER — ONDANSETRON 2 MG/ML
4 INJECTION INTRAMUSCULAR; INTRAVENOUS EVERY 6 HOURS PRN
Status: DISCONTINUED | OUTPATIENT
Start: 2023-09-01 | End: 2023-09-24

## 2023-09-01 RX ORDER — SUCCINYLCHOLINE CHLORIDE 20 MG/ML
INJECTION INTRAMUSCULAR; INTRAVENOUS
Status: COMPLETED
Start: 2023-09-01 | End: 2023-09-01

## 2023-09-01 RX ORDER — MIDAZOLAM HYDROCHLORIDE 1 MG/ML
INJECTION INTRAMUSCULAR; INTRAVENOUS
Status: COMPLETED
Start: 2023-09-01 | End: 2023-09-01

## 2023-09-01 RX ORDER — SODIUM CHLORIDE 9 MG/ML
INJECTION, SOLUTION INTRAVENOUS PRN
Status: DISCONTINUED | OUTPATIENT
Start: 2023-09-01 | End: 2023-09-24

## 2023-09-01 RX ORDER — SERTRALINE HYDROCHLORIDE 100 MG/1
100 TABLET, FILM COATED ORAL 2 TIMES DAILY
Status: DISCONTINUED | OUTPATIENT
Start: 2023-09-01 | End: 2023-09-24

## 2023-09-01 RX ORDER — MIDAZOLAM HYDROCHLORIDE 1 MG/ML
2 INJECTION INTRAMUSCULAR; INTRAVENOUS
Status: DISCONTINUED | OUTPATIENT
Start: 2023-09-01 | End: 2023-09-18

## 2023-09-01 RX ORDER — PROPOFOL 10 MG/ML
5-50 INJECTION, EMULSION INTRAVENOUS CONTINUOUS
Status: DISCONTINUED | OUTPATIENT
Start: 2023-09-01 | End: 2023-09-03

## 2023-09-01 RX ORDER — ALBUTEROL SULFATE 2.5 MG/3ML
2.5 SOLUTION RESPIRATORY (INHALATION)
Status: DISCONTINUED | OUTPATIENT
Start: 2023-09-01 | End: 2023-09-01

## 2023-09-01 RX ORDER — MIDAZOLAM HYDROCHLORIDE 1 MG/ML
2 INJECTION INTRAMUSCULAR; INTRAVENOUS ONCE
Status: COMPLETED | OUTPATIENT
Start: 2023-09-01 | End: 2023-09-01

## 2023-09-01 RX ORDER — CARBOXYMETHYLCELLULOSE SODIUM 10 MG/ML
1 GEL OPHTHALMIC EVERY 4 HOURS
Status: DISCONTINUED | OUTPATIENT
Start: 2023-09-01 | End: 2023-09-01 | Stop reason: SDUPTHER

## 2023-09-01 RX ORDER — FENTANYL CITRATE-0.9 % NACL/PF 20 MCG/2ML
50 SYRINGE (ML) INTRAVENOUS
Status: DISCONTINUED | OUTPATIENT
Start: 2023-09-01 | End: 2023-09-18

## 2023-09-01 RX ORDER — ENOXAPARIN SODIUM 100 MG/ML
30 INJECTION SUBCUTANEOUS 2 TIMES DAILY
Status: DISCONTINUED | OUTPATIENT
Start: 2023-09-01 | End: 2023-09-01 | Stop reason: DRUGHIGH

## 2023-09-01 RX ORDER — FENTANYL CITRATE-0.9 % NACL/PF 10 MCG/ML
25-200 PLASTIC BAG, INJECTION (ML) INTRAVENOUS CONTINUOUS
Status: DISCONTINUED | OUTPATIENT
Start: 2023-09-01 | End: 2023-09-02

## 2023-09-01 RX ORDER — 0.9 % SODIUM CHLORIDE 0.9 %
1000 INTRAVENOUS SOLUTION INTRAVENOUS ONCE
Status: COMPLETED | OUTPATIENT
Start: 2023-09-01 | End: 2023-09-01

## 2023-09-01 RX ORDER — ASPIRIN 81 MG/1
81 TABLET, CHEWABLE ORAL DAILY
Status: DISCONTINUED | OUTPATIENT
Start: 2023-09-02 | End: 2023-09-24

## 2023-09-01 RX ORDER — ASPIRIN 325 MG
325 TABLET, DELAYED RELEASE (ENTERIC COATED) ORAL ONCE
Status: COMPLETED | OUTPATIENT
Start: 2023-09-01 | End: 2023-09-01

## 2023-09-01 RX ORDER — ONDANSETRON 4 MG/1
4 TABLET, ORALLY DISINTEGRATING ORAL EVERY 8 HOURS PRN
Status: DISCONTINUED | OUTPATIENT
Start: 2023-09-01 | End: 2023-09-24

## 2023-09-01 RX ORDER — IPRATROPIUM BROMIDE AND ALBUTEROL SULFATE 2.5; .5 MG/3ML; MG/3ML
1 SOLUTION RESPIRATORY (INHALATION) ONCE
Status: COMPLETED | OUTPATIENT
Start: 2023-09-01 | End: 2023-09-01

## 2023-09-01 RX ORDER — POLYVINYL ALCOHOL 14 MG/ML
1 SOLUTION/ DROPS OPHTHALMIC EVERY 4 HOURS
Status: DISCONTINUED | OUTPATIENT
Start: 2023-09-01 | End: 2023-09-01 | Stop reason: SDUPTHER

## 2023-09-01 RX ORDER — ALBUTEROL SULFATE 90 UG/1
4 AEROSOL, METERED RESPIRATORY (INHALATION) EVERY 4 HOURS
Status: DISCONTINUED | OUTPATIENT
Start: 2023-09-01 | End: 2023-09-01

## 2023-09-01 RX ORDER — CHLORHEXIDINE GLUCONATE ORAL RINSE 1.2 MG/ML
15 SOLUTION DENTAL 2 TIMES DAILY
Status: DISCONTINUED | OUTPATIENT
Start: 2023-09-01 | End: 2023-09-18

## 2023-09-01 RX ORDER — ETOMIDATE 2 MG/ML
INJECTION INTRAVENOUS
Status: COMPLETED
Start: 2023-09-01 | End: 2023-09-01

## 2023-09-01 RX ORDER — SODIUM CHLORIDE 0.9 % (FLUSH) 0.9 %
5-40 SYRINGE (ML) INJECTION EVERY 12 HOURS SCHEDULED
Status: DISCONTINUED | OUTPATIENT
Start: 2023-09-01 | End: 2023-09-24

## 2023-09-01 RX ORDER — ACETAMINOPHEN 325 MG/1
650 TABLET ORAL EVERY 6 HOURS PRN
Status: DISCONTINUED | OUTPATIENT
Start: 2023-09-01 | End: 2023-09-24

## 2023-09-01 RX ORDER — PROPOFOL 10 MG/ML
INJECTION, EMULSION INTRAVENOUS
Status: COMPLETED
Start: 2023-09-01 | End: 2023-09-01

## 2023-09-01 RX ORDER — SODIUM CHLORIDE 9 MG/ML
INJECTION, SOLUTION INTRAVENOUS CONTINUOUS
Status: DISCONTINUED | OUTPATIENT
Start: 2023-09-01 | End: 2023-09-04

## 2023-09-01 RX ORDER — BUSPIRONE HYDROCHLORIDE 7.5 MG/1
15 TABLET ORAL 4 TIMES DAILY
Status: DISCONTINUED | OUTPATIENT
Start: 2023-09-01 | End: 2023-09-24

## 2023-09-01 RX ORDER — CARBOXYMETHYLCELLULOSE SODIUM 10 MG/ML
1 GEL OPHTHALMIC EVERY 4 HOURS
Status: DISCONTINUED | OUTPATIENT
Start: 2023-09-01 | End: 2023-09-18

## 2023-09-01 RX ORDER — ROCURONIUM BROMIDE 10 MG/ML
100 INJECTION, SOLUTION INTRAVENOUS ONCE
Status: COMPLETED | OUTPATIENT
Start: 2023-09-01 | End: 2023-09-01

## 2023-09-01 RX ORDER — ALBUTEROL SULFATE 2.5 MG/3ML
2.5 SOLUTION RESPIRATORY (INHALATION) EVERY 4 HOURS PRN
Status: DISCONTINUED | OUTPATIENT
Start: 2023-09-01 | End: 2023-09-02

## 2023-09-01 RX ORDER — ENOXAPARIN SODIUM 100 MG/ML
90 INJECTION SUBCUTANEOUS ONCE
Status: COMPLETED | OUTPATIENT
Start: 2023-09-01 | End: 2023-09-01

## 2023-09-01 RX ORDER — SUCCINYLCHOLINE CHLORIDE 20 MG/ML
120 INJECTION INTRAMUSCULAR; INTRAVENOUS ONCE
Status: COMPLETED | OUTPATIENT
Start: 2023-09-01 | End: 2023-09-01

## 2023-09-01 RX ORDER — CHLORHEXIDINE GLUCONATE ORAL RINSE 1.2 MG/ML
15 SOLUTION DENTAL 2 TIMES DAILY
Status: DISCONTINUED | OUTPATIENT
Start: 2023-09-01 | End: 2023-09-01 | Stop reason: SDUPTHER

## 2023-09-01 RX ORDER — PANTOPRAZOLE SODIUM 40 MG/10ML
40 INJECTION, POWDER, LYOPHILIZED, FOR SOLUTION INTRAVENOUS DAILY
Status: DISCONTINUED | OUTPATIENT
Start: 2023-09-01 | End: 2023-09-24

## 2023-09-01 RX ORDER — 0.9 % SODIUM CHLORIDE 0.9 %
30 INTRAVENOUS SOLUTION INTRAVENOUS ONCE
Status: COMPLETED | OUTPATIENT
Start: 2023-09-01 | End: 2023-09-01

## 2023-09-01 RX ORDER — ACETAMINOPHEN 650 MG/1
650 SUPPOSITORY RECTAL EVERY 6 HOURS PRN
Status: DISCONTINUED | OUTPATIENT
Start: 2023-09-01 | End: 2023-09-24

## 2023-09-01 RX ORDER — ATORVASTATIN CALCIUM 40 MG/1
80 TABLET, FILM COATED ORAL NIGHTLY
Status: DISCONTINUED | OUTPATIENT
Start: 2023-09-01 | End: 2023-09-24

## 2023-09-01 RX ORDER — ENOXAPARIN SODIUM 100 MG/ML
40 INJECTION SUBCUTANEOUS DAILY
Status: DISCONTINUED | OUTPATIENT
Start: 2023-09-01 | End: 2023-09-01 | Stop reason: DRUGHIGH

## 2023-09-01 RX ORDER — IPRATROPIUM BROMIDE AND ALBUTEROL SULFATE 2.5; .5 MG/3ML; MG/3ML
1 SOLUTION RESPIRATORY (INHALATION) EVERY 4 HOURS
Status: DISCONTINUED | OUTPATIENT
Start: 2023-09-01 | End: 2023-09-18

## 2023-09-01 RX ORDER — ENOXAPARIN SODIUM 150 MG/ML
1 INJECTION SUBCUTANEOUS EVERY 12 HOURS
Status: DISCONTINUED | OUTPATIENT
Start: 2023-09-02 | End: 2023-09-04

## 2023-09-01 RX ADMIN — BUSPIRONE HYDROCHLORIDE 15 MG: 7.5 TABLET ORAL at 20:34

## 2023-09-01 RX ADMIN — DEXAMETHASONE 6 MG: 4 TABLET ORAL at 20:34

## 2023-09-01 RX ADMIN — SODIUM CHLORIDE, PRESERVATIVE FREE 10 ML: 5 INJECTION INTRAVENOUS at 12:22

## 2023-09-01 RX ADMIN — IPRATROPIUM BROMIDE AND ALBUTEROL SULFATE 1 DOSE: 2.5; .5 SOLUTION RESPIRATORY (INHALATION) at 15:58

## 2023-09-01 RX ADMIN — BUSPIRONE HYDROCHLORIDE 15 MG: 7.5 TABLET ORAL at 17:09

## 2023-09-01 RX ADMIN — AZTREONAM 1000 MG: 1 INJECTION, POWDER, LYOPHILIZED, FOR SOLUTION INTRAMUSCULAR; INTRAVENOUS at 04:39

## 2023-09-01 RX ADMIN — Medication 50 MCG: at 08:10

## 2023-09-01 RX ADMIN — SUCCINYLCHOLINE CHLORIDE 120 MG: 20 INJECTION, SOLUTION INTRAMUSCULAR; INTRAVENOUS at 02:37

## 2023-09-01 RX ADMIN — ETOMIDATE 20 MG: 2 INJECTION INTRAVENOUS at 02:38

## 2023-09-01 RX ADMIN — WHITE PETROLATUM 57.7 %-MINERAL OIL 31.9 % EYE OINTMENT: at 20:34

## 2023-09-01 RX ADMIN — Medication 3 MG/HR: at 04:52

## 2023-09-01 RX ADMIN — VANCOMYCIN HYDROCHLORIDE 2000 MG: 10 INJECTION, POWDER, LYOPHILIZED, FOR SOLUTION INTRAVENOUS at 05:12

## 2023-09-01 RX ADMIN — ETOMIDATE 20 MG: 2 INJECTION, SOLUTION INTRAVENOUS at 02:38

## 2023-09-01 RX ADMIN — Medication 50 MCG/HR: at 04:26

## 2023-09-01 RX ADMIN — SERTRALINE 100 MG: 100 TABLET, FILM COATED ORAL at 20:34

## 2023-09-01 RX ADMIN — POTASSIUM CHLORIDE 10 MEQ: 7.46 INJECTION, SOLUTION INTRAVENOUS at 04:51

## 2023-09-01 RX ADMIN — VANCOMYCIN HYDROCHLORIDE 1000 MG: 1 INJECTION, POWDER, LYOPHILIZED, FOR SOLUTION INTRAVENOUS at 22:01

## 2023-09-01 RX ADMIN — MEROPENEM 1000 MG: 1 INJECTION, POWDER, FOR SOLUTION INTRAVENOUS at 20:31

## 2023-09-01 RX ADMIN — CHLORHEXIDINE GLUCONATE 0.12% ORAL RINSE 15 ML: 1.2 LIQUID ORAL at 12:26

## 2023-09-01 RX ADMIN — MUPIROCIN: 20 OINTMENT TOPICAL at 12:24

## 2023-09-01 RX ADMIN — IPRATROPIUM BROMIDE AND ALBUTEROL SULFATE 1 DOSE: 2.5; .5 SOLUTION RESPIRATORY (INHALATION) at 11:41

## 2023-09-01 RX ADMIN — SODIUM CHLORIDE 1000 ML: 9 INJECTION, SOLUTION INTRAVENOUS at 04:33

## 2023-09-01 RX ADMIN — Medication 200 MCG/HR: at 16:57

## 2023-09-01 RX ADMIN — DEXAMETHASONE SODIUM PHOSPHATE 12 MG: 4 INJECTION, SOLUTION INTRAMUSCULAR; INTRAVENOUS at 12:21

## 2023-09-01 RX ADMIN — ASPIRIN 325 MG: 325 TABLET, COATED ORAL at 20:34

## 2023-09-01 RX ADMIN — Medication 5 MG/HR: at 05:56

## 2023-09-01 RX ADMIN — PANTOPRAZOLE SODIUM 40 MG: 40 INJECTION, POWDER, FOR SOLUTION INTRAVENOUS at 12:25

## 2023-09-01 RX ADMIN — ENOXAPARIN SODIUM 90 MG: 100 INJECTION SUBCUTANEOUS at 17:09

## 2023-09-01 RX ADMIN — Medication 8 MG/HR: at 19:56

## 2023-09-01 RX ADMIN — MUPIROCIN: 20 OINTMENT TOPICAL at 20:34

## 2023-09-01 RX ADMIN — BUSPIRONE HYDROCHLORIDE 15 MG: 7.5 TABLET ORAL at 12:26

## 2023-09-01 RX ADMIN — ENOXAPARIN SODIUM 30 MG: 100 INJECTION SUBCUTANEOUS at 12:26

## 2023-09-01 RX ADMIN — Medication 125 MCG/HR: at 06:00

## 2023-09-01 RX ADMIN — SODIUM CHLORIDE, PRESERVATIVE FREE 10 ML: 5 INJECTION INTRAVENOUS at 20:30

## 2023-09-01 RX ADMIN — ATORVASTATIN CALCIUM 80 MG: 40 TABLET, FILM COATED ORAL at 20:34

## 2023-09-01 RX ADMIN — Medication 150 MCG/HR: at 06:59

## 2023-09-01 RX ADMIN — PROPOFOL 20 MCG/KG/MIN: 10 INJECTION, EMULSION INTRAVENOUS at 02:48

## 2023-09-01 RX ADMIN — CISATRACURIUM BESYLATE 2 MCG/KG/MIN: 10 INJECTION, SOLUTION INTRAVENOUS at 09:10

## 2023-09-01 RX ADMIN — Medication 75 MCG/HR: at 04:57

## 2023-09-01 RX ADMIN — MIDAZOLAM HYDROCHLORIDE 2 MG: 1 INJECTION, SOLUTION INTRAMUSCULAR; INTRAVENOUS at 08:10

## 2023-09-01 RX ADMIN — CARBOXYMETHYLCELLULOSE SODIUM 1 DROP: 10 GEL OPHTHALMIC at 17:13

## 2023-09-01 RX ADMIN — CISATRACURIUM BESYLATE 1.5 MCG/KG/MIN: 10 INJECTION, SOLUTION INTRAVENOUS at 21:18

## 2023-09-01 RX ADMIN — ROCURONIUM BROMIDE 100 MG: 10 INJECTION, SOLUTION INTRAVENOUS at 08:30

## 2023-09-01 RX ADMIN — SODIUM CHLORIDE 1000 ML: 9 INJECTION, SOLUTION INTRAVENOUS at 07:06

## 2023-09-01 RX ADMIN — CARBOXYMETHYLCELLULOSE SODIUM 1 DROP: 10 GEL OPHTHALMIC at 12:25

## 2023-09-01 RX ADMIN — BUSPIRONE HYDROCHLORIDE 15 MG: 7.5 TABLET ORAL at 12:30

## 2023-09-01 RX ADMIN — SERTRALINE 100 MG: 100 TABLET, FILM COATED ORAL at 12:26

## 2023-09-01 RX ADMIN — Medication 100 MCG/HR: at 05:28

## 2023-09-01 RX ADMIN — SUCCINYLCHOLINE CHLORIDE 120 MG: 20 INJECTION INTRAMUSCULAR; INTRAVENOUS at 02:37

## 2023-09-01 RX ADMIN — Medication 200 MCG/HR: at 12:03

## 2023-09-01 RX ADMIN — ALBUTEROL SULFATE 2.5 MG: 2.5 SOLUTION RESPIRATORY (INHALATION) at 08:00

## 2023-09-01 RX ADMIN — Medication 2 MG/HR: at 04:19

## 2023-09-01 RX ADMIN — MEROPENEM 1000 MG: 1 INJECTION, POWDER, FOR SOLUTION INTRAVENOUS at 12:47

## 2023-09-01 RX ADMIN — IPRATROPIUM BROMIDE AND ALBUTEROL SULFATE 1 DOSE: 2.5; .5 SOLUTION RESPIRATORY (INHALATION) at 22:35

## 2023-09-01 RX ADMIN — IPRATROPIUM BROMIDE AND ALBUTEROL SULFATE 1 DOSE: 2.5; .5 SOLUTION RESPIRATORY (INHALATION) at 19:35

## 2023-09-01 RX ADMIN — CARBOXYMETHYLCELLULOSE SODIUM 1 DROP: 10 GEL OPHTHALMIC at 13:32

## 2023-09-01 RX ADMIN — CHLORHEXIDINE GLUCONATE 0.12% ORAL RINSE 15 ML: 1.2 LIQUID ORAL at 20:35

## 2023-09-01 RX ADMIN — Medication 200 MCG/HR: at 22:42

## 2023-09-01 RX ADMIN — MIDAZOLAM HYDROCHLORIDE 2 MG: 1 INJECTION, SOLUTION INTRAMUSCULAR; INTRAVENOUS at 05:04

## 2023-09-01 RX ADMIN — IPRATROPIUM BROMIDE AND ALBUTEROL SULFATE 1 DOSE: 2.5; .5 SOLUTION RESPIRATORY (INHALATION) at 03:00

## 2023-09-01 RX ADMIN — SODIUM CHLORIDE: 9 INJECTION, SOLUTION INTRAVENOUS at 12:12

## 2023-09-01 RX ADMIN — POTASSIUM BICARBONATE 40 MEQ: 782 TABLET, EFFERVESCENT ORAL at 12:25

## 2023-09-01 RX ADMIN — SODIUM CHLORIDE 1000 ML: 9 INJECTION, SOLUTION INTRAVENOUS at 09:16

## 2023-09-01 ASSESSMENT — PULMONARY FUNCTION TESTS
PIF_VALUE: 26
PIF_VALUE: 24
PIF_VALUE: 26
PIF_VALUE: 25
PIF_VALUE: 25
PIF_VALUE: 28
PIF_VALUE: 27
PIF_VALUE: 26
PIF_VALUE: 26
PIF_VALUE: 25
PIF_VALUE: 27
PIF_VALUE: 25
PIF_VALUE: 36
PIF_VALUE: 26
PIF_VALUE: 31
PIF_VALUE: 36
PIF_VALUE: 26

## 2023-09-01 ASSESSMENT — PAIN SCALES - GENERAL
PAINLEVEL_OUTOF10: 0
PAINLEVEL_OUTOF10: 4
PAINLEVEL_OUTOF10: 0

## 2023-09-01 NOTE — PROGRESS NOTES
Report given to SCAR Corado. No needs expressed. POC transferred at this time. RASS -5.   - Fentanyl infusing at 200 mcg//h.   - Versed infusing at 8 mg/h. - Nimbex infusing at 2mcg/kg/min. Titrating for vent synchrony. Intubated and sedated on AC # 7.5 ETT, at 23 LL. 26 / 300 / 40 %/ +5. SpO2 95%. VSS  [place] in place. PICC and PIV WDL. All lines and monitoring devices in place. Bridges is patent and secured. Bed in lowest position with wheels locked.

## 2023-09-01 NOTE — PROGRESS NOTES
Rounding completed with Dr. Kendal Rowley and multidisciplinary team. POC, labs, lines and assessment reviewed. - ABG results discussed. Vent changes made. RR increased to 22(from 20) and FiO2 decreased to 40% (from 50%)  - Repeat ABG at 1400.  - Covid negative - Ok to take out of droplet precautions per Dr. Kendal Rowley. Message left for infection prevention  - Urine output only 45 since 0700.  Monitor closely and start [email or web address].  - PICC  - Hold off on tube feed for today  - K 3.2 (10IV given in ER) given an additional 40mEq.  - ECHO ordered for today  - ABD and chest CT ordered

## 2023-09-01 NOTE — PROGRESS NOTES
09/01/23 0301   Patient Observation   Pulse (!) 111   Respirations 23   SpO2 91 %   Observations ETT SIZE 7.5, SECURED AT 23 LIP LINE. AMBU BAG AT HEAD OF BED. WATER GOOD. Vent Information   Vent Mode AC/VC   $Ventilation $Initial Day   Ventilator Settings   FiO2  60 %   Vt (Set, mL) 450 mL   Resp Rate (Set) 18 bmp   PEEP/CPAP (cmH2O) 5   Vent Patient Data (Readings)   Vt (Measured) 450 mL   Peak Inspiratory Pressure (cmH2O) 36 cmH2O   Rate Measured 18 br/min   Minute Volume (L/min) 8.6 Liters   Peak Inspiratory Flow (lpm) 60 L/sec   Mean Airway Pressure (cmH2O) 13 cmH20   Plateau Pressure (cm H2O) 22 cm H2O   Driving Pressure 17   I:E Ratio 1:3.1   Flow Sensitivity 3 L/min   Vent Alarm Settings   High Pressure (cmH2O) 45 cmH2O   Low Minute Volume (lpm) 3 L/min   High Minute Volume (lpm) 20 L/min   Low Exhaled Vt (ml) 250 mL   High Exhaled Vt (ml) 990 mL   RR High (bpm) 40 br/min   Apnea (secs) 20 secs   Additional Respiratoray Assessments   Humidification Source Heated wire   Humidification Temp 36.7   Ambu Bag With Mask At Bedside Yes   Airway Clearance   Suction ET Tube   Suction Device Inline suction catheter   Sputum Method Obtained Endotracheal   Sputum Amount Other (comment)  (NONE)   Non-Surgical Airway 09/01/23 Endotracheal tube   Placement Date/Time: 09/01/23 0240   Present on Admission/Arrival: Yes  Placed By: In ED  Placement Verified By: Auscultation;Capnometry; Chest X-ray;Colorimetric ETCO2 device  Mask Ventilation: Oral airway  Insertion attempts: 1  Location: Oral  Airwa. ..    Secured At 23 cm   Measured From Lips   Secured Location Left   Secured By Commercial tube mayer   Site Assessment Dry

## 2023-09-01 NOTE — CONSULTS
CARDIOLOGY CONSULTATION        Patient Name: Pam Eid  Date of admission: 9/1/2023  2:26 AM  Admission Dx: Respiratory failure ([address]) [J96.90]  HCAP (healthcare-associated pneumonia) [J18.9]  Acute respiratory failure with hypoxia and hypercapnia ([address]) [J96.01, J96.02]  New onset left bundle branch block (LBBB) [I44.7]  Sepsis, due to unspecified organism, unspecified whether acute organ dysfunction present ([address]) [A41.9]  Requesting Physician: Dori Vega DO  Primary Care physician: Ceasar Leigh MD    Reason for Consultation/Chief Complaint: Acute respiratory failure/elevated troponin    History of Present Illness:     Pam Eid is a 68 y.o. patient with a prior medical history notable for recent admission at [facility] for uncomplicated diverticulitis, breast cancer, asthma, hypertension, hyperlipidemia who presented to the hospital with complaints of shortness of breath and respiratory distress. ED course/Vital signs on presentation:She was noted by EMS to have oxygen saturations initially 70s. She was placed on BiPAP transfer to [facility].  Initial heart rate 170 bpm, /61. Oxygen saturation 87%. Patient was noted to have copious amounts of mucopurulent secretions. Struggling to breathe with marked tachypnea and tachycardia. Ultimately decision made to intubate. EKG showed sinus tachycardia with left bundle branch block. This is new compared with previous 2017. Chest x-ray showed bilateral pleural parenchymal disease right greater than left. Left-sided pleural effusion with adjacent causes of elevation. Scattered opacities left upper lobe. Right-sided pleural effusion with scattered opacities. Anion gap acidosis with lactate 8.7. Initial ABG showed pH 7.0, PCO2 67, PO2 65.  proBNP over 2000. High-sensitivity troponin 17 without trend to 372. Creatinine normal.    Patient is evaluated this evening. No family at bedside.  Pt intubated and unable to provide no rub, no S3/S4, PMI non-palpable. Abdomen:   Obese, Soft, non-tender, with quiet bowel sounds. Extremities: No cyanosis, clubbing or pitting edema. Vascular: 2+ radial, art line in place, dec dorsalis pedis and posterior tibial pulses bilaterally. Brisk carotid upstrokes without carotid bruit. Skin: Skin color, texture, turgor are normal with no rashes or ulceration.     Pysch: Cannot assess   Neurologic: Cannot assess        Labs:   CBC:   Lab Results   Component Value Date/Time    WBC 24.0 09/01/2023 02:50 AM    RBC 4.55 09/01/2023 02:50 AM    HGB 13.6 09/01/2023 02:50 AM    HCT 40.3 09/01/2023 02:50 AM    MCV 88.7 09/01/2023 02:50 AM    RDW 14.5 09/01/2023 02:50 AM     09/01/2023 02:50 AM     CMP:  Lab Results   Component Value Date/Time     09/01/2023 02:50 AM    K 3.2 09/01/2023 02:50 AM    CL 91 09/01/2023 02:50 AM    CO2 20 09/01/2023 02:50 AM    BUN 7 09/01/2023 02:50 AM    CREATININE 0.9 09/01/2023 02:50 AM    GFRAA >60 03/14/2022 10:09 AM    AGRATIO 1.2 09/01/2023 02:50 AM    LABGLOM >60 09/01/2023 02:50 AM    GLUCOSE 251 09/01/2023 02:50 AM    GLUCOSE 101 12/14/2020 12:00 AM    PROT 7.5 09/01/2023 02:50 AM    CALCIUM 9.0 09/01/2023 02:50 AM    BILITOT 0.4 09/01/2023 02:50 AM    ALKPHOS 79 09/01/2023 02:50 AM    AST 37 09/01/2023 02:50 AM    ALT 25 09/01/2023 02:50 AM     PT/INR:  No results found for: PTINR  HgBA1c:No results found for: LABA1C  Lab Results   Component Value Date    TROPONINI <0.01 02/08/2015       Lab Results   Component Value Date    CHOL 157 03/20/2023    CHOL 171 03/14/2022    CHOL 180 02/13/2020     Lab Results   Component Value Date    TRIG 69 09/01/2023    TRIG 78 03/20/2023    TRIG 79 03/14/2022     Lab Results   Component Value Date    HDL 35 (L) 03/20/2023    HDL 38 (L) 03/14/2022    HDL 35 (L) 02/13/2020     Lab Results   Component Value Date    LDLCALC 106 (H) 03/20/2023    LDLCALC 117 (H) 03/14/2022    LDLCALC 125 (H) 02/13/2020     Lab Results

## 2023-09-01 NOTE — PROGRESS NOTES
09/01/23 1936   Patient Observation   Observations 7.5 ett 22 at lip   Breath Sounds   Right Upper Lobe Diminished   Right Middle Lobe Diminished   Right Lower Lobe Diminished   Left Upper Lobe Diminished   Left Lower Lobe Diminished   Vent Information   Vent Mode AC/VC   Ventilator Settings   FiO2  40 %   Vt (Set, mL) 300 mL   Resp Rate (Set) 26 bmp   PEEP/CPAP (cmH2O) 5   Vent Patient Data (Readings)   Vt (Measured) 301 mL   Peak Inspiratory Pressure (cmH2O) 25 cmH2O   Rate Measured 26 br/min   Minute Volume (L/min) 7.8 Liters   Peak Inspiratory Flow (lpm) 60 L/sec   Mean Airway Pressure (cmH2O) 10 cmH20   I:E Ratio 1:3.3   Flow Sensitivity 3 L/min   Static Compliance (L/cm H2O) 17   Dynamic Compliance (L/cm H2O) 15 L/cm H2O   Vent Alarm Settings   High Pressure (cmH2O) 45 cmH2O   Low Minute Volume (lpm) 2 L/min   Low Exhaled Vt (ml) 210 mL   RR High (bpm) 40 br/min   Apnea (secs) 20 secs   Additional Respiratoray Assessments   Humidification Source Heated wire   Humidification Temp 36.8   Circuit Condensation Drained   Ambu Bag With Mask At Bedside Yes   Airway Clearance   Suction ET Tube   Suction Device Inline suction catheter   Sputum Method Obtained Endotracheal   Sputum Amount Scant   Non-Surgical Airway 09/01/23 Endotracheal tube   Placement Date/Time: 09/01/23 0240   Present on Admission/Arrival: Yes  Placed By: In ED  Placement Verified By: Auscultation;Capnometry; Chest X-ray;Colorimetric ETCO2 device  Mask Ventilation: Oral airway  Insertion attempts: 1  Location: Oral  Airwa. ..    Secured At 22 cm   Measured From Lips   Secured Location Right   Secured By Commercial tube mayer

## 2023-09-01 NOTE — ED PROVIDER NOTES
protocol:     Patient identity confirmed:  Verbally with patient  Pre-procedure details:     Indication: failure to oxygenate, failure to protect airway and failure to ventilate      Patient status:  Altered mental status    Mouth opening - incisor distance:  2 finger widths    Hyoid-mental distance: 2 finger widths      Hyoid-thyroid distance: 2 or more finger widths      Obstruction: none      Neck mobility: normal      Pharmacologic strategy: RSI      Induction agents:  Etomidate    Paralytics:  Succinylcholine  Procedure details:     Preoxygenation:  Bag valve mask    CPR in progress: no      Intubation method:  Oral    Intubation technique: direct      Laryngoscope blade: Mac 3    Grade view: I      Tube size (mm):  7.5    Tube type:  Cuffed    Number of attempts:  1    Ventilation between attempts: no      Tube visualized through cords: yes    Placement assessment:     ETT at teeth/gumline (cm):  23    Tube secured with:  ETT mayer    Breath sounds:  Equal    Placement verification: chest rise, colorimetric ETCO2 and CXR verification    Post-procedure details:     Procedure completion:  Tolerated      FINAL IMPRESSION      1. Acute respiratory failure with hypoxia and hypercapnia (HCC)    2. HCAP (healthcare-associated pneumonia)    3. Sepsis, due to unspecified organism, unspecified whether acute organ dysfunction present ([address])    4. New onset left bundle branch block (LBBB)            DISPOSITION/PLAN   DISPOSITION Admitted 09/01/2023 05:33:06 AM        PATIENT REFERRED TO:  No follow-up provider specified. DISCHARGE MEDICATIONS:  New Prescriptions    No medications on file     Controlled Substances Monitoring:     RX Monitoring 7/13/2017   Attestation The Prescription Monitoring Report for this patient was reviewed today. Periodic Controlled Substance Monitoring Possible medication side effects, risk of tolerance and/or dependence, and alternative treatments discussed; No signs of potential drug abuse

## 2023-09-01 NOTE — PROGRESS NOTES
Troponin of 372 (from 17). Notified Dr. Eddie Prado. Orders for consult to cardiology and high dose lovenox.

## 2023-09-01 NOTE — PROGRESS NOTES
VTE Prophylaxis Monitoring    Recent Labs     08/31/23  0629 09/01/23  0250   CREATININE 0.7 0.9     Recent Labs     09/01/23  0250   HGB 13.6   HCT 40.3      INR 1.20*     Estimated Creatinine Clearance: 63 mL/min (based on SCr of 0.9 mg/dL). Wt Readings from Last 1 Encounters:   08/28/23 250 lb (113.4 kg)       The guidance below is to provide initial recommendations for dosing. If recommended dose does not align well with patient's current clinical picture, communications with the care team will occur to determine most appropriate medication and dose. Do not exceed enoxaparin 40mg daily or UFH 5000 units SUBQ TID in patients with epidurals, lumbar drains, or external ventricular drains    TABLE 1.   ENOXAPARIN ROUTINE PROPHYLAXIS DOSING (Medically ill, routine surgery)   Patient Weight (kg)     50.9 and below 51 - 100.9 101 - 150.9 151 - 174.9 175 or greater         Estimated CrCl  (ml/min) 30 or greater   30 mg SUBQ daily   40 mg SUBQ daily 30 mg SUBQ BID  40 mg SUBQ BID 60mg SUBQ BID      15-29 UFH 5000 units SUBQ BID   30 mg SUBQ daily 30 mg SUBQ daily 40 mg SUBQ daily   60 mg SUBQ daily      Less than 15 or Dialysis UFH 5000 units SUBQ BID   UFH 5000 units SUBQ TID UFH 7500 units SUBQ TID         BRIDGET Berry SINAN [facility] 9/1/2023 5:47 AM

## 2023-09-01 NOTE — ED NOTES
Rass for fentanyl +1 at 0426, 0457, 0528,0600 Rass for [place] +1 at 9348,[id number],7951, [address], RN  09/01/23 3192

## 2023-09-01 NOTE — CARE COORDINATION
Case Management Assessment  Initial Evaluation    Date/Time of Evaluation: 9/1/2023 10:38 AM  Assessment Completed by: Sera Guzman    If patient is discharged prior to next notation, then this note serves as note for discharge by case management. Patient Name: Phillip Kamara                   YOB: 1945  Diagnosis: Respiratory failure ([address]) [J96.90]  HCAP (healthcare-associated pneumonia) [J18.9]  Acute respiratory failure with hypoxia and hypercapnia ([address]) [J96.01, J96.02]  New onset left bundle branch block (LBBB) [I44.7]  Sepsis, due to unspecified organism, unspecified whether acute organ dysfunction present [facility]) [A41.9]                   Date / Time: 9/1/2023  2:26 AM    Patient Admission Status: Inpatient   Readmission Risk (Low < 19, Mod (19-27), High > 27): Readmission Risk Score: 15.7    Current PCP: Isi Lind MD  PCP verified by CM? Yes (Alberto)    Chart Reviewed: Yes      History Provided by: Significant Other  Patient Orientation: Sedated, Other (see comment) (on vent,  answered questions)    Patient Cognition: Other (see comment) (vented)    Hospitalization in the last 30 days (Readmission):  Yes    If yes, Readmission Assessment in CM Navigator will be completed. Advance Directives:      Code Status: Full Code   Patient's Primary Decision Maker is: Legal Next of Kin    Primary Decision Maker: Too Mariscal - Spouse - [phone]    Discharge Planning:    Patient lives with: Spouse/Significant Other Type of Home: House  Primary Care Giver: Self  Patient Support Systems include: Spouse/Significant Other   Current Financial resources: Medicare  Current community resources: None  Current services prior to admission: Durable Medical Equipment            Current DME: Berdie Adas, Wheelchair            Type of Home Care services:  None    ADLS  Prior functional level: Independent in ADLs/IADLs  Current functional level:  Independent in ADLs/IADLs    PT AM-PAC:   /24  OT AM-PAC: plan. Freedom of choice list with basic dialogue that supports the patient's individualized plan of care/goals and shares the quality data associated with the providers was provided to:     Patient Representative Name:       The Patient and/or Patient Representative Agree with the Discharge Plan?       Marie Haddad  Case Management Department  Ph: [phone] Fax: [phone]

## 2023-09-01 NOTE — PROGRESS NOTES
09/01/23 0515   Patient Observation   Pulse 96   Respirations 30   SpO2 100 %   Ventilator Settings   FiO2  (S)  50 %

## 2023-09-01 NOTE — PROGRESS NOTES
Pt dyssynchronous with the vent. Dr. Jessica Mercado at bedside. 100mg of rocuronium given. Continuous sedation increased. - Fentanyl infusing at 200 mcg//h.   - Versed infusing at 8 mg/h. Pt now tolerating the vent. Order for continuous Nimbex ordered. BP 79/36 MAP 47 via [place]. 1L NS bolus ordered and infusing now.

## 2023-09-01 NOTE — PROGRESS NOTES
09/01/23 0651   Patient Observation   Pulse 66   Respirations 20   Ventilator Settings   FiO2  50 %   Vt (Set, mL) (S)  300 mL   Resp Rate (Set) 20 bmp   PEEP/CPAP (cmH2O) (S)  8   Pressure Support (cm H2O) 0 cm H2O   Vent Patient Data (Readings)   Vt (Measured) 298 mL   Peak Inspiratory Pressure (cmH2O) 26 cmH2O   Rate Measured 20 br/min   Minute Volume (L/min) 5.95 Liters   Mean Airway Pressure (cmH2O) 12 cmH20   Plateau Pressure (cm H2O) 21 cm H2O   Driving Pressure 13   I:E Ratio 1:4.60   Vent Alarm Settings   High Pressure (cmH2O) 45 cmH2O   Low Minute Volume (lpm) 3 L/min   RR High (bpm) 40 br/min

## 2023-09-01 NOTE — PROGRESS NOTES
4 Eyes Skin Assessment     NAME:  Vandana Choi  YOB: 1945  MEDICAL RECORD NUMBER:  [id number]    The patient is being assessed for  Admission    I agree that at least one RN has performed a thorough Head to Toe Skin Assessment on the patient. ALL assessment sites listed below have been assessed. Areas assessed by both nurses:    Head, Face, Ears, Shoulders, Back, Chest, Arms, Elbows, Hands, Sacrum. Buttock, Coccyx, Ischium, Legs. Feet and Heels, and Under Medical Devices         Does the Patient have a Wound? No noted wound(s)       Sloan Prevention initiated by RN: Yes  Wound Care Orders initiated by RN: No    Pressure Injury (Stage 3,4, Unstageable, DTI, NWPT, and Complex wounds) if present, place Wound referral order by RN under : No    New Ostomies, if present place, Ostomy referral order under : No     Nurse 1 eSignature: Electronically signed by David Victor RN on 9/1/23 at 7:07 AM EDT    Patient is not able to demonstrate the ability to move from a reclining position to an upright position within the recliner due to Intubated.     **SHARE this note so that the co-signing nurse can place an eSignature**    Nurse 2 eSignature: {Esignature:[id number]}

## 2023-09-01 NOTE — PROGRESS NOTES
Cardiology consult called, spoke with [facility].  Electronically signed by Cara Flores on 9/1/2023 at 3:33 PM

## 2023-09-01 NOTE — ACP (ADVANCE CARE PLANNING)
Advance Care Planning     General Advance Care Planning (ACP) Conversation    Date of Conversation: 9/1/2023  Conducted with: Patient with Decision Making Capacity    Healthcare Decision Maker:    Primary Decision Maker: Too Mariscal - Spouse - [phone]  Click here to complete [address] including selection of the Healthcare Decision Maker Relationship (ie \"Primary\"). Today we documented Decision Maker(s) consistent with Legal Next of Kin hierarchy.     Content/Action Overview:  DECLINED ACP Conversation - will revisit periodically  Reviewed DNR/DNI and patient elects Full Code (Attempt Resuscitation)        Length of Voluntary ACP Conversation in minutes:  <16 minutes (Non-Billable)    Chuck Thompson

## 2023-09-01 NOTE — H&P
Admission [address];  MRN: [id number] ;   Admit Date: 9/1/2023  2:26 AM   Current Date and Time: 9/1/2023 7:41 AM    PCP  --  Apollo Hunt MD         Chief Complaint   Patient presents with    Respiratory Distress     Ems states they were called for a allergic reaction, upon there arrival audible rhales, cpap placed          HPI:  Patient is a 68 y.o.  female  With known h.o  HTN, hyperlipidemia, Asthma, GERD, anxiety , breast cancer presented by EMS last night for severe resp distress . Pt was recently admitted to [facility] for mild sigmoid diverticulitis and was given cipro and flagyl . Apparently she called 911 in the middle of the night for possible allergic reaction and resp distress. She was brought to ER where she was emergently intubated , placed on vent and admitted to ICU .      Pt  in room who is in a different bedroom , not aware of EMS arrival and came to know after a little while after she was admitted to hospital. He reports she was fine when going to sleep     Info collected from ER charts and speaking to  as pt on vent     Allergies   Allergen Reactions    Actifed Cold-Allergy [Chlorpheniramine-Phenylephrine] Other (See Comments)     Tachalicia,head rush    Medrol [Methylprednisolone]      Chest pain    Prednisone      Chest pain    Amoxicillin      Had hives when taken with Lisinopril and not sure what caused what since she was on both in the past for many years    Antihistamines, Chlorpheniramine-Type     Benadryl [Diphenhydramine]     Ceftin [Cefuroxime]      bleeding    Cefuroxime Axetil Diarrhea    Desonide Crea-Wound Dress Crea     Iv Contrast [Iodides] Hives    Lisinopril Hives    Naprelan [Naproxen]     Norvasc [Amlodipine Besylate] Swelling    Other      Sultin vaginal cream    Pcn [Penicillins]     Singulair [Montelukast Sodium]     Sudafed [Pseudoephedrine Hcl] Other (See Comments)     Tachalicia,head rush    Tape [Adhesive Tape]     Augmentin [Amoxicillin-Pot

## 2023-09-02 ENCOUNTER — APPOINTMENT (OUTPATIENT)
Dept: GENERAL RADIOLOGY | Age: 78
DRG: 870 | End: 2023-09-02
Payer: MEDICARE

## 2023-09-02 PROBLEM — J96.01 ACUTE HYPOXEMIC RESPIRATORY FAILURE (HCC): Status: ACTIVE | Noted: 2023-09-02
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
## 2023-09-02 PROBLEM — J45.51 SEVERE PERSISTENT ASTHMA WITH EXACERBATION: Status: ACTIVE | Noted: 2023-09-02

## 2023-09-02 LAB
ANION GAP SERPL CALCULATED.3IONS-SCNC: 14 MMOL/L (ref 3–16)
BASE EXCESS BLDA CALC-SCNC: -8 MMOL/L (ref -3–3)
BASOPHILS # BLD: 0 K/UL (ref 0–0.2)
BASOPHILS NFR BLD: 0.1 %
BUN SERPL-MCNC: 12 MG/DL (ref 7–20)
CALCIUM SERPL-MCNC: 7.7 MG/DL (ref 8.3–10.6)
CHLORIDE SERPL-SCNC: 101 MMOL/L (ref 99–110)
CO2 BLDA-SCNC: 19.1 MMOL/L
CO2 SERPL-SCNC: 18 MMOL/L (ref 21–32)
COHGB MFR BLDA: 0.3 % (ref 0–1.5)
CREAT SERPL-MCNC: 1.2 MG/DL (ref 0.6–1.2)
DEPRECATED RDW RBC AUTO: 14.2 % (ref 12.4–15.4)
EOSINOPHIL # BLD: 0 K/UL (ref 0–0.6)
EOSINOPHIL NFR BLD: 0 %
GFR SERPLBLD CREATININE-BSD FMLA CKD-EPI: 46 ML/MIN/{1.73_M2}
GLUCOSE BLD-MCNC: 163 MG/DL (ref 70–99)
GLUCOSE BLD-MCNC: 171 MG/DL (ref 70–99)
GLUCOSE BLD-MCNC: 172 MG/DL (ref 70–99)
GLUCOSE BLD-MCNC: 190 MG/DL (ref 70–99)
GLUCOSE SERPL-MCNC: 213 MG/DL (ref 70–99)
HCO3 BLDA-SCNC: 17.9 MMOL/L (ref 21–29)
HCT VFR BLD AUTO: 32.7 % (ref 36–48)
HGB BLD-MCNC: 11.3 G/DL (ref 12–16)
HGB BLDA-MCNC: 12.2 G/DL (ref 12–16)
LV EF: 55 %
LVEF MODALITY: NORMAL
LYMPHOCYTES # BLD: 0.4 K/UL (ref 1–5.1)
LYMPHOCYTES NFR BLD: 4 %
MCH RBC QN AUTO: 30.3 PG (ref 26–34)
MCHC RBC AUTO-ENTMCNC: 34.5 G/DL (ref 31–36)
MCV RBC AUTO: 87.9 FL (ref 80–100)
METHGB MFR BLDA: 0.3 %
MONOCYTES # BLD: 0.3 K/UL (ref 0–1.3)
MONOCYTES NFR BLD: 2.2 %
NEUTROPHILS # BLD: 10.6 K/UL (ref 1.7–7.7)
NEUTROPHILS NFR BLD: 93.7 %
O2 THERAPY: ABNORMAL
PCO2 BLDA: 37.9 MMHG (ref 35–45)
PERFORMED ON: ABNORMAL
PH BLDA: 7.29 [PH] (ref 7.35–7.45)
PLATELET # BLD AUTO: 195 K/UL (ref 135–450)
PMV BLD AUTO: 7.9 FL (ref 5–10.5)
PO2 BLDA: 80.1 MMHG (ref 75–108)
POTASSIUM SERPL-SCNC: 3.8 MMOL/L (ref 3.5–5.1)
RBC # BLD AUTO: 3.72 M/UL (ref 4–5.2)
SAO2 % BLDA: 94.7 %
SODIUM SERPL-SCNC: 133 MMOL/L (ref 136–145)
TROPONIN, HIGH SENSITIVITY: 188 NG/L (ref 0–14)
TROPONIN, HIGH SENSITIVITY: 190 NG/L (ref 0–14)
WBC # BLD AUTO: 11.3 K/UL (ref 4–11)

## 2023-09-02 PROCEDURE — 2580000003 HC RX 258: Performed by: INTERNAL MEDICINE

## 2023-09-02 PROCEDURE — 94640 AIRWAY INHALATION TREATMENT: CPT

## 2023-09-02 PROCEDURE — 2700000000 HC OXYGEN THERAPY PER DAY

## 2023-09-02 PROCEDURE — 6370000000 HC RX 637 (ALT 250 FOR IP): Performed by: INTERNAL MEDICINE

## 2023-09-02 PROCEDURE — 94761 N-INVAS EAR/PLS OXIMETRY MLT: CPT

## 2023-09-02 PROCEDURE — 71045 X-RAY EXAM CHEST 1 VIEW: CPT

## 2023-09-02 PROCEDURE — 6360000002 HC RX W HCPCS: Performed by: INTERNAL MEDICINE

## 2023-09-02 PROCEDURE — 36415 COLL VENOUS BLD VENIPUNCTURE: CPT

## 2023-09-02 PROCEDURE — 2500000003 HC RX 250 WO HCPCS: Performed by: INTERNAL MEDICINE

## 2023-09-02 PROCEDURE — 99291 CRITICAL CARE FIRST HOUR: CPT | Performed by: INTERNAL MEDICINE

## 2023-09-02 PROCEDURE — 80048 BASIC METABOLIC PNL TOTAL CA: CPT

## 2023-09-02 PROCEDURE — 84484 ASSAY OF TROPONIN QUANT: CPT

## 2023-09-02 PROCEDURE — 2500000003 HC RX 250 WO HCPCS: Performed by: EMERGENCY MEDICINE

## 2023-09-02 PROCEDURE — 94003 VENT MGMT INPAT SUBQ DAY: CPT

## 2023-09-02 PROCEDURE — 36592 COLLECT BLOOD FROM PICC: CPT

## 2023-09-02 PROCEDURE — 82803 BLOOD GASES ANY COMBINATION: CPT

## 2023-09-02 PROCEDURE — 85025 COMPLETE CBC W/AUTO DIFF WBC: CPT

## 2023-09-02 PROCEDURE — 99233 SBSQ HOSP IP/OBS HIGH 50: CPT | Performed by: INTERNAL MEDICINE

## 2023-09-02 PROCEDURE — 2000000000 HC ICU R&B

## 2023-09-02 PROCEDURE — C9113 INJ PANTOPRAZOLE SODIUM, VIA: HCPCS | Performed by: INTERNAL MEDICINE

## 2023-09-02 PROCEDURE — 93306 TTE W/DOPPLER COMPLETE: CPT

## 2023-09-02 RX ORDER — ROCURONIUM BROMIDE 10 MG/ML
1 INJECTION, SOLUTION INTRAVENOUS ONCE
Status: DISCONTINUED | OUTPATIENT
Start: 2023-09-02 | End: 2023-09-02

## 2023-09-02 RX ORDER — 0.9 % SODIUM CHLORIDE 0.9 %
500 INTRAVENOUS SOLUTION INTRAVENOUS ONCE
Status: DISCONTINUED | OUTPATIENT
Start: 2023-09-02 | End: 2023-09-20

## 2023-09-02 RX ORDER — FENTANYL CITRATE-0.9 % NACL/PF 10 MCG/ML
25-200 PLASTIC BAG, INJECTION (ML) INTRAVENOUS CONTINUOUS
Status: DISCONTINUED | OUTPATIENT
Start: 2023-09-02 | End: 2023-09-17

## 2023-09-02 RX ORDER — FENTANYL CITRATE-0.9 % NACL/PF 10 MCG/ML
25-200 PLASTIC BAG, INJECTION (ML) INTRAVENOUS CONTINUOUS
Status: DISCONTINUED | OUTPATIENT
Start: 2023-09-02 | End: 2023-09-02

## 2023-09-02 RX ORDER — ALBUTEROL SULFATE 2.5 MG/3ML
2.5 SOLUTION RESPIRATORY (INHALATION)
Status: DISCONTINUED | OUTPATIENT
Start: 2023-09-02 | End: 2023-09-19

## 2023-09-02 RX ORDER — ROCURONIUM BROMIDE 10 MG/ML
100 INJECTION, SOLUTION INTRAVENOUS ONCE
Status: COMPLETED | OUTPATIENT
Start: 2023-09-02 | End: 2023-09-02

## 2023-09-02 RX ADMIN — IPRATROPIUM BROMIDE AND ALBUTEROL SULFATE 1 DOSE: 2.5; .5 SOLUTION RESPIRATORY (INHALATION) at 07:57

## 2023-09-02 RX ADMIN — CISATRACURIUM BESYLATE 2 MCG/KG/MIN: 10 INJECTION, SOLUTION INTRAVENOUS at 11:16

## 2023-09-02 RX ADMIN — SERTRALINE 100 MG: 100 TABLET, FILM COATED ORAL at 19:39

## 2023-09-02 RX ADMIN — ATORVASTATIN CALCIUM 80 MG: 40 TABLET, FILM COATED ORAL at 19:39

## 2023-09-02 RX ADMIN — ASPIRIN 81 MG: 81 TABLET, CHEWABLE ORAL at 09:12

## 2023-09-02 RX ADMIN — CARBOXYMETHYLCELLULOSE SODIUM 1 DROP: 10 GEL OPHTHALMIC at 17:13

## 2023-09-02 RX ADMIN — NOREPINEPHRINE BITARTRATE 1 MCG/MIN: 1 INJECTION, SOLUTION, CONCENTRATE INTRAVENOUS at 19:14

## 2023-09-02 RX ADMIN — Medication 200 MCG/HR: at 11:40

## 2023-09-02 RX ADMIN — IPRATROPIUM BROMIDE AND ALBUTEROL SULFATE 1 DOSE: 2.5; .5 SOLUTION RESPIRATORY (INHALATION) at 22:41

## 2023-09-02 RX ADMIN — SODIUM CHLORIDE: 9 INJECTION, SOLUTION INTRAVENOUS at 13:51

## 2023-09-02 RX ADMIN — BUSPIRONE HYDROCHLORIDE 15 MG: 7.5 TABLET ORAL at 09:12

## 2023-09-02 RX ADMIN — PANTOPRAZOLE SODIUM 40 MG: 40 INJECTION, POWDER, FOR SOLUTION INTRAVENOUS at 09:12

## 2023-09-02 RX ADMIN — MUPIROCIN: 20 OINTMENT TOPICAL at 09:13

## 2023-09-02 RX ADMIN — ENOXAPARIN SODIUM 120 MG: 150 INJECTION SUBCUTANEOUS at 17:13

## 2023-09-02 RX ADMIN — Medication 200 MCG/HR: at 17:12

## 2023-09-02 RX ADMIN — BUSPIRONE HYDROCHLORIDE 15 MG: 7.5 TABLET ORAL at 19:39

## 2023-09-02 RX ADMIN — Medication 500 ML: at 11:32

## 2023-09-02 RX ADMIN — MEROPENEM 1000 MG: 1 INJECTION, POWDER, FOR SOLUTION INTRAVENOUS at 12:14

## 2023-09-02 RX ADMIN — IPRATROPIUM BROMIDE AND ALBUTEROL SULFATE 1 DOSE: 2.5; .5 SOLUTION RESPIRATORY (INHALATION) at 03:00

## 2023-09-02 RX ADMIN — SODIUM CHLORIDE: 9 INJECTION, SOLUTION INTRAVENOUS at 06:07

## 2023-09-02 RX ADMIN — SODIUM CHLORIDE, PRESERVATIVE FREE 10 ML: 5 INJECTION INTRAVENOUS at 19:37

## 2023-09-02 RX ADMIN — MIDAZOLAM HYDROCHLORIDE 2 MG: 1 INJECTION, SOLUTION INTRAMUSCULAR; INTRAVENOUS at 10:37

## 2023-09-02 RX ADMIN — NOREPINEPHRINE BITARTRATE 5 MCG/MIN: 1 INJECTION, SOLUTION, CONCENTRATE INTRAVENOUS at 02:27

## 2023-09-02 RX ADMIN — MEROPENEM 1000 MG: 1 INJECTION, POWDER, FOR SOLUTION INTRAVENOUS at 04:14

## 2023-09-02 RX ADMIN — MUPIROCIN: 20 OINTMENT TOPICAL at 19:39

## 2023-09-02 RX ADMIN — VANCOMYCIN HYDROCHLORIDE 1000 MG: 1 INJECTION, POWDER, LYOPHILIZED, FOR SOLUTION INTRAVENOUS at 17:06

## 2023-09-02 RX ADMIN — ROCURONIUM BROMIDE 100 MG: 10 INJECTION, SOLUTION INTRAVENOUS at 11:30

## 2023-09-02 RX ADMIN — CHLORHEXIDINE GLUCONATE 0.12% ORAL RINSE 15 ML: 1.2 LIQUID ORAL at 19:39

## 2023-09-02 RX ADMIN — Medication 125 MCG/HR: at 06:03

## 2023-09-02 RX ADMIN — Medication 7 MG/HR: at 14:16

## 2023-09-02 RX ADMIN — IPRATROPIUM BROMIDE AND ALBUTEROL SULFATE 1 DOSE: 2.5; .5 SOLUTION RESPIRATORY (INHALATION) at 19:31

## 2023-09-02 RX ADMIN — CISATRACURIUM BESYLATE 2 MCG/KG/MIN: 10 INJECTION, SOLUTION INTRAVENOUS at 20:22

## 2023-09-02 RX ADMIN — SERTRALINE 100 MG: 100 TABLET, FILM COATED ORAL at 09:12

## 2023-09-02 RX ADMIN — MEROPENEM 1000 MG: 1 INJECTION, POWDER, FOR SOLUTION INTRAVENOUS at 19:38

## 2023-09-02 RX ADMIN — DEXAMETHASONE 6 MG: 4 TABLET ORAL at 09:12

## 2023-09-02 RX ADMIN — IPRATROPIUM BROMIDE AND ALBUTEROL SULFATE 1 DOSE: 2.5; .5 SOLUTION RESPIRATORY (INHALATION) at 15:28

## 2023-09-02 RX ADMIN — BUSPIRONE HYDROCHLORIDE 15 MG: 7.5 TABLET ORAL at 17:13

## 2023-09-02 RX ADMIN — CHLORHEXIDINE GLUCONATE 0.12% ORAL RINSE 15 ML: 1.2 LIQUID ORAL at 09:12

## 2023-09-02 RX ADMIN — CARBOXYMETHYLCELLULOSE SODIUM 1 DROP: 10 GEL OPHTHALMIC at 04:15

## 2023-09-02 RX ADMIN — CARBOXYMETHYLCELLULOSE SODIUM 1 DROP: 10 GEL OPHTHALMIC at 09:12

## 2023-09-02 RX ADMIN — Medication 200 MCG/HR: at 23:05

## 2023-09-02 RX ADMIN — DEXAMETHASONE 6 MG: 4 TABLET ORAL at 19:39

## 2023-09-02 RX ADMIN — BUSPIRONE HYDROCHLORIDE 15 MG: 7.5 TABLET ORAL at 12:11

## 2023-09-02 RX ADMIN — ENOXAPARIN SODIUM 120 MG: 150 INJECTION SUBCUTANEOUS at 04:16

## 2023-09-02 RX ADMIN — IPRATROPIUM BROMIDE AND ALBUTEROL SULFATE 1 DOSE: 2.5; .5 SOLUTION RESPIRATORY (INHALATION) at 10:59

## 2023-09-02 RX ADMIN — CARBOXYMETHYLCELLULOSE SODIUM 1 DROP: 10 GEL OPHTHALMIC at 14:27

## 2023-09-02 RX ADMIN — SODIUM CHLORIDE, PRESERVATIVE FREE 10 ML: 5 INJECTION INTRAVENOUS at 09:13

## 2023-09-02 RX ADMIN — CARBOXYMETHYLCELLULOSE SODIUM 1 DROP: 10 GEL OPHTHALMIC at 01:20

## 2023-09-02 ASSESSMENT — PULMONARY FUNCTION TESTS
PIF_VALUE: 24
PIF_VALUE: 23
PIF_VALUE: 22
PIF_VALUE: 21
PIF_VALUE: 24
PIF_VALUE: 22
PIF_VALUE: 23
PIF_VALUE: 23
PIF_VALUE: 33
PIF_VALUE: 21
PIF_VALUE: 24
PIF_VALUE: 24
PIF_VALUE: 22
PIF_VALUE: 22
PIF_VALUE: 24
PIF_VALUE: 23
PIF_VALUE: 24
PIF_VALUE: 25
PIF_VALUE: 23

## 2023-09-02 NOTE — PROGRESS NOTES
Patient report given to Kimberlee Benson. Patient continues resting comfortably on the ventilator. Levophed has been weaned to 1mcg/min, Fentanyl to 125mcg/hr, Versed to 6mg/hr, and Nimbex to 0.5mcg/kg/min. Care transferred.

## 2023-09-02 NOTE — PROGRESS NOTES
09/02/23 1932   Patient Observation   Observations 7.5 ett 22 at lip   Breath Sounds   Right Upper Lobe Diminished   Right Middle Lobe Diminished   Right Lower Lobe Diminished   Left Upper Lobe Diminished   Left Lower Lobe Diminished   Vent Information   Vent Mode AC/VC   Ventilator Settings   FiO2  35 %   Vt (Set, mL) 300 mL   Resp Rate (Set) 24 bmp   PEEP/CPAP (cmH2O) 5   Vent Patient Data (Readings)   Vt (Measured) 296 mL   Peak Inspiratory Pressure (cmH2O) 22 cmH2O   Rate Measured 24 br/min   Minute Volume (L/min) 7.1 Liters   Peak Inspiratory Flow (lpm) 60 L/sec   Mean Airway Pressure (cmH2O) 9.3 cmH20   I:E Ratio 1:3.6   Flow Sensitivity 3 L/min   Static Compliance (L/cm H2O) 27   Dynamic Compliance (L/cm H2O) 21 L/cm H2O   Vent Alarm Settings   High Pressure (cmH2O) 45 cmH2O   Low Minute Volume (lpm) 2 L/min   Low Exhaled Vt (ml) 210 mL   RR High (bpm) 40 br/min   Apnea (secs) 20 secs   Additional Respiratoray Assessments   Humidification Source Heated wire   Humidification Temp 36.8   Circuit Condensation Drained   Ambu Bag With Mask At Bedside Yes   Airway Clearance   Suction ET Tube   Suction Device Inline suction catheter   Sputum Method Obtained Endotracheal   Sputum Amount Scant   Non-Surgical Airway 09/01/23 Endotracheal tube   Placement Date/Time: 09/01/23 0240   Present on Admission/Arrival: Yes  Placed By: In ED  Placement Verified By: Auscultation;Capnometry; Chest X-ray;Colorimetric ETCO2 device  Mask Ventilation: Oral airway  Insertion attempts: 1  Location: Oral  Airwa. ..    Secured At 22 cm   Measured From Lips   Secured Location Right   Secured By Commercial tube mayer

## 2023-09-02 NOTE — PROGRESS NOTES
Patient care assumed, assessment completed as charted. Patient continues on ventilator, #7.5 at the 23 lip line. A/C 26, , FiO2 40%, PEEP 5. Right upper arm PICC in place with Fentanyl infusing at 200mcg/hr, Versed at 8mg/hr, Nimbex at 2mcg/kg/min, and NS at 75mL/hr. Right radial a-line in place, OG clamped, villaseñor catheter patent. No further needs assessed at this time. Will monitor.

## 2023-09-02 NOTE — PROGRESS NOTES
Rounding completed with Dr. Prosper Mark and interdisciplinary team.  POC, labs, lines and assessment reviewed. -stop Nimbex this AM for holiday.   Discussed fentanyl increased to 150 mcg prior to rounds pt was tearful and eye movement was on nimbex

## 2023-09-02 NOTE — PROGRESS NOTES
Attempted to hold levophed 1 mcg MAP decreased and restarted. A- line not consistent wave form not reading well and does not flush.   Adjusted to MAP on BP cuff

## 2023-09-02 NOTE — PLAN OF CARE
Problem: Discharge Planning  Goal: Discharge to home or other facility with appropriate resources  Outcome: Progressing     Problem: Pain  Goal: Verbalizes/displays adequate comfort level or baseline comfort level  Outcome: Progressing     Problem: Safety - Adult  Goal: Free from fall injury  Outcome: Progressing  Note: Fall precautions in place. Problem: Nutrition Deficit:  Goal: Optimize nutritional status  Outcome: Progressing     Problem: Skin/Tissue Integrity  Goal: Absence of new skin breakdown  Description: 1. Monitor for areas of redness and/or skin breakdown  2. Assess vascular access sites hourly  3. Every 4-6 hours minimum:  Change oxygen saturation probe site  4. Every 4-6 hours:  If on nasal continuous positive airway pressure, respiratory therapy assess nares and determine need for appliance change or resting period. Outcome: Progressing  Note: Patient turned/repositioned every 2 hours and as needed to maintain and improve skin integrity.

## 2023-09-02 NOTE — PROGRESS NOTES
CARDIOLOGY PROGRESS NOTE        Patient Name: Natalya Knapp  Date of admission: 9/1/2023  2:26 AM  Admission Dx: Respiratory failure ([address]) [J96.90]  HCAP (healthcare-associated pneumonia) [J18.9]  Acute respiratory failure with hypoxia and hypercapnia ([address]) [J96.01, J96.02]  New onset left bundle branch block (LBBB) [I44.7]  Sepsis, due to unspecified organism, unspecified whether acute organ dysfunction present ([address]) [A41.9]  Requesting Physician: Delisa Cross DO  Primary Care physician: Day Valencia MD    Reason for Consultation/Chief Complaint: Acute respiratory failure/elevated troponin    History of Present Illness:     Natalya Knapp is a 68 y.o. patient with a prior medical history notable for recent admission at [facility] for uncomplicated diverticulitis, breast cancer, asthma, hypertension, hyperlipidemia who presented to the hospital with complaints of shortness of breath and respiratory distress. See consultation note dated 9/1 for full details of HPI. Patient presenting with acute hypoxic respiratory failure with concern for HCAP on the heels of recent admission. Course complicated by NSTEMI. Troponin peaked 372. Creatinine 1.2 from 0.9. Improving leukocytosis. Chest x-ray shows increasing small to moderate left and small right pleural effusions compared with previous. Slightly improved right-sided airspace disease. Echo pending. Vent settings remain stable. Started on low-dose Levophed overnight, maintaining adequate MAP. Telemetry is stable with no arrhythmia. Past Medical History:   has a past medical history of Anxiety attack, Arthritis, Cancer ([address]), Diastolic dysfunction, Diverticulitis, GERD (gastroesophageal reflux disease), Hearing loss, Hyperlipidemia, Hypertension, Left breast mass, Miscarriage, Moderate persistent asthma without complication, PONV (postoperative nausea and vomiting), Spastic colon, and Tinnitus.     Surgical History:   has a past surgical history

## 2023-09-02 NOTE — PROGRESS NOTES
Shift assessment completed, see flow sheet. RASS -5 pt is vented and sedated. Nimbex off per Dr. Bianca Scott now. Intubated and sedated on AC # 7.5 ETT, at 23 LL. 26 / 300 / 35 %/ +5. SpO2 95%. Respirations are easy, even, and unlabored. Bilateral lung sounds diminished. VSS  ST  on the monitor  OG in place at 60 and clamped, with   Biz monitoring 58    PICC/CVC/PIV, WNL with fentanyl infusing at 150 mcg/hr, levophed 1mcg/min, NS 75 ml/hr, versed 6mg/hr    All lines and monitoring devices in place. Bridges is patent and secured. Bed in lowest position with wheels locked. No needs expressed at this time. Will continue to monitor.

## 2023-09-02 NOTE — PROGRESS NOTES
09/02/23 1113   Patient Observation   Pulse (!) 110   Respirations 24   Ventilator Settings   FiO2  35 %   Vt (Set, mL) 300 mL   Resp Rate (Set) 26 bmp   PEEP/CPAP (cmH2O) 5   Pressure Support (cm H2O) 0 cm H2O   Vent Patient Data (Readings)   Vt (Measured) 300 mL   Peak Inspiratory Pressure (cmH2O) 23 cmH2O   Rate Measured 24 br/min   Minute Volume (L/min) 7.21 Liters   Mean Airway Pressure (cmH2O) 9.1 cmH20   Plateau Pressure (cm H2O) 20 cm H2O   Driving Pressure 15   I:E Ratio 1:3.60   Vent Alarm Settings   High Pressure (cmH2O) 45 cmH2O   Low Minute Volume (lpm) 2 L/min   RR High (bpm) 40 br/min   Additional Respiratoray Assessments   Humidification Source Heated wire   Humidification Temp 36   Ambu Bag With Mask At Bedside Yes   Non-Surgical Airway 09/01/23 Endotracheal tube   Placement Date/Time: 09/01/23 0240   Present on Admission/Arrival: Yes  Placed By: In ED  Placement Verified By: Auscultation;Capnometry; Chest X-ray;Colorimetric ETCO2 device  Mask Ventilation: Oral airway  Insertion attempts: 1  Location: Oral  Airwa. ..    Secured At 22 cm   Measured From Lips   Secured Location Right   Ventilator Associated Pneumonia Bundle   Oral Care Completed Yes

## 2023-09-02 NOTE — PROGRESS NOTES
Per Dr. Nely Becerra. Pt now double stacking and vent alarming. Increasing sedation and prn given. Per MD make sure pt deep sedation and restart nimbex. Do not titrate any medication while on nimbex.

## 2023-09-02 NOTE — PROGRESS NOTES
Patient care assumed, assessment completed as charted. Patient continues on ventilator, #7.5 at the 22 lip line. A/C 24, , FiO2 35%, PEEP 5. Right upper arm PICC in place with Fentanyl infusing at 200mcg/hr, Versed at 7mg/hr, Nimbex at 2mcg/kg/min, and NS at 75mL/hr. Right radial a-line in place, OG clamped, villaseñor catheter patent. No further needs assessed at this time. Will monitor.

## 2023-09-02 NOTE — FLOWSHEET NOTE
09/02/23 0200 09/02/23 0230   Vitals   BP (!) 102/40 (!) 104/40   MAP (Calculated) 61 61   MAP (mmHg) (!) 57 (!) 58     BP trending low. ABP reading 75/54(64), 77/55(65). Levophed initiated at 5mcg/min. Will monitor.

## 2023-09-02 NOTE — PROGRESS NOTES
4 Eyes Skin Assessment     NAME:  Brent Davalos  YOB: 1945  MEDICAL RECORD NUMBER:  [id number]    The patient is being assessed for  Shift Handoff    I agree that at least one RN has performed a thorough Head to Toe Skin Assessment on the patient. ALL assessment sites listed below have been assessed. Areas assessed by both nurses:    Head, Face, Ears, Shoulders, Back, Chest, Arms, Elbows, Hands, Sacrum. Buttock, Coccyx, Ischium, Legs. Feet and Heels, and Under Medical Devices         Does the Patient have a Wound?  No noted wound(s)       Sloan Prevention initiated by RN: Yes  Wound Care Orders initiated by RN: No    Pressure Injury (Stage 3,4, Unstageable, DTI, NWPT, and Complex wounds) if present, place Wound referral order by RN under : No    New Ostomies, if present place, Ostomy referral order under : No     Nurse 1 eSignature: Electronically signed by Marva Simmons RN on 9/2/23 at 3:59 AM EDT    **SHARE this note so that the co-signing nurse can place an eSignature**    Nurse 2 eSignature: Electronically signed by Cuba Payne RN on 9/2/23 at 6:30 AM EDT

## 2023-09-02 NOTE — PROGRESS NOTES
4 Eyes Skin Assessment     The patient is being assess for  Shift assessment    I agree that 2 RN's have performed a thorough Head to Toe Skin Assessment on the patient. ALL assessment sites listed below have been assessed. Areas assessed by both nurses:   [x]   Head, Face, and Ears   [x]   Shoulders, Back, and Chest  [x]   Arms, Elbows, and Hands   [x]   Coccyx, Sacrum, and Ischum  [x]   Legs, Feet, and Heels        Does the Patient have Skin Breakdown?   No         Sloan Prevention initiated:  Yes   Wound Care Orders initiated:  No    Abrasion right lower abdomen  Scattered bruising  [facility] nurse consulted for Pressure Injury (Stage 3,4, Unstageable, DTI, NWPT, and Complex wounds):  No      Nurse 1 eSignature: Electronically signed by Marzena Appiah RN on 9/2/23 at 4:08 PM EDT    **SHARE this note so that the co-signing nurse is able to place an eSignature**    Nurse 2 eSignature: {Esignature:[id number]}

## 2023-09-02 NOTE — PROGRESS NOTES
09/01/23 2236   Patient Observation   Observations 7.5 ett 23 at lip   Breath Sounds   Right Upper Lobe Diminished   Right Middle Lobe Diminished   Right Lower Lobe Diminished   Left Upper Lobe Diminished   Left Lower Lobe Diminished   Vent Information   Vent Mode AC/VC   Ventilator Settings   FiO2  40 %   Vt (Set, mL) 300 mL   Resp Rate (Set) 26 bmp   PEEP/CPAP (cmH2O) 5   Vent Patient Data (Readings)   Vt (Measured) 300 mL   Peak Inspiratory Pressure (cmH2O) 27 cmH2O   Rate Measured 26 br/min   Minute Volume (L/min) 7.7 Liters   Peak Inspiratory Flow (lpm) 60 L/sec   Mean Airway Pressure (cmH2O) 10 cmH20   I:E Ratio 1:3.3   Flow Sensitivity 3 L/min   Vent Alarm Settings   High Pressure (cmH2O) 45 cmH2O   Low Minute Volume (lpm) 2 L/min   Low Exhaled Vt (ml) 210 mL   RR High (bpm) 40 br/min   Apnea (secs) 20 secs   Additional Respiratoray Assessments   Humidification Source Heated wire   Humidification Temp 36.7   Circuit Condensation Drained   Ambu Bag With Mask At Bedside Yes   Airway Clearance   Suction ET Tube   Suction Device Inline suction catheter   Sputum Method Obtained Endotracheal   Sputum Amount Scant   Non-Surgical Airway 09/01/23 Endotracheal tube   Placement Date/Time: 09/01/23 0240   Present on Admission/Arrival: Yes  Placed By: In ED  Placement Verified By: Auscultation;Capnometry; Chest X-ray;Colorimetric ETCO2 device  Mask Ventilation: Oral airway  Insertion attempts: 1  Location: Oral  Airwa. ..    Secured At 23 cm   Measured From [address] By Commercial tube mayer

## 2023-09-02 NOTE — PROGRESS NOTES
Pt given 500 ml bolus per Dr. Bianca Scott dt/ BP after paralytic. Pt now compliant with vent. BIS 50.

## 2023-09-02 NOTE — PROGRESS NOTES
RT at bedside pt still double stacking despite increase in sedation and prn. Dr. Jaimee Mathew at bedside. Will restart nimbex. HOANG ordered and given.

## 2023-09-02 NOTE — PROGRESS NOTES
FiO2 decraesed to 35%         09/02/23 0759   Breath Sounds   Right Upper Lobe Diminished   Right Middle Lobe Diminished   Right Lower Lobe Diminished   Left Upper Lobe Clear   Left Lower Lobe Diminished   Vent Alarm Settings   Apnea (secs) 20 secs   Additional Respiratoray Assessments   Humidification Source Heated wire   Humidification Temp 37   Non-Surgical Airway 09/01/23 Endotracheal tube   Placement Date/Time: 09/01/23 0240   Present on Admission/Arrival: Yes  Placed By: In ED  Placement Verified By: Auscultation;Capnometry; Chest X-ray;Colorimetric ETCO2 device  Mask Ventilation: Oral airway  Insertion attempts: 1  Location: Oral  Airwa. ..    Secured At 22 cm   Measured From [address] By Commercial tube mayer   Site Assessment Dry

## 2023-09-03 ENCOUNTER — APPOINTMENT (OUTPATIENT)
Dept: GENERAL RADIOLOGY | Age: 78
DRG: 870 | End: 2023-09-03
Payer: MEDICARE

## 2023-09-03 PROBLEM — T17.500A MUCUS PLUGGING OF BRONCHI: Status: ACTIVE | Noted: 2023-09-03

## 2023-09-03 PROBLEM — J18.9 HCAP (HEALTHCARE-ASSOCIATED PNEUMONIA): Status: ACTIVE | Noted: 2023-09-03

## 2023-09-03 LAB
ANION GAP SERPL CALCULATED.3IONS-SCNC: 8 MMOL/L (ref 3–16)
BASE EXCESS BLDA CALC-SCNC: -5.3 MMOL/L (ref -3–3)
BASOPHILS # BLD: 0 K/UL (ref 0–0.2)
BASOPHILS NFR BLD: 0.2 %
BUN SERPL-MCNC: 15 MG/DL (ref 7–20)
CALCIUM SERPL-MCNC: 7.8 MG/DL (ref 8.3–10.6)
CHLORIDE SERPL-SCNC: 105 MMOL/L (ref 99–110)
CO2 BLDA-SCNC: 21.8 MMOL/L
CO2 SERPL-SCNC: 21 MMOL/L (ref 21–32)
COHGB MFR BLDA: 0.3 % (ref 0–1.5)
CREAT SERPL-MCNC: 1.2 MG/DL (ref 0.6–1.2)
DEPRECATED RDW RBC AUTO: 14.4 % (ref 12.4–15.4)
EOSINOPHIL # BLD: 0 K/UL (ref 0–0.6)
EOSINOPHIL NFR BLD: 0 %
GFR SERPLBLD CREATININE-BSD FMLA CKD-EPI: 46 ML/MIN/{1.73_M2}
GLUCOSE BLD-MCNC: 119 MG/DL (ref 70–99)
GLUCOSE BLD-MCNC: 135 MG/DL (ref 70–99)
GLUCOSE BLD-MCNC: 151 MG/DL (ref 70–99)
GLUCOSE BLD-MCNC: 156 MG/DL (ref 70–99)
GLUCOSE BLD-MCNC: 167 MG/DL (ref 70–99)
GLUCOSE SERPL-MCNC: 155 MG/DL (ref 70–99)
HCO3 BLDA-SCNC: 20.6 MMOL/L (ref 21–29)
HCT VFR BLD AUTO: 31.1 % (ref 36–48)
HGB BLD-MCNC: 10.8 G/DL (ref 12–16)
HGB BLDA-MCNC: 11.7 G/DL (ref 12–16)
LYMPHOCYTES # BLD: 0.4 K/UL (ref 1–5.1)
LYMPHOCYTES NFR BLD: 4 %
MCH RBC QN AUTO: 31 PG (ref 26–34)
MCHC RBC AUTO-ENTMCNC: 34.7 G/DL (ref 31–36)
MCV RBC AUTO: 89.2 FL (ref 80–100)
METHGB MFR BLDA: 0 %
MONOCYTES # BLD: 0.3 K/UL (ref 0–1.3)
MONOCYTES NFR BLD: 2.9 %
NEUTROPHILS # BLD: 9.9 K/UL (ref 1.7–7.7)
NEUTROPHILS NFR BLD: 92.9 %
O2 THERAPY: ABNORMAL
PCO2 BLDA: 41.3 MMHG (ref 35–45)
PERFORMED ON: ABNORMAL
PH BLDA: 7.32 [PH] (ref 7.35–7.45)
PLATELET # BLD AUTO: 179 K/UL (ref 135–450)
PMV BLD AUTO: 7.5 FL (ref 5–10.5)
PO2 BLDA: 81.8 MMHG (ref 75–108)
POTASSIUM SERPL-SCNC: 3.9 MMOL/L (ref 3.5–5.1)
RBC # BLD AUTO: 3.49 M/UL (ref 4–5.2)
SAO2 % BLDA: 95.2 %
SODIUM SERPL-SCNC: 134 MMOL/L (ref 136–145)
VANCOMYCIN TROUGH SERPL-MCNC: 15.6 UG/ML (ref 10–20)
WBC # BLD AUTO: 10.6 K/UL (ref 4–11)

## 2023-09-03 PROCEDURE — 0B9F8ZX DRAINAGE OF RIGHT LOWER LUNG LOBE, VIA NATURAL OR ARTIFICIAL OPENING ENDOSCOPIC, DIAGNOSTIC: ICD-10-PCS | Performed by: INTERNAL MEDICINE

## 2023-09-03 PROCEDURE — 87070 CULTURE OTHR SPECIMN AEROBIC: CPT

## 2023-09-03 PROCEDURE — 80048 BASIC METABOLIC PNL TOTAL CA: CPT

## 2023-09-03 PROCEDURE — 31622 DX BRONCHOSCOPE/WASH: CPT

## 2023-09-03 PROCEDURE — 36592 COLLECT BLOOD FROM PICC: CPT

## 2023-09-03 PROCEDURE — C9113 INJ PANTOPRAZOLE SODIUM, VIA: HCPCS | Performed by: INTERNAL MEDICINE

## 2023-09-03 PROCEDURE — 2580000003 HC RX 258: Performed by: INTERNAL MEDICINE

## 2023-09-03 PROCEDURE — 80202 ASSAY OF VANCOMYCIN: CPT

## 2023-09-03 PROCEDURE — 94003 VENT MGMT INPAT SUBQ DAY: CPT

## 2023-09-03 PROCEDURE — 6360000002 HC RX W HCPCS: Performed by: INTERNAL MEDICINE

## 2023-09-03 PROCEDURE — 2700000000 HC OXYGEN THERAPY PER DAY

## 2023-09-03 PROCEDURE — 82803 BLOOD GASES ANY COMBINATION: CPT

## 2023-09-03 PROCEDURE — 94640 AIRWAY INHALATION TREATMENT: CPT

## 2023-09-03 PROCEDURE — 36600 WITHDRAWAL OF ARTERIAL BLOOD: CPT

## 2023-09-03 PROCEDURE — 85025 COMPLETE CBC W/AUTO DIFF WBC: CPT

## 2023-09-03 PROCEDURE — 71045 X-RAY EXAM CHEST 1 VIEW: CPT

## 2023-09-03 PROCEDURE — 31624 DX BRONCHOSCOPE/LAVAGE: CPT | Performed by: INTERNAL MEDICINE

## 2023-09-03 PROCEDURE — 99233 SBSQ HOSP IP/OBS HIGH 50: CPT | Performed by: INTERNAL MEDICINE

## 2023-09-03 PROCEDURE — 2000000000 HC ICU R&B

## 2023-09-03 PROCEDURE — 87205 SMEAR GRAM STAIN: CPT

## 2023-09-03 PROCEDURE — 0BCB8ZZ EXTIRPATION OF MATTER FROM LEFT LOWER LOBE BRONCHUS, VIA NATURAL OR ARTIFICIAL OPENING ENDOSCOPIC: ICD-10-PCS | Performed by: INTERNAL MEDICINE

## 2023-09-03 PROCEDURE — 6370000000 HC RX 637 (ALT 250 FOR IP): Performed by: INTERNAL MEDICINE

## 2023-09-03 PROCEDURE — 99291 CRITICAL CARE FIRST HOUR: CPT | Performed by: INTERNAL MEDICINE

## 2023-09-03 PROCEDURE — 94761 N-INVAS EAR/PLS OXIMETRY MLT: CPT

## 2023-09-03 PROCEDURE — 31645 BRNCHSC W/THER ASPIR 1ST: CPT | Performed by: INTERNAL MEDICINE

## 2023-09-03 PROCEDURE — 2500000003 HC RX 250 WO HCPCS: Performed by: INTERNAL MEDICINE

## 2023-09-03 RX ORDER — DEXAMETHASONE SODIUM PHOSPHATE 10 MG/ML
8 INJECTION, SOLUTION INTRAMUSCULAR; INTRAVENOUS EVERY 12 HOURS
Status: DISCONTINUED | OUTPATIENT
Start: 2023-09-03 | End: 2023-09-10

## 2023-09-03 RX ADMIN — Medication 200 MCG/HR: at 10:34

## 2023-09-03 RX ADMIN — CHLORHEXIDINE GLUCONATE 0.12% ORAL RINSE 15 ML: 1.2 LIQUID ORAL at 20:37

## 2023-09-03 RX ADMIN — IPRATROPIUM BROMIDE AND ALBUTEROL SULFATE 1 DOSE: 2.5; .5 SOLUTION RESPIRATORY (INHALATION) at 15:21

## 2023-09-03 RX ADMIN — CARBOXYMETHYLCELLULOSE SODIUM 1 DROP: 10 GEL OPHTHALMIC at 13:33

## 2023-09-03 RX ADMIN — BUSPIRONE HYDROCHLORIDE 15 MG: 7.5 TABLET ORAL at 08:43

## 2023-09-03 RX ADMIN — KETAMINE HYDROCHLORIDE 0.05 MG/KG/HR: 100 INJECTION INTRAMUSCULAR; INTRAVENOUS at 11:37

## 2023-09-03 RX ADMIN — CHLORHEXIDINE GLUCONATE 0.12% ORAL RINSE 15 ML: 1.2 LIQUID ORAL at 08:43

## 2023-09-03 RX ADMIN — CISATRACURIUM BESYLATE 2 MCG/KG/MIN: 10 INJECTION, SOLUTION INTRAVENOUS at 11:31

## 2023-09-03 RX ADMIN — DEXAMETHASONE SODIUM PHOSPHATE 8 MG: 10 INJECTION INTRAMUSCULAR; INTRAVENOUS at 20:36

## 2023-09-03 RX ADMIN — BUSPIRONE HYDROCHLORIDE 15 MG: 7.5 TABLET ORAL at 20:36

## 2023-09-03 RX ADMIN — WHITE PETROLATUM 57.7 %-MINERAL OIL 31.9 % EYE OINTMENT: at 18:50

## 2023-09-03 RX ADMIN — SODIUM CHLORIDE: 9 INJECTION, SOLUTION INTRAVENOUS at 04:10

## 2023-09-03 RX ADMIN — BUSPIRONE HYDROCHLORIDE 15 MG: 7.5 TABLET ORAL at 12:51

## 2023-09-03 RX ADMIN — Medication 7 MG/HR: at 08:32

## 2023-09-03 RX ADMIN — WHITE PETROLATUM 57.7 %-MINERAL OIL 31.9 % EYE OINTMENT: at 01:07

## 2023-09-03 RX ADMIN — ATORVASTATIN CALCIUM 80 MG: 40 TABLET, FILM COATED ORAL at 20:36

## 2023-09-03 RX ADMIN — DEXAMETHASONE 6 MG: 4 TABLET ORAL at 08:44

## 2023-09-03 RX ADMIN — PANTOPRAZOLE SODIUM 40 MG: 40 INJECTION, POWDER, FOR SOLUTION INTRAVENOUS at 08:53

## 2023-09-03 RX ADMIN — IPRATROPIUM BROMIDE AND ALBUTEROL SULFATE 1 DOSE: 2.5; .5 SOLUTION RESPIRATORY (INHALATION) at 07:46

## 2023-09-03 RX ADMIN — IPRATROPIUM BROMIDE AND ALBUTEROL SULFATE 1 DOSE: 2.5; .5 SOLUTION RESPIRATORY (INHALATION) at 10:53

## 2023-09-03 RX ADMIN — Medication 200 MCG/HR: at 04:26

## 2023-09-03 RX ADMIN — SERTRALINE 100 MG: 100 TABLET, FILM COATED ORAL at 08:44

## 2023-09-03 RX ADMIN — MEROPENEM 1000 MG: 1 INJECTION, POWDER, FOR SOLUTION INTRAVENOUS at 04:12

## 2023-09-03 RX ADMIN — ENOXAPARIN SODIUM 120 MG: 150 INJECTION SUBCUTANEOUS at 06:00

## 2023-09-03 RX ADMIN — SODIUM CHLORIDE: 9 INJECTION, SOLUTION INTRAVENOUS at 17:49

## 2023-09-03 RX ADMIN — SODIUM CHLORIDE, PRESERVATIVE FREE 10 ML: 5 INJECTION INTRAVENOUS at 20:36

## 2023-09-03 RX ADMIN — ENOXAPARIN SODIUM 120 MG: 150 INJECTION SUBCUTANEOUS at 16:44

## 2023-09-03 RX ADMIN — IPRATROPIUM BROMIDE AND ALBUTEROL SULFATE 1 DOSE: 2.5; .5 SOLUTION RESPIRATORY (INHALATION) at 03:01

## 2023-09-03 RX ADMIN — IPRATROPIUM BROMIDE AND ALBUTEROL SULFATE 1 DOSE: 2.5; .5 SOLUTION RESPIRATORY (INHALATION) at 23:08

## 2023-09-03 RX ADMIN — VANCOMYCIN HYDROCHLORIDE 750 MG: 750 INJECTION, POWDER, LYOPHILIZED, FOR SOLUTION INTRAVENOUS at 08:41

## 2023-09-03 RX ADMIN — MUPIROCIN: 20 OINTMENT TOPICAL at 20:37

## 2023-09-03 RX ADMIN — CARBOXYMETHYLCELLULOSE SODIUM 1 DROP: 10 GEL OPHTHALMIC at 01:08

## 2023-09-03 RX ADMIN — SODIUM CHLORIDE, PRESERVATIVE FREE 10 ML: 5 INJECTION INTRAVENOUS at 08:55

## 2023-09-03 RX ADMIN — SERTRALINE 100 MG: 100 TABLET, FILM COATED ORAL at 20:36

## 2023-09-03 RX ADMIN — MEROPENEM 1000 MG: 1 INJECTION, POWDER, FOR SOLUTION INTRAVENOUS at 20:33

## 2023-09-03 RX ADMIN — IPRATROPIUM BROMIDE AND ALBUTEROL SULFATE 1 DOSE: 2.5; .5 SOLUTION RESPIRATORY (INHALATION) at 19:52

## 2023-09-03 RX ADMIN — WHITE PETROLATUM 57.7 %-MINERAL OIL 31.9 % EYE OINTMENT: at 20:36

## 2023-09-03 RX ADMIN — ASPIRIN 81 MG: 81 TABLET, CHEWABLE ORAL at 08:44

## 2023-09-03 RX ADMIN — BUSPIRONE HYDROCHLORIDE 15 MG: 7.5 TABLET ORAL at 16:44

## 2023-09-03 RX ADMIN — CARBOXYMETHYLCELLULOSE SODIUM 1 DROP: 10 GEL OPHTHALMIC at 08:43

## 2023-09-03 RX ADMIN — Medication 200 MCG/HR: at 21:20

## 2023-09-03 RX ADMIN — MEROPENEM 1000 MG: 1 INJECTION, POWDER, FOR SOLUTION INTRAVENOUS at 12:22

## 2023-09-03 RX ADMIN — WHITE PETROLATUM 57.7 %-MINERAL OIL 31.9 % EYE OINTMENT: at 06:00

## 2023-09-03 RX ADMIN — Medication 200 MCG/HR: at 15:50

## 2023-09-03 RX ADMIN — MUPIROCIN: 20 OINTMENT TOPICAL at 08:53

## 2023-09-03 ASSESSMENT — PULMONARY FUNCTION TESTS
PIF_VALUE: 23
PIF_VALUE: 22
PIF_VALUE: 23
PIF_VALUE: 28
PIF_VALUE: 23
PIF_VALUE: 23
PIF_VALUE: 29
PIF_VALUE: 23
PIF_VALUE: 22
PIF_VALUE: 23
PIF_VALUE: 26
PIF_VALUE: 23
PIF_VALUE: 27
PIF_VALUE: 26
PIF_VALUE: 23
PIF_VALUE: 29
PIF_VALUE: 40
PIF_VALUE: 23
PIF_VALUE: 30
PIF_VALUE: 26
PIF_VALUE: 23
PIF_VALUE: 23
PIF_VALUE: 22
PIF_VALUE: 23
PIF_VALUE: 22
PIF_VALUE: 23
PIF_VALUE: 30
PIF_VALUE: 23
PIF_VALUE: 27
PIF_VALUE: 23
PIF_VALUE: 22
PIF_VALUE: 22
PIF_VALUE: 29
PIF_VALUE: 31
PIF_VALUE: 23
PIF_VALUE: 22
PIF_VALUE: 26
PIF_VALUE: 23
PIF_VALUE: 23
PIF_VALUE: 29
PIF_VALUE: 23
PIF_VALUE: 27
PIF_VALUE: 23
PIF_VALUE: 24
PIF_VALUE: 26
PIF_VALUE: 23
PIF_VALUE: 23
PIF_VALUE: 26
PIF_VALUE: 23
PIF_VALUE: 23
PIF_VALUE: 26
PIF_VALUE: 26
PIF_VALUE: 23
PIF_VALUE: 31
PIF_VALUE: 23
PIF_VALUE: 26
PIF_VALUE: 23
PIF_VALUE: 22
PIF_VALUE: 23
PIF_VALUE: 30
PIF_VALUE: 23
PIF_VALUE: 27
PIF_VALUE: 23
PIF_VALUE: 26
PIF_VALUE: 23
PIF_VALUE: 23
PIF_VALUE: 26
PIF_VALUE: 23
PIF_VALUE: 26
PIF_VALUE: 23
PIF_VALUE: 31
PIF_VALUE: 23
PIF_VALUE: 26
PIF_VALUE: 23
PIF_VALUE: 29
PIF_VALUE: 23
PIF_VALUE: 24
PIF_VALUE: 23
PIF_VALUE: 26
PIF_VALUE: 23
PIF_VALUE: 30
PIF_VALUE: 23
PIF_VALUE: 24
PIF_VALUE: 23
PIF_VALUE: 27
PIF_VALUE: 26
PIF_VALUE: 27
PIF_VALUE: 29
PIF_VALUE: 23
PIF_VALUE: 26
PIF_VALUE: 23
PIF_VALUE: 26
PIF_VALUE: 23
PIF_VALUE: 27
PIF_VALUE: 23
PIF_VALUE: 22
PIF_VALUE: 23
PIF_VALUE: 26
PIF_VALUE: 23
PIF_VALUE: 23
PIF_VALUE: 26
PIF_VALUE: 23
PIF_VALUE: 27
PIF_VALUE: 23
PIF_VALUE: 32
PIF_VALUE: 23
PIF_VALUE: 23
PIF_VALUE: 29
PIF_VALUE: 23
PIF_VALUE: 35
PIF_VALUE: 23
PIF_VALUE: 23
PIF_VALUE: 29
PIF_VALUE: 23
PIF_VALUE: 23
PIF_VALUE: 25
PIF_VALUE: 26
PIF_VALUE: 23
PIF_VALUE: 26
PIF_VALUE: 31
PIF_VALUE: 23
PIF_VALUE: 26
PIF_VALUE: 23
PIF_VALUE: 24
PIF_VALUE: 26
PIF_VALUE: 23
PIF_VALUE: 28
PIF_VALUE: 23
PIF_VALUE: 35
PIF_VALUE: 24
PIF_VALUE: 23
PIF_VALUE: 23
PIF_VALUE: 26
PIF_VALUE: 23
PIF_VALUE: 31
PIF_VALUE: 23
PIF_VALUE: 23
PIF_VALUE: 34
PIF_VALUE: 26
PIF_VALUE: 32
PIF_VALUE: 23
PIF_VALUE: 27
PIF_VALUE: 23
PIF_VALUE: 26
PIF_VALUE: 23
PIF_VALUE: 26
PIF_VALUE: 26
PIF_VALUE: 23
PIF_VALUE: 23
PIF_VALUE: 26
PIF_VALUE: 23
PIF_VALUE: 26
PIF_VALUE: 23
PIF_VALUE: 27
PIF_VALUE: 26
PIF_VALUE: 23
PIF_VALUE: 26
PIF_VALUE: 23
PIF_VALUE: 24
PIF_VALUE: 23
PIF_VALUE: 29
PIF_VALUE: 23
PIF_VALUE: 27
PIF_VALUE: 23
PIF_VALUE: 23
PIF_VALUE: 33
PIF_VALUE: 23
PIF_VALUE: 27
PIF_VALUE: 23
PIF_VALUE: 23

## 2023-09-03 NOTE — PROGRESS NOTES
4 Eyes Skin Assessment     NAME:  Brent Davalos  YOB: 1945  MEDICAL RECORD NUMBER:  [id number]    The patient is being assessed for  Shift Handoff    I agree that at least one RN has performed a thorough Head to Toe Skin Assessment on the patient. ALL assessment sites listed below have been assessed. Areas assessed by both nurses:    Head, Face, Ears, Shoulders, Back, Chest, Arms, Elbows, Hands, Sacrum. Buttock, Coccyx, Ischium, Legs. Feet and Heels, and Under Medical Devices         Does the Patient have a Wound?  No noted wound(s)       Sloan Prevention initiated by RN: Yes  Wound Care Orders initiated by RN: No    Pressure Injury (Stage 3,4, Unstageable, DTI, NWPT, and Complex wounds) if present, place Wound referral order by RN under : No    New Ostomies, if present place, Ostomy referral order under : No     Nurse 1 eSignature: Electronically signed by Ruddy Allen RN on 9/3/23 at 7:02 PM EDT    **SHARE this note so that the co-signing nurse can place an eSignature**    Nurse 2 eSignature: {Esignature:[id number]}

## 2023-09-03 NOTE — PROGRESS NOTES
CARDIOLOGY PROGRESS NOTE        Patient Name: Fritz Mesa  Date of admission: 9/1/2023  2:26 AM  Admission Dx: Respiratory failure ([address]) [J96.90]  HCAP (healthcare-associated pneumonia) [J18.9]  Acute respiratory failure with hypoxia and hypercapnia ([address]) [J96.01, J96.02]  New onset left bundle branch block (LBBB) [I44.7]  Sepsis, due to unspecified organism, unspecified whether acute organ dysfunction present ([address]) [A41.9]  Requesting Physician: Driss Chamorro DO  Primary Care physician: Chay Cano MD    Reason for Consultation/Chief Complaint: Acute respiratory failure/elevated troponin    History of Present Illness:     Fritz Mesa is a 68 y.o. patient with a prior medical history notable for recent admission at [facility] for uncomplicated diverticulitis, breast cancer, asthma, hypertension, hyperlipidemia who presented to the hospital with complaints of shortness of breath and respiratory distress. See consultation note dated 9/1 for full details of HPI. Patient presenting with acute hypoxic respiratory failure with concern for HCAP on the heels of recent admission. Course complicated by NSTEMI.    9/2: Troponin peaked 372. Creatinine 1.2 from 0.9. Improving leukocytosis. Chest x-ray shows increasing small to moderate left and small right pleural effusions compared with previous. Slightly improved right-sided airspace disease. Low-dose Levophed. Echo showed normal LV function with no regional wall motion abnormalities. Moderate TR/pulmonary hypertension. Today, no acute events overnight. Patient weaned from [facility]. Blood pressure stable. No weaning trial as yet. Remains on antimicrobial therapy. No family at bedside.     Past Medical History:   has a past medical history of Anxiety attack, Arthritis, Cancer ([address]), Diastolic dysfunction, Diverticulitis, GERD (gastroesophageal reflux disease), Hearing loss, Hyperlipidemia, Hypertension, Left breast mass, Miscarriage, Moderate

## 2023-09-03 NOTE — PROGRESS NOTES
Assessment completed, patient remains on Nimbex drip, patients VSS, bis at 42, remains within normal levels, awaiting care rounds.

## 2023-09-03 NOTE — PROGRESS NOTES
Consent verified for bronchoscopy. Timeout per policy. Procedure performed by Dr. Jalyn Joy. Bronchoscopy assist performed on 100 FiO2.    0ml of 1% lidocaine instilled and 63ml of 0.9% normal saline instilled. Pt tolerated bronchoscopy without difficulty, airway support per RTD. Patient SpO2 within acceptable range throughout bronchoscopy procedure. Sedation provided/monitored per nurse. No s/s distress, no complications noted. See physician notes.   Continue plan of care

## 2023-09-03 NOTE — PROGRESS NOTES
09/02/23 2242   Patient Observation   Observations 7.5 ett 23 at lip   Breath Sounds   Right Upper Lobe Diminished   Right Middle Lobe Diminished   Right Lower Lobe Diminished   Left Upper Lobe Diminished   Left Lower Lobe Diminished   Vent Information   Vent Mode AC/VC   Ventilator Settings   FiO2  35 %   Vt (Set, mL) 300 mL   Resp Rate (Set) 24 bmp   PEEP/CPAP (cmH2O) 5   Vent Patient Data (Readings)   Vt (Measured) 296 mL   Peak Inspiratory Pressure (cmH2O) 25 cmH2O   Rate Measured 24 br/min   Minute Volume (L/min) 7.1 Liters   Peak Inspiratory Flow (lpm) 60 L/sec   Mean Airway Pressure (cmH2O) 9.7 cmH20   I:E Ratio 1:3.6   Flow Sensitivity 3 L/min   Vent Alarm Settings   High Pressure (cmH2O) 45 cmH2O   Low Minute Volume (lpm) 2 L/min   Low Exhaled Vt (ml) 210 mL   RR High (bpm) 40 br/min   Apnea (secs) 20 secs   Additional Respiratoray Assessments   Humidification Source Heated wire   Humidification Temp 37   Circuit Condensation Drained   Ambu Bag With Mask At Bedside Yes   Airway Clearance   Suction ET Tube   Suction Device Inline suction catheter   Sputum Method Obtained Endotracheal   Sputum Amount Scant   Non-Surgical Airway 09/01/23 Endotracheal tube   Placement Date/Time: 09/01/23 0240   Present on Admission/Arrival: Yes  Placed By: In ED  Placement Verified By: Auscultation;Capnometry; Chest X-ray;Colorimetric ETCO2 device  Mask Ventilation: Oral airway  Insertion attempts: 1  Location: Oral  Airwa. ..    Secured At 23 cm   Measured From [address] By Commercial tube mayer

## 2023-09-03 NOTE — PROGRESS NOTES
09/03/23 0749   Patient Observation   Pulse 89   Respirations 17   SpO2 100 %   Breath Sounds   Right Upper Lobe Expiratory wheezes   Right Middle Lobe Expiratory wheezes   Right Lower Lobe Diminished   Left Upper Lobe Expiratory wheezes   Left Lower Lobe Diminished   Ventilator Settings   FiO2  35 %   Vt (Set, mL) 300 mL   Resp Rate (Set) 24 bmp   PEEP/CPAP (cmH2O) 5   Vent Patient Data (Readings)   Vt (Measured) 295 mL   Peak Inspiratory Pressure (cmH2O) 22 cmH2O   Rate Measured 24 br/min   Minute Volume (L/min) 7.08 Liters   Mean Airway Pressure (cmH2O) 9.3 cmH20   I:E Ratio 1:3.60   Vent Alarm Settings   High Pressure (cmH2O) 45 cmH2O   Low Minute Volume (lpm) 2 L/min   RR High (bpm) 40 br/min   Additional Respiratoray Assessments   Humidification Source Heated wire   Humidification Temp 37   Non-Surgical Airway 09/01/23 Endotracheal tube   Placement Date/Time: 09/01/23 0240   Present on Admission/Arrival: Yes  Placed By: In ED  Placement Verified By: Auscultation;Capnometry; Chest X-ray;Colorimetric ETCO2 device  Mask Ventilation: Oral airway  Insertion attempts: 1  Location: Oral  Airwa. ..    Secured At 22 cm   Measured From [address] By Commercial tube mayer   Site Assessment Dry   Ventilator Associated Pneumonia Bundle   Oral Care Completed Yes   Oral Care Performed Mouth suctioned   Treatment   $Treatment Type $Inhaled Therapy/Meds

## 2023-09-03 NOTE — PROGRESS NOTES
4 Eyes Skin Assessment     NAME:  Je Epstein  YOB: 1945  MEDICAL RECORD NUMBER:  [id number]    The patient is being assessed for  Shift Handoff    I agree that at least one RN has performed a thorough Head to Toe Skin Assessment on the patient. ALL assessment sites listed below have been assessed. Areas assessed by both nurses:    Head, Face, Ears, Shoulders, Back, Chest, Arms, Elbows, Hands, Sacrum. Buttock, Coccyx, Ischium, Legs. Feet and Heels, and Under Medical Devices         Does the Patient have a Wound?  No noted wound(s)       Sloan Prevention initiated by RN: Yes  Wound Care Orders initiated by RN: No    Pressure Injury (Stage 3,4, Unstageable, DTI, NWPT, and Complex wounds) if present, place Wound referral order by RN under : No    New Ostomies, if present place, Ostomy referral order under : No     Nurse 1 eSignature: Electronically signed by Karla Moran RN on 9/3/23 at 5:32 AM EDT    **SHARE this note so that the co-signing nurse can place an eSignature**    Nurse 2 eSignature: Electronically signed by Sandee Storey RN on 9/3/23 at 5:33 AM EDT

## 2023-09-03 NOTE — PROCEDURES
PROCEDURE:  BRONCHOSCOPY WITH BRONCHOALVEOLAR LAVAGE    Pre-procedure evaluation/H&P: Critically ill patient with respiratory failure, requires fiberoptic bronchoscopy for direct airway evaluation and to enable acquisition of quality pulmonary fluid sample for analysis.   Please see my ICU note from today for additional details    Current Facility-Administered Medications   Medication Dose Route Frequency Provider Last Rate Last Admin    vancomycin (VANCOCIN) 750 mg in sodium chloride 0.9 % 250 mL IVPB  750 mg IntraVENous Q18H Kinza Johnson MD   Stopped at 09/03/23 1043    dexamethasone (PF) (DECADRON) injection 8 mg  8 mg IntraVENous Q12H Kinza Johnson MD        ketamine (KETALAR) 500 mg in sodium chloride 0.9 % 100 mL infusion  0.05-1 mg/kg/hr (Ideal) IntraVENous Continuous Kinza Johnson MD        fentaNYL (SUBLIMAZE) 1,000 mcg in sodium chloride 0.9% 100 mL infusion   mcg/hr IntraVENous Continuous Kinza Johnson MD 20 mL/hr at 09/03/23 1034 200 mcg/hr at 09/03/23 1034    midazolam (VERSED) 1 mg/mL in NS infusion  7 mg/hr IntraVENous Continuous Kinza Johnson MD 7 mL/hr at 09/03/23 0832 7 mg/hr at 09/03/23 1519    sodium chloride 0.9 % bolus 500 mL  500 mL IntraVENous Once Kinza Johnson MD        albuterol (PROVENTIL) (2.5 MG/3ML) 0.083% nebulizer solution 2.5 mg  2.5 mg Nebulization Q2H PRN Kinza Johnson MD        sodium chloride flush 0.9 % injection 5-40 mL  5-40 mL IntraVENous 2 times per day Karis Monson MD   10 mL at 09/03/23 0855    sodium chloride flush 0.9 % injection 5-40 mL  5-40 mL IntraVENous PRN Karis Monson MD        0.9 % sodium chloride infusion   IntraVENous PRN Karis Monson MD        ondansetron (ZOFRAN-ODT) disintegrating tablet 4 mg  4 mg Oral Q8H PRN Karis Monson MD        Or    ondansetron (ZOFRAN) injection 4 mg  4 mg IntraVENous Q6H PRN Karis Monson MD        polyethylene glycol (GLYCOLAX) packet 17 g  17 g Oral Daily PRN Karis Monson MD

## 2023-09-03 NOTE — PROGRESS NOTES
09/03/23 0302   Patient Observation   Observations 7.5 ett 23 at lip   Breath Sounds   Right Upper Lobe Diminished   Right Middle Lobe Diminished   Right Lower Lobe Diminished   Left Upper Lobe Diminished   Left Lower Lobe Diminished   Vent Information   Vent Mode AC/VC   Ventilator Settings   FiO2  35 %   Vt (Set, mL) 300 mL   Resp Rate (Set) 24 bmp   PEEP/CPAP (cmH2O) 5   Vent Patient Data (Readings)   Vt (Measured) 296 mL   Peak Inspiratory Pressure (cmH2O) 29 cmH2O   Rate Measured 24 br/min   Minute Volume (L/min) 7.1 Liters   Peak Inspiratory Flow (lpm) 60 L/sec   Mean Airway Pressure (cmH2O) 11 cmH20   I:E Ratio 1:3.6   Flow Sensitivity 3 L/min   Vent Alarm Settings   High Pressure (cmH2O) 45 cmH2O   Low Minute Volume (lpm) 2 L/min   Low Exhaled Vt (ml) 210 mL   RR High (bpm) 40 br/min   Apnea (secs) 20 secs   Additional Respiratoray Assessments   Humidification Source Heated wire   Humidification Temp 36.9   Circuit Condensation Drained   Ambu Bag With Mask At Bedside Yes   Airway Clearance   Suction ET Tube   Suction Device Inline suction catheter   Sputum Method Obtained Endotracheal   Sputum Amount Scant   Non-Surgical Airway 09/01/23 Endotracheal tube   Placement Date/Time: 09/01/23 0240   Present on Admission/Arrival: Yes  Placed By: In ED  Placement Verified By: Auscultation;Capnometry; Chest X-ray;Colorimetric ETCO2 device  Mask Ventilation: Oral airway  Insertion attempts: 1  Location: Oral  Airwa. ..    Secured At 23 cm   Measured From Lips   Secured Location Right   Secured By Commercial tube mayer

## 2023-09-04 ENCOUNTER — APPOINTMENT (OUTPATIENT)
Dept: GENERAL RADIOLOGY | Age: 78
DRG: 870 | End: 2023-09-04
Payer: MEDICARE

## 2023-09-04 LAB
ANION GAP SERPL CALCULATED.3IONS-SCNC: 7 MMOL/L (ref 3–16)
BASE EXCESS BLDA CALC-SCNC: -5.2 MMOL/L (ref -3–3)
BASOPHILS # BLD: 0 K/UL (ref 0–0.2)
BASOPHILS NFR BLD: 0.3 %
BUN SERPL-MCNC: 20 MG/DL (ref 7–20)
CALCIUM SERPL-MCNC: 7.8 MG/DL (ref 8.3–10.6)
CHLORIDE SERPL-SCNC: 108 MMOL/L (ref 99–110)
CO2 BLDA-SCNC: 22.3 MMOL/L
CO2 SERPL-SCNC: 21 MMOL/L (ref 21–32)
COHGB MFR BLDA: 0.3 % (ref 0–1.5)
CREAT SERPL-MCNC: 1.1 MG/DL (ref 0.6–1.2)
DEPRECATED RDW RBC AUTO: 14.8 % (ref 12.4–15.4)
EOSINOPHIL # BLD: 0 K/UL (ref 0–0.6)
EOSINOPHIL NFR BLD: 0 %
GFR SERPLBLD CREATININE-BSD FMLA CKD-EPI: 52 ML/MIN/{1.73_M2}
GLUCOSE BLD-MCNC: 126 MG/DL (ref 70–99)
GLUCOSE BLD-MCNC: 130 MG/DL (ref 70–99)
GLUCOSE BLD-MCNC: 136 MG/DL (ref 70–99)
GLUCOSE BLD-MCNC: 155 MG/DL (ref 70–99)
GLUCOSE BLD-MCNC: 163 MG/DL (ref 70–99)
GLUCOSE SERPL-MCNC: 152 MG/DL (ref 70–99)
HCO3 BLDA-SCNC: 21 MMOL/L (ref 21–29)
HCT VFR BLD AUTO: 33.6 % (ref 36–48)
HGB BLD-MCNC: 11.3 G/DL (ref 12–16)
HGB BLDA-MCNC: 12.2 G/DL (ref 12–16)
LYMPHOCYTES # BLD: 0.5 K/UL (ref 1–5.1)
LYMPHOCYTES NFR BLD: 4.8 %
MCH RBC QN AUTO: 30.3 PG (ref 26–34)
MCHC RBC AUTO-ENTMCNC: 33.6 G/DL (ref 31–36)
MCV RBC AUTO: 90.1 FL (ref 80–100)
METHGB MFR BLDA: 0 %
MONOCYTES # BLD: 0.3 K/UL (ref 0–1.3)
MONOCYTES NFR BLD: 2.6 %
NEUTROPHILS # BLD: 8.9 K/UL (ref 1.7–7.7)
NEUTROPHILS NFR BLD: 92.3 %
O2 THERAPY: ABNORMAL
PCO2 BLDA: 43.5 MMHG (ref 35–45)
PERFORMED ON: ABNORMAL
PH BLDA: 7.3 [PH] (ref 7.35–7.45)
PLATELET # BLD AUTO: 172 K/UL (ref 135–450)
PMV BLD AUTO: 7.6 FL (ref 5–10.5)
PO2 BLDA: 68.6 MMHG (ref 75–108)
POTASSIUM SERPL-SCNC: 4 MMOL/L (ref 3.5–5.1)
RBC # BLD AUTO: 3.73 M/UL (ref 4–5.2)
SAO2 % BLDA: 92 %
SODIUM SERPL-SCNC: 136 MMOL/L (ref 136–145)
WBC # BLD AUTO: 9.6 K/UL (ref 4–11)

## 2023-09-04 PROCEDURE — 94761 N-INVAS EAR/PLS OXIMETRY MLT: CPT

## 2023-09-04 PROCEDURE — 80048 BASIC METABOLIC PNL TOTAL CA: CPT

## 2023-09-04 PROCEDURE — 2000000000 HC ICU R&B

## 2023-09-04 PROCEDURE — 94003 VENT MGMT INPAT SUBQ DAY: CPT

## 2023-09-04 PROCEDURE — 82803 BLOOD GASES ANY COMBINATION: CPT

## 2023-09-04 PROCEDURE — 2580000003 HC RX 258: Performed by: INTERNAL MEDICINE

## 2023-09-04 PROCEDURE — 6360000002 HC RX W HCPCS: Performed by: INTERNAL MEDICINE

## 2023-09-04 PROCEDURE — 85025 COMPLETE CBC W/AUTO DIFF WBC: CPT

## 2023-09-04 PROCEDURE — 36592 COLLECT BLOOD FROM PICC: CPT

## 2023-09-04 PROCEDURE — 6370000000 HC RX 637 (ALT 250 FOR IP): Performed by: INTERNAL MEDICINE

## 2023-09-04 PROCEDURE — 2500000003 HC RX 250 WO HCPCS: Performed by: INTERNAL MEDICINE

## 2023-09-04 PROCEDURE — 71045 X-RAY EXAM CHEST 1 VIEW: CPT

## 2023-09-04 PROCEDURE — 99291 CRITICAL CARE FIRST HOUR: CPT | Performed by: INTERNAL MEDICINE

## 2023-09-04 PROCEDURE — 84478 ASSAY OF TRIGLYCERIDES: CPT

## 2023-09-04 PROCEDURE — 94640 AIRWAY INHALATION TREATMENT: CPT

## 2023-09-04 PROCEDURE — C9113 INJ PANTOPRAZOLE SODIUM, VIA: HCPCS | Performed by: INTERNAL MEDICINE

## 2023-09-04 PROCEDURE — 2700000000 HC OXYGEN THERAPY PER DAY

## 2023-09-04 PROCEDURE — 99233 SBSQ HOSP IP/OBS HIGH 50: CPT | Performed by: INTERNAL MEDICINE

## 2023-09-04 RX ORDER — CISATRACURIUM BESYLATE 2 MG/ML
0.15 INJECTION, SOLUTION INTRAVENOUS
Status: DISCONTINUED | OUTPATIENT
Start: 2023-09-04 | End: 2023-09-04

## 2023-09-04 RX ORDER — ENOXAPARIN SODIUM 100 MG/ML
40 INJECTION SUBCUTANEOUS DAILY
Status: DISCONTINUED | OUTPATIENT
Start: 2023-09-05 | End: 2023-09-05

## 2023-09-04 RX ADMIN — MUPIROCIN: 20 OINTMENT TOPICAL at 08:19

## 2023-09-04 RX ADMIN — IPRATROPIUM BROMIDE AND ALBUTEROL SULFATE 1 DOSE: 2.5; .5 SOLUTION RESPIRATORY (INHALATION) at 19:31

## 2023-09-04 RX ADMIN — CHLORHEXIDINE GLUCONATE 0.12% ORAL RINSE 15 ML: 1.2 LIQUID ORAL at 20:41

## 2023-09-04 RX ADMIN — MIDAZOLAM HYDROCHLORIDE 2 MG: 1 INJECTION, SOLUTION INTRAMUSCULAR; INTRAVENOUS at 16:53

## 2023-09-04 RX ADMIN — BUSPIRONE HYDROCHLORIDE 15 MG: 7.5 TABLET ORAL at 08:18

## 2023-09-04 RX ADMIN — PANTOPRAZOLE SODIUM 40 MG: 40 INJECTION, POWDER, FOR SOLUTION INTRAVENOUS at 08:18

## 2023-09-04 RX ADMIN — WHITE PETROLATUM 57.7 %-MINERAL OIL 31.9 % EYE OINTMENT: at 06:31

## 2023-09-04 RX ADMIN — KETAMINE HYDROCHLORIDE 1 MG/KG/HR: 100 INJECTION INTRAMUSCULAR; INTRAVENOUS at 18:33

## 2023-09-04 RX ADMIN — Medication 7 MG/HR: at 22:23

## 2023-09-04 RX ADMIN — MEROPENEM 1000 MG: 1 INJECTION, POWDER, FOR SOLUTION INTRAVENOUS at 06:30

## 2023-09-04 RX ADMIN — SODIUM CHLORIDE, PRESERVATIVE FREE 10 ML: 5 INJECTION INTRAVENOUS at 08:19

## 2023-09-04 RX ADMIN — CARBOXYMETHYLCELLULOSE SODIUM 1 DROP: 10 GEL OPHTHALMIC at 17:27

## 2023-09-04 RX ADMIN — IPRATROPIUM BROMIDE AND ALBUTEROL SULFATE 1 DOSE: 2.5; .5 SOLUTION RESPIRATORY (INHALATION) at 15:58

## 2023-09-04 RX ADMIN — SODIUM CHLORIDE: 9 INJECTION, SOLUTION INTRAVENOUS at 08:56

## 2023-09-04 RX ADMIN — MEROPENEM 1000 MG: 1 INJECTION, POWDER, FOR SOLUTION INTRAVENOUS at 12:11

## 2023-09-04 RX ADMIN — MEROPENEM 1000 MG: 1 INJECTION, POWDER, FOR SOLUTION INTRAVENOUS at 20:43

## 2023-09-04 RX ADMIN — DEXAMETHASONE SODIUM PHOSPHATE 8 MG: 10 INJECTION INTRAMUSCULAR; INTRAVENOUS at 20:42

## 2023-09-04 RX ADMIN — ATORVASTATIN CALCIUM 80 MG: 40 TABLET, FILM COATED ORAL at 20:42

## 2023-09-04 RX ADMIN — BUSPIRONE HYDROCHLORIDE 15 MG: 7.5 TABLET ORAL at 12:11

## 2023-09-04 RX ADMIN — CISATRACURIUM BESYLATE 2 MCG/KG/MIN: 10 INJECTION, SOLUTION INTRAVENOUS at 22:25

## 2023-09-04 RX ADMIN — IPRATROPIUM BROMIDE AND ALBUTEROL SULFATE 1 DOSE: 2.5; .5 SOLUTION RESPIRATORY (INHALATION) at 23:16

## 2023-09-04 RX ADMIN — VANCOMYCIN HYDROCHLORIDE 750 MG: 750 INJECTION, POWDER, LYOPHILIZED, FOR SOLUTION INTRAVENOUS at 20:16

## 2023-09-04 RX ADMIN — ENOXAPARIN SODIUM 120 MG: 150 INJECTION SUBCUTANEOUS at 06:30

## 2023-09-04 RX ADMIN — Medication 7 MG/HR: at 02:03

## 2023-09-04 RX ADMIN — Medication 50 MCG: at 17:00

## 2023-09-04 RX ADMIN — IPRATROPIUM BROMIDE AND ALBUTEROL SULFATE 1 DOSE: 2.5; .5 SOLUTION RESPIRATORY (INHALATION) at 03:10

## 2023-09-04 RX ADMIN — SERTRALINE 100 MG: 100 TABLET, FILM COATED ORAL at 08:18

## 2023-09-04 RX ADMIN — CISATRACURIUM BESYLATE 2 MCG/KG/MIN: 10 INJECTION, SOLUTION INTRAVENOUS at 05:03

## 2023-09-04 RX ADMIN — Medication 200 MCG/HR: at 08:15

## 2023-09-04 RX ADMIN — Medication 200 MCG/HR: at 02:02

## 2023-09-04 RX ADMIN — Medication 200 MCG/HR: at 13:58

## 2023-09-04 RX ADMIN — BUSPIRONE HYDROCHLORIDE 15 MG: 7.5 TABLET ORAL at 16:43

## 2023-09-04 RX ADMIN — MUPIROCIN: 20 OINTMENT TOPICAL at 20:21

## 2023-09-04 RX ADMIN — CHLORHEXIDINE GLUCONATE 0.12% ORAL RINSE 15 ML: 1.2 LIQUID ORAL at 08:18

## 2023-09-04 RX ADMIN — VANCOMYCIN HYDROCHLORIDE 750 MG: 750 INJECTION, POWDER, LYOPHILIZED, FOR SOLUTION INTRAVENOUS at 01:08

## 2023-09-04 RX ADMIN — ASPIRIN 81 MG: 81 TABLET, CHEWABLE ORAL at 08:18

## 2023-09-04 RX ADMIN — WHITE PETROLATUM 57.7 %-MINERAL OIL 31.9 % EYE OINTMENT: at 14:06

## 2023-09-04 RX ADMIN — KETAMINE HYDROCHLORIDE 0.5 MG/KG/HR: 100 INJECTION INTRAMUSCULAR; INTRAVENOUS at 05:04

## 2023-09-04 RX ADMIN — CARBOXYMETHYLCELLULOSE SODIUM 1 DROP: 10 GEL OPHTHALMIC at 08:18

## 2023-09-04 RX ADMIN — CARBOXYMETHYLCELLULOSE SODIUM 1 DROP: 10 GEL OPHTHALMIC at 20:41

## 2023-09-04 RX ADMIN — IPRATROPIUM BROMIDE AND ALBUTEROL SULFATE 1 DOSE: 2.5; .5 SOLUTION RESPIRATORY (INHALATION) at 08:35

## 2023-09-04 RX ADMIN — DEXAMETHASONE SODIUM PHOSPHATE 8 MG: 10 INJECTION INTRAMUSCULAR; INTRAVENOUS at 08:18

## 2023-09-04 RX ADMIN — Medication 200 MCG/HR: at 18:40

## 2023-09-04 RX ADMIN — BUSPIRONE HYDROCHLORIDE 15 MG: 7.5 TABLET ORAL at 20:41

## 2023-09-04 RX ADMIN — IPRATROPIUM BROMIDE AND ALBUTEROL SULFATE 1 DOSE: 2.5; .5 SOLUTION RESPIRATORY (INHALATION) at 11:28

## 2023-09-04 RX ADMIN — SERTRALINE 100 MG: 100 TABLET, FILM COATED ORAL at 20:43

## 2023-09-04 ASSESSMENT — PULMONARY FUNCTION TESTS
PIF_VALUE: 23
PIF_VALUE: 26
PIF_VALUE: 25
PIF_VALUE: 24
PIF_VALUE: 28
PIF_VALUE: 25
PIF_VALUE: 22
PIF_VALUE: 29
PIF_VALUE: 22
PIF_VALUE: 22
PIF_VALUE: 27
PIF_VALUE: 25

## 2023-09-04 NOTE — PROGRESS NOTES
At [id number], patient had episode of increased airway pressures in excess of 45 cmh2o, patient also had desaturations sao2 to 56% with little air movement on ascultation,patient was given prn dose 2 mg Versed as well as 50 mcg bolus of Fentanyl from IV drip, patient was unresponsive at present and Nimbex drip was resumed at previous settings with bolus prior. Patients systolic blood pressure was 172 and Versed drip was increased to 9 mg/hr per Dr Jaimee Mathew verbal order, pharmacy was notified of change. Dr Jaimee Mathew was present on unit after ans aware of changes.

## 2023-09-04 NOTE — PROGRESS NOTES
Assessment completed, patient remains on Nimbex drip currently, BIS at 41, VSS, tube feeds at 10 ml/hr, no other needs at present, 0 residuals with tube feeds.

## 2023-09-04 NOTE — PROGRESS NOTES
4 Eyes Skin Assessment     NAME:  Gene Talley  YOB: 1945  MEDICAL RECORD NUMBER:  [id number]    The patient is being assessed for  Shift Handoff    I agree that at least one RN has performed a thorough Head to Toe Skin Assessment on the patient. ALL assessment sites listed below have been assessed. Areas assessed by both nurses:    Head, Face, Ears, Shoulders, Back, Chest, Arms, Elbows, Hands, Sacrum. Buttock, Coccyx, Ischium, Legs. Feet and Heels, and Under Medical Devices         Does the Patient have a Wound?  No noted wound(s)       Sloan Prevention initiated by RN: Yes  Wound Care Orders initiated by RN: No    Pressure Injury (Stage 3,4, Unstageable, DTI, NWPT, and Complex wounds) if present, place Wound referral order by RN under : No    New Ostomies, if present place, Ostomy referral order under : No     Nurse 1 eSignature: Electronically signed by Gauri Reyes RN on 9/4/23 at 7:47 PM EDT    **SHARE this note so that the co-signing nurse can place an eSignature**    Nurse 2 eSignature: Electronically signed by Elizabeth Vences RN on 9/4/23 at 7:48 PM EDT

## 2023-09-04 NOTE — PROGRESS NOTES
Patient updates given to Eliu Florence, [address] [place] [address]. Patient has rested without acute changes in assessment noted overnight. Care transferred.

## 2023-09-04 NOTE — PROGRESS NOTES
Patient care assumed, assessment completed as charted. Patient continues on ventilator, #7.5 at the 22 lip line. A/C 24, , FiO2 35%, PEEP 5. Right upper arm PICC in place with Fentanyl infusing at 200mcg/hr, Versed at 7mg/hr, Ketamine at 0.5mg/kg/hr, Nimbex at 2mcg/kg/min, and NS at 75mL/hr. Right radial a-line in place, NG to right nare with tube feed running at 10mL/hr, villaseñor catheter patent. No further needs assessed at this time. Will monitor.

## 2023-09-04 NOTE — PLAN OF CARE
Problem: Discharge Planning  Goal: Discharge to home or other facility with appropriate resources  Outcome: Progressing  Flowsheets (Taken 9/3/2023 2853 by Abi Dyson RN)  Discharge to home or other facility with appropriate resources: Identify barriers to discharge with patient and caregiver     Problem: Pain  Goal: Verbalizes/displays adequate comfort level or baseline comfort level  Outcome: Progressing     Problem: Safety - Adult  Goal: Free from fall injury  Outcome: Progressing  Note: Fall precautions in place. Problem: Nutrition Deficit:  Goal: Optimize nutritional status  Outcome: Progressing  Note: Patient receiving nutrition via continuous tube feed. Problem: Skin/Tissue Integrity  Goal: Absence of new skin breakdown  Description: 1. Monitor for areas of redness and/or skin breakdown  2. Assess vascular access sites hourly  3. Every 4-6 hours minimum:  Change oxygen saturation probe site  4. Every 4-6 hours:  If on nasal continuous positive airway pressure, respiratory therapy assess nares and determine need for appliance change or resting period. Outcome: Progressing  Note: Patient turned/repositioned every 2 hours and as needed to maintain and improve skin integrity.

## 2023-09-05 ENCOUNTER — APPOINTMENT (OUTPATIENT)
Dept: GENERAL RADIOLOGY | Age: 78
DRG: 870 | End: 2023-09-05
Payer: MEDICARE

## 2023-09-05 LAB
ANION GAP SERPL CALCULATED.3IONS-SCNC: 6 MMOL/L (ref 3–16)
BACTERIA BLD CULT ORG #2: NORMAL
BACTERIA BLD CULT: NORMAL
BACTERIA SPEC RESP CULT: NORMAL
BASE EXCESS BLDA CALC-SCNC: -4.8 MMOL/L (ref -3–3)
BASE EXCESS BLDA CALC-SCNC: -7 MMOL/L (ref -3–3)
BASOPHILS # BLD: 0 K/UL (ref 0–0.2)
BASOPHILS NFR BLD: 0.2 %
BUN SERPL-MCNC: 27 MG/DL (ref 7–20)
CALCIUM SERPL-MCNC: 7.9 MG/DL (ref 8.3–10.6)
CHLORIDE SERPL-SCNC: 107 MMOL/L (ref 99–110)
CO2 BLDA-SCNC: 21.1 MMOL/L
CO2 BLDA-SCNC: 22.9 MMOL/L
CO2 SERPL-SCNC: 21 MMOL/L (ref 21–32)
COHGB MFR BLDA: 0.1 % (ref 0–1.5)
COHGB MFR BLDA: 0.2 % (ref 0–1.5)
CREAT SERPL-MCNC: 1 MG/DL (ref 0.6–1.2)
DEPRECATED RDW RBC AUTO: 14.8 % (ref 12.4–15.4)
EOSINOPHIL # BLD: 0 K/UL (ref 0–0.6)
EOSINOPHIL NFR BLD: 0 %
GFR SERPLBLD CREATININE-BSD FMLA CKD-EPI: 58 ML/MIN/{1.73_M2}
GLUCOSE BLD-MCNC: 122 MG/DL (ref 70–99)
GLUCOSE BLD-MCNC: 126 MG/DL (ref 70–99)
GLUCOSE BLD-MCNC: 142 MG/DL (ref 70–99)
GLUCOSE BLD-MCNC: 143 MG/DL (ref 70–99)
GLUCOSE BLD-MCNC: 144 MG/DL (ref 70–99)
GLUCOSE SERPL-MCNC: 148 MG/DL (ref 70–99)
GRAM STN SPEC: NORMAL
HCO3 BLDA-SCNC: 19.7 MMOL/L (ref 21–29)
HCO3 BLDA-SCNC: 21.5 MMOL/L (ref 21–29)
HCT VFR BLD AUTO: 32.9 % (ref 36–48)
HGB BLD-MCNC: 11.4 G/DL (ref 12–16)
HGB BLDA-MCNC: 12.2 G/DL (ref 12–16)
HGB BLDA-MCNC: 13.1 G/DL (ref 12–16)
LYMPHOCYTES # BLD: 0.4 K/UL (ref 1–5.1)
LYMPHOCYTES NFR BLD: 3.9 %
MCH RBC QN AUTO: 30.8 PG (ref 26–34)
MCHC RBC AUTO-ENTMCNC: 34.6 G/DL (ref 31–36)
MCV RBC AUTO: 89.1 FL (ref 80–100)
METHGB MFR BLDA: 0.1 %
METHGB MFR BLDA: 0.1 %
MONOCYTES # BLD: 0.4 K/UL (ref 0–1.3)
MONOCYTES NFR BLD: 4 %
NEUTROPHILS # BLD: 9.2 K/UL (ref 1.7–7.7)
NEUTROPHILS NFR BLD: 91.9 %
O2 THERAPY: ABNORMAL
O2 THERAPY: ABNORMAL
PCO2 BLDA: 43.9 MMHG (ref 35–45)
PCO2 BLDA: 44.8 MMHG (ref 35–45)
PERFORMED ON: ABNORMAL
PH BLDA: 7.27 [PH] (ref 7.35–7.45)
PH BLDA: 7.3 [PH] (ref 7.35–7.45)
PLATELET # BLD AUTO: 168 K/UL (ref 135–450)
PMV BLD AUTO: 8 FL (ref 5–10.5)
PO2 BLDA: 63.6 MMHG (ref 75–108)
PO2 BLDA: 74.6 MMHG (ref 75–108)
POTASSIUM SERPL-SCNC: 4.1 MMOL/L (ref 3.5–5.1)
RBC # BLD AUTO: 3.7 M/UL (ref 4–5.2)
SAO2 % BLDA: 90.1 %
SAO2 % BLDA: 93.1 %
SODIUM SERPL-SCNC: 134 MMOL/L (ref 136–145)
TRIGL SERPL-MCNC: 129 MG/DL (ref 0–150)
VANCOMYCIN TROUGH SERPL-MCNC: 9.5 UG/ML (ref 10–20)
WBC # BLD AUTO: 10 K/UL (ref 4–11)

## 2023-09-05 PROCEDURE — 82803 BLOOD GASES ANY COMBINATION: CPT

## 2023-09-05 PROCEDURE — C9113 INJ PANTOPRAZOLE SODIUM, VIA: HCPCS | Performed by: INTERNAL MEDICINE

## 2023-09-05 PROCEDURE — 6370000000 HC RX 637 (ALT 250 FOR IP): Performed by: INTERNAL MEDICINE

## 2023-09-05 PROCEDURE — 94003 VENT MGMT INPAT SUBQ DAY: CPT

## 2023-09-05 PROCEDURE — 2580000003 HC RX 258: Performed by: INTERNAL MEDICINE

## 2023-09-05 PROCEDURE — 80202 ASSAY OF VANCOMYCIN: CPT

## 2023-09-05 PROCEDURE — 94640 AIRWAY INHALATION TREATMENT: CPT

## 2023-09-05 PROCEDURE — 6360000002 HC RX W HCPCS: Performed by: INTERNAL MEDICINE

## 2023-09-05 PROCEDURE — 94761 N-INVAS EAR/PLS OXIMETRY MLT: CPT

## 2023-09-05 PROCEDURE — 71045 X-RAY EXAM CHEST 1 VIEW: CPT

## 2023-09-05 PROCEDURE — 99233 SBSQ HOSP IP/OBS HIGH 50: CPT | Performed by: INTERNAL MEDICINE

## 2023-09-05 PROCEDURE — 2000000000 HC ICU R&B

## 2023-09-05 PROCEDURE — 80048 BASIC METABOLIC PNL TOTAL CA: CPT

## 2023-09-05 PROCEDURE — 2500000003 HC RX 250 WO HCPCS: Performed by: INTERNAL MEDICINE

## 2023-09-05 PROCEDURE — 36600 WITHDRAWAL OF ARTERIAL BLOOD: CPT

## 2023-09-05 PROCEDURE — 85025 COMPLETE CBC W/AUTO DIFF WBC: CPT

## 2023-09-05 PROCEDURE — 99291 CRITICAL CARE FIRST HOUR: CPT | Performed by: INTERNAL MEDICINE

## 2023-09-05 PROCEDURE — 2700000000 HC OXYGEN THERAPY PER DAY

## 2023-09-05 RX ORDER — ENOXAPARIN SODIUM 100 MG/ML
30 INJECTION SUBCUTANEOUS 2 TIMES DAILY
Status: DISCONTINUED | OUTPATIENT
Start: 2023-09-05 | End: 2023-09-24

## 2023-09-05 RX ADMIN — MIDAZOLAM HYDROCHLORIDE 2 MG: 1 INJECTION, SOLUTION INTRAMUSCULAR; INTRAVENOUS at 20:48

## 2023-09-05 RX ADMIN — Medication 8 MG/HR: at 18:38

## 2023-09-05 RX ADMIN — MUPIROCIN: 20 OINTMENT TOPICAL at 10:22

## 2023-09-05 RX ADMIN — Medication 50 MCG: at 20:50

## 2023-09-05 RX ADMIN — MEROPENEM 1000 MG: 1 INJECTION, POWDER, FOR SOLUTION INTRAVENOUS at 19:59

## 2023-09-05 RX ADMIN — MEROPENEM 1000 MG: 1 INJECTION, POWDER, FOR SOLUTION INTRAVENOUS at 05:37

## 2023-09-05 RX ADMIN — PANTOPRAZOLE SODIUM 40 MG: 40 INJECTION, POWDER, FOR SOLUTION INTRAVENOUS at 10:23

## 2023-09-05 RX ADMIN — VANCOMYCIN HYDROCHLORIDE 750 MG: 750 INJECTION, POWDER, LYOPHILIZED, FOR SOLUTION INTRAVENOUS at 15:33

## 2023-09-05 RX ADMIN — IPRATROPIUM BROMIDE AND ALBUTEROL SULFATE 1 DOSE: 2.5; .5 SOLUTION RESPIRATORY (INHALATION) at 11:15

## 2023-09-05 RX ADMIN — Medication 200 MCG/HR: at 00:18

## 2023-09-05 RX ADMIN — IPRATROPIUM BROMIDE AND ALBUTEROL SULFATE 1 DOSE: 2.5; .5 SOLUTION RESPIRATORY (INHALATION) at 03:15

## 2023-09-05 RX ADMIN — ATORVASTATIN CALCIUM 80 MG: 40 TABLET, FILM COATED ORAL at 19:58

## 2023-09-05 RX ADMIN — IPRATROPIUM BROMIDE AND ALBUTEROL SULFATE 1 DOSE: 2.5; .5 SOLUTION RESPIRATORY (INHALATION) at 08:33

## 2023-09-05 RX ADMIN — ENOXAPARIN SODIUM 30 MG: 100 INJECTION SUBCUTANEOUS at 19:58

## 2023-09-05 RX ADMIN — MUPIROCIN: 20 OINTMENT TOPICAL at 20:03

## 2023-09-05 RX ADMIN — CARBOXYMETHYLCELLULOSE SODIUM 1 DROP: 10 GEL OPHTHALMIC at 02:41

## 2023-09-05 RX ADMIN — Medication 200 MCG/HR: at 20:14

## 2023-09-05 RX ADMIN — CARBOXYMETHYLCELLULOSE SODIUM 1 DROP: 10 GEL OPHTHALMIC at 15:33

## 2023-09-05 RX ADMIN — DEXAMETHASONE SODIUM PHOSPHATE 8 MG: 10 INJECTION INTRAMUSCULAR; INTRAVENOUS at 10:23

## 2023-09-05 RX ADMIN — KETAMINE HYDROCHLORIDE 1 MG/KG/HR: 100 INJECTION INTRAMUSCULAR; INTRAVENOUS at 21:23

## 2023-09-05 RX ADMIN — CARBOXYMETHYLCELLULOSE SODIUM 1 DROP: 10 GEL OPHTHALMIC at 21:13

## 2023-09-05 RX ADMIN — MIDAZOLAM HYDROCHLORIDE 2 MG: 1 INJECTION, SOLUTION INTRAMUSCULAR; INTRAVENOUS at 22:34

## 2023-09-05 RX ADMIN — CARBOXYMETHYLCELLULOSE SODIUM 1 DROP: 10 GEL OPHTHALMIC at 10:22

## 2023-09-05 RX ADMIN — BUSPIRONE HYDROCHLORIDE 15 MG: 7.5 TABLET ORAL at 10:23

## 2023-09-05 RX ADMIN — SERTRALINE 100 MG: 100 TABLET, FILM COATED ORAL at 19:58

## 2023-09-05 RX ADMIN — ASPIRIN 81 MG: 81 TABLET, CHEWABLE ORAL at 10:23

## 2023-09-05 RX ADMIN — KETAMINE HYDROCHLORIDE 1 MG/KG/HR: 100 INJECTION INTRAMUSCULAR; INTRAVENOUS at 06:22

## 2023-09-05 RX ADMIN — SODIUM CHLORIDE, PRESERVATIVE FREE 10 ML: 5 INJECTION INTRAVENOUS at 20:04

## 2023-09-05 RX ADMIN — Medication 200 MCG/HR: at 06:21

## 2023-09-05 RX ADMIN — CHLORHEXIDINE GLUCONATE 0.12% ORAL RINSE 15 ML: 1.2 LIQUID ORAL at 10:22

## 2023-09-05 RX ADMIN — Medication 200 MCG/HR: at 16:10

## 2023-09-05 RX ADMIN — IPRATROPIUM BROMIDE AND ALBUTEROL SULFATE 1 DOSE: 2.5; .5 SOLUTION RESPIRATORY (INHALATION) at 15:54

## 2023-09-05 RX ADMIN — ENOXAPARIN SODIUM 40 MG: 100 INJECTION SUBCUTANEOUS at 10:23

## 2023-09-05 RX ADMIN — MEROPENEM 1000 MG: 1 INJECTION, POWDER, FOR SOLUTION INTRAVENOUS at 13:29

## 2023-09-05 RX ADMIN — CARBOXYMETHYLCELLULOSE SODIUM 1 DROP: 10 GEL OPHTHALMIC at 05:36

## 2023-09-05 RX ADMIN — IPRATROPIUM BROMIDE AND ALBUTEROL SULFATE 1 DOSE: 2.5; .5 SOLUTION RESPIRATORY (INHALATION) at 23:30

## 2023-09-05 RX ADMIN — CARBOXYMETHYLCELLULOSE SODIUM 1 DROP: 10 GEL OPHTHALMIC at 18:36

## 2023-09-05 RX ADMIN — BUSPIRONE HYDROCHLORIDE 15 MG: 7.5 TABLET ORAL at 17:11

## 2023-09-05 RX ADMIN — DEXAMETHASONE SODIUM PHOSPHATE 8 MG: 10 INJECTION INTRAMUSCULAR; INTRAVENOUS at 19:58

## 2023-09-05 RX ADMIN — CISATRACURIUM BESYLATE 2 MCG/KG/MIN: 10 INJECTION, SOLUTION INTRAVENOUS at 23:34

## 2023-09-05 RX ADMIN — BUSPIRONE HYDROCHLORIDE 15 MG: 7.5 TABLET ORAL at 19:58

## 2023-09-05 RX ADMIN — SERTRALINE 100 MG: 100 TABLET, FILM COATED ORAL at 10:23

## 2023-09-05 RX ADMIN — SODIUM CHLORIDE, PRESERVATIVE FREE 10 ML: 5 INJECTION INTRAVENOUS at 10:23

## 2023-09-05 RX ADMIN — IPRATROPIUM BROMIDE AND ALBUTEROL SULFATE 1 DOSE: 2.5; .5 SOLUTION RESPIRATORY (INHALATION) at 20:02

## 2023-09-05 RX ADMIN — BUSPIRONE HYDROCHLORIDE 15 MG: 7.5 TABLET ORAL at 13:29

## 2023-09-05 RX ADMIN — CHLORHEXIDINE GLUCONATE 0.12% ORAL RINSE 15 ML: 1.2 LIQUID ORAL at 19:58

## 2023-09-05 ASSESSMENT — PULMONARY FUNCTION TESTS
PIF_VALUE: 29
PIF_VALUE: 31
PIF_VALUE: 32

## 2023-09-05 NOTE — CARE COORDINATION
INTERDISCIPLINARY PLAN OF CARE CONFERENCE    Date/Time: 9/5/2023 6:20 PM  Completed by: Marquita Sheppard RN, Case Management      Patient Name:  Anna Marie Powell  YOB: 1945  Admitting Diagnosis: Respiratory failure ([address]) [J96.90]  HCAP (healthcare-associated pneumonia) [J18.9]  Acute respiratory failure with hypoxia and hypercapnia ([address]) [J96.01, J96.02]  New onset left bundle branch block (LBBB) [I44.7]  Sepsis, due to unspecified organism, unspecified whether acute organ dysfunction present ([address]) [A41.9]     Admit Date/Time:  9/1/2023  2:26 AM    Chart reviewed. Interdisciplinary team contacted or reviewed plan related to patient progress and discharge plans. Disciplines included Case Management, Nursing, and Dietitian. Current Status:9/1/23  PT/OT recommendation for discharge plan of care: NA    Expected D/C Disposition:    Patient discharged from [facility]. Was eating something and choked on it but did not tell anyone. The patient eventually called the squad and was brought to [facility]. Remains intubated since admission. Patient lives with spouse. Spouse has dementia. The patient was independent prior to admission. Possible 02 needed at discharge. The plan TBD.

## 2023-09-05 NOTE — PROGRESS NOTES
Pt noted increased 's. BP increased. Pt arouses to verbal stimuli. Vent returned to prev settings & sedation restarted. Dr. Elina Vivas updated.

## 2023-09-05 NOTE — PROGRESS NOTES
PM assessment complete, see flowsheet. Medications given per MAR. VSS, IV lines and monitors checked and tightened. Remains on paralytic, RASS -5 and at goal. Vent settings unchanged. No other needs noted, will continue to monitor and assess.

## 2023-09-05 NOTE — PROGRESS NOTES
Shift assessment, completed, see flow sheet. Pt remains chemically sedated and paralyzed. Intubated on AC # 7.5 ETT, at 22 LL. Respirations are easy, even, and unlabored. Bilateral lung sounds are diminished throughout. OG in place at 60, with TF at 10 mL/hr, 0ml residual.      PICC and PIV x2 all remain in place and patent with sites and dressings C/D/I. Bridges in place and patent with STAT lock. Bed in lowest position. Bed alarm on. Plan of care is ongoing.

## 2023-09-05 NOTE — PROGRESS NOTES
Care rounds completed with Dr. José Miguel Whipple and multidisciplinary team. Reviewed labs, meds, VS, assessment, & plan of care for today. See dictated note and new orders for details. New orders received.

## 2023-09-05 NOTE — PLAN OF CARE
Problem: Safety - Medical Restraint  Goal: Remains free of injury from restraints (Restraint for Interference with Medical Device)  Description: INTERVENTIONS:  1. Determine that other, less restrictive measures have been tried or would not be effective before applying the restraint  2. Evaluate the patient's condition at the time of restraint application  3. Inform patient/family regarding the reason for restraint  4.  Q2H: Monitor safety, psychosocial status, comfort, nutrition and hydration  Reactivated  Flowsheets (Taken 9/5/2023 1600)  Remains free of injury from restraints (restraint for interference with medical device): Every 2 hours: Monitor safety, psychosocial status, comfort, nutrition and hydration

## 2023-09-05 NOTE — PROGRESS NOTES
Comprehensive Nutrition Assessment    Type and Reason for Visit:  Reassess    Nutrition Recommendations/Plan:   Continue trophic TF order - ADULT TUBE FEEDING; Orogastric; Peptide-Based High-Protein formula - Vital High-Protein with a trophic rate of 10 ml/hr x 20 hours + 30 ml water flushes every 6 hours for tube patency. Monitor TF rate, intake, and tolerance + water flushes. Monitor respiratory/vent status, paralytic, and plan of care. Please re-check patient's weight since patient weighed 268# on 9/1/23 and CBW is 298#. Monitor nutrition-related labs, bowel function, and weight trends. Malnutrition Assessment:  Malnutrition Status:   At risk for malnutrition (09/05/23 1153)    Context:  Acute Illness     Findings of the 6 clinical characteristics of malnutrition:  Energy Intake:  50% or less of estimated energy requirements for 5 or more days  Weight Loss:  No significant weight loss     Body Fat Loss:  No significant body fat loss     Muscle Mass Loss:  No significant muscle mass loss    Fluid Accumulation:  No significant fluid accumulation     Strength:  Not Performed    Nutrition Assessment:    patient is slightly improved from a nutritional standpoint AEB she has been receiving trophic TF while on Nimbex, however, she remains at risk for further compromise d/t inadequate EN intake, altered nutrition-related labs, and intubation status; will continue Jevity 1.5 with a trophic rate of 10 ml/hr x 20 hours + 30 ml water flushes every 6 hours    Nutrition Related Findings:    patient remains intubated and paralyzed at this time; + NG tube with trophic TF infusing at this time; last documented BM was on 8/28/23; Nimbex will be weaned down/off; no fat or muscle loss noted during NFPE today Wound Type: None       Current Nutrition Intake & Therapies:    Average Meal Intake: NPO  Average Supplements Intake: NPO  Current Tube Feeding (TF) Orders:  Feeding Route: Orogastric  Formula: Peptide Based High

## 2023-09-05 NOTE — PROGRESS NOTES
Pt tolerating SBT well today. Has been on Nimbex drip since 1005 this AM and off all sedation since 1105 AM. ABG obtained and results reviewed by Dr. Rut Conde this afternoon. Not going to extubate pt today however continue to let her trial this afternoon unless pt becomes distressed. Flip back to [facility] for this evening. Family remains present at bedside and is up to date/aware on POC at this time.

## 2023-09-06 ENCOUNTER — APPOINTMENT (OUTPATIENT)
Dept: GENERAL RADIOLOGY | Age: 78
DRG: 870 | End: 2023-09-06
Payer: MEDICARE

## 2023-09-06 LAB
EKG ATRIAL RATE: 117 BPM
EKG ATRIAL RATE: 144 BPM
EKG DIAGNOSIS: NORMAL
EKG DIAGNOSIS: NORMAL
EKG P AXIS: 5 DEGREES
EKG P AXIS: 59 DEGREES
EKG P-R INTERVAL: 152 MS
EKG P-R INTERVAL: 154 MS
EKG Q-T INTERVAL: 292 MS
EKG Q-T INTERVAL: 350 MS
EKG QRS DURATION: 130 MS
EKG QRS DURATION: 130 MS
EKG QTC CALCULATION (BAZETT): 452 MS
EKG QTC CALCULATION (BAZETT): 488 MS
EKG R AXIS: -42 DEGREES
EKG R AXIS: -46 DEGREES
EKG T AXIS: 136 DEGREES
EKG T AXIS: 138 DEGREES
EKG VENTRICULAR RATE: 117 BPM
EKG VENTRICULAR RATE: 144 BPM
GLUCOSE BLD-MCNC: 122 MG/DL (ref 70–99)
GLUCOSE BLD-MCNC: 136 MG/DL (ref 70–99)
GLUCOSE BLD-MCNC: 142 MG/DL (ref 70–99)
GLUCOSE BLD-MCNC: 147 MG/DL (ref 70–99)
PERFORMED ON: ABNORMAL
TRIGL SERPL-MCNC: 105 MG/DL (ref 0–150)

## 2023-09-06 PROCEDURE — 2700000000 HC OXYGEN THERAPY PER DAY

## 2023-09-06 PROCEDURE — 84478 ASSAY OF TRIGLYCERIDES: CPT

## 2023-09-06 PROCEDURE — 93005 ELECTROCARDIOGRAM TRACING: CPT | Performed by: INTERNAL MEDICINE

## 2023-09-06 PROCEDURE — C9113 INJ PANTOPRAZOLE SODIUM, VIA: HCPCS | Performed by: INTERNAL MEDICINE

## 2023-09-06 PROCEDURE — 94761 N-INVAS EAR/PLS OXIMETRY MLT: CPT

## 2023-09-06 PROCEDURE — 2580000003 HC RX 258: Performed by: INTERNAL MEDICINE

## 2023-09-06 PROCEDURE — 2500000003 HC RX 250 WO HCPCS: Performed by: INTERNAL MEDICINE

## 2023-09-06 PROCEDURE — 99291 CRITICAL CARE FIRST HOUR: CPT | Performed by: INTERNAL MEDICINE

## 2023-09-06 PROCEDURE — 94003 VENT MGMT INPAT SUBQ DAY: CPT

## 2023-09-06 PROCEDURE — 6370000000 HC RX 637 (ALT 250 FOR IP): Performed by: INTERNAL MEDICINE

## 2023-09-06 PROCEDURE — 6360000002 HC RX W HCPCS: Performed by: INTERNAL MEDICINE

## 2023-09-06 PROCEDURE — 71045 X-RAY EXAM CHEST 1 VIEW: CPT

## 2023-09-06 PROCEDURE — 94640 AIRWAY INHALATION TREATMENT: CPT

## 2023-09-06 PROCEDURE — 99233 SBSQ HOSP IP/OBS HIGH 50: CPT | Performed by: INTERNAL MEDICINE

## 2023-09-06 PROCEDURE — 2000000000 HC ICU R&B

## 2023-09-06 PROCEDURE — 93010 ELECTROCARDIOGRAM REPORT: CPT | Performed by: INTERNAL MEDICINE

## 2023-09-06 RX ORDER — DEXMEDETOMIDINE HYDROCHLORIDE 4 UG/ML
.1-1.5 INJECTION, SOLUTION INTRAVENOUS CONTINUOUS
Status: DISCONTINUED | OUTPATIENT
Start: 2023-09-06 | End: 2023-09-08

## 2023-09-06 RX ORDER — HYDRALAZINE HYDROCHLORIDE 20 MG/ML
10 INJECTION INTRAMUSCULAR; INTRAVENOUS EVERY 6 HOURS PRN
Status: DISCONTINUED | OUTPATIENT
Start: 2023-09-06 | End: 2023-09-24

## 2023-09-06 RX ORDER — METOPROLOL TARTRATE 5 MG/5ML
5 INJECTION INTRAVENOUS ONCE
Status: COMPLETED | OUTPATIENT
Start: 2023-09-06 | End: 2023-09-06

## 2023-09-06 RX ORDER — FUROSEMIDE 10 MG/ML
40 INJECTION INTRAMUSCULAR; INTRAVENOUS 2 TIMES DAILY
Status: DISCONTINUED | OUTPATIENT
Start: 2023-09-06 | End: 2023-09-07

## 2023-09-06 RX ADMIN — CARBOXYMETHYLCELLULOSE SODIUM 1 DROP: 10 GEL OPHTHALMIC at 02:33

## 2023-09-06 RX ADMIN — MEROPENEM 1000 MG: 1 INJECTION, POWDER, FOR SOLUTION INTRAVENOUS at 14:21

## 2023-09-06 RX ADMIN — CARBOXYMETHYLCELLULOSE SODIUM 1 DROP: 10 GEL OPHTHALMIC at 20:13

## 2023-09-06 RX ADMIN — KETAMINE HYDROCHLORIDE 1 MG/KG/HR: 100 INJECTION INTRAMUSCULAR; INTRAVENOUS at 08:01

## 2023-09-06 RX ADMIN — MEROPENEM 1000 MG: 1 INJECTION, POWDER, FOR SOLUTION INTRAVENOUS at 20:16

## 2023-09-06 RX ADMIN — BUSPIRONE HYDROCHLORIDE 15 MG: 7.5 TABLET ORAL at 14:21

## 2023-09-06 RX ADMIN — BUSPIRONE HYDROCHLORIDE 15 MG: 7.5 TABLET ORAL at 08:07

## 2023-09-06 RX ADMIN — KETAMINE HYDROCHLORIDE 1 MG/KG/HR: 100 INJECTION INTRAMUSCULAR; INTRAVENOUS at 18:37

## 2023-09-06 RX ADMIN — ASPIRIN 81 MG: 81 TABLET, CHEWABLE ORAL at 08:07

## 2023-09-06 RX ADMIN — Medication 200 MCG/HR: at 13:17

## 2023-09-06 RX ADMIN — Medication 200 MCG/HR: at 18:39

## 2023-09-06 RX ADMIN — MEROPENEM 1000 MG: 1 INJECTION, POWDER, FOR SOLUTION INTRAVENOUS at 05:21

## 2023-09-06 RX ADMIN — CHLORHEXIDINE GLUCONATE 0.12% ORAL RINSE 15 ML: 1.2 LIQUID ORAL at 20:14

## 2023-09-06 RX ADMIN — FUROSEMIDE 40 MG: 10 INJECTION, SOLUTION INTRAMUSCULAR; INTRAVENOUS at 14:21

## 2023-09-06 RX ADMIN — Medication 200 MCG/HR: at 01:51

## 2023-09-06 RX ADMIN — CARBOXYMETHYLCELLULOSE SODIUM 1 DROP: 10 GEL OPHTHALMIC at 08:07

## 2023-09-06 RX ADMIN — DEXAMETHASONE SODIUM PHOSPHATE 8 MG: 10 INJECTION INTRAMUSCULAR; INTRAVENOUS at 20:14

## 2023-09-06 RX ADMIN — FUROSEMIDE 40 MG: 10 INJECTION, SOLUTION INTRAMUSCULAR; INTRAVENOUS at 17:21

## 2023-09-06 RX ADMIN — DEXAMETHASONE SODIUM PHOSPHATE 8 MG: 10 INJECTION INTRAMUSCULAR; INTRAVENOUS at 08:07

## 2023-09-06 RX ADMIN — Medication 1.2 MCG/KG/HR: at 20:49

## 2023-09-06 RX ADMIN — IPRATROPIUM BROMIDE AND ALBUTEROL SULFATE 1 DOSE: 2.5; .5 SOLUTION RESPIRATORY (INHALATION) at 16:04

## 2023-09-06 RX ADMIN — CARBOXYMETHYLCELLULOSE SODIUM 1 DROP: 10 GEL OPHTHALMIC at 05:09

## 2023-09-06 RX ADMIN — IPRATROPIUM BROMIDE AND ALBUTEROL SULFATE 1 DOSE: 2.5; .5 SOLUTION RESPIRATORY (INHALATION) at 19:49

## 2023-09-06 RX ADMIN — Medication 1.2 MCG/KG/HR: at 23:37

## 2023-09-06 RX ADMIN — ATORVASTATIN CALCIUM 80 MG: 40 TABLET, FILM COATED ORAL at 20:14

## 2023-09-06 RX ADMIN — METOROPROLOL TARTRATE 5 MG: 5 INJECTION, SOLUTION INTRAVENOUS at 00:45

## 2023-09-06 RX ADMIN — Medication 200 MCG/HR: at 07:45

## 2023-09-06 RX ADMIN — VANCOMYCIN HYDROCHLORIDE 750 MG: 750 INJECTION, POWDER, LYOPHILIZED, FOR SOLUTION INTRAVENOUS at 02:33

## 2023-09-06 RX ADMIN — ENOXAPARIN SODIUM 30 MG: 100 INJECTION SUBCUTANEOUS at 08:06

## 2023-09-06 RX ADMIN — SODIUM CHLORIDE, PRESERVATIVE FREE 10 ML: 5 INJECTION INTRAVENOUS at 20:14

## 2023-09-06 RX ADMIN — ENOXAPARIN SODIUM 30 MG: 100 INJECTION SUBCUTANEOUS at 20:14

## 2023-09-06 RX ADMIN — IPRATROPIUM BROMIDE AND ALBUTEROL SULFATE 1 DOSE: 2.5; .5 SOLUTION RESPIRATORY (INHALATION) at 08:25

## 2023-09-06 RX ADMIN — PANTOPRAZOLE SODIUM 40 MG: 40 INJECTION, POWDER, FOR SOLUTION INTRAVENOUS at 08:07

## 2023-09-06 RX ADMIN — POTASSIUM BICARBONATE 20 MEQ: 782 TABLET, EFFERVESCENT ORAL at 20:14

## 2023-09-06 RX ADMIN — SERTRALINE 100 MG: 100 TABLET, FILM COATED ORAL at 08:07

## 2023-09-06 RX ADMIN — IPRATROPIUM BROMIDE AND ALBUTEROL SULFATE 1 DOSE: 2.5; .5 SOLUTION RESPIRATORY (INHALATION) at 23:42

## 2023-09-06 RX ADMIN — BUSPIRONE HYDROCHLORIDE 15 MG: 7.5 TABLET ORAL at 17:21

## 2023-09-06 RX ADMIN — SERTRALINE 100 MG: 100 TABLET, FILM COATED ORAL at 20:13

## 2023-09-06 RX ADMIN — Medication 0.2 MCG/KG/HR: at 00:28

## 2023-09-06 RX ADMIN — CARBOXYMETHYLCELLULOSE SODIUM 1 DROP: 10 GEL OPHTHALMIC at 14:22

## 2023-09-06 RX ADMIN — IPRATROPIUM BROMIDE AND ALBUTEROL SULFATE 1 DOSE: 2.5; .5 SOLUTION RESPIRATORY (INHALATION) at 03:09

## 2023-09-06 RX ADMIN — Medication 0.2 MCG/KG/HR: at 13:16

## 2023-09-06 RX ADMIN — BUSPIRONE HYDROCHLORIDE 15 MG: 7.5 TABLET ORAL at 20:14

## 2023-09-06 RX ADMIN — POTASSIUM BICARBONATE 20 MEQ: 782 TABLET, EFFERVESCENT ORAL at 14:21

## 2023-09-06 RX ADMIN — CARBOXYMETHYLCELLULOSE SODIUM 1 DROP: 10 GEL OPHTHALMIC at 17:21

## 2023-09-06 RX ADMIN — VANCOMYCIN HYDROCHLORIDE 750 MG: 750 INJECTION, POWDER, LYOPHILIZED, FOR SOLUTION INTRAVENOUS at 14:27

## 2023-09-06 RX ADMIN — IPRATROPIUM BROMIDE AND ALBUTEROL SULFATE 1 DOSE: 2.5; .5 SOLUTION RESPIRATORY (INHALATION) at 12:12

## 2023-09-06 ASSESSMENT — PULMONARY FUNCTION TESTS
PIF_VALUE: 27
PIF_VALUE: 31
PIF_VALUE: 35
PIF_VALUE: 28
PIF_VALUE: 27

## 2023-09-06 NOTE — PROGRESS NOTES
Anxiety attack 08/11/2015    Allergic reaction 07/23/2015    Gastroesophageal reflux disease with esophagitis     Essential hypertension     H/O malignant neoplasm of breast 01/06/2010     Acute hypoxemic respiratory failure  - placed on vent in ER for severe distress. Etiology likely Asthma exacerbation vs aspiration   - admit to ICU, pulmonary /critical care consulted  - imaging with fluid overload , recent admission for diverticulitis and was given cipro/flagyl and IVF   - check ECHO(normal EF), continue meropenem and iv vanc   - pulmonary managing  On Nimbex - now off nimbex  Started on ketamine   Trial off Nimbex again today  Lasix 40 mg iv bid      Multifocal  pneumonia - started merrem and iv vanc day # 6  for presumable gram neg and/or MRSA pna   Sputum cx     Asthma with acute exacerbation - on dexamethasone,IBD      Recent sigmoid diverticulitis  - uncomplicated and was tx with abx . No perf on last imaging  Given worsening leucocytosis, repeat CT   Start merrem      Hyponatremia - started NS  Resolved       Abnormal EKG - Left BBB with no previous comparison since 2017 at which time it was normal . Trend troponins.  Obtain ECHO- reviewed       Diet: NPO   GI/DVT PX: /lovenox  CODE STATUS: full code       Sheryl Pascal MD

## 2023-09-06 NOTE — PROGRESS NOTES
4 Eyes Skin Assessment     NAME:  Austin Hatfield  YOB: 1945  MEDICAL RECORD NUMBER:  [id number]    The patient is being assessed for  Other Shift assessment     I agree that at least one RN has performed a thorough Head to Toe Skin Assessment on the patient. ALL assessment sites listed below have been assessed. Areas assessed by both nurses:    Head, Face, Ears, Shoulders, Back, Chest, Arms, Elbows, Hands, Sacrum. Buttock, Coccyx, Ischium, Legs. Feet and Heels, and Under Medical Devices         Redness to backside, blanchable    Does the Patient have a Wound?  No noted wound(s)       Sloan Prevention initiated by RN: Yes  Wound Care Orders initiated by RN: No    Pressure Injury (Stage 3,4, Unstageable, DTI, NWPT, and Complex wounds) if present, place Wound referral order by RN under : No    New Ostomies, if present place, Ostomy referral order under : No     Nurse 1 eSignature: Electronically signed by Romana Hailey, RN on 9/6/23 at 6:50 AM EDT    **SHARE this note so that the co-signing nurse can place an eSignature**    Nurse 2 eSignature: Electronically signed by Lynne Jones RN on 9/6/23 at 6:51 AM EDT

## 2023-09-06 NOTE — PROGRESS NOTES
Pt HR increased from 100's to 130's, double staking on vent with long pauses. Will first give PRN versed and fentanyl, then reassess need to resume Nimbex gtt.

## 2023-09-06 NOTE — PROGRESS NOTES
Pt continues to double stack vent and sats ranging in mid 80's. RT at the bedside, Nimbex gtt restarted. Will continue to monitor.

## 2023-09-06 NOTE — PROGRESS NOTES
09/05/23 2002   Patient Observation   Pulse 94   Respirations 19   SpO2 92 %   Breath Sounds   Breath Sounds Bilateral Expiratory wheezing; Rhonchi   Vent Information   Vent Mode AC/VC   Ventilator Settings   FiO2  40 %   Vt (Set, mL) 300 mL   Resp Rate (Set) 20 bmp   PEEP/CPAP (cmH2O) 8   Pressure Support (cm H2O) 0 cm H2O   Vent Patient Data (Readings)   Vt (Measured) 605 mL   Peak Inspiratory Pressure (cmH2O) 32 cmH2O   Rate Measured 25 br/min   Minute Volume (L/min) 7.38 Liters   Mean Airway Pressure (cmH2O) 13 cmH20   Plateau Pressure (cm H2O) 26 cm H2O   Driving Pressure 18   I:E Ratio 1:4.60   Flow Sensitivity 3 L/min   Backup Apnea On   Backup Rate 20 Breaths Per Minute   Backup Vt 300   Vent Alarm Settings   High Pressure (cmH2O) 45 cmH2O   Low Minute Volume (lpm) 3 L/min   High Minute Volume (lpm) 20 L/min   Low Exhaled Vt (ml) 200 mL   High Exhaled Vt (ml) 800 mL   RR High (bpm) 40 br/min   Apnea (secs) 20 secs   Additional Respiratoray Assessments   Humidification Source Heated wire   Humidification Temp 37   Airway Clearance   Suction ET Tube   Subglottic Suction Done Yes   Suction Device Inline suction catheter   Sputum Method Obtained Endotracheal   Non-Surgical Airway 09/01/23 Endotracheal tube   Placement Date/Time: 09/01/23 0240   Present on Admission/Arrival: Yes  Placed By: In ED  Placement Verified By: Auscultation;Capnometry; Chest X-ray;Colorimetric ETCO2 device  Mask Ventilation: Oral airway  Insertion attempts: 1  Location: Oral  Airwa. ..    Secured At 23 cm   Measured From Lips   Secured Location Right   Secured By Commercial tube mayer   Site Assessment Dry   Treatment   $Treatment Type $Inhaled Therapy/Meds

## 2023-09-06 NOTE — PROGRESS NOTES
09/05/23 2330   Patient Observation   Pulse (!) 141   Respirations (!) 8   SpO2 95 %   Breath Sounds   Breath Sounds Bilateral Clear;Diminished   Vent Information   Vent Mode AC/VC   Ventilator Settings   FiO2  (S)  60 %   Vt (Set, mL) 300 mL   Resp Rate (Set) 20 bmp   PEEP/CPAP (cmH2O) 10   Pressure Support (cm H2O) 0 cm H2O   Vent Patient Data (Readings)   Vt (Measured) 299 mL   Peak Inspiratory Pressure (cmH2O) 29 cmH2O   Rate Measured 20 br/min   Minute Volume (L/min) 5.98 Liters   Mean Airway Pressure (cmH2O) 14 cmH20   Plateau Pressure (cm H2O) 22 cm H2O   Driving Pressure 12   I:E Ratio 1:4.60   Flow Sensitivity 3 L/min   Backup Apnea On   Vent Alarm Settings   High Pressure (cmH2O) 45 cmH2O   Low Minute Volume (lpm) 3 L/min   High Minute Volume (lpm) 20 L/min   Low Exhaled Vt (ml) 200 mL   High Exhaled Vt (ml) 800 mL   RR High (bpm) 40 br/min   Apnea (secs) 20 secs   Additional Respiratoray Assessments   Humidification Source Heated wire   Humidification Temp 37   Circuit Condensation Drained   Non-Surgical Airway 09/01/23 Endotracheal tube   Placement Date/Time: 09/01/23 0240   Present on Admission/Arrival: Yes  Placed By: In ED  Placement Verified By: Auscultation;Capnometry; Chest X-ray;Colorimetric ETCO2 device  Mask Ventilation: Oral airway  Insertion attempts: 1  Location: Oral  Airwa. ..    Secured At 22 cm   Measured From [address] By Commercial tube mayer   Site Assessment Dry   Treatment   $Treatment Type $Inhaled Therapy/Meds

## 2023-09-06 NOTE — PROGRESS NOTES
Applied BSWR for pt safety d/t paralytic being turned off at this time. Pt remains sedated. Will continue to monitor and titrate sedation per Dr. Michelet Garcia. Family present at bedside and are aware of POC at this time.

## 2023-09-06 NOTE — PROGRESS NOTES
09/06/23 1949   Patient Observation   Observations 7.5 ett 23 at lip   Breath Sounds   Right Upper Lobe Diminished   Right Middle Lobe Diminished   Right Lower Lobe Diminished   Left Upper Lobe Diminished   Left Lower Lobe Diminished   Vent Information   Vent Mode AC/VC   Ventilator Settings   FiO2  50 %   Vt (Set, mL) 300 mL   Resp Rate (Set) 20 bmp   PEEP/CPAP (cmH2O) 10   Vent Patient Data (Readings)   Vt (Measured) 314 mL   Peak Inspiratory Pressure (cmH2O) 27 cmH2O   Rate Measured 22 br/min   Minute Volume (L/min) 6.6 Liters   Peak Inspiratory Flow (lpm) 60 L/sec   I:E Ratio 1:4.6   Flow Sensitivity 3 L/min   Static Compliance (L/cm H2O) 31   Dynamic Compliance (L/cm H2O) 18 L/cm H2O   Vent Alarm Settings   High Pressure (cmH2O) 45 cmH2O   Low Minute Volume (lpm) 3 L/min   Low Exhaled Vt (ml) 200 mL   RR High (bpm) 40 br/min   Apnea (secs) 20 secs   Additional Respiratoray Assessments   Humidification Source Heated wire   Humidification Temp 37   Circuit Condensation Drained   Ambu Bag With Mask At Bedside Yes   Airway Clearance   Suction ET Tube   Suction Device Inline suction catheter   Sputum Method Obtained Endotracheal   Sputum Amount Small   Sputum Color/Odor White;Yellow   Sputum Consistency Thick   Non-Surgical Airway 09/01/23 Endotracheal tube   Placement Date/Time: 09/01/23 0240   Present on Admission/Arrival: Yes  Placed By: In ED  Placement Verified By: Auscultation;Capnometry; Chest X-ray;Colorimetric ETCO2 device  Mask Ventilation: Oral airway  Insertion attempts: 1  Location: Oral  Airwa. ..    Secured At 23 cm   Measured From Lips   Secured Location Left   Secured By Commercial tube mayer

## 2023-09-06 NOTE — PROGRESS NOTES
Shift assessment, completed, see flow sheet. Pt remains intubated, sedated and chemically paralyzed. VSS. Respirations are easy, even, and unlabored. Bilateral lung sounds are diminished throughout. NG remains in place at 61, with TF at 10 mL/hr, 0ml residual.      PICC and PIV x2 remain in place and patent with sites and dressings C/D/I. Bridges in place and patent with STAT lock. Call light within reach. Bed in lowest position. Bed alarm on. Will continue to monitor.

## 2023-09-06 NOTE — PROGRESS NOTES
09/06/23 1606   NICU Vent Information   Skin Assessment Clean, dry, & intact   Equipment Changed Humidification   Vent Type 980   Vent Mode AC/VC   Vt (Set, mL) 300 mL   Vt (Measured) 308 mL   Resp Rate (Set) 20 bmp   Rate Measured 22 br/min   Minute Volume (L/min) 6.63 Liters   Peak Flow 60 L/min   FiO2  50 %   Peak Inspiratory Pressure (cmH2O) 27 cmH2O   I:E Ratio 1:4.60   Sensitivity 3   PEEP/CPAP (cmH2O) 10   Mean Airway Pressure (cmH2O) 14 cmH20   Cough/Sputum   Sputum How Obtained Endotracheal;Suctioned   Cough Productive   Sputum Amount Small   Sputum Color Cloudy   Tenacity Thick   Breath Sounds   Breath Sounds Bilateral Diminished   Right Upper Lobe Diminished   Right Middle Lobe Diminished   Right Lower Lobe Diminished   Left Upper Lobe Diminished   Left Lower Lobe Diminished   Additional Respiratory Assessments   Pulse 88   Respirations 19   SpO2 96 %   Humidification Source Heated wire   Humidification Temp 37   Vent Alarm Settings   High Pressure (cmH2O) 45 cmH2O   Low Minute Volume (lpm) 3 L/min   RR High (bpm) 40 br/min   Non-Surgical Airway 09/01/23 Endotracheal tube   Placement Date/Time: 09/01/23 0240   Present on Admission/Arrival: Yes  Placed By: In ED  Placement Verified By: Auscultation;Capnometry; Chest X-ray;Colorimetric ETCO2 device  Mask Ventilation: Oral airway  Insertion attempts: 1  Location: Oral  Airwa. ..    Secured At 22 cm   Measured From [address] By Commercial tube mayer   Site Assessment Dry   Treatment   $Treatment Type $Inhaled Therapy/Meds

## 2023-09-06 NOTE — PROGRESS NOTES
09/06/23 0309   Patient Observation   Pulse (!) 110   Respirations (!) 9   SpO2 93 %   Vent Information   Vent Mode AC/VC   $Ventilation $Subsequent Day   Ventilator Settings   FiO2  60 %   Vt (Set, mL) 300 mL   Resp Rate (Set) 20 bmp   PEEP/CPAP (cmH2O) 10   Pressure Support (cm H2O) 0 cm H2O   Vent Patient Data (Readings)   Vt (Measured) 298 mL   Peak Inspiratory Pressure (cmH2O) 31 cmH2O   Rate Measured 20 br/min   Minute Volume (L/min) 5.95 Liters   Mean Airway Pressure (cmH2O) 14 cmH20   Plateau Pressure (cm H2O) 22 cm H2O   Driving Pressure 12   I:E Ratio 1:4.60   Flow Sensitivity 3 L/min   Vent Alarm Settings   High Pressure (cmH2O) 45 cmH2O   Low Minute Volume (lpm) 3 L/min   High Minute Volume (lpm) 20 L/min   Low Exhaled Vt (ml) 200 mL   High Exhaled Vt (ml) 800 mL   RR High (bpm) 40 br/min   Apnea (secs) 20 secs   Additional Respiratoray Assessments   Humidification Source Heated wire   Humidification Temp 37   Circuit Condensation Drained   Airway Clearance   Suction ET Tube;Oral   Subglottic Suction Done Yes   Suction Device Inline suction catheter   Sputum Method Obtained Endotracheal   Non-Surgical Airway 09/01/23 Endotracheal tube   Placement Date/Time: 09/01/23 0240   Present on Admission/Arrival: Yes  Placed By: In ED  Placement Verified By: Auscultation;Capnometry; Chest X-ray;Colorimetric ETCO2 device  Mask Ventilation: Oral airway  Insertion attempts: 1  Location: Oral  Airwa. ..    Secured At 23 cm   Measured From Lips   Secured Location Left   Secured By Commercial tube mayer   Site Assessment Dry   Treatment   $Treatment Type $Inhaled Therapy/Meds

## 2023-09-06 NOTE — PROGRESS NOTES
Rhythm changes noted on monitor, HR increased into 140's. MD notified. Orders for EKG and to add precedex gtt.

## 2023-09-06 NOTE — PROGRESS NOTES
sertraline  100 mg Oral BID    pantoprazole  40 mg IntraVENous Daily    busPIRone  15 mg Oral 4x daily    ipratropium 0.5 mg-albuterol 2.5 mg  1 Dose Inhalation Q4H    meropenem  1,000 mg IntraVENous Q8H    atorvastatin  80 mg Oral Nightly    aspirin  81 mg Oral Daily       Data:  CBC:   Recent Labs     09/04/23  0353 09/05/23  0636   WBC 9.6 10.0   HGB 11.3* 11.4*   HCT 33.6* 32.9*   MCV 90.1 89.1    168       BMP:   Recent Labs     09/04/23  0353 09/05/23  0636    134*   K 4.0 4.1    107   CO2 21 21   BUN 20 27*   CREATININE 1.1 1.0       LIVER PROFILE:   No results for input(s): AST, ALT, LIPASE, BILIDIR, BILITOT, ALKPHOS in the last 72 hours. Invalid input(s): AMYLASE,  ALB    Microbiology:      9/1/2023 SARS-CoV-2 and influenza are negative  9/1/2023 blood NGTD  9/3/2023 BAL pending    Imaging:  CXR 9/4/2023 bilateral airspace disease    Echo 9/2/2023 EF 55%, mild LVH, no wall motion abnormality, normal RV, PASP 59    ASSESSMENT:  Acute hypoxemic respiratory failure with possible ARDS   Paralytic 9/1/2023-present, has not tolerated trials off paralytic  Moderate persistent asthma with severe acute exacerbation  She had stopped her inhaled corticosteroid approximately 4 months ago due to cost, she follows with me in the pulmonary clinic  Hospital-acquired pneumonia v aspiration pneumonia   Mucous plugging of bronchi  NSTEMI  Recurrent diverticulitis, just discharged from [facility] day prior to admission  Bilteral effusion will look with US     PLAN:  Mechanical ventilation as per my orders.  The ventilator was adjusted by me at the bedside for unstable, life threatening respiratory failure  + 12 ,will start Lasix 40 bid  Start KCL 20 meq bid  If not better then CT scan as CXR fluid overload vs   20/300/50 /10 increase PEEP grad 10   Adjust sedation   Hold on SBT of   Nimbex for ventilator synchrony, trial off Nimbex again  Merrem/Vanc D#6  Inhaled bronchodilators  Decadron BID  ASA, statin per cardiology  Nutrition: Trophic tube feeds, do not advance  Will look with US ,if effusion large will drain   Prophylaxis: lovenox, bactroban, protonix   I discussed with patient's daughter at the bedside and she is to think about code status   For now patient is full code  Total critical care time caring for this patient with life threatening, unstable organ failure, including direct patient contact, management of life support systems, review of data including imaging and labs, discussions with other team members and physicians is 32 minutes so far today, excluding procedures.

## 2023-09-06 NOTE — PROGRESS NOTES
Care rounds completed with Dr. Holger Kingsley and multidisciplinary team. Reviewed labs, meds, VS, assessment, & plan of care for today. See dictated note and new orders for details. New orders received. Daughter was present at bedside during rounds and as a team with the daughter discussed code status. Pt's daughter seems to think that her mother had stated previously she wanted to be a DNR. Daughter will discuss with other family members and report back to staff on what she would like for us to do.

## 2023-09-07 ENCOUNTER — TELEPHONE (OUTPATIENT)
Dept: INTERNAL MEDICINE CLINIC | Age: 78
End: 2023-09-07

## 2023-09-07 ENCOUNTER — APPOINTMENT (OUTPATIENT)
Dept: GENERAL RADIOLOGY | Age: 78
DRG: 870 | End: 2023-09-07
Payer: MEDICARE

## 2023-09-07 LAB
ANION GAP SERPL CALCULATED.3IONS-SCNC: 10 MMOL/L (ref 3–16)
BASE EXCESS BLDA CALC-SCNC: -9.6 MMOL/L (ref -3–3)
BILIRUB UR QL STRIP.AUTO: NEGATIVE
BUN SERPL-MCNC: 45 MG/DL (ref 7–20)
CALCIUM SERPL-MCNC: 8.2 MG/DL (ref 8.3–10.6)
CHLORIDE SERPL-SCNC: 109 MMOL/L (ref 99–110)
CLARITY UR: CLEAR
CO2 BLDA-SCNC: 21.4 MMOL/L
CO2 SERPL-SCNC: 21 MMOL/L (ref 21–32)
COHGB MFR BLDA: 0.3 % (ref 0–1.5)
COLOR UR: YELLOW
CREAT SERPL-MCNC: 1.4 MG/DL (ref 0.6–1.2)
CREAT UR-MCNC: 16.3 MG/DL (ref 28–259)
EPI CELLS #/AREA URNS HPF: ABNORMAL /HPF (ref 0–5)
GFR SERPLBLD CREATININE-BSD FMLA CKD-EPI: 39 ML/MIN/{1.73_M2}
GLUCOSE BLD-MCNC: 129 MG/DL (ref 70–99)
GLUCOSE BLD-MCNC: 145 MG/DL (ref 70–99)
GLUCOSE BLD-MCNC: 159 MG/DL (ref 70–99)
GLUCOSE SERPL-MCNC: 173 MG/DL (ref 70–99)
GLUCOSE UR STRIP.AUTO-MCNC: NEGATIVE MG/DL
HCO3 BLDA-SCNC: 19.7 MMOL/L (ref 21–29)
HGB BLDA-MCNC: 14.1 G/DL (ref 12–16)
HGB UR QL STRIP.AUTO: ABNORMAL
KETONES UR STRIP.AUTO-MCNC: NEGATIVE MG/DL
LEUKOCYTE ESTERASE UR QL STRIP.AUTO: NEGATIVE
METHGB MFR BLDA: 0.2 %
NITRITE UR QL STRIP.AUTO: NEGATIVE
O2 THERAPY: ABNORMAL
PCO2 BLDA: 56.8 MMHG (ref 35–45)
PERFORMED ON: ABNORMAL
PH BLDA: 7.16 [PH] (ref 7.35–7.45)
PH UR STRIP.AUTO: 5.5 [PH] (ref 5–8)
PO2 BLDA: 82.6 MMHG (ref 75–108)
POTASSIUM SERPL-SCNC: 5.3 MMOL/L (ref 3.5–5.1)
PROT UR STRIP.AUTO-MCNC: NEGATIVE MG/DL
RBC #/AREA URNS HPF: ABNORMAL /HPF (ref 0–4)
SAO2 % BLDA: 92.8 %
SODIUM SERPL-SCNC: 140 MMOL/L (ref 136–145)
SODIUM UR-SCNC: 111 MMOL/L
SP GR UR STRIP.AUTO: 1.01 (ref 1–1.03)
UA DIPSTICK W REFLEX MICRO PNL UR: YES
URN SPEC COLLECT METH UR: ABNORMAL
UROBILINOGEN UR STRIP-ACNC: 0.2 E.U./DL
VANCOMYCIN TROUGH SERPL-MCNC: 19.4 UG/ML (ref 10–20)
WBC #/AREA URNS HPF: ABNORMAL /HPF (ref 0–5)

## 2023-09-07 PROCEDURE — 2580000003 HC RX 258: Performed by: INTERNAL MEDICINE

## 2023-09-07 PROCEDURE — 94761 N-INVAS EAR/PLS OXIMETRY MLT: CPT

## 2023-09-07 PROCEDURE — 84300 ASSAY OF URINE SODIUM: CPT

## 2023-09-07 PROCEDURE — 82803 BLOOD GASES ANY COMBINATION: CPT

## 2023-09-07 PROCEDURE — 71045 X-RAY EXAM CHEST 1 VIEW: CPT

## 2023-09-07 PROCEDURE — 2000000000 HC ICU R&B

## 2023-09-07 PROCEDURE — 2500000003 HC RX 250 WO HCPCS: Performed by: INTERNAL MEDICINE

## 2023-09-07 PROCEDURE — C9113 INJ PANTOPRAZOLE SODIUM, VIA: HCPCS | Performed by: INTERNAL MEDICINE

## 2023-09-07 PROCEDURE — 80202 ASSAY OF VANCOMYCIN: CPT

## 2023-09-07 PROCEDURE — 94640 AIRWAY INHALATION TREATMENT: CPT

## 2023-09-07 PROCEDURE — 6360000002 HC RX W HCPCS: Performed by: INTERNAL MEDICINE

## 2023-09-07 PROCEDURE — 82570 ASSAY OF URINE CREATININE: CPT

## 2023-09-07 PROCEDURE — 99291 CRITICAL CARE FIRST HOUR: CPT | Performed by: INTERNAL MEDICINE

## 2023-09-07 PROCEDURE — 6370000000 HC RX 637 (ALT 250 FOR IP): Performed by: INTERNAL MEDICINE

## 2023-09-07 PROCEDURE — 6360000002 HC RX W HCPCS: Performed by: STUDENT IN AN ORGANIZED HEALTH CARE EDUCATION/TRAINING PROGRAM

## 2023-09-07 PROCEDURE — 2700000000 HC OXYGEN THERAPY PER DAY

## 2023-09-07 PROCEDURE — 99233 SBSQ HOSP IP/OBS HIGH 50: CPT | Performed by: INTERNAL MEDICINE

## 2023-09-07 PROCEDURE — 81001 URINALYSIS AUTO W/SCOPE: CPT

## 2023-09-07 PROCEDURE — 80048 BASIC METABOLIC PNL TOTAL CA: CPT

## 2023-09-07 PROCEDURE — 6370000000 HC RX 637 (ALT 250 FOR IP): Performed by: STUDENT IN AN ORGANIZED HEALTH CARE EDUCATION/TRAINING PROGRAM

## 2023-09-07 PROCEDURE — 94003 VENT MGMT INPAT SUBQ DAY: CPT

## 2023-09-07 RX ORDER — FUROSEMIDE 10 MG/ML
60 INJECTION INTRAMUSCULAR; INTRAVENOUS ONCE
Status: COMPLETED | OUTPATIENT
Start: 2023-09-07 | End: 2023-09-07

## 2023-09-07 RX ORDER — FUROSEMIDE 10 MG/ML
40 INJECTION INTRAMUSCULAR; INTRAVENOUS 3 TIMES DAILY
Status: DISCONTINUED | OUTPATIENT
Start: 2023-09-07 | End: 2023-09-07

## 2023-09-07 RX ADMIN — SODIUM CHLORIDE, PRESERVATIVE FREE 10 ML: 5 INJECTION INTRAVENOUS at 10:00

## 2023-09-07 RX ADMIN — MIDAZOLAM HYDROCHLORIDE 2 MG: 1 INJECTION, SOLUTION INTRAMUSCULAR; INTRAVENOUS at 02:25

## 2023-09-07 RX ADMIN — Medication 200 MCG/HR: at 11:20

## 2023-09-07 RX ADMIN — KETAMINE HYDROCHLORIDE 1 MG/KG/HR: 100 INJECTION INTRAMUSCULAR; INTRAVENOUS at 06:25

## 2023-09-07 RX ADMIN — ENOXAPARIN SODIUM 30 MG: 100 INJECTION SUBCUTANEOUS at 20:46

## 2023-09-07 RX ADMIN — POTASSIUM BICARBONATE 25 MEQ: 977.5 TABLET, EFFERVESCENT ORAL at 02:34

## 2023-09-07 RX ADMIN — Medication 200 MCG/HR: at 17:25

## 2023-09-07 RX ADMIN — SODIUM CHLORIDE, PRESERVATIVE FREE 10 ML: 5 INJECTION INTRAVENOUS at 20:25

## 2023-09-07 RX ADMIN — CARBOXYMETHYLCELLULOSE SODIUM 1 DROP: 10 GEL OPHTHALMIC at 02:27

## 2023-09-07 RX ADMIN — VANCOMYCIN HYDROCHLORIDE 750 MG: 750 INJECTION, POWDER, LYOPHILIZED, FOR SOLUTION INTRAVENOUS at 02:19

## 2023-09-07 RX ADMIN — SERTRALINE 100 MG: 100 TABLET, FILM COATED ORAL at 09:56

## 2023-09-07 RX ADMIN — IPRATROPIUM BROMIDE AND ALBUTEROL SULFATE 1 DOSE: 2.5; .5 SOLUTION RESPIRATORY (INHALATION) at 11:31

## 2023-09-07 RX ADMIN — BUSPIRONE HYDROCHLORIDE 15 MG: 7.5 TABLET ORAL at 14:27

## 2023-09-07 RX ADMIN — ASPIRIN 81 MG: 81 TABLET, CHEWABLE ORAL at 09:56

## 2023-09-07 RX ADMIN — IPRATROPIUM BROMIDE AND ALBUTEROL SULFATE 1 DOSE: 2.5; .5 SOLUTION RESPIRATORY (INHALATION) at 03:41

## 2023-09-07 RX ADMIN — Medication 200 MCG/HR: at 01:11

## 2023-09-07 RX ADMIN — HYDRALAZINE HYDROCHLORIDE 10 MG: 20 INJECTION INTRAMUSCULAR; INTRAVENOUS at 00:07

## 2023-09-07 RX ADMIN — KETAMINE HYDROCHLORIDE 1 MG/KG/HR: 100 INJECTION INTRAMUSCULAR; INTRAVENOUS at 16:27

## 2023-09-07 RX ADMIN — FUROSEMIDE 10 MG/HR: 10 INJECTION, SOLUTION INTRAMUSCULAR; INTRAVENOUS at 14:53

## 2023-09-07 RX ADMIN — PANTOPRAZOLE SODIUM 40 MG: 40 INJECTION, POWDER, FOR SOLUTION INTRAVENOUS at 09:57

## 2023-09-07 RX ADMIN — MEROPENEM 1000 MG: 1 INJECTION, POWDER, FOR SOLUTION INTRAVENOUS at 04:45

## 2023-09-07 RX ADMIN — IPRATROPIUM BROMIDE AND ALBUTEROL SULFATE 1 DOSE: 2.5; .5 SOLUTION RESPIRATORY (INHALATION) at 20:06

## 2023-09-07 RX ADMIN — CARBOXYMETHYLCELLULOSE SODIUM 1 DROP: 10 GEL OPHTHALMIC at 09:58

## 2023-09-07 RX ADMIN — IPRATROPIUM BROMIDE AND ALBUTEROL SULFATE 1 DOSE: 2.5; .5 SOLUTION RESPIRATORY (INHALATION) at 06:58

## 2023-09-07 RX ADMIN — CISATRACURIUM BESYLATE 2 MCG/KG/MIN: 10 INJECTION, SOLUTION INTRAVENOUS at 09:33

## 2023-09-07 RX ADMIN — IPRATROPIUM BROMIDE AND ALBUTEROL SULFATE 1 DOSE: 2.5; .5 SOLUTION RESPIRATORY (INHALATION) at 15:23

## 2023-09-07 RX ADMIN — FUROSEMIDE 10 MG/HR: 10 INJECTION, SOLUTION INTRAMUSCULAR; INTRAVENOUS at 23:12

## 2023-09-07 RX ADMIN — CISATRACURIUM BESYLATE 2 MCG/KG/MIN: 10 INJECTION, SOLUTION INTRAVENOUS at 06:44

## 2023-09-07 RX ADMIN — HYDRALAZINE HYDROCHLORIDE 10 MG: 20 INJECTION INTRAMUSCULAR; INTRAVENOUS at 07:08

## 2023-09-07 RX ADMIN — Medication 200 MCG/HR: at 23:32

## 2023-09-07 RX ADMIN — DEXAMETHASONE SODIUM PHOSPHATE 8 MG: 10 INJECTION INTRAMUSCULAR; INTRAVENOUS at 09:57

## 2023-09-07 RX ADMIN — DEXAMETHASONE SODIUM PHOSPHATE 8 MG: 10 INJECTION INTRAMUSCULAR; INTRAVENOUS at 20:46

## 2023-09-07 RX ADMIN — VANCOMYCIN HYDROCHLORIDE 750 MG: 750 INJECTION, POWDER, LYOPHILIZED, FOR SOLUTION INTRAVENOUS at 14:35

## 2023-09-07 RX ADMIN — CARBOXYMETHYLCELLULOSE SODIUM 1 DROP: 10 GEL OPHTHALMIC at 20:46

## 2023-09-07 RX ADMIN — IPRATROPIUM BROMIDE AND ALBUTEROL SULFATE 1 DOSE: 2.5; .5 SOLUTION RESPIRATORY (INHALATION) at 23:13

## 2023-09-07 RX ADMIN — CARBOXYMETHYLCELLULOSE SODIUM 1 DROP: 10 GEL OPHTHALMIC at 07:32

## 2023-09-07 RX ADMIN — Medication 1.5 MCG/KG/HR: at 05:11

## 2023-09-07 RX ADMIN — HYDRALAZINE HYDROCHLORIDE 10 MG: 20 INJECTION INTRAMUSCULAR; INTRAVENOUS at 20:15

## 2023-09-07 RX ADMIN — ENOXAPARIN SODIUM 30 MG: 100 INJECTION SUBCUTANEOUS at 09:56

## 2023-09-07 RX ADMIN — Medication 3 MG/HR: at 02:44

## 2023-09-07 RX ADMIN — MEROPENEM 1000 MG: 1 INJECTION, POWDER, FOR SOLUTION INTRAVENOUS at 17:24

## 2023-09-07 RX ADMIN — Medication 1.4 MCG/KG/HR: at 02:42

## 2023-09-07 RX ADMIN — CARBOXYMETHYLCELLULOSE SODIUM 1 DROP: 10 GEL OPHTHALMIC at 14:29

## 2023-09-07 RX ADMIN — FUROSEMIDE 60 MG: 10 INJECTION, SOLUTION INTRAMUSCULAR; INTRAVENOUS at 14:27

## 2023-09-07 RX ADMIN — Medication 200 MCG/HR: at 05:54

## 2023-09-07 RX ADMIN — FUROSEMIDE 40 MG: 10 INJECTION, SOLUTION INTRAMUSCULAR; INTRAVENOUS at 09:57

## 2023-09-07 RX ADMIN — BUSPIRONE HYDROCHLORIDE 15 MG: 7.5 TABLET ORAL at 09:56

## 2023-09-07 RX ADMIN — CHLORHEXIDINE GLUCONATE 0.12% ORAL RINSE 15 ML: 1.2 LIQUID ORAL at 20:47

## 2023-09-07 RX ADMIN — BUSPIRONE HYDROCHLORIDE 15 MG: 7.5 TABLET ORAL at 17:22

## 2023-09-07 RX ADMIN — CHLORHEXIDINE GLUCONATE 0.12% ORAL RINSE 15 ML: 1.2 LIQUID ORAL at 09:58

## 2023-09-07 ASSESSMENT — PULMONARY FUNCTION TESTS
PIF_VALUE: 25
PIF_VALUE: 34
PIF_VALUE: 39
PIF_VALUE: 32
PIF_VALUE: 30
PIF_VALUE: 39
PIF_VALUE: 30
PIF_VALUE: 43
PIF_VALUE: 33
PIF_VALUE: 39
PIF_VALUE: 25
PIF_VALUE: 26
PIF_VALUE: 40
PIF_VALUE: 45
PIF_VALUE: 41
PIF_VALUE: 37
PIF_VALUE: 24
PIF_VALUE: 23
PIF_VALUE: 39
PIF_VALUE: 34
PIF_VALUE: 38
PIF_VALUE: 35
PIF_VALUE: 33
PIF_VALUE: 35
PIF_VALUE: 29
PIF_VALUE: 34
PIF_VALUE: 35
PIF_VALUE: 18
PIF_VALUE: 41
PIF_VALUE: 26
PIF_VALUE: 29
PIF_VALUE: 27
PIF_VALUE: 26
PIF_VALUE: 36
PIF_VALUE: 45
PIF_VALUE: 37
PIF_VALUE: 45
PIF_VALUE: 28
PIF_VALUE: 35
PIF_VALUE: 33
PIF_VALUE: 35
PIF_VALUE: 27
PIF_VALUE: 35
PIF_VALUE: 38
PIF_VALUE: 23
PIF_VALUE: 38
PIF_VALUE: 34
PIF_VALUE: 35
PIF_VALUE: 45
PIF_VALUE: 39
PIF_VALUE: 29
PIF_VALUE: 25
PIF_VALUE: 45
PIF_VALUE: 39
PIF_VALUE: 35
PIF_VALUE: 34
PIF_VALUE: 26
PIF_VALUE: 38
PIF_VALUE: 44
PIF_VALUE: 25
PIF_VALUE: 36
PIF_VALUE: 34
PIF_VALUE: 38
PIF_VALUE: 32
PIF_VALUE: 23
PIF_VALUE: 32
PIF_VALUE: 22
PIF_VALUE: 26
PIF_VALUE: 36
PIF_VALUE: 38
PIF_VALUE: 37
PIF_VALUE: 30
PIF_VALUE: 30
PIF_VALUE: 36
PIF_VALUE: 27
PIF_VALUE: 26
PIF_VALUE: 36
PIF_VALUE: 24
PIF_VALUE: 35
PIF_VALUE: 21
PIF_VALUE: 36
PIF_VALUE: 36
PIF_VALUE: 25
PIF_VALUE: 32
PIF_VALUE: 38
PIF_VALUE: 27
PIF_VALUE: 29
PIF_VALUE: 40
PIF_VALUE: 33
PIF_VALUE: 30
PIF_VALUE: 37
PIF_VALUE: 25
PIF_VALUE: 36
PIF_VALUE: 23
PIF_VALUE: 34
PIF_VALUE: 41
PIF_VALUE: 35
PIF_VALUE: 27
PIF_VALUE: 39
PIF_VALUE: 37
PIF_VALUE: 37
PIF_VALUE: 26
PIF_VALUE: 30
PIF_VALUE: 34
PIF_VALUE: 38
PIF_VALUE: 17
PIF_VALUE: 32
PIF_VALUE: 39
PIF_VALUE: 21
PIF_VALUE: 36
PIF_VALUE: 19
PIF_VALUE: 34
PIF_VALUE: 23
PIF_VALUE: 31
PIF_VALUE: 34
PIF_VALUE: 38
PIF_VALUE: 40
PIF_VALUE: 25
PIF_VALUE: 30
PIF_VALUE: 31
PIF_VALUE: 25
PIF_VALUE: 38
PIF_VALUE: 25
PIF_VALUE: 37
PIF_VALUE: 35
PIF_VALUE: 23
PIF_VALUE: 31
PIF_VALUE: 30
PIF_VALUE: 32
PIF_VALUE: 34
PIF_VALUE: 35
PIF_VALUE: 36
PIF_VALUE: 35
PIF_VALUE: 20
PIF_VALUE: 31
PIF_VALUE: 44
PIF_VALUE: 38
PIF_VALUE: 36
PIF_VALUE: 34
PIF_VALUE: 29
PIF_VALUE: 37
PIF_VALUE: 33
PIF_VALUE: 35
PIF_VALUE: 36
PIF_VALUE: 29
PIF_VALUE: 34
PIF_VALUE: 36
PIF_VALUE: 37
PIF_VALUE: 45
PIF_VALUE: 28
PIF_VALUE: 25
PIF_VALUE: 41
PIF_VALUE: 36
PIF_VALUE: 45
PIF_VALUE: 26
PIF_VALUE: 32
PIF_VALUE: 37
PIF_VALUE: 32
PIF_VALUE: 39
PIF_VALUE: 37
PIF_VALUE: 27
PIF_VALUE: 45
PIF_VALUE: 32
PIF_VALUE: 32

## 2023-09-07 NOTE — PLAN OF CARE
Problem: Discharge Planning  Goal: Discharge to home or other facility with appropriate resources  Outcome: Progressing     Problem: Pain  Goal: Verbalizes/displays adequate comfort level or baseline comfort level  Outcome: Progressing     Problem: Safety - Adult  Goal: Free from fall injury  Outcome: Progressing  Note: Fall precautions in place. Problem: Nutrition Deficit:  Goal: Optimize nutritional status  Outcome: Progressing     Problem: Skin/Tissue Integrity  Goal: Absence of new skin breakdown  Description: 1. Monitor for areas of redness and/or skin breakdown  2. Assess vascular access sites hourly  3. Every 4-6 hours minimum:  Change oxygen saturation probe site  4. Every 4-6 hours:  If on nasal continuous positive airway pressure, respiratory therapy assess nares and determine need for appliance change or resting period. Outcome: Progressing  Note: Patient turned/repositioned as needed to maintain and improve skin integrity.

## 2023-09-07 NOTE — PROGRESS NOTES
4 Eyes Skin Assessment     NAME:  Je Epstein  YOB: 1945  MEDICAL RECORD NUMBER:  [id number]    The patient is being assessed for  Other Shift Assessment    I agree that at least one RN has performed a thorough Head to Toe Skin Assessment on the patient. ALL assessment sites listed below have been assessed. Areas assessed by both nurses:    Head, Face, Ears, Shoulders, Back, Chest, Arms, Elbows, Hands, Sacrum. Buttock, Coccyx, Ischium, Legs. Feet and Heels, and Under Medical Devices         Does the Patient have a Wound?  No noted wound(s)       Sloan Prevention initiated by RN: Yes  Wound Care Orders initiated by RN: No    Pressure Injury (Stage 3,4, Unstageable, DTI, NWPT, and Complex wounds) if present, place Wound referral order by RN under : No    New Ostomies, if present place, Ostomy referral order under : No     Nurse 1 eSignature: Electronically signed by Conway Epley, RN on 9/19/23 at 4:12AM EDT    **SHARE this note so that the co-signing nurse can place an eSignature**    Nurse 2 eSignature: Electronically signed by Tyrell Jeffers RN on 9/19/23 at 5:55 AM EDT

## 2023-09-07 NOTE — PROGRESS NOTES
Peep decraesed to 12cwp  FiO2 65%         09/07/23 1525   Breath Sounds   Right Upper Lobe Diminished   Right Middle Lobe Diminished   Right Lower Lobe Fine crackles   Left Upper Lobe Diminished   Left Lower Lobe Fine crackles   Vent Information   Vent Mode AC/VC   Vent Patient Data (Readings)   Flow Sensitivity 3 L/min   Backup Apnea On   Backup Rate 20 Breaths Per Minute   Backup Vt 300   Vent Alarm Settings   High Minute Volume (lpm) 20 L/min   Low Exhaled Vt (ml) 200 mL   High Exhaled Vt (ml) 800 mL   Apnea (secs) 20 secs   Additional Respiratoray Assessments   Humidification Source Heated wire   Humidification Temp 36.9   Circuit Condensation Drained   Ambu Bag With Mask At Bedside Yes   Airway Clearance   Suction ET Tube   Subglottic Suction Done Yes   Suction Device Inline suction catheter   Sputum Method Obtained Endotracheal   Sputum Amount Other (comment)  (none)   Non-Surgical Airway 09/01/23 Endotracheal tube   Placement Date/Time: 09/01/23 0240   Present on Admission/Arrival: Yes  Placed By: In ED  Placement Verified By: Auscultation;Capnometry; Chest X-ray;Colorimetric ETCO2 device  Mask Ventilation: Oral airway  Insertion attempts: 1  Location: Oral  Airwa. ..    Secured At 22 cm   Measured From Lips   Secured Location Right   Secured By Commercial tube mayer   Site Assessment Dry

## 2023-09-07 NOTE — PROGRESS NOTES
Care rounds completed with Dr. Paige Patel and multidisciplinary team. Reviewed labs, meds, VS, assessment, & plan of care for today. See dictated note and new orders for details. New orders received. Plan to prone pt following AM rounds. Family was present during rounds this AM and are aware and up to date on POC.

## 2023-09-07 NOTE — CARE COORDINATION
INTERDISCIPLINARY PLAN OF CARE CONFERENCE    Date/Time: 9/7/2023 11:18 AM  Completed by: Terrace Landau, Case Management      Patient Name:  Kristen Garcia  YOB: 1945  Admitting Diagnosis: Respiratory failure ([address]) [J96.90]  HCAP (healthcare-associated pneumonia) [J18.9]  Acute respiratory failure with hypoxia and hypercapnia ([address]) [J96.01, J96.02]  New onset left bundle branch block (LBBB) [I44.7]  Sepsis, due to unspecified organism, unspecified whether acute organ dysfunction present ([address]) [A41.9]     Admit Date/Time:  9/1/2023  2:26 AM    Chart reviewed. Interdisciplinary team contacted or reviewed plan related to patient progress and discharge plans. Disciplines included Case Management, Nursing, and Dietitian. Current Status:vented  PT/OT recommendation for discharge plan of care: NA    Expected D/C Disposition:  TBD  Confirmed plan with patient and/or family: no family at bedside    Met with: patient    Discharge Plan Comments: Reviewed chart, met with pt bedside. Plan for pt to prone after rounds. Nephology to see as well.  is healthcare decision maker. Will continue to monitor.

## 2023-09-07 NOTE — PROGRESS NOTES
Pt O2 in mid 80's, double staking and fighting ventilator. Nimbex gtt turned back on. No change in O2 status after nimbex added. RT called to bedside. Settings changed, PEEP increased to 14 and FiO2 increased to 100%    BP elevated in 512'K systolic, PRN hydralazine given.

## 2023-09-07 NOTE — PROGRESS NOTES
PM assessment complete, see flowsheet. Medications given per MAR. VSS, vent settings unchanged. RASS -4, nimbex remains off. No other needs noted.  Will continue to monitor

## 2023-09-07 NOTE — TELEPHONE ENCOUNTER
Received 2 phone calls in office. 1st phone call from patient's grandson'Isacc pickett. She says patient was at [facility] and discharged and is now at [id number] [facility]. She is requesting to know if her fiance is on patient's HIPAA form because he would like medical records. I advised her that her fiance would need to call the office to discuss as I could not give her that information. She voiced understanding. Patient's grandson Ragini Talavera called in to see if he is on patient's HIPAA form and is wanting medical records. I advised him that he is on the form, however the patient did not indicate on the form what information could be given or discussed with him. This form pertains to the physician offices and information may be different in the hospital. He can speak with a representative at the hospital to see if patient has him listed elsewhere there. He was upset but voiced understanding.

## 2023-09-07 NOTE — PROGRESS NOTES
09/06/23 2343   Patient Observation   Observations 7.5 ett 22 at lip   Breath Sounds   Right Upper Lobe Diminished   Right Middle Lobe Diminished   Right Lower Lobe Diminished   Left Upper Lobe Diminished   Left Lower Lobe Diminished   Vent Information   Vent Mode AC/VC   Ventilator Settings   FiO2  50 %   Vt (Set, mL) 300 mL   Resp Rate (Set) 20 bmp   PEEP/CPAP (cmH2O) 10   Vent Patient Data (Readings)   Vt (Measured) 375 mL   Peak Inspiratory Pressure (cmH2O) 35 cmH2O   Rate Measured 25 br/min   Minute Volume (L/min) 7.6 Liters   Peak Inspiratory Flow (lpm) 60 L/sec   I:E Ratio 1:4   Flow Sensitivity 3 L/min   Vent Alarm Settings   High Pressure (cmH2O) 45 cmH2O   Low Minute Volume (lpm) 3 L/min   Low Exhaled Vt (ml) 200 mL   RR High (bpm) 40 br/min   Apnea (secs) 20 secs   Additional Respiratoray Assessments   Humidification Source Heated wire   Humidification Temp 36.8   Circuit Condensation Drained   Ambu Bag With Mask At Bedside Yes   Airway Clearance   Suction ET Tube   Suction Device Inline suction catheter   Sputum Method Obtained Endotracheal   Sputum Amount Small   Sputum Color/Odor White;Yellow   Sputum Consistency Thick   Non-Surgical Airway 09/01/23 Endotracheal tube   Placement Date/Time: 09/01/23 0240   Present on Admission/Arrival: Yes  Placed By: In ED  Placement Verified By: Auscultation;Capnometry; Chest X-ray;Colorimetric ETCO2 device  Mask Ventilation: Oral airway  Insertion attempts: 1  Location: Oral  Airwa. ..    Secured At 22 cm   Measured From [address] By Commercial tube mayer

## 2023-09-07 NOTE — PROGRESS NOTES
Per Dr. Francesca Dixon, pt was placed in prone positioning at this time with pillow support with assist of RT x1 and RN x4. All bony prominices were padded prior to proning. Pt tolerated well. VSS, HR remains elevated.

## 2023-09-07 NOTE — CONSULTS
Thank you to requesting provider: Milvia Funez MD, for asking us to see Gene Talley  Reason for consultation:  Acute Kidney Injury with Volume overload   Chief Complaint:  Respiratory distress     History of Presenting Illness      69 y/o female admitted to the ICU with respiratory failure. She is intubated and on vent with FiO2 of 70%. We have been asked to see her for acute kidney injury.   Patient has massive fluid overload and she was started on IV lasix with UOP of > 2 liters but then Scr went up to 1.4.  BP has been stable and no recent contrast.       Past Medical/Surgical History      Active Ambulatory Problems     Diagnosis Date Noted    Gastroesophageal reflux disease with esophagitis     Essential hypertension     H/O malignant neoplasm of breast 01/06/2010    Allergic reaction 07/23/2015    Anxiety attack 08/11/2015    Pes anserine bursitis 09/25/2015    Right knee pain 09/25/2015    Tear of right rotator cuff 12/08/2015    Shoulder impingement syndrome 12/08/2015    Shoulder pain, right 12/08/2015    Peripheral neuropathic pain 02/22/2016    Vitamin D deficiency 11/27/2017    Environmental and seasonal allergies 11/27/2017    Bilateral leg edema 11/27/2017    Other osteoporosis without current pathological fracture 11/27/2017    Anxiety 12/17/2018    Moderate persistent asthma without complication 66/50/7003    Chronic GERD 03/20/2023    Diverticulitis 08/28/2023     Resolved Ambulatory Problems     Diagnosis Date Noted    Acute sinusitis 04/21/2015     Past Medical History:   Diagnosis Date    Arthritis     Cancer ([address]) 9433,[id number]    Diastolic dysfunction     GERD (gastroesophageal reflux disease)     Hearing loss     Hyperlipidemia     Hypertension     Left breast mass     Miscarriage 1966    PONV (postoperative nausea and vomiting)     Spastic colon     Tinnitus          Review of Systems     Unable to obtain due to being on vent       Medications      Reviewed in EMR     Allergies Bladder, resp. rate 21, height 5' 2\" (1.575 m), weight 298 lb (135.2 kg), SpO2 94 %, not currently breastfeeding. General:  Critically ill   HEENT:  PERRL, orally intubated   Neck:  Supple, no mass   Chest:  On vent, no wheezing, decreased in bases with rhonchi   CV:  Tachycardia, no rub   Abdomen:  NTND, soft, +BS, no hepatosplenomegaly  Extremities:  ++ peripheral edema  Neurological:  Moving all four extremities, CN II-XII grossly intact  Lymphatics:  No palpable lymph nodes  Skin:  No rash, no jaundice  Psychiatric:  Sedated on vent  :  Bridges     Data     Recent Labs     09/05/23  0636   WBC 10.0   HGB 11.4*   HCT 32.9*   MCV 89.1        Recent Labs     09/05/23 0636 09/07/23  0615   * 140   K 4.1 5.3*    109   CO2 21 21   GLUCOSE 148* 173*   BUN 27* 45*   CREATININE 1.0 1.4*   LABGLOM 58* 39*       Assessment:    Acute Kidney Injury:  KDIGO stage 1  Etiology:  Appear to be congestive heart failure with increased passive venous congestion and decreased renal blood flow.   Non-oliguric   Respiratory Failure:  Multifocal pneumonia with fluid overload  On Antibiotics   FiO2 of 70%  BP stable  Hyperkalemia:  Mild  Due to BERNARD and was on supplement     Plan:    Antibiotics  Will recheck UA with urine indices  IV lasix with additional 60 mg bolus and then start lasix gtt  Monitor in/outs and adjust as needed  Daily labs   Holding potassium supplement  Consider IV albumin but last checked it was 4.1     Thank you for asking us to participate in the management of your patient, please do not hesitate to contact me for any concerns regarding my recommendations as outlined above.    -----------------------------  Radha Franco M.D.   [facility]

## 2023-09-07 NOTE — PROGRESS NOTES
Shift assessment, completed, see flow sheet. Pt remains intubated, sedated and chemically paralyzed. HR and BP remain elevated at this time, sedation and paralytic recently increased. Bilateral lung sounds are diminished throughout. NG in place at 61, with TF at 10 mL/hr, 0ml residual.      PICC to RUE and PIV x2 remain in place and patent with sites and dressings C/D/I. Bridges in place and patent with STAT lock. Bed in lowest position. Bed alarm on. Will continue to monitor.

## 2023-09-07 NOTE — PROGRESS NOTES
9/7  Vanc trough = 19.4 mcg/mL at 0215. BMP for this AM, will check renal function and possibly adjust dose/frequency. Calculated AUC of 541. Continue current dose and frequency of vancomycin. Will monitor and adjust dose accordingly.   Nori Babb PharmD  9/7/2023 3:27 AM

## 2023-09-07 NOTE — PROGRESS NOTES
09/07/23 0343   Patient Observation   Observations 7.5 ett 22 at lip   Breath Sounds   Right Upper Lobe Diminished   Right Middle Lobe Diminished   Right Lower Lobe Diminished   Left Upper Lobe Diminished   Left Lower Lobe Diminished   Vent Information   Vent Mode AC/VC   Ventilator Settings   FiO2  50 %   Vt (Set, mL) 300 mL   Resp Rate (Set) 20 bmp   PEEP/CPAP (cmH2O) 10   Vent Patient Data (Readings)   Peak Inspiratory Pressure (cmH2O) 26 cmH2O   Rate Measured 28 br/min   Minute Volume (L/min) 8.5 Liters   Peak Inspiratory Flow (lpm) 60 L/sec   Mean Airway Pressure (cmH2O) 16 cmH20   I:E Ratio 1:4.6   Flow Sensitivity 3 L/min   Vent Alarm Settings   High Pressure (cmH2O) 45 cmH2O   Low Minute Volume (lpm) 3 L/min   Low Exhaled Vt (ml) 200 mL   RR High (bpm) 40 br/min   Apnea (secs) 20 secs   Additional Respiratoray Assessments   Humidification Source Heated wire   Humidification Temp 36.8   Circuit Condensation Drained   Ambu Bag With Mask At Bedside Yes   Airway Clearance   Suction ET Tube   Suction Device Inline suction catheter   Sputum Method Obtained Endotracheal   Sputum Amount Small   Sputum Color/Odor Pink tinged   Sputum Consistency Thick   Non-Surgical Airway 09/01/23 Endotracheal tube   Placement Date/Time: 09/01/23 0240   Present on Admission/Arrival: Yes  Placed By: In ED  Placement Verified By: Auscultation;Capnometry; Chest X-ray;Colorimetric ETCO2 device  Mask Ventilation: Oral airway  Insertion attempts: 1  Location: Oral  Airwa. ..    Secured At 22 cm   Measured From Lips   Secured Location Right   Secured By Commercial tube mayer Debridement Text: The wound edges were debrided prior to proceeding with the closure to facilitate wound healing.

## 2023-09-08 ENCOUNTER — APPOINTMENT (OUTPATIENT)
Dept: GENERAL RADIOLOGY | Age: 78
DRG: 870 | End: 2023-09-08
Payer: MEDICARE

## 2023-09-08 LAB
ALBUMIN SERPL-MCNC: 2.9 G/DL (ref 3.4–5)
ANION GAP SERPL CALCULATED.3IONS-SCNC: 11 MMOL/L (ref 3–16)
BASE EXCESS BLDA CALC-SCNC: -3.4 MMOL/L (ref -3–3)
BASE EXCESS BLDA CALC-SCNC: -6.2 MMOL/L (ref -3–3)
BASE EXCESS BLDA CALC-SCNC: -6.5 MMOL/L (ref -3–3)
BASOPHILS # BLD: 0 K/UL (ref 0–0.2)
BASOPHILS NFR BLD: 0 %
BUN SERPL-MCNC: 58 MG/DL (ref 7–20)
CALCIUM SERPL-MCNC: 8.3 MG/DL (ref 8.3–10.6)
CHLORIDE SERPL-SCNC: 111 MMOL/L (ref 99–110)
CK SERPL-CCNC: 138 U/L (ref 26–192)
CO2 BLDA-SCNC: 22.7 MMOL/L
CO2 BLDA-SCNC: 24.2 MMOL/L
CO2 BLDA-SCNC: 25.6 MMOL/L
CO2 SERPL-SCNC: 21 MMOL/L (ref 21–32)
COHGB MFR BLDA: 0.1 % (ref 0–1.5)
COHGB MFR BLDA: 0.3 % (ref 0–1.5)
COHGB MFR BLDA: 0.4 % (ref 0–1.5)
CREAT SERPL-MCNC: 1.6 MG/DL (ref 0.6–1.2)
DEPRECATED RDW RBC AUTO: 15.5 % (ref 12.4–15.4)
EOSINOPHIL # BLD: 0 K/UL (ref 0–0.6)
EOSINOPHIL NFR BLD: 0 %
GFR SERPLBLD CREATININE-BSD FMLA CKD-EPI: 33 ML/MIN/{1.73_M2}
GLUCOSE BLD-MCNC: 119 MG/DL (ref 70–99)
GLUCOSE BLD-MCNC: 124 MG/DL (ref 70–99)
GLUCOSE BLD-MCNC: 134 MG/DL (ref 70–99)
GLUCOSE BLD-MCNC: 136 MG/DL (ref 70–99)
GLUCOSE BLD-MCNC: 99 MG/DL (ref 70–99)
GLUCOSE SERPL-MCNC: 149 MG/DL (ref 70–99)
HCO3 BLDA-SCNC: 21.1 MMOL/L (ref 21–29)
HCO3 BLDA-SCNC: 22.8 MMOL/L (ref 21–29)
HCO3 BLDA-SCNC: 23.6 MMOL/L (ref 21–29)
HCT VFR BLD AUTO: 35.4 % (ref 36–48)
HGB BLD-MCNC: 11.8 G/DL (ref 12–16)
HGB BLDA-MCNC: 12.3 G/DL (ref 12–16)
HGB BLDA-MCNC: 12.9 G/DL (ref 12–16)
HGB BLDA-MCNC: 13.5 G/DL (ref 12–16)
LYMPHOCYTES # BLD: 0.5 K/UL (ref 1–5.1)
LYMPHOCYTES NFR BLD: 2 %
MCH RBC QN AUTO: 30.1 PG (ref 26–34)
MCHC RBC AUTO-ENTMCNC: 33.5 G/DL (ref 31–36)
MCV RBC AUTO: 89.7 FL (ref 80–100)
METHGB MFR BLDA: 0.2 %
METHGB MFR BLDA: 0.2 %
METHGB MFR BLDA: 0.3 %
MONOCYTES # BLD: 0.9 K/UL (ref 0–1.3)
MONOCYTES NFR BLD: 4 %
NEUTROPHILS # BLD: 22.2 K/UL (ref 1.7–7.7)
NEUTROPHILS NFR BLD: 94 %
O2 THERAPY: ABNORMAL
PCO2 BLDA: 45.8 MMHG (ref 35–45)
PCO2 BLDA: 50.6 MMHG (ref 35–45)
PCO2 BLDA: 67 MMHG (ref 35–45)
PERFORMED ON: ABNORMAL
PERFORMED ON: NORMAL
PH BLDA: 7.16 [PH] (ref 7.35–7.45)
PH BLDA: 7.24 [PH] (ref 7.35–7.45)
PH BLDA: 7.32 [PH] (ref 7.35–7.45)
PHOSPHATE SERPL-MCNC: 6.3 MG/DL (ref 2.5–4.9)
PLATELET # BLD AUTO: 247 K/UL (ref 135–450)
PLATELET BLD QL SMEAR: ADEQUATE
PMV BLD AUTO: 7.9 FL (ref 5–10.5)
PO2 BLDA: 103.5 MMHG (ref 75–108)
PO2 BLDA: 183.1 MMHG (ref 75–108)
PO2 BLDA: 219.6 MMHG (ref 75–108)
POTASSIUM SERPL-SCNC: 5.7 MMOL/L (ref 3.5–5.1)
RBC # BLD AUTO: 3.94 M/UL (ref 4–5.2)
SAO2 % BLDA: 97.3 %
SAO2 % BLDA: 99 %
SAO2 % BLDA: 99.2 %
SODIUM SERPL-SCNC: 143 MMOL/L (ref 136–145)
URATE SERPL-MCNC: 6.2 MG/DL (ref 2.6–6)
VANCOMYCIN TROUGH SERPL-MCNC: 30.1 UG/ML (ref 10–20)
WBC # BLD AUTO: 23.6 K/UL (ref 4–11)

## 2023-09-08 PROCEDURE — 71045 X-RAY EXAM CHEST 1 VIEW: CPT

## 2023-09-08 PROCEDURE — 2580000003 HC RX 258: Performed by: INTERNAL MEDICINE

## 2023-09-08 PROCEDURE — 2500000003 HC RX 250 WO HCPCS: Performed by: INTERNAL MEDICINE

## 2023-09-08 PROCEDURE — 6370000000 HC RX 637 (ALT 250 FOR IP): Performed by: INTERNAL MEDICINE

## 2023-09-08 PROCEDURE — 6360000002 HC RX W HCPCS: Performed by: INTERNAL MEDICINE

## 2023-09-08 PROCEDURE — 94761 N-INVAS EAR/PLS OXIMETRY MLT: CPT

## 2023-09-08 PROCEDURE — 36592 COLLECT BLOOD FROM PICC: CPT

## 2023-09-08 PROCEDURE — 94640 AIRWAY INHALATION TREATMENT: CPT

## 2023-09-08 PROCEDURE — 6360000002 HC RX W HCPCS: Performed by: STUDENT IN AN ORGANIZED HEALTH CARE EDUCATION/TRAINING PROGRAM

## 2023-09-08 PROCEDURE — 85025 COMPLETE CBC W/AUTO DIFF WBC: CPT

## 2023-09-08 PROCEDURE — 36556 INSERT NON-TUNNEL CV CATH: CPT

## 2023-09-08 PROCEDURE — 82803 BLOOD GASES ANY COMBINATION: CPT

## 2023-09-08 PROCEDURE — C9113 INJ PANTOPRAZOLE SODIUM, VIA: HCPCS | Performed by: INTERNAL MEDICINE

## 2023-09-08 PROCEDURE — 02HV33Z INSERTION OF INFUSION DEVICE INTO SUPERIOR VENA CAVA, PERCUTANEOUS APPROACH: ICD-10-PCS | Performed by: INTERNAL MEDICINE

## 2023-09-08 PROCEDURE — 2000000000 HC ICU R&B

## 2023-09-08 PROCEDURE — 99233 SBSQ HOSP IP/OBS HIGH 50: CPT | Performed by: INTERNAL MEDICINE

## 2023-09-08 PROCEDURE — 84550 ASSAY OF BLOOD/URIC ACID: CPT

## 2023-09-08 PROCEDURE — 99291 CRITICAL CARE FIRST HOUR: CPT | Performed by: INTERNAL MEDICINE

## 2023-09-08 PROCEDURE — 36600 WITHDRAWAL OF ARTERIAL BLOOD: CPT

## 2023-09-08 PROCEDURE — 80069 RENAL FUNCTION PANEL: CPT

## 2023-09-08 PROCEDURE — 94003 VENT MGMT INPAT SUBQ DAY: CPT

## 2023-09-08 PROCEDURE — 82550 ASSAY OF CK (CPK): CPT

## 2023-09-08 PROCEDURE — 36556 INSERT NON-TUNNEL CV CATH: CPT | Performed by: INTERNAL MEDICINE

## 2023-09-08 PROCEDURE — 2700000000 HC OXYGEN THERAPY PER DAY

## 2023-09-08 PROCEDURE — 80202 ASSAY OF VANCOMYCIN: CPT

## 2023-09-08 RX ORDER — PROPOFOL 10 MG/ML
5-50 INJECTION, EMULSION INTRAVENOUS CONTINUOUS
Status: DISCONTINUED | OUTPATIENT
Start: 2023-09-08 | End: 2023-09-17

## 2023-09-08 RX ORDER — FLUDROCORTISONE ACETATE 0.1 MG/1
0.2 TABLET ORAL ONCE
Status: COMPLETED | OUTPATIENT
Start: 2023-09-08 | End: 2023-09-08

## 2023-09-08 RX ORDER — HYDRALAZINE HYDROCHLORIDE 25 MG/1
25 TABLET, FILM COATED ORAL EVERY 8 HOURS SCHEDULED
Status: DISCONTINUED | OUTPATIENT
Start: 2023-09-08 | End: 2023-09-11

## 2023-09-08 RX ORDER — FUROSEMIDE 10 MG/ML
100 INJECTION INTRAMUSCULAR; INTRAVENOUS ONCE
Status: COMPLETED | OUTPATIENT
Start: 2023-09-08 | End: 2023-09-08

## 2023-09-08 RX ADMIN — BUSPIRONE HYDROCHLORIDE 15 MG: 7.5 TABLET ORAL at 09:12

## 2023-09-08 RX ADMIN — HYDRALAZINE HYDROCHLORIDE 25 MG: 25 TABLET, FILM COATED ORAL at 20:35

## 2023-09-08 RX ADMIN — FUROSEMIDE 20 MG/HR: 10 INJECTION, SOLUTION INTRAMUSCULAR; INTRAVENOUS at 21:18

## 2023-09-08 RX ADMIN — SODIUM CHLORIDE, PRESERVATIVE FREE 10 ML: 5 INJECTION INTRAVENOUS at 09:13

## 2023-09-08 RX ADMIN — IPRATROPIUM BROMIDE AND ALBUTEROL SULFATE 1 DOSE: 2.5; .5 SOLUTION RESPIRATORY (INHALATION) at 15:26

## 2023-09-08 RX ADMIN — IPRATROPIUM BROMIDE AND ALBUTEROL SULFATE 1 DOSE: 2.5; .5 SOLUTION RESPIRATORY (INHALATION) at 03:17

## 2023-09-08 RX ADMIN — SERTRALINE 100 MG: 100 TABLET, FILM COATED ORAL at 09:12

## 2023-09-08 RX ADMIN — CARBOXYMETHYLCELLULOSE SODIUM 1 DROP: 10 GEL OPHTHALMIC at 02:13

## 2023-09-08 RX ADMIN — WHITE PETROLATUM 57.7 %-MINERAL OIL 31.9 % EYE OINTMENT: at 20:36

## 2023-09-08 RX ADMIN — FUROSEMIDE 100 MG: 10 INJECTION, SOLUTION INTRAVENOUS at 10:53

## 2023-09-08 RX ADMIN — Medication 5 MG/HR: at 09:59

## 2023-09-08 RX ADMIN — SODIUM ZIRCONIUM CYCLOSILICATE 5 G: 5 POWDER, FOR SUSPENSION ORAL at 13:48

## 2023-09-08 RX ADMIN — FUROSEMIDE 10 MG/HR: 10 INJECTION, SOLUTION INTRAMUSCULAR; INTRAVENOUS at 09:26

## 2023-09-08 RX ADMIN — DEXAMETHASONE SODIUM PHOSPHATE 8 MG: 10 INJECTION INTRAMUSCULAR; INTRAVENOUS at 09:12

## 2023-09-08 RX ADMIN — FLUDROCORTISONE ACETATE 0.2 MG: 0.1 TABLET ORAL at 10:53

## 2023-09-08 RX ADMIN — MEROPENEM 1000 MG: 1 INJECTION, POWDER, FOR SOLUTION INTRAVENOUS at 05:08

## 2023-09-08 RX ADMIN — METOPROLOL TARTRATE 25 MG: 25 TABLET, FILM COATED ORAL at 20:36

## 2023-09-08 RX ADMIN — Medication 200 MCG/HR: at 11:00

## 2023-09-08 RX ADMIN — PANTOPRAZOLE SODIUM 40 MG: 40 INJECTION, POWDER, FOR SOLUTION INTRAVENOUS at 09:12

## 2023-09-08 RX ADMIN — SODIUM ZIRCONIUM CYCLOSILICATE 5 G: 5 POWDER, FOR SUSPENSION ORAL at 20:36

## 2023-09-08 RX ADMIN — MEROPENEM 1000 MG: 1 INJECTION, POWDER, FOR SOLUTION INTRAVENOUS at 17:34

## 2023-09-08 RX ADMIN — BUSPIRONE HYDROCHLORIDE 15 MG: 7.5 TABLET ORAL at 20:36

## 2023-09-08 RX ADMIN — ASPIRIN 81 MG: 81 TABLET, CHEWABLE ORAL at 09:12

## 2023-09-08 RX ADMIN — CARBOXYMETHYLCELLULOSE SODIUM 1 DROP: 10 GEL OPHTHALMIC at 09:12

## 2023-09-08 RX ADMIN — SERTRALINE 100 MG: 100 TABLET, FILM COATED ORAL at 20:36

## 2023-09-08 RX ADMIN — ATORVASTATIN CALCIUM 80 MG: 40 TABLET, FILM COATED ORAL at 20:36

## 2023-09-08 RX ADMIN — CHLORHEXIDINE GLUCONATE 0.12% ORAL RINSE 15 ML: 1.2 LIQUID ORAL at 09:11

## 2023-09-08 RX ADMIN — PROPOFOL 40 MCG/KG/MIN: 10 INJECTION, EMULSION INTRAVENOUS at 22:21

## 2023-09-08 RX ADMIN — Medication 175 MCG/HR: at 16:25

## 2023-09-08 RX ADMIN — ENOXAPARIN SODIUM 30 MG: 100 INJECTION SUBCUTANEOUS at 09:12

## 2023-09-08 RX ADMIN — CARBOXYMETHYLCELLULOSE SODIUM 1 DROP: 10 GEL OPHTHALMIC at 14:00

## 2023-09-08 RX ADMIN — FUROSEMIDE 20 MG/HR: 10 INJECTION, SOLUTION INTRAMUSCULAR; INTRAVENOUS at 16:29

## 2023-09-08 RX ADMIN — IPRATROPIUM BROMIDE AND ALBUTEROL SULFATE 1 DOSE: 2.5; .5 SOLUTION RESPIRATORY (INHALATION) at 08:36

## 2023-09-08 RX ADMIN — CHLORHEXIDINE GLUCONATE 0.12% ORAL RINSE 15 ML: 1.2 LIQUID ORAL at 20:35

## 2023-09-08 RX ADMIN — PROPOFOL 40 MCG/KG/MIN: 10 INJECTION, EMULSION INTRAVENOUS at 19:17

## 2023-09-08 RX ADMIN — CARBOXYMETHYLCELLULOSE SODIUM 1 DROP: 10 GEL OPHTHALMIC at 06:43

## 2023-09-08 RX ADMIN — PROPOFOL 25 MCG/KG/MIN: 10 INJECTION, EMULSION INTRAVENOUS at 11:54

## 2023-09-08 RX ADMIN — Medication 175 MCG/HR: at 21:30

## 2023-09-08 RX ADMIN — IPRATROPIUM BROMIDE AND ALBUTEROL SULFATE 1 DOSE: 2.5; .5 SOLUTION RESPIRATORY (INHALATION) at 20:44

## 2023-09-08 RX ADMIN — SODIUM CHLORIDE, PRESERVATIVE FREE 10 ML: 5 INJECTION INTRAVENOUS at 20:37

## 2023-09-08 RX ADMIN — BUSPIRONE HYDROCHLORIDE 15 MG: 7.5 TABLET ORAL at 13:48

## 2023-09-08 RX ADMIN — HYDRALAZINE HYDROCHLORIDE 25 MG: 25 TABLET, FILM COATED ORAL at 17:41

## 2023-09-08 RX ADMIN — HYDRALAZINE HYDROCHLORIDE 10 MG: 20 INJECTION INTRAMUSCULAR; INTRAVENOUS at 02:13

## 2023-09-08 RX ADMIN — ENOXAPARIN SODIUM 30 MG: 100 INJECTION SUBCUTANEOUS at 20:35

## 2023-09-08 RX ADMIN — BUSPIRONE HYDROCHLORIDE 15 MG: 7.5 TABLET ORAL at 17:41

## 2023-09-08 RX ADMIN — PROPOFOL 25 MCG/KG/MIN: 10 INJECTION, EMULSION INTRAVENOUS at 16:22

## 2023-09-08 RX ADMIN — DEXAMETHASONE SODIUM PHOSPHATE 8 MG: 10 INJECTION INTRAMUSCULAR; INTRAVENOUS at 20:36

## 2023-09-08 RX ADMIN — HYDRALAZINE HYDROCHLORIDE 10 MG: 20 INJECTION INTRAMUSCULAR; INTRAVENOUS at 16:09

## 2023-09-08 RX ADMIN — CARBOXYMETHYLCELLULOSE SODIUM 1 DROP: 10 GEL OPHTHALMIC at 17:41

## 2023-09-08 RX ADMIN — VANCOMYCIN HYDROCHLORIDE 750 MG: 750 INJECTION, POWDER, LYOPHILIZED, FOR SOLUTION INTRAVENOUS at 02:22

## 2023-09-08 RX ADMIN — CISATRACURIUM BESYLATE 1.8 MCG/KG/MIN: 10 INJECTION, SOLUTION INTRAVENOUS at 02:39

## 2023-09-08 RX ADMIN — Medication 200 MCG/HR: at 05:10

## 2023-09-08 RX ADMIN — KETAMINE HYDROCHLORIDE 1 MG/KG/HR: 100 INJECTION INTRAMUSCULAR; INTRAVENOUS at 05:09

## 2023-09-08 RX ADMIN — HYDRALAZINE HYDROCHLORIDE 10 MG: 20 INJECTION INTRAMUSCULAR; INTRAVENOUS at 08:53

## 2023-09-08 RX ADMIN — IPRATROPIUM BROMIDE AND ALBUTEROL SULFATE 1 DOSE: 2.5; .5 SOLUTION RESPIRATORY (INHALATION) at 11:09

## 2023-09-08 ASSESSMENT — PULMONARY FUNCTION TESTS
PIF_VALUE: 28
PIF_VALUE: 26
PIF_VALUE: 29
PIF_VALUE: 29
PIF_VALUE: 26
PIF_VALUE: 24
PIF_VALUE: 34
PIF_VALUE: 36
PIF_VALUE: 27
PIF_VALUE: 28
PIF_VALUE: 26
PIF_VALUE: 26
PIF_VALUE: 27
PIF_VALUE: 26
PIF_VALUE: 26
PIF_VALUE: 24
PIF_VALUE: 24

## 2023-09-08 NOTE — PROGRESS NOTES
09/08/23 1446   Encounter Summary   Encounter Overview/Reason  Initial Encounter   Service Provided For: Family   Referral/Consult From: [place]   Last Encounter  09/08/23  (Nati Sheth met with  and provided pastoral support.)

## 2023-09-08 NOTE — PROGRESS NOTES
Progress Note    HISTORY     CC:   Respiratory Failure              We have been consulted for acute kidney injury       Subjective/   HPI:  Patient remains in the ICU in critical condition. Urine volume of 2 liters with lasix gtt. Kidney function is worsening and potassium is elevated. ROS:  Constitutional:  No fevers, No Chills  Cardiovascular:  No palpations, + edema  Respiratory: On vent   Skin:  No rash, no itching  :  No hematuria, no dysuria     Social Hx:  No Family at the bedside     Past Medical and Surgical History:  - Reviewed, no changes     EXAM       Objective/     Vitals:    09/08/23 0800 09/08/23 0850 09/08/23 0853 09/08/23 0900   BP: (!) 190/78  (!) 222/79 (!) 204/72   Pulse: (!) 109 (!) 116  (!) 116   Resp: 28 28 28   Temp:       TempSrc:       SpO2: 100%   100%   Weight:       Height:         24HR INTAKE/OUTPUT:    Intake/Output Summary (Last 24 hours) at 9/8/2023 1014  Last data filed at 9/8/2023 1000  Gross per 24 hour   Intake 2114.57 ml   Output 3490 ml   Net -1375.43 ml     Constitutional:  Critically ill   Eyes:  Pupils reactive, sclera clear   Neck:  Normal thyroid, no masses   Cardiovascular:  Tachycardic, no rub  Respiratory: On vent, no wheezing  Psychiatry:  Sedated   Abdomen: +bs, soft, nt, no masses   Musculoskeletal: ++ LE edema, no clubbing   Lymphatics:  No LAD in neck, no supraclavicular nodes       MEDICAL DECISION MAKING       Data/  Recent Labs     09/08/23  0355   WBC 23.6*   HGB 11.8*   HCT 35.4*   MCV 89.7        Recent Labs     09/07/23  0615 09/08/23  0355    143   K 5.3* 5.7*    111*   CO2 21 21   GLUCOSE 173* 149*   PHOS  --  6.3*   BUN 45* 58*   CREATININE 1.4* 1.6*   LABGLOM 39* 33*       Assessment/     Acute Kidney Injury:  KDIGO stage 1  Etiology:  Severe ATN ( U/P creatinine ratio of ~ 10 ). No hypotension.   + passive venous congestion related to volume overload and decreased renal blood flow from increased intra-abdominal

## 2023-09-08 NOTE — PROGRESS NOTES
Comprehensive Nutrition Assessment    Type and Reason for Visit:  Reassess    Nutrition Recommendations/Plan:   Re-start TF - ADULT TUBE FEEDING; Orogastric; Renal formula - Nepro with a goal rate of 20 ml/hr x 20 hours. Water flushes, 30 ml every 4 hours for tube patency. Monitor TF rate, intake, and tolerance + water flushes. Monitor respiratory/vent status, sedation type/amount (propofol is currently at 25 mcg x 24 hours which = 536 kcals from lipids), TG checks, and plan of care. Monitor nutrition-related labs, bowel function (last documented BM was on 8/28/23), and weight trends. Malnutrition Assessment:  Malnutrition Status:   At risk for malnutrition (09/08/23 1457)    Context:  Acute Illness     Findings of the 6 clinical characteristics of malnutrition:  Energy Intake:  50% or less of estimated energy requirements for 5 or more days  Weight Loss:  No significant weight loss     Body Fat Loss:  No significant body fat loss     Muscle Mass Loss:  No significant muscle mass loss    Fluid Accumulation:  No significant fluid accumulation     Strength:  Not Performed    Nutrition Assessment:    patient is declining from a nutritional standpoint AEB TF was held and patient remained in NPO status this am + last documented BM was on 8/28/23 and she is at risk for further compromise d/t respiratory dysfunction, renal dysfunction, altered nutrition-related labs, and need for EN as sole source of nutrition while intubated; will start TF today    Nutrition Related Findings:    patient remains intubated and sedated with 25 mcg propofol at this time; nimbex was off this am during rounds and patient was tolerating well; last documented BM was on 8/28/23; TG check was 105 mg/dl on 9/6/23; patient may need HD at some point; okay to re-start TF today Wound Type: None       Current Nutrition Intake & Therapies:    Average Meal Intake: NPO  Average Supplements Intake: NPO  Current Tube Feeding (TF)

## 2023-09-08 NOTE — PROGRESS NOTES
09/08/23 0318   Patient Observation   Pulse (!) 123   Respirations 24   SpO2 99 %   Breath Sounds   Breath Sounds Bilateral Diminished   Vent Information   Vent Mode AC/VC   Ventilator Settings   FiO2  65 %   Vt (Set, mL) 300 mL   Resp Rate (Set) 24 bmp   PEEP/CPAP (cmH2O) 12   Pressure Support (cm H2O) 0 cm H2O   Vent Patient Data (Readings)   Vt (Measured) 296 mL   Peak Inspiratory Pressure (cmH2O) 26 cmH2O   Rate Measured 24 br/min   Minute Volume (L/min) 7.12 Liters   Mean Airway Pressure (cmH2O) 15 cmH20   Plateau Pressure (cm H2O) 25 cm H2O   Driving Pressure 13   I:E Ratio 1:4.00   Vent Alarm Settings   High Pressure (cmH2O) 50 cmH2O   Low Minute Volume (lpm) 3 L/min   RR High (bpm) 40 br/min   Additional Respiratoray Assessments   Circuit Condensation Drained   Airway Clearance   Suction ET Tube   Suction Device Rojas   Sputum Method Obtained Endotracheal   Sputum Amount   (none)   Treatment   $Treatment Type $Inhaled Therapy/Meds

## 2023-09-08 NOTE — PROGRESS NOTES
DATE:  9/8/230  Meets for Devices:    [x] Indwelling Cath       [x] Central Lines  : PICC line is being removed, along with PIVs, a CVC Line is replacement related to sites are no longer usable       1. Acute Urinary Retention or Bladder Outlet Obstruction or Irrigation    Urinary Retention :    [x]Other: Please Specify:     2. Need for Accurate Measurements of Urinary Output in Critically Ill  Patients  Anticipated to Require Large Volume Infusions or Diuretics or Vasopressors / Inotropes et al:      []Hemodynamic Instability: receiving IV: Vasopressors & Inotropes EX: Dopamine, Epinephrine, Norepinephrine, Phenylephrine, Vasopressin, Digoxin  [x]High-dose Diuretics      [x]Other: Please Specify      Assessment/      Acute Kidney Injury:  KDIGO stage 1  Etiology:  Severe ATN ( U/P creatinine ratio of ~ 10 ). No hypotension. + passive venous congestion related to volume overload and decreased renal blood flow from increased intra-abdominal pressure  Clinical:  On lasix gtt but worsening labs   Respiratory Failure:  Multifocal pneumonia with fluid overload  On Antibiotics   FiO2 better   BP stable  Hyperkalemia:  Mild  Due to BERNARD and was on supplement   Supplement held but worsening      Plan/ Per Dr Britney Lobato MD Nephro       Lasix 100 mg IV x 1  Increase lasix gtt to 20 mg/hr  At this point she needs volume removal and has ATN. Diuresis will not impact the natural history of ATN but hopefully we can medically manage until she reached the plateau phase. May end up needing dialysis   Florinef 0.2 mg po x 1 via NG  Monitor ins and outs   Follow labs      Thank you for asking us to participate in the management of your patient, please do not hesitate to contact me for any concerns regarding my recommendations as outlined above.    -----------------------------  7.   Medications Intravenous: used for Intubation:     []Propofol (Diprivan): General Anesthetic     []Fentanyl: Narcotic/Analgesic     []Versed

## 2023-09-08 NOTE — PROGRESS NOTES
Reassessment completed. Titrating sedation for RASS  -2 and vent compliance. Increasing propofol while decreasing versed with goal of d/cing versed. SEE MAR. VSS at this time. Bridges patent. CVP 10. All new tubing started for medication and placed in right IJ CVC. PICC and pIV removed. Call light within reach. Family at bedside.  Update given

## 2023-09-08 NOTE — PROGRESS NOTES
Patient care assumed, assessment completed as charted. Patient continues on ventilator, in prone position. A/C24, , FiO2 65%, PEEP 12. Right upper arm PICC in place, Nimbex continues at 2mcg/kg/min, Versed at 5mg/hr, Fentanyl at 200mcg/hr, Ketamine at 0.998mg/kg/hr, and Lasix at 10mg/hr. /70 (111), Hydralazine 10mg administered per PRN order. NG clamped, villaseñor catheter patent. No further needs assessed at this time. Will monitor.

## 2023-09-08 NOTE — PROGRESS NOTES
Wasted 85  mL of Nimbex with Tia Gould RN.  Electronically signed by Krista Penn RN on 9/8/2023 at 6:27 PM

## 2023-09-08 NOTE — CARE COORDINATION
INTERDISCIPLINARY PLAN OF CARE CONFERENCE    Date/Time: 9/8/2023 5:02 PM  Completed by: Khai Hurd RN, Case Management      Patient Name:  Kristen Garcia  YOB: 1945  Admitting Diagnosis: Respiratory failure ([address]) [J96.90]  HCAP (healthcare-associated pneumonia) [J18.9]  Acute respiratory failure with hypoxia and hypercapnia ([address]) [J96.01, J96.02]  New onset left bundle branch block (LBBB) [I44.7]  Sepsis, due to unspecified organism, unspecified whether acute organ dysfunction present ([address]) [A41.9]     Admit Date/Time:  9/1/2023  2:26 AM    Chart reviewed. Interdisciplinary team contacted or reviewed plan related to patient progress and discharge plans. Disciplines included Case Management, Nursing, and Dietitian. Current Status:9/1/23  PT/OT recommendation for discharge plan of care:     Expected D/C Disposition:  TBD    The patient remains on vent. Per Dr. Kim Lr the patient may end up needing dialysis for a period of time.

## 2023-09-08 NOTE — CONSULTS
Consult received for prevention. Pt proned last night, supine this am.  All foam dressings removed to anterior bony prominences. No skin breakdown noted. Staff RN at bedside.   Call for any concerns

## 2023-09-08 NOTE — PLAN OF CARE
Problem: Safety - Adult  Goal: Free from fall injury  Outcome: Progressing     Problem: Nutrition Deficit:  Goal: Optimize nutritional status  Outcome: Progressing  Flowsheets (Taken 9/8/2023 1450 by Diane Parker RD, LD)  Nutrient intake appropriate for improving, restoring, or maintaining nutritional needs:   Assess nutritional status and recommend course of action   Recommend, monitor, and adjust tube feedings and TPN/PPN based on assessed needs

## 2023-09-08 NOTE — PROGRESS NOTES
09/07/23 2010   Patient Observation   Pulse (!) 113   Respirations 24   SpO2 100 %   Observations Proned   Breath Sounds   Breath Sounds Bilateral Diminished   Vent Information   Vent Mode AC/VC   Ventilator Settings   FiO2  65 %   Vt (Set, mL) 300 mL   Resp Rate (Set) 24 bmp   PEEP/CPAP (cmH2O) 12   Pressure Support (cm H2O) 0 cm H2O   Vent Patient Data (Readings)   Vt (Measured) 295 mL   Peak Inspiratory Pressure (cmH2O) 31 cmH2O   Rate Measured 24 br/min   Minute Volume (L/min) 7.09 Liters   Mean Airway Pressure (cmH2O) 16 cmH20   Plateau Pressure (cm H2O) 31 cm H2O   Driving Pressure 19   I:E Ratio 1:4.00   Static Compliance (L/cm H2O) 16   Dynamic Compliance (L/cm H2O) 16 L/cm H2O   Vent Alarm Settings   High Pressure (cmH2O) 50 cmH2O   Low Minute Volume (lpm) 3 L/min   RR High (bpm) 40 br/min   Additional Respiratoray Assessments   Humidification Source Heated wire   Humidification Temp 37   Circuit Condensation Drained   Ambu Bag With Mask At Bedside Yes   Airway Clearance   Suction ET Tube   Suction Device Inline suction catheter   Sputum Method Obtained Endotracheal   Sputum Amount Small   Sputum Color/Odor Pink tinged   Sputum Consistency Thick   Non-Surgical Airway 09/01/23 Endotracheal tube   Placement Date/Time: 09/01/23 0240   Present on Admission/Arrival: Yes  Placed By: In ED  Placement Verified By: Auscultation;Capnometry; Chest X-ray;Colorimetric ETCO2 device  Mask Ventilation: Oral airway  Insertion attempts: 1  Location: Oral  Airwa. ..    Secured At 22 cm   Measured From [address] By Commercial tube mayer   Site Assessment Dry

## 2023-09-08 NOTE — PROGRESS NOTES
09/08/23 0451   Patient Observation   Pulse (!) 126   Respirations 19   SpO2 98 %   Breath Sounds   Breath Sounds Bilateral Diminished   Ventilator Settings   FiO2  65 %   Vt (Set, mL) 320 mL   Resp Rate (Set) 28 bmp   PEEP/CPAP (cmH2O) 10   Pressure Support (cm H2O) 0 cm H2O   Vent Patient Data (Readings)   Vt (Measured) 311 mL   Peak Inspiratory Pressure (cmH2O) 24 cmH2O   Rate Measured 28 br/min   Minute Volume (L/min) 8.83 Liters   Mean Airway Pressure (cmH2O) 14 cmH20   Plateau Pressure (cm H2O) 25 cm H2O   Driving Pressure 15   I:E Ratio 1:3.00   Vent Alarm Settings   High Pressure (cmH2O) 50 cmH2O   Low Minute Volume (lpm) 3 L/min   RR High (bpm) 40 br/min

## 2023-09-08 NOTE — PROGRESS NOTES
Shift assessment completed as documented on flowsheet. Pt currently hypertensive, hydralazine prn as needed. Lungs diminished. NSR without ectopy. Abd round sot and nontender. Yuliana patent to bsd. Pt currently in prone position.  Sedated and mechanically ventilated Monitoring closely

## 2023-09-08 NOTE — PROGRESS NOTES
Pt placed in supine position with 4 RN andRT. Pt tolerated well. Plan on stopping Nimbex for paralytic vacation.

## 2023-09-08 NOTE — PROGRESS NOTES
09/07/23 2314   Patient Observation   Pulse (!) 122   Respirations 24   SpO2 100 %   Breath Sounds   Breath Sounds Bilateral Diminished   Vent Information   Vent Mode AC/VC   Ventilator Settings   FiO2  65 %   Vt (Set, mL) 300 mL   Resp Rate (Set) 24 bmp   PEEP/CPAP (cmH2O) 12   Pressure Support (cm H2O) 0 cm H2O   Vent Patient Data (Readings)   Vt (Measured) 300 mL   Peak Inspiratory Pressure (cmH2O) 29 cmH2O   Rate Measured 24 br/min   Minute Volume (L/min) 7.17 Liters   Mean Airway Pressure (cmH2O) 16 cmH20   Plateau Pressure (cm H2O) 25 cm H2O   Driving Pressure 13   I:E Ratio 1:4.00   Vent Alarm Settings   High Pressure (cmH2O) 50 cmH2O   Low Minute Volume (lpm) 3 L/min   RR High (bpm) 40 br/min   Additional Respiratoray Assessments   Circuit Condensation Drained   Airway Clearance   Suction ET Tube   Suction Device Inline suction catheter   Sputum Method Obtained Endotracheal   Sputum Amount   (none)   Non-Surgical Airway 09/01/23 Endotracheal tube   Placement Date/Time: 09/01/23 0240   Present on Admission/Arrival: Yes  Placed By: In ED  Placement Verified By: Auscultation;Capnometry; Chest X-ray;Colorimetric ETCO2 device  Mask Ventilation: Oral airway  Insertion attempts: 1  Location: Oral  Airwa. ..    Secured Location Left   Treatment   $Treatment Type $Inhaled Therapy/Meds

## 2023-09-09 ENCOUNTER — APPOINTMENT (OUTPATIENT)
Dept: GENERAL RADIOLOGY | Age: 78
DRG: 870 | End: 2023-09-09
Payer: MEDICARE

## 2023-09-09 LAB
ALBUMIN SERPL-MCNC: 2.9 G/DL (ref 3.4–5)
ANION GAP SERPL CALCULATED.3IONS-SCNC: 14 MMOL/L (ref 3–16)
BASE EXCESS BLDA CALC-SCNC: -2.6 MMOL/L (ref -3–3)
BASOPHILS # BLD: 0 K/UL (ref 0–0.2)
BASOPHILS NFR BLD: 0 %
BUN SERPL-MCNC: 72 MG/DL (ref 7–20)
CALCIUM SERPL-MCNC: 8.7 MG/DL (ref 8.3–10.6)
CHLORIDE SERPL-SCNC: 106 MMOL/L (ref 99–110)
CO2 BLDA-SCNC: 22.5 MMOL/L
CO2 SERPL-SCNC: 23 MMOL/L (ref 21–32)
COHGB MFR BLDA: 0.7 % (ref 0–1.5)
CREAT SERPL-MCNC: 1.9 MG/DL (ref 0.6–1.2)
DEPRECATED RDW RBC AUTO: 15 % (ref 12.4–15.4)
EOSINOPHIL # BLD: 0 K/UL (ref 0–0.6)
EOSINOPHIL NFR BLD: 0 %
GFR SERPLBLD CREATININE-BSD FMLA CKD-EPI: 27 ML/MIN/{1.73_M2}
GLUCOSE BLD-MCNC: 124 MG/DL (ref 70–99)
GLUCOSE BLD-MCNC: 138 MG/DL (ref 70–99)
GLUCOSE BLD-MCNC: 186 MG/DL (ref 70–99)
GLUCOSE BLD-MCNC: 191 MG/DL (ref 70–99)
GLUCOSE SERPL-MCNC: 154 MG/DL (ref 70–99)
HCO3 BLDA-SCNC: 21.5 MMOL/L (ref 21–29)
HCT VFR BLD AUTO: 33.5 % (ref 36–48)
HGB BLD-MCNC: 11.3 G/DL (ref 12–16)
HGB BLDA-MCNC: 11.9 G/DL (ref 12–16)
LYMPHOCYTES # BLD: 0.9 K/UL (ref 1–5.1)
LYMPHOCYTES NFR BLD: 6 %
MCH RBC QN AUTO: 30.1 PG (ref 26–34)
MCHC RBC AUTO-ENTMCNC: 33.8 G/DL (ref 31–36)
MCV RBC AUTO: 89 FL (ref 80–100)
METHGB MFR BLDA: 0 %
MONOCYTES # BLD: 0.8 K/UL (ref 0–1.3)
MONOCYTES NFR BLD: 5 %
NEUTROPHILS # BLD: 13.8 K/UL (ref 1.7–7.7)
NEUTROPHILS NFR BLD: 89 %
O2 THERAPY: ABNORMAL
PCO2 BLDA: 34.9 MMHG (ref 35–45)
PERFORMED ON: ABNORMAL
PH BLDA: 7.41 [PH] (ref 7.35–7.45)
PHOSPHATE SERPL-MCNC: 5.5 MG/DL (ref 2.5–4.9)
PLATELET # BLD AUTO: 171 K/UL (ref 135–450)
PLATELET BLD QL SMEAR: ADEQUATE
PMV BLD AUTO: 8.2 FL (ref 5–10.5)
PO2 BLDA: 73.6 MMHG (ref 75–108)
POTASSIUM SERPL-SCNC: 4.7 MMOL/L (ref 3.5–5.1)
RBC # BLD AUTO: 3.77 M/UL (ref 4–5.2)
SAO2 % BLDA: 93.7 %
SODIUM SERPL-SCNC: 143 MMOL/L (ref 136–145)
VANCOMYCIN SERPL-MCNC: 24.9 UG/ML
WBC # BLD AUTO: 15.5 K/UL (ref 4–11)

## 2023-09-09 PROCEDURE — 6370000000 HC RX 637 (ALT 250 FOR IP): Performed by: INTERNAL MEDICINE

## 2023-09-09 PROCEDURE — 80069 RENAL FUNCTION PANEL: CPT

## 2023-09-09 PROCEDURE — 2580000003 HC RX 258: Performed by: INTERNAL MEDICINE

## 2023-09-09 PROCEDURE — 85025 COMPLETE CBC W/AUTO DIFF WBC: CPT

## 2023-09-09 PROCEDURE — 6360000002 HC RX W HCPCS: Performed by: INTERNAL MEDICINE

## 2023-09-09 PROCEDURE — 99233 SBSQ HOSP IP/OBS HIGH 50: CPT | Performed by: INTERNAL MEDICINE

## 2023-09-09 PROCEDURE — 94761 N-INVAS EAR/PLS OXIMETRY MLT: CPT

## 2023-09-09 PROCEDURE — 82803 BLOOD GASES ANY COMBINATION: CPT

## 2023-09-09 PROCEDURE — C9113 INJ PANTOPRAZOLE SODIUM, VIA: HCPCS | Performed by: INTERNAL MEDICINE

## 2023-09-09 PROCEDURE — 2500000003 HC RX 250 WO HCPCS: Performed by: INTERNAL MEDICINE

## 2023-09-09 PROCEDURE — 94640 AIRWAY INHALATION TREATMENT: CPT

## 2023-09-09 PROCEDURE — 94003 VENT MGMT INPAT SUBQ DAY: CPT

## 2023-09-09 PROCEDURE — 80202 ASSAY OF VANCOMYCIN: CPT

## 2023-09-09 PROCEDURE — 99291 CRITICAL CARE FIRST HOUR: CPT | Performed by: INTERNAL MEDICINE

## 2023-09-09 PROCEDURE — 71045 X-RAY EXAM CHEST 1 VIEW: CPT

## 2023-09-09 PROCEDURE — 2000000000 HC ICU R&B

## 2023-09-09 PROCEDURE — 2700000000 HC OXYGEN THERAPY PER DAY

## 2023-09-09 RX ADMIN — IPRATROPIUM BROMIDE AND ALBUTEROL SULFATE 1 DOSE: 2.5; .5 SOLUTION RESPIRATORY (INHALATION) at 00:07

## 2023-09-09 RX ADMIN — Medication 175 MCG/HR: at 03:38

## 2023-09-09 RX ADMIN — PROPOFOL 40 MCG/KG/MIN: 10 INJECTION, EMULSION INTRAVENOUS at 14:06

## 2023-09-09 RX ADMIN — WHITE PETROLATUM 57.7 %-MINERAL OIL 31.9 % EYE OINTMENT: at 20:46

## 2023-09-09 RX ADMIN — CHLORHEXIDINE GLUCONATE 0.12% ORAL RINSE 15 ML: 1.2 LIQUID ORAL at 10:54

## 2023-09-09 RX ADMIN — PROPOFOL 40 MCG/KG/MIN: 10 INJECTION, EMULSION INTRAVENOUS at 07:23

## 2023-09-09 RX ADMIN — CARBOXYMETHYLCELLULOSE SODIUM 1 DROP: 10 GEL OPHTHALMIC at 14:07

## 2023-09-09 RX ADMIN — BUSPIRONE HYDROCHLORIDE 15 MG: 7.5 TABLET ORAL at 10:55

## 2023-09-09 RX ADMIN — ATORVASTATIN CALCIUM 80 MG: 40 TABLET, FILM COATED ORAL at 20:46

## 2023-09-09 RX ADMIN — HYDRALAZINE HYDROCHLORIDE 25 MG: 25 TABLET, FILM COATED ORAL at 06:16

## 2023-09-09 RX ADMIN — FUROSEMIDE 20 MG/HR: 10 INJECTION, SOLUTION INTRAMUSCULAR; INTRAVENOUS at 21:49

## 2023-09-09 RX ADMIN — IPRATROPIUM BROMIDE AND ALBUTEROL SULFATE 1 DOSE: 2.5; .5 SOLUTION RESPIRATORY (INHALATION) at 03:21

## 2023-09-09 RX ADMIN — FUROSEMIDE 20 MG/HR: 10 INJECTION, SOLUTION INTRAMUSCULAR; INTRAVENOUS at 02:30

## 2023-09-09 RX ADMIN — WHITE PETROLATUM 57.7 %-MINERAL OIL 31.9 % EYE OINTMENT: at 02:50

## 2023-09-09 RX ADMIN — PROPOFOL 40 MCG/KG/MIN: 10 INJECTION, EMULSION INTRAVENOUS at 04:15

## 2023-09-09 RX ADMIN — PROPOFOL 40 MCG/KG/MIN: 10 INJECTION, EMULSION INTRAVENOUS at 10:53

## 2023-09-09 RX ADMIN — SERTRALINE 100 MG: 100 TABLET, FILM COATED ORAL at 20:46

## 2023-09-09 RX ADMIN — BUSPIRONE HYDROCHLORIDE 15 MG: 7.5 TABLET ORAL at 20:46

## 2023-09-09 RX ADMIN — METOPROLOL TARTRATE 25 MG: 25 TABLET, FILM COATED ORAL at 10:56

## 2023-09-09 RX ADMIN — Medication 150 MCG/HR: at 18:25

## 2023-09-09 RX ADMIN — FUROSEMIDE 20 MG/HR: 10 INJECTION, SOLUTION INTRAMUSCULAR; INTRAVENOUS at 07:55

## 2023-09-09 RX ADMIN — ASPIRIN 81 MG: 81 TABLET, CHEWABLE ORAL at 10:54

## 2023-09-09 RX ADMIN — PROPOFOL 40 MCG/KG/MIN: 10 INJECTION, EMULSION INTRAVENOUS at 20:02

## 2023-09-09 RX ADMIN — SERTRALINE 100 MG: 100 TABLET, FILM COATED ORAL at 10:55

## 2023-09-09 RX ADMIN — CARBOXYMETHYLCELLULOSE SODIUM 1 DROP: 10 GEL OPHTHALMIC at 16:59

## 2023-09-09 RX ADMIN — Medication 125 MCG/HR: at 10:06

## 2023-09-09 RX ADMIN — CARBOXYMETHYLCELLULOSE SODIUM 1 DROP: 10 GEL OPHTHALMIC at 11:01

## 2023-09-09 RX ADMIN — WHITE PETROLATUM 57.7 %-MINERAL OIL 31.9 % EYE OINTMENT: at 06:17

## 2023-09-09 RX ADMIN — IPRATROPIUM BROMIDE AND ALBUTEROL SULFATE 1 DOSE: 2.5; .5 SOLUTION RESPIRATORY (INHALATION) at 11:42

## 2023-09-09 RX ADMIN — BUSPIRONE HYDROCHLORIDE 15 MG: 7.5 TABLET ORAL at 14:08

## 2023-09-09 RX ADMIN — BUSPIRONE HYDROCHLORIDE 15 MG: 7.5 TABLET ORAL at 16:59

## 2023-09-09 RX ADMIN — DEXAMETHASONE SODIUM PHOSPHATE 8 MG: 10 INJECTION INTRAMUSCULAR; INTRAVENOUS at 10:55

## 2023-09-09 RX ADMIN — METOPROLOL TARTRATE 25 MG: 25 TABLET, FILM COATED ORAL at 20:47

## 2023-09-09 RX ADMIN — MEROPENEM 1000 MG: 1 INJECTION, POWDER, FOR SOLUTION INTRAVENOUS at 16:58

## 2023-09-09 RX ADMIN — SODIUM CHLORIDE, PRESERVATIVE FREE 10 ML: 5 INJECTION INTRAVENOUS at 20:46

## 2023-09-09 RX ADMIN — DEXAMETHASONE SODIUM PHOSPHATE 8 MG: 10 INJECTION INTRAMUSCULAR; INTRAVENOUS at 20:45

## 2023-09-09 RX ADMIN — MEROPENEM 1000 MG: 1 INJECTION, POWDER, FOR SOLUTION INTRAVENOUS at 04:31

## 2023-09-09 RX ADMIN — IPRATROPIUM BROMIDE AND ALBUTEROL SULFATE 1 DOSE: 2.5; .5 SOLUTION RESPIRATORY (INHALATION) at 08:46

## 2023-09-09 RX ADMIN — HYDRALAZINE HYDROCHLORIDE 25 MG: 25 TABLET, FILM COATED ORAL at 20:46

## 2023-09-09 RX ADMIN — PANTOPRAZOLE SODIUM 40 MG: 40 INJECTION, POWDER, FOR SOLUTION INTRAVENOUS at 10:54

## 2023-09-09 RX ADMIN — HYDRALAZINE HYDROCHLORIDE 25 MG: 25 TABLET, FILM COATED ORAL at 14:08

## 2023-09-09 RX ADMIN — ENOXAPARIN SODIUM 30 MG: 100 INJECTION SUBCUTANEOUS at 10:54

## 2023-09-09 RX ADMIN — PROPOFOL 40 MCG/KG/MIN: 10 INJECTION, EMULSION INTRAVENOUS at 02:15

## 2023-09-09 RX ADMIN — IPRATROPIUM BROMIDE AND ALBUTEROL SULFATE 1 DOSE: 2.5; .5 SOLUTION RESPIRATORY (INHALATION) at 23:22

## 2023-09-09 RX ADMIN — PROPOFOL 40 MCG/KG/MIN: 10 INJECTION, EMULSION INTRAVENOUS at 23:03

## 2023-09-09 RX ADMIN — IPRATROPIUM BROMIDE AND ALBUTEROL SULFATE 1 DOSE: 2.5; .5 SOLUTION RESPIRATORY (INHALATION) at 20:30

## 2023-09-09 RX ADMIN — FUROSEMIDE 20 MG/HR: 10 INJECTION, SOLUTION INTRAMUSCULAR; INTRAVENOUS at 16:57

## 2023-09-09 RX ADMIN — ENOXAPARIN SODIUM 30 MG: 100 INJECTION SUBCUTANEOUS at 20:45

## 2023-09-09 RX ADMIN — IPRATROPIUM BROMIDE AND ALBUTEROL SULFATE 1 DOSE: 2.5; .5 SOLUTION RESPIRATORY (INHALATION) at 15:49

## 2023-09-09 RX ADMIN — CHLORHEXIDINE GLUCONATE 0.12% ORAL RINSE 15 ML: 1.2 LIQUID ORAL at 20:45

## 2023-09-09 ASSESSMENT — PULMONARY FUNCTION TESTS
PIF_VALUE: 28
PIF_VALUE: 26
PIF_VALUE: 25
PIF_VALUE: 16
PIF_VALUE: 24
PIF_VALUE: 20
PIF_VALUE: 25
PIF_VALUE: 26
PIF_VALUE: 24
PIF_VALUE: 24
PIF_VALUE: 26
PIF_VALUE: 42
PIF_VALUE: 26
PIF_VALUE: 38
PIF_VALUE: 37
PIF_VALUE: 19
PIF_VALUE: 18
PIF_VALUE: 40
PIF_VALUE: 25
PIF_VALUE: 27
PIF_VALUE: 26
PIF_VALUE: 27
PIF_VALUE: 25
PIF_VALUE: 30
PIF_VALUE: 15
PIF_VALUE: 44
PIF_VALUE: 27
PIF_VALUE: 26
PIF_VALUE: 28

## 2023-09-09 ASSESSMENT — PAIN SCALES - GENERAL: PAINLEVEL_OUTOF10: 0

## 2023-09-09 NOTE — PROGRESS NOTES
50ml of IV Versed wasted with Mat Clorinda Claude RN Hortense Ly, RN  Electronically signed by Rere Rudd RN on 9/9/2023 at 6:34 PM

## 2023-09-09 NOTE — PROGRESS NOTES
09/09/23 0008   Patient Observation   Pulse (!) 102   Respirations 24   SpO2 99 %   Breath Sounds   Breath Sounds Bilateral Diminished   Vent Information   Equipment Changed Humidification   Vent Mode AC/VC   Ventilator Settings   FiO2  55 %   Vt (Set, mL) 320 mL   Resp Rate (Set) 25 bmp   PEEP/CPAP (cmH2O) 10   Pressure Support (cm H2O) 0 cm H2O   Vent Patient Data (Readings)   Vt (Measured) 320 mL   Peak Inspiratory Pressure (cmH2O) 26 cmH2O   Rate Measured 27 br/min   Minute Volume (L/min) 8.7 Liters   Mean Airway Pressure (cmH2O) 15 cmH20   Plateau Pressure (cm H2O) 24 cm H2O   Driving Pressure 14   I:E Ratio 1:3.40   Vent Alarm Settings   High Pressure (cmH2O) 50 cmH2O   Low Minute Volume (lpm) 3 L/min   RR High (bpm) 40 br/min   Additional Respiratoray Assessments   Circuit Condensation Drained   Airway Clearance   Suction ET Tube   Suction Device Inline suction catheter   Sputum Method Obtained Endotracheal   Sputum Amount Small   Sputum Color/Odor Tan   Sputum Consistency Thick   Non-Surgical Airway 09/01/23 Endotracheal tube   Placement Date/Time: 09/01/23 0240   Present on Admission/Arrival: Yes  Placed By: In ED  Placement Verified By: Auscultation;Capnometry; Chest X-ray;Colorimetric ETCO2 device  Mask Ventilation: Oral airway  Insertion attempts: 1  Location: Oral  Airwa. ..    Secured Location Right   Treatment   $Treatment Type $Inhaled Therapy/Meds

## 2023-09-09 NOTE — PLAN OF CARE
Problem: Discharge Planning  Goal: Discharge to home or other facility with appropriate resources  9/9/2023 0014 by Amapro Wright RN  Outcome: Progressing  Flowsheets (Taken 9/9/2023 0014)  Discharge to home or other facility with appropriate resources: Identify barriers to discharge with patient and caregiver  9/8/2023 1834 by Andrea Hensley RN  Outcome: Progressing     Problem: Pain  Goal: Verbalizes/displays adequate comfort level or baseline comfort level  9/9/2023 0014 by Amparo Wright RN  Outcome: Progressing  Flowsheets (Taken 9/9/2023 0014)  Verbalizes/displays adequate comfort level or baseline comfort level:   Encourage patient to monitor pain and request assistance   Assess pain using appropriate pain scale   Administer analgesics based on type and severity of pain and evaluate response  9/8/2023 1834 by Andrea Hensley RN  Outcome: Progressing     Problem: Safety - Adult  Goal: Free from fall injury  9/9/2023 0014 by Amparo Wright RN  Outcome: Progressing  Flowsheets (Taken 9/9/2023 0014)  Free From Fall Injury: Based on caregiver fall risk screen, instruct family/caregiver to ask for assistance with transferring infant if caregiver noted to have fall risk factors  9/8/2023 1834 by Andrea Hensley RN  Outcome: Progressing     Problem: Nutrition Deficit:  Goal: Optimize nutritional status  9/9/2023 0014 by Amparo Wright RN  Outcome: Progressing  Flowsheets (Taken 9/9/2023 0014)  Nutrient intake appropriate for improving, restoring, or maintaining nutritional needs: Assess nutritional status and recommend course of action  9/8/2023 1834 by Andrea Hensley RN  Outcome: Progressing  Flowsheets (Taken 9/8/2023 1450 by Piero Brown, RD, LD)  Nutrient intake appropriate for improving, restoring, or maintaining nutritional needs:   Assess nutritional status and recommend course of action   Recommend, monitor, and adjust tube feedings and TPN/PPN based on assessed needs     Problem: Skin/Tissue

## 2023-09-09 NOTE — PROGRESS NOTES
09/09/23 0322   Patient Observation   Pulse (!) 105   Respirations 25   SpO2 96 %   Breath Sounds   Breath Sounds Bilateral Diminished   Vent Information   Vent Mode AC/VC   Ventilator Settings   FiO2  50 %   Vt (Set, mL) 320 mL   Resp Rate (Set) 25 bmp   PEEP/CPAP (cmH2O) 8   Pressure Support (cm H2O) 0 cm H2O   Vent Patient Data (Readings)   Vt (Measured) 321 mL   Peak Inspiratory Pressure (cmH2O) 25 cmH2O   Rate Measured 25 br/min   Minute Volume (L/min) 8.13 Liters   Mean Airway Pressure (cmH2O) 12 cmH20   Plateau Pressure (cm H2O) 24 cm H2O   Driving Pressure 16   I:E Ratio 1:3.40   Vent Alarm Settings   High Pressure (cmH2O) 50 cmH2O   Low Minute Volume (lpm) 3 L/min   RR High (bpm) 40 br/min   Airway Clearance   Suction ET Tube   Suction Device Inline suction catheter   Sputum Method Obtained Endotracheal   Sputum Amount Scant   Sputum Color/Odor Tan   Sputum Consistency Thick   Non-Surgical Airway 09/01/23 Endotracheal tube   Placement Date/Time: 09/01/23 0240   Present on Admission/Arrival: Yes  Placed By: In ED  Placement Verified By: Auscultation;Capnometry; Chest X-ray;Colorimetric ETCO2 device  Mask Ventilation: Oral airway  Insertion attempts: 1  Location: Oral  Airwa. ..    [address]   Treatment   $Treatment Type $Inhaled Therapy/Meds

## 2023-09-09 NOTE — PROGRESS NOTES
9/9  Vanc random = 24.9 mcg/mL at 0350. Hold vancomycin x1. Recheck vanc level tomorrow in the AM (9/10 at 0600).   Andrés Dominguez, PharmD  9/9/2023 5:10 AM

## 2023-09-09 NOTE — PROGRESS NOTES
4 Eyes Skin Assessment     NAME:  Natalya Knapp  YOB: 1945  MEDICAL RECORD NUMBER:  [id number]    The patient is being assessed for  Shift Handoff    I agree that at least one RN has performed a thorough Head to Toe Skin Assessment on the patient. ALL assessment sites listed below have been assessed. Areas assessed by both nurses:    Head, Face, Ears, Shoulders, Back, Chest, Arms, Elbows, Hands, Sacrum. Buttock, Coccyx, Ischium, Legs. Feet and Heels, and Under Medical Devices           Does the Patient have a Wound? No noted wound(s)       Sloan Prevention initiated by RN: Yes  Wound Care Orders initiated by RN: Yes    Pressure Injury (Stage 3,4, Unstageable, DTI, NWPT, and Complex wounds) if present, place Wound referral order by RN under : No    New Ostomies, if present place, Ostomy referral order under : No     Nurse 1 eSignature: Electronically signed by Joann Garzon RN on 9/9/23 at 12:19 AM EDT    **SHARE this note so that the co-signing nurse can place an eSignature**    Nurse 2 eSignature: Electronically signed by Saray Hazel RN on 9/9/23 at 12:41 AM EDT     Patient is not able to demonstrated the ability to move from a reclining position to an upright position within the recliner. Patient is confused, demented and /or unable to follow instruction.

## 2023-09-09 NOTE — PROGRESS NOTES
09/09/23 0011   Patient Observation   Pulse (!) 102   Respirations 24   SpO2 98 %   Vent Information   Vent Mode AC/VC   Ventilator Settings   FiO2  50 %   Vt (Set, mL) 320 mL   Resp Rate (Set) 25 bmp   PEEP/CPAP (cmH2O) 8   Pressure Support (cm H2O) 0 cm H2O   Vent Patient Data (Readings)   Vt (Measured) 321 mL   Peak Inspiratory Pressure (cmH2O) 24 cmH2O   Rate Measured 27 br/min   Minute Volume (L/min) 8.73 Liters   Mean Airway Pressure (cmH2O) 13 cmH20   Plateau Pressure (cm H2O) 24 cm H2O   Driving Pressure 16   I:E Ratio 1:3.40   Vent Alarm Settings   High Pressure (cmH2O) 50 cmH2O   Low Minute Volume (lpm) 3 L/min   RR High (bpm) 40 br/min

## 2023-09-09 NOTE — PROGRESS NOTES
unstable, life threatening respiratory failure  Nnegative 4 L last 2 days  On lasix 20/h   Craetinine 1.9   ARDS protocol/Pulmonary edema. change propofol  /fentanyl ,wean vesred amd wean ketamine  Lasix 20 mg/h  Adjust sedation   Hold on SBT of   off Nimbex again  Merrem/Vanc D#9 for 10 days   Inhaled bronchodilators  Decadron BID 8 bid   ASA, statin per cardiology  Nutrition: hold with paralytic   We look with US ,had to position  Did great with prone since ratio above 200 ,will supine and watch   Prophylaxis: lovenox, bactroban, protonix   I discussed with patient's daughter at the bedside ,want her full code  \central line 9/8  On tube feeding   For now patient is full code  Total critical care time caring for this patient with life threatening, unstable organ failure, including direct patient contact, management of life support systems, review of data including imaging and labs, discussions with other team members and physicians is 32 minutes so far today, excluding procedures.

## 2023-09-09 NOTE — PROGRESS NOTES
Care rounds completed, discussed POC. Updated  and daughter who are at bedside. Daughter stated her mother has a MPOA and living will, she can not get a copy until Monday because she has to get it from her PCP. Explained that if there is a living will, we would need a copy for hospital records. Until the spouse would remain as the decision maker. But since her and her father both want pt to be  full code, it was a huge problem at this time.  Daughter verbalized understanding

## 2023-09-09 NOTE — PROGRESS NOTES
Progress Note    HISTORY     CC:   Respiratory Failure              We have been consulted for acute kidney injury       Subjective/   HPI:  Patient remains in the ICU in critical condition. Urine volume of 4.5 liters with lasix gtt. Kidney function is worsening. Potassium is better. Vent settings are better     ROS:  Constitutional:  No fevers, No Chills  Cardiovascular:  No palpations, + edema  Respiratory: On vent   Skin:  No rash, no itching  :  No hematuria, no dysuria     Social Hx:  No Family at the bedside     Past Medical and Surgical History:  - Reviewed, no changes     EXAM       Objective/     Vitals:    09/09/23 0500 09/09/23 0600 09/09/23 0616 09/09/23 0700   BP: (!) 152/54 (!) 155/53 (!) 155/53 (!) 181/59   Pulse: (!) 107 (!) 104  (!) 106   Resp: 23 23 23   Temp:       TempSrc:       SpO2: 94% 96%  97%   Weight:       Height:         24HR INTAKE/OUTPUT:    Intake/Output Summary (Last 24 hours) at 9/9/2023 0803  Last data filed at 9/9/2023 0340  Gross per 24 hour   Intake 1828.73 ml   Output 4575 ml   Net -2746.27 ml       Constitutional:  Critically ill   Eyes:  Pupils reactive, sclera clear   Neck:  Normal thyroid, no masses   Cardiovascular:  Tachycardic, no rub  Respiratory: On vent, no wheezing  Psychiatry:  Sedated   Abdomen: +bs, soft, nt, no masses   Musculoskeletal: ++ LE edema, no clubbing   Lymphatics:  No LAD in neck, no supraclavicular nodes       MEDICAL DECISION MAKING       Data/  Recent Labs     09/08/23  0355 09/09/23  0350   WBC 23.6* 15.5*   HGB 11.8* 11.3*   HCT 35.4* 33.5*   MCV 89.7 89.0    171       Recent Labs     09/07/23  0615 09/08/23  0355 09/09/23  0350    143 143   K 5.3* 5.7* 4.7    111* 106   CO2 21 21 23   GLUCOSE 173* 149* 154*   PHOS  --  6.3* 5.5*   BUN 45* 58* 72*   CREATININE 1.4* 1.6* 1.9*   LABGLOM 39* 33* 27*         Assessment/     Acute Kidney Injury:  KDIGO stage 1  Etiology:  Severe ATN ( U/P creatinine ratio of ~ 10 ).

## 2023-09-09 NOTE — PROGRESS NOTES
4 Eyes Skin Assessment     NAME:  Lani Simmons  YOB: 1945  MEDICAL RECORD NUMBER:  [id number]    The patient is being assessed for  Shift Handoff    I agree that at least one RN has performed a thorough Head to Toe Skin Assessment on the patient. ALL assessment sites listed below have been assessed. Areas assessed by both nurses:    Head, Face, Ears, Shoulders, Back, Chest, Arms, Elbows, Hands, Sacrum. Buttock, Coccyx, Ischium, Legs. Feet and Heels, and Under Medical Devices         Does the Patient have a Wound?  No noted wound(s)       Slona Prevention initiated by RN: Yes  Wound Care Orders initiated by RN: Yes    Pressure Injury (Stage 3,4, Unstageable, DTI, NWPT, and Complex wounds) if present, place Wound referral order by RN under : Yes    New Ostomies, if present place, Ostomy referral order under : No     Nurse 1 eSignature: Electronically signed by Petra Dumont RN on 9/9/23 at 6:57 PM EDT    **SHARE this note so that the co-signing nurse can place an eSignature**    Nurse 2 eSignature: Electronically signed by Keren Lopez RN on 9/9/23 at 6:50 PM EDT

## 2023-09-09 NOTE — PROGRESS NOTES
Shift assessment completed as documented on flowsheet. VSS at this time without pressor support. Pt sedated and mechanically ventilated, RASS -3. Suction as needed, thick yellow secreations, sputum thick brownish/yellow. NSR with occ PVC. Abd round soft and nontender. Tube feeding infusing per order. Bridges patent to bsd. Pt currently not in wrist restraints due to not making effort to pull out lines and tubes. Oral care given, pt moves head and grimace when providing care. IV infusing per MAR. BIS  45.  Monitoring closely

## 2023-09-09 NOTE — PROCEDURES
[facility]                                Central line Procedure Note    Procedure: Insertion of Central Line     Indications: shock , Hemodynamic/CVP monitoring and IV access    Anesthesia: Local infiltration of 1% lidocaine. Consent:  Informed written consent obtained. Surgeon: Clark Mccartney MD    Technique:  Procedure was done using strict aseptic technique. The patient's neck was prepped and draped in the usual sterile fashion. Ultrasound was used to identify the jugular vein. This area of the neck overlying the IJ was injected with 1% lidocaine. Then using the Seldinger technique, a large-bore needle was placed into the jugular vein with good backflow. A guidewire was placed. Then using an 11 blade, a small incision was made in the patient's skin. The large-bore needle was removed. A dilator was passed over the guidewire. Once completed, a triple lumen catheter was placed over the guidewire with the guidewire then subsequently removed. All three ports were drawn and flushed with good flow. Then the catheter was sutured in place, and a sterile dressing was put in place. Number of sticks: 1    Number of Kits used:  1    Complications: No immediate complication. Estimated blood loss: Minimal.     Comment: Patient tolerated the procedure well.      Placement:  CXR was requested to confirm placement,US confirm guild wire in vein IJ and away from artery,also CVP was 10   Clark Mccartney MD

## 2023-09-09 NOTE — PROGRESS NOTES
Pt spouse and grandsons fiance at bedside, discussed pt progress at this time. IV infusing per MAR. No change in assessment at this time. Repositioned. RASS -2.  Monitoring closely

## 2023-09-10 ENCOUNTER — APPOINTMENT (OUTPATIENT)
Dept: GENERAL RADIOLOGY | Age: 78
DRG: 870 | End: 2023-09-10
Payer: MEDICARE

## 2023-09-10 LAB
ALBUMIN SERPL-MCNC: 2.7 G/DL (ref 3.4–5)
ANION GAP SERPL CALCULATED.3IONS-SCNC: 14 MMOL/L (ref 3–16)
BASE EXCESS BLDA CALC-SCNC: 2.1 MMOL/L (ref -3–3)
BASOPHILS # BLD: 0 K/UL (ref 0–0.2)
BASOPHILS NFR BLD: 0 %
BUN SERPL-MCNC: 71 MG/DL (ref 7–20)
CALCIUM SERPL-MCNC: 8.1 MG/DL (ref 8.3–10.6)
CHLORIDE SERPL-SCNC: 100 MMOL/L (ref 99–110)
CO2 BLDA-SCNC: 26.6 MMOL/L
CO2 SERPL-SCNC: 26 MMOL/L (ref 21–32)
COHGB MFR BLDA: 0.1 % (ref 0–1.5)
CREAT SERPL-MCNC: 1.8 MG/DL (ref 0.6–1.2)
DEPRECATED RDW RBC AUTO: 14.6 % (ref 12.4–15.4)
EOSINOPHIL # BLD: 0 K/UL (ref 0–0.6)
EOSINOPHIL NFR BLD: 0 %
GFR SERPLBLD CREATININE-BSD FMLA CKD-EPI: 29 ML/MIN/{1.73_M2}
GLUCOSE BLD-MCNC: 147 MG/DL (ref 70–99)
GLUCOSE BLD-MCNC: 162 MG/DL (ref 70–99)
GLUCOSE BLD-MCNC: 162 MG/DL (ref 70–99)
GLUCOSE BLD-MCNC: 168 MG/DL (ref 70–99)
GLUCOSE BLD-MCNC: 171 MG/DL (ref 70–99)
GLUCOSE SERPL-MCNC: 180 MG/DL (ref 70–99)
HCO3 BLDA-SCNC: 25.5 MMOL/L (ref 21–29)
HCT VFR BLD AUTO: 31.5 % (ref 36–48)
HGB BLD-MCNC: 10.8 G/DL (ref 12–16)
HGB BLDA-MCNC: 12.1 G/DL (ref 12–16)
LYMPHOCYTES # BLD: 0.5 K/UL (ref 1–5.1)
LYMPHOCYTES NFR BLD: 4 %
MCH RBC QN AUTO: 29.9 PG (ref 26–34)
MCHC RBC AUTO-ENTMCNC: 34.4 G/DL (ref 31–36)
MCV RBC AUTO: 86.8 FL (ref 80–100)
METHGB MFR BLDA: 0.1 %
MONOCYTES # BLD: 0.1 K/UL (ref 0–1.3)
MONOCYTES NFR BLD: 1 %
MYELOCYTES NFR BLD MANUAL: 1 %
NEUTROPHILS # BLD: 12.3 K/UL (ref 1.7–7.7)
NEUTROPHILS NFR BLD: 93 %
NEUTS BAND NFR BLD MANUAL: 1 % (ref 0–7)
O2 THERAPY: ABNORMAL
PCO2 BLDA: 35.5 MMHG (ref 35–45)
PERFORMED ON: ABNORMAL
PH BLDA: 7.47 [PH] (ref 7.35–7.45)
PHOSPHATE SERPL-MCNC: 5.4 MG/DL (ref 2.5–4.9)
PLATELET # BLD AUTO: 161 K/UL (ref 135–450)
PLATELET BLD QL SMEAR: ADEQUATE
PMV BLD AUTO: 8.6 FL (ref 5–10.5)
PO2 BLDA: 71.1 MMHG (ref 75–108)
POTASSIUM SERPL-SCNC: 3.5 MMOL/L (ref 3.5–5.1)
RBC # BLD AUTO: 3.63 M/UL (ref 4–5.2)
SAO2 % BLDA: 95.4 %
SLIDE REVIEW: ABNORMAL
SODIUM SERPL-SCNC: 140 MMOL/L (ref 136–145)
VANCOMYCIN SERPL-MCNC: 20.8 UG/ML
WBC # BLD AUTO: 12.9 K/UL (ref 4–11)

## 2023-09-10 PROCEDURE — 2500000003 HC RX 250 WO HCPCS: Performed by: INTERNAL MEDICINE

## 2023-09-10 PROCEDURE — 6360000002 HC RX W HCPCS: Performed by: INTERNAL MEDICINE

## 2023-09-10 PROCEDURE — 6370000000 HC RX 637 (ALT 250 FOR IP): Performed by: INTERNAL MEDICINE

## 2023-09-10 PROCEDURE — 80069 RENAL FUNCTION PANEL: CPT

## 2023-09-10 PROCEDURE — 80202 ASSAY OF VANCOMYCIN: CPT

## 2023-09-10 PROCEDURE — 94003 VENT MGMT INPAT SUBQ DAY: CPT

## 2023-09-10 PROCEDURE — 71045 X-RAY EXAM CHEST 1 VIEW: CPT

## 2023-09-10 PROCEDURE — 99233 SBSQ HOSP IP/OBS HIGH 50: CPT | Performed by: INTERNAL MEDICINE

## 2023-09-10 PROCEDURE — 2700000000 HC OXYGEN THERAPY PER DAY

## 2023-09-10 PROCEDURE — 2580000003 HC RX 258: Performed by: INTERNAL MEDICINE

## 2023-09-10 PROCEDURE — 94640 AIRWAY INHALATION TREATMENT: CPT

## 2023-09-10 PROCEDURE — 94761 N-INVAS EAR/PLS OXIMETRY MLT: CPT

## 2023-09-10 PROCEDURE — 2000000000 HC ICU R&B

## 2023-09-10 PROCEDURE — 85025 COMPLETE CBC W/AUTO DIFF WBC: CPT

## 2023-09-10 PROCEDURE — 99291 CRITICAL CARE FIRST HOUR: CPT | Performed by: INTERNAL MEDICINE

## 2023-09-10 PROCEDURE — C9113 INJ PANTOPRAZOLE SODIUM, VIA: HCPCS | Performed by: INTERNAL MEDICINE

## 2023-09-10 PROCEDURE — 82803 BLOOD GASES ANY COMBINATION: CPT

## 2023-09-10 RX ORDER — DEXAMETHASONE SODIUM PHOSPHATE 4 MG/ML
4 INJECTION, SOLUTION INTRA-ARTICULAR; INTRALESIONAL; INTRAMUSCULAR; INTRAVENOUS; SOFT TISSUE EVERY 12 HOURS
Status: DISCONTINUED | OUTPATIENT
Start: 2023-09-10 | End: 2023-09-12

## 2023-09-10 RX ORDER — POTASSIUM CHLORIDE 29.8 MG/ML
20 INJECTION INTRAVENOUS ONCE
Status: COMPLETED | OUTPATIENT
Start: 2023-09-10 | End: 2023-09-10

## 2023-09-10 RX ADMIN — ASPIRIN 81 MG: 81 TABLET, CHEWABLE ORAL at 09:04

## 2023-09-10 RX ADMIN — DEXAMETHASONE SODIUM PHOSPHATE 8 MG: 10 INJECTION INTRAMUSCULAR; INTRAVENOUS at 09:04

## 2023-09-10 RX ADMIN — CARBOXYMETHYLCELLULOSE SODIUM 1 DROP: 10 GEL OPHTHALMIC at 16:47

## 2023-09-10 RX ADMIN — BUSPIRONE HYDROCHLORIDE 15 MG: 7.5 TABLET ORAL at 09:02

## 2023-09-10 RX ADMIN — IPRATROPIUM BROMIDE AND ALBUTEROL SULFATE 1 DOSE: 2.5; .5 SOLUTION RESPIRATORY (INHALATION) at 03:27

## 2023-09-10 RX ADMIN — CHLORHEXIDINE GLUCONATE 0.12% ORAL RINSE 15 ML: 1.2 LIQUID ORAL at 09:02

## 2023-09-10 RX ADMIN — HYDRALAZINE HYDROCHLORIDE 25 MG: 25 TABLET, FILM COATED ORAL at 14:05

## 2023-09-10 RX ADMIN — WHITE PETROLATUM 57.7 %-MINERAL OIL 31.9 % EYE OINTMENT: at 02:41

## 2023-09-10 RX ADMIN — METOPROLOL TARTRATE 25 MG: 25 TABLET, FILM COATED ORAL at 20:45

## 2023-09-10 RX ADMIN — METOPROLOL TARTRATE 25 MG: 25 TABLET, FILM COATED ORAL at 09:04

## 2023-09-10 RX ADMIN — PROPOFOL 40 MCG/KG/MIN: 10 INJECTION, EMULSION INTRAVENOUS at 02:39

## 2023-09-10 RX ADMIN — DEXAMETHASONE SODIUM PHOSPHATE 4 MG: 4 INJECTION, SOLUTION INTRAMUSCULAR; INTRAVENOUS at 20:46

## 2023-09-10 RX ADMIN — IPRATROPIUM BROMIDE AND ALBUTEROL SULFATE 1 DOSE: 2.5; .5 SOLUTION RESPIRATORY (INHALATION) at 11:40

## 2023-09-10 RX ADMIN — FUROSEMIDE 20 MG/HR: 10 INJECTION, SOLUTION INTRAMUSCULAR; INTRAVENOUS at 09:02

## 2023-09-10 RX ADMIN — IPRATROPIUM BROMIDE AND ALBUTEROL SULFATE 1 DOSE: 2.5; .5 SOLUTION RESPIRATORY (INHALATION) at 15:09

## 2023-09-10 RX ADMIN — FUROSEMIDE 20 MG/HR: 10 INJECTION, SOLUTION INTRAMUSCULAR; INTRAVENOUS at 03:34

## 2023-09-10 RX ADMIN — Medication 100 MCG/HR: at 16:46

## 2023-09-10 RX ADMIN — HYDRALAZINE HYDROCHLORIDE 25 MG: 25 TABLET, FILM COATED ORAL at 20:46

## 2023-09-10 RX ADMIN — ATORVASTATIN CALCIUM 80 MG: 40 TABLET, FILM COATED ORAL at 20:45

## 2023-09-10 RX ADMIN — SODIUM CHLORIDE, PRESERVATIVE FREE 10 ML: 5 INJECTION INTRAVENOUS at 20:47

## 2023-09-10 RX ADMIN — PROPOFOL 30 MCG/KG/MIN: 10 INJECTION, EMULSION INTRAVENOUS at 12:20

## 2023-09-10 RX ADMIN — CARBOXYMETHYLCELLULOSE SODIUM 1 DROP: 10 GEL OPHTHALMIC at 09:02

## 2023-09-10 RX ADMIN — ENOXAPARIN SODIUM 30 MG: 100 INJECTION SUBCUTANEOUS at 20:46

## 2023-09-10 RX ADMIN — BUSPIRONE HYDROCHLORIDE 15 MG: 7.5 TABLET ORAL at 16:47

## 2023-09-10 RX ADMIN — BUSPIRONE HYDROCHLORIDE 15 MG: 7.5 TABLET ORAL at 14:01

## 2023-09-10 RX ADMIN — HYDRALAZINE HYDROCHLORIDE 25 MG: 25 TABLET, FILM COATED ORAL at 04:34

## 2023-09-10 RX ADMIN — FUROSEMIDE 20 MG/HR: 10 INJECTION, SOLUTION INTRAMUSCULAR; INTRAVENOUS at 22:24

## 2023-09-10 RX ADMIN — FUROSEMIDE 20 MG/HR: 10 INJECTION, SOLUTION INTRAMUSCULAR; INTRAVENOUS at 16:35

## 2023-09-10 RX ADMIN — PROPOFOL 30 MCG/KG/MIN: 10 INJECTION, EMULSION INTRAVENOUS at 08:57

## 2023-09-10 RX ADMIN — ENOXAPARIN SODIUM 30 MG: 100 INJECTION SUBCUTANEOUS at 09:05

## 2023-09-10 RX ADMIN — Medication 150 MCG/HR: at 01:24

## 2023-09-10 RX ADMIN — PROPOFOL 30 MCG/KG/MIN: 10 INJECTION, EMULSION INTRAVENOUS at 16:46

## 2023-09-10 RX ADMIN — POTASSIUM CHLORIDE 20 MEQ: 29.8 INJECTION, SOLUTION INTRAVENOUS at 14:01

## 2023-09-10 RX ADMIN — MEROPENEM 1000 MG: 1 INJECTION, POWDER, FOR SOLUTION INTRAVENOUS at 16:48

## 2023-09-10 RX ADMIN — CHLORHEXIDINE GLUCONATE 0.12% ORAL RINSE 15 ML: 1.2 LIQUID ORAL at 20:46

## 2023-09-10 RX ADMIN — Medication 125 MCG/HR: at 09:23

## 2023-09-10 RX ADMIN — PANTOPRAZOLE SODIUM 40 MG: 40 INJECTION, POWDER, FOR SOLUTION INTRAVENOUS at 09:04

## 2023-09-10 RX ADMIN — IPRATROPIUM BROMIDE AND ALBUTEROL SULFATE 1 DOSE: 2.5; .5 SOLUTION RESPIRATORY (INHALATION) at 08:48

## 2023-09-10 RX ADMIN — PROPOFOL 40 MCG/KG/MIN: 10 INJECTION, EMULSION INTRAVENOUS at 05:11

## 2023-09-10 RX ADMIN — SERTRALINE 100 MG: 100 TABLET, FILM COATED ORAL at 09:02

## 2023-09-10 RX ADMIN — IPRATROPIUM BROMIDE AND ALBUTEROL SULFATE 1 DOSE: 2.5; .5 SOLUTION RESPIRATORY (INHALATION) at 23:43

## 2023-09-10 RX ADMIN — MEROPENEM 1000 MG: 1 INJECTION, POWDER, FOR SOLUTION INTRAVENOUS at 04:33

## 2023-09-10 RX ADMIN — PROPOFOL 30 MCG/KG/MIN: 10 INJECTION, EMULSION INTRAVENOUS at 20:54

## 2023-09-10 RX ADMIN — IPRATROPIUM BROMIDE AND ALBUTEROL SULFATE 1 DOSE: 2.5; .5 SOLUTION RESPIRATORY (INHALATION) at 20:29

## 2023-09-10 RX ADMIN — CARBOXYMETHYLCELLULOSE SODIUM 1 DROP: 10 GEL OPHTHALMIC at 14:01

## 2023-09-10 RX ADMIN — WHITE PETROLATUM 57.7 %-MINERAL OIL 31.9 % EYE OINTMENT: at 20:46

## 2023-09-10 RX ADMIN — SERTRALINE 100 MG: 100 TABLET, FILM COATED ORAL at 20:46

## 2023-09-10 RX ADMIN — BUSPIRONE HYDROCHLORIDE 15 MG: 7.5 TABLET ORAL at 20:46

## 2023-09-10 RX ADMIN — WHITE PETROLATUM 57.7 %-MINERAL OIL 31.9 % EYE OINTMENT: at 04:34

## 2023-09-10 ASSESSMENT — PULMONARY FUNCTION TESTS
PIF_VALUE: 40
PIF_VALUE: 31
PIF_VALUE: 24
PIF_VALUE: 27
PIF_VALUE: 25
PIF_VALUE: 29
PIF_VALUE: 35
PIF_VALUE: 27
PIF_VALUE: 39
PIF_VALUE: 30
PIF_VALUE: 27
PIF_VALUE: 26
PIF_VALUE: 25

## 2023-09-10 NOTE — PROGRESS NOTES
09/10/23 0328   Patient Observation   Pulse 99   Respirations 22   SpO2 97 %   Breath Sounds   Breath Sounds Bilateral Diminished   Vent Information   Vent Mode AC/VC   Ventilator Settings   FiO2  50 %   Vt (Set, mL) 320 mL   Resp Rate (Set) 25 bmp   PEEP/CPAP (cmH2O) 10   Pressure Support (cm H2O) 0 cm H2O   Vent Patient Data (Readings)   Vt (Measured) 594 mL   Peak Inspiratory Pressure (cmH2O) 30 cmH2O   Rate Measured 30 br/min   Minute Volume (L/min) 9.43 Liters   Mean Airway Pressure (cmH2O) 16 cmH20   Plateau Pressure (cm H2O) 24 cm H2O   Driving Pressure 14   I:E Ratio 1:3.40   Vent Alarm Settings   High Pressure (cmH2O) 50 cmH2O   Low Minute Volume (lpm) 3 L/min   RR High (bpm) 40 br/min   Additional Respiratoray Assessments   Circuit Condensation Drained   Airway Clearance   Suction ET Tube   Suction Device Inline suction catheter   Sputum Method Obtained Endotracheal   Sputum Amount Scant   Sputum Color/Odor Tan   Sputum Consistency Thick   Non-Surgical Airway 09/01/23 Endotracheal tube   Placement Date/Time: 09/01/23 0240   Present on Admission/Arrival: Yes  Placed By: In ED  Placement Verified By: Auscultation;Capnometry; Chest X-ray;Colorimetric ETCO2 device  Mask Ventilation: Oral airway  Insertion attempts: 1  Location: Oral  Airwa. ..    Secured Location Right   Treatment   $Treatment Type $Inhaled Therapy/Meds

## 2023-09-10 NOTE — PROGRESS NOTES
Shift assessment completed as documented on flowsheet. Pt sedated and  mechanically ventilated RASS -2. Tube feeding infusing, RASS -2. Reposition as needed. Will slowly wean sedation. SPO2 99% on current vent settings. Bridges patent to bsd.  Monitoring closely

## 2023-09-10 NOTE — PROGRESS NOTES
09/09/23 2031   Patient Observation   Pulse (!) 104   Respirations 22   SpO2 97 %   Observations 7.5 ETT / 23 @ Lip   Breath Sounds   Breath Sounds Bilateral Diminished   Vent Information   Vent Mode AC/VC   Ventilator Settings   FiO2  40 %   Vt (Set, mL) 320 mL   Resp Rate (Set) 25 bmp   PEEP/CPAP (cmH2O) 10   Pressure Support (cm H2O) 0 cm H2O   Vent Patient Data (Readings)   Vt (Measured) 315 mL   Peak Inspiratory Pressure (cmH2O) 27 cmH2O   Rate Measured 30 br/min   Minute Volume (L/min) 9.84 Liters   Mean Airway Pressure (cmH2O) 16 cmH20   Plateau Pressure (cm H2O) 33 cm H2O   Driving Pressure 23   I:E Ratio 1:3.40   Static Compliance (L/cm H2O) 14   Dynamic Compliance (L/cm H2O) 18.53 L/cm H2O   Vent Alarm Settings   High Pressure (cmH2O) 50 cmH2O   Low Minute Volume (lpm) 3 L/min   RR High (bpm) 40 br/min   Additional Respiratoray Assessments   Humidification Source Heated wire   Humidification Temp 36.5   Circuit Condensation Drained   Ambu Bag With Mask At Bedside Yes   Airway Clearance   Suction ET Tube;Oral   Subglottic Suction Done Yes   Suction Device Inline suction catheter   Sputum Method Obtained Endotracheal   Sputum Amount Moderate   Sputum Color/Odor Tan;Bloody   Sputum Consistency Thick   Non-Surgical Airway 09/01/23 Endotracheal tube   Placement Date/Time: 09/01/23 0240   Present on Admission/Arrival: Yes  Placed By: In ED  Placement Verified By: Auscultation;Capnometry; Chest X-ray;Colorimetric ETCO2 device  Mask Ventilation: Oral airway  Insertion attempts: 1  Location: Oral  Airwa. ..    Secured At 23 cm   Measured From Lips   Secured Location Left   Secured By Commercial tube maeyr   Site Assessment Dry

## 2023-09-10 NOTE — PLAN OF CARE
Problem: Discharge Planning  Goal: Discharge to home or other facility with appropriate resources  Outcome: Progressing  Flowsheets (Taken 9/10/2023 0231)  Discharge to home or other facility with appropriate resources:   Identify barriers to discharge with patient and caregiver   Identify discharge learning needs (meds, wound care, etc)     Problem: Pain  Goal: Verbalizes/displays adequate comfort level or baseline comfort level  Outcome: Progressing  Flowsheets (Taken 9/10/2023 0231)  Verbalizes/displays adequate comfort level or baseline comfort level:   Encourage patient to monitor pain and request assistance   Assess pain using appropriate pain scale     Problem: Safety - Adult  Goal: Free from fall injury  Outcome: Progressing  Flowsheets (Taken 9/10/2023 0231)  Free From Fall Injury: Instruct family/caregiver on patient safety     Problem: Nutrition Deficit:  Goal: Optimize nutritional status  Outcome: Progressing  Flowsheets (Taken 9/10/2023 0231)  Nutrient intake appropriate for improving, restoring, or maintaining nutritional needs: Assess nutritional status and recommend course of action     Problem: Skin/Tissue Integrity  Goal: Absence of new skin breakdown  Description: 1. Monitor for areas of redness and/or skin breakdown  2. Assess vascular access sites hourly  3. Every 4-6 hours minimum:  Change oxygen saturation probe site  4. Every 4-6 hours:  If on nasal continuous positive airway pressure, respiratory therapy assess nares and determine need for appliance change or resting period.   Outcome: Progressing     Problem: Respiratory - Adult  Goal: Achieves optimal ventilation and oxygenation  Outcome: Progressing  Flowsheets (Taken 9/10/2023 0231)  Achieves optimal ventilation and oxygenation:   Assess for changes in respiratory status   Position to facilitate oxygenation and minimize respiratory effort   Assess for changes in mentation and behavior     Problem: Cardiovascular - Adult  Goal:

## 2023-09-10 NOTE — PROGRESS NOTES
4 Eyes Skin Assessment     NAME:  Yue Sutherland  YOB: 1945  MEDICAL RECORD NUMBER:  [id number]    The patient is being assessed for  Shift Handoff    I agree that at least one RN has performed a thorough Head to Toe Skin Assessment on the patient. ALL assessment sites listed below have been assessed. Areas assessed by both nurses:    Head, Face, Ears, Shoulders, Back, Chest, Arms, Elbows, Hands, Sacrum. Buttock, Coccyx, Ischium, Legs. Feet and Heels, and Under Medical Devices         Does the Patient have a Wound?  No noted wound(s)       Sloan Prevention initiated by RN: Yes  Wound Care Orders initiated by RN: Yes    Pressure Injury (Stage 3,4, Unstageable, DTI, NWPT, and Complex wounds) if present, place Wound referral order by RN under : No    New Ostomies, if present place, Ostomy referral order under : No     Nurse 1 eSignature: Electronically signed by Abelardo Starkey RN on 9/10/23 at 5:59 AM EDT    **SHARE this note so that the co-signing nurse can place an eSignature**    Nurse 2 eSignature: Electronically signed by Odessia Councilman, RN on 9/10/23 at 6:22 AM EDT

## 2023-09-10 NOTE — PROGRESS NOTES
9/10  Vanc random = 20.8 mcg/mL at 0431. Continue to hold vancomycin. Continue to check vancomycin levels daily (9/11 at 0600).   Analilia Pino PharmD  9/10/2023 6:37 AM

## 2023-09-10 NOTE — PROGRESS NOTES
4 Eyes Skin Assessment     NAME:  Scott Sierra  YOB: 1945  MEDICAL RECORD NUMBER:  [id number]    The patient is being assessed for  Shift Handoff    I agree that at least one RN has performed a thorough Head to Toe Skin Assessment on the patient. ALL assessment sites listed below have been assessed. Areas assessed by both nurses:    Head, Face, Ears, Shoulders, Back, Chest, Arms, Elbows, Hands, Sacrum. Buttock, Coccyx, Ischium, Legs. Feet and Heels, and Under Medical Devices         Does the Patient have a Wound?  No       Sloan Prevention initiated by RN: Yes  Wound Care Orders initiated by RN: Yes    Pressure Injury (Stage 3,4, Unstageable, DTI, NWPT, and Complex wounds) if present, place Wound referral order by RN under : No    New Ostomies, if present place, Ostomy referral order under : Yes     Nurse 1 eSignature: Electronically signed by Jaya Pierre RN on 9/10/23 at 11:04 PM EDT    **SHARE this note so that the co-signing nurse can place an eSignature**    Nurse 2 eSignature:   Electronically signed by Sandra Villafuerte RN on 9/10/23 at 7:13 PM EDT

## 2023-09-10 NOTE — PROGRESS NOTES
09/09/23 2326   Patient Observation   Pulse (!) 107   Respirations (!) 33   SpO2 93 %   Breath Sounds   Breath Sounds Bilateral Diminished   Vent Information   Vent Mode AC/VC   Ventilator Settings   FiO2  (S)  50 %   Vt (Set, mL) 320 mL   Resp Rate (Set) 25 bmp   PEEP/CPAP (cmH2O) 10   Pressure Support (cm H2O) 0 cm H2O   Vent Patient Data (Readings)   Vt (Measured) 329 mL   Peak Inspiratory Pressure (cmH2O) 19 cmH2O   Rate Measured 28 br/min   Minute Volume (L/min) 9.06 Liters   Mean Airway Pressure (cmH2O) 11 cmH20   Plateau Pressure (cm H2O) 24 cm H2O   Driving Pressure 14   I:E Ratio 1:3.10   Vent Alarm Settings   High Pressure (cmH2O) 50 cmH2O   Low Minute Volume (lpm) 3 L/min   RR High (bpm) 40 br/min   Additional Respiratoray Assessments   Circuit Condensation Drained   Airway Clearance   Suction ET Tube   Suction Device Inline suction catheter   Sputum Method Obtained Endotracheal   Sputum Amount Small   Sputum Color/Odor Tan   Sputum Consistency Thick   Non-Surgical Airway 09/01/23 Endotracheal tube   Placement Date/Time: 09/01/23 0240   Present on Admission/Arrival: Yes  Placed By: In ED  Placement Verified By: Auscultation;Capnometry; Chest X-ray;Colorimetric ETCO2 device  Mask Ventilation: Oral airway  Insertion attempts: 1  Location: Oral  Airwa. ..    [facility] [address]   Treatment   $Treatment Type $Inhaled Therapy/Meds

## 2023-09-10 NOTE — PLAN OF CARE
Problem: Nutrition Deficit:  Goal: Optimize nutritional status  9/10/2023 0231 by Gene Giles RN  Outcome: Progressing  Flowsheets (Taken 9/10/2023 0231)  Nutrient intake appropriate for improving, restoring, or maintaining nutritional needs: Assess nutritional status and recommend course of action     Problem: Safety - Adult  Goal: Free from fall injury  9/10/2023 0231 by Gene Giles RN  Outcome: Progressing  Flowsheets (Taken 9/10/2023 0231)  Free From Fall Injury: Instruct family/caregiver on patient safety     Problem: Respiratory - Adult  Goal: Achieves optimal ventilation and oxygenation  9/10/2023 0231 by Gene Giles RN  Outcome: Progressing  Flowsheets (Taken 9/10/2023 0231)  Achieves optimal ventilation and oxygenation:   Assess for changes in respiratory status   Position to facilitate oxygenation and minimize respiratory effort   Assess for changes in mentation and behavior

## 2023-09-10 NOTE — PROGRESS NOTES
AM care rounds completed. Family updated at this time. Decreasing sedation as pt tolerates, see MAR. Pt responds to painful stimuli.  Tube feeding infusing per order, 0 gastric residual. Monitoring closely

## 2023-09-10 NOTE — PROGRESS NOTES
Updated Dr Carlos patrick regarding [facility] radiology stating ETT needs to be pulled back 4cm from current position. ETT is @ 23 lip line where it has been. MD requested cxr.

## 2023-09-11 ENCOUNTER — APPOINTMENT (OUTPATIENT)
Dept: GENERAL RADIOLOGY | Age: 78
DRG: 870 | End: 2023-09-11
Payer: MEDICARE

## 2023-09-11 LAB
ALBUMIN SERPL-MCNC: 2.8 G/DL (ref 3.4–5)
ANION GAP SERPL CALCULATED.3IONS-SCNC: 12 MMOL/L (ref 3–16)
ANION GAP SERPL CALCULATED.3IONS-SCNC: 14 MMOL/L (ref 3–16)
BASE EXCESS BLDA CALC-SCNC: 7.2 MMOL/L (ref -3–3)
BASOPHILS # BLD: 0 K/UL (ref 0–0.2)
BASOPHILS NFR BLD: 0.2 %
BUN SERPL-MCNC: 73 MG/DL (ref 7–20)
BUN SERPL-MCNC: 78 MG/DL (ref 7–20)
CALCIUM SERPL-MCNC: 8.4 MG/DL (ref 8.3–10.6)
CALCIUM SERPL-MCNC: 8.5 MG/DL (ref 8.3–10.6)
CHLORIDE SERPL-SCNC: 95 MMOL/L (ref 99–110)
CHLORIDE SERPL-SCNC: 97 MMOL/L (ref 99–110)
CO2 BLDA-SCNC: 31.9 MMOL/L
CO2 SERPL-SCNC: 32 MMOL/L (ref 21–32)
CO2 SERPL-SCNC: 33 MMOL/L (ref 21–32)
COHGB MFR BLDA: 0 % (ref 0–1.5)
CREAT SERPL-MCNC: 1.6 MG/DL (ref 0.6–1.2)
CREAT SERPL-MCNC: 1.6 MG/DL (ref 0.6–1.2)
DEPRECATED RDW RBC AUTO: 14.6 % (ref 12.4–15.4)
EOSINOPHIL # BLD: 0.2 K/UL (ref 0–0.6)
EOSINOPHIL NFR BLD: 1.2 %
GFR SERPLBLD CREATININE-BSD FMLA CKD-EPI: 33 ML/MIN/{1.73_M2}
GFR SERPLBLD CREATININE-BSD FMLA CKD-EPI: 33 ML/MIN/{1.73_M2}
GLUCOSE BLD-MCNC: 149 MG/DL (ref 70–99)
GLUCOSE BLD-MCNC: 159 MG/DL (ref 70–99)
GLUCOSE BLD-MCNC: 169 MG/DL (ref 70–99)
GLUCOSE BLD-MCNC: 173 MG/DL (ref 70–99)
GLUCOSE BLD-MCNC: 177 MG/DL (ref 70–99)
GLUCOSE SERPL-MCNC: 180 MG/DL (ref 70–99)
GLUCOSE SERPL-MCNC: 181 MG/DL (ref 70–99)
HCO3 BLDA-SCNC: 30.7 MMOL/L (ref 21–29)
HCT VFR BLD AUTO: 32.9 % (ref 36–48)
HGB BLD-MCNC: 11.4 G/DL (ref 12–16)
HGB BLDA-MCNC: 12.4 G/DL (ref 12–16)
LYMPHOCYTES # BLD: 0.3 K/UL (ref 1–5.1)
LYMPHOCYTES NFR BLD: 2.1 %
MAGNESIUM SERPL-MCNC: 2 MG/DL (ref 1.8–2.4)
MCH RBC QN AUTO: 29.9 PG (ref 26–34)
MCHC RBC AUTO-ENTMCNC: 34.7 G/DL (ref 31–36)
MCV RBC AUTO: 86.1 FL (ref 80–100)
METHGB MFR BLDA: 0.1 %
MONOCYTES # BLD: 0.5 K/UL (ref 0–1.3)
MONOCYTES NFR BLD: 3.3 %
NEUTROPHILS # BLD: 13.2 K/UL (ref 1.7–7.7)
NEUTROPHILS NFR BLD: 93.2 %
O2 THERAPY: ABNORMAL
PCO2 BLDA: 39.3 MMHG (ref 35–45)
PERFORMED ON: ABNORMAL
PH BLDA: 7.51 [PH] (ref 7.35–7.45)
PHOSPHATE SERPL-MCNC: 4.9 MG/DL (ref 2.5–4.9)
PLATELET # BLD AUTO: 155 K/UL (ref 135–450)
PMV BLD AUTO: 8.4 FL (ref 5–10.5)
PO2 BLDA: 105.7 MMHG (ref 75–108)
POTASSIUM SERPL-SCNC: 3.3 MMOL/L (ref 3.5–5.1)
POTASSIUM SERPL-SCNC: 3.5 MMOL/L (ref 3.5–5.1)
RBC # BLD AUTO: 3.82 M/UL (ref 4–5.2)
SAO2 % BLDA: 98.3 %
SODIUM SERPL-SCNC: 141 MMOL/L (ref 136–145)
SODIUM SERPL-SCNC: 142 MMOL/L (ref 136–145)
VANCOMYCIN SERPL-MCNC: 16.6 UG/ML
WBC # BLD AUTO: 14.2 K/UL (ref 4–11)

## 2023-09-11 PROCEDURE — 94003 VENT MGMT INPAT SUBQ DAY: CPT

## 2023-09-11 PROCEDURE — 2580000003 HC RX 258: Performed by: INTERNAL MEDICINE

## 2023-09-11 PROCEDURE — C9113 INJ PANTOPRAZOLE SODIUM, VIA: HCPCS | Performed by: INTERNAL MEDICINE

## 2023-09-11 PROCEDURE — 6360000002 HC RX W HCPCS: Performed by: INTERNAL MEDICINE

## 2023-09-11 PROCEDURE — 99291 CRITICAL CARE FIRST HOUR: CPT | Performed by: INTERNAL MEDICINE

## 2023-09-11 PROCEDURE — 2700000000 HC OXYGEN THERAPY PER DAY

## 2023-09-11 PROCEDURE — 36415 COLL VENOUS BLD VENIPUNCTURE: CPT

## 2023-09-11 PROCEDURE — 2500000003 HC RX 250 WO HCPCS: Performed by: INTERNAL MEDICINE

## 2023-09-11 PROCEDURE — 6370000000 HC RX 637 (ALT 250 FOR IP): Performed by: INTERNAL MEDICINE

## 2023-09-11 PROCEDURE — 94761 N-INVAS EAR/PLS OXIMETRY MLT: CPT

## 2023-09-11 PROCEDURE — 82803 BLOOD GASES ANY COMBINATION: CPT

## 2023-09-11 PROCEDURE — 94640 AIRWAY INHALATION TREATMENT: CPT

## 2023-09-11 PROCEDURE — 99233 SBSQ HOSP IP/OBS HIGH 50: CPT | Performed by: INTERNAL MEDICINE

## 2023-09-11 PROCEDURE — 71045 X-RAY EXAM CHEST 1 VIEW: CPT

## 2023-09-11 PROCEDURE — 80048 BASIC METABOLIC PNL TOTAL CA: CPT

## 2023-09-11 PROCEDURE — 85025 COMPLETE CBC W/AUTO DIFF WBC: CPT

## 2023-09-11 PROCEDURE — 80069 RENAL FUNCTION PANEL: CPT

## 2023-09-11 PROCEDURE — 80202 ASSAY OF VANCOMYCIN: CPT

## 2023-09-11 PROCEDURE — 6360000002 HC RX W HCPCS: Performed by: STUDENT IN AN ORGANIZED HEALTH CARE EDUCATION/TRAINING PROGRAM

## 2023-09-11 PROCEDURE — 84478 ASSAY OF TRIGLYCERIDES: CPT

## 2023-09-11 PROCEDURE — 83735 ASSAY OF MAGNESIUM: CPT

## 2023-09-11 PROCEDURE — 2000000000 HC ICU R&B

## 2023-09-11 RX ORDER — SENNOSIDES A AND B 8.6 MG/1
2 TABLET, FILM COATED ORAL 2 TIMES DAILY
Status: DISCONTINUED | OUTPATIENT
Start: 2023-09-11 | End: 2023-09-18

## 2023-09-11 RX ORDER — AMLODIPINE BESYLATE 5 MG/1
5 TABLET ORAL DAILY
Status: DISCONTINUED | OUTPATIENT
Start: 2023-09-11 | End: 2023-09-11

## 2023-09-11 RX ORDER — POTASSIUM CHLORIDE 29.8 MG/ML
20 INJECTION INTRAVENOUS ONCE
Status: COMPLETED | OUTPATIENT
Start: 2023-09-11 | End: 2023-09-11

## 2023-09-11 RX ORDER — HYDRALAZINE HYDROCHLORIDE 25 MG/1
25 TABLET, FILM COATED ORAL ONCE
Status: DISCONTINUED | OUTPATIENT
Start: 2023-09-11 | End: 2023-09-12

## 2023-09-11 RX ORDER — METOPROLOL TARTRATE 5 MG/5ML
5 INJECTION INTRAVENOUS EVERY 4 HOURS PRN
Status: DISCONTINUED | OUTPATIENT
Start: 2023-09-11 | End: 2023-09-24

## 2023-09-11 RX ORDER — 0.9 % SODIUM CHLORIDE 0.9 %
500 INTRAVENOUS SOLUTION INTRAVENOUS ONCE
Status: COMPLETED | OUTPATIENT
Start: 2023-09-11 | End: 2023-09-11

## 2023-09-11 RX ORDER — HYDRALAZINE HYDROCHLORIDE 50 MG/1
50 TABLET, FILM COATED ORAL EVERY 8 HOURS SCHEDULED
Status: DISCONTINUED | OUTPATIENT
Start: 2023-09-11 | End: 2023-09-15

## 2023-09-11 RX ORDER — HYDRALAZINE HYDROCHLORIDE 25 MG/1
25 TABLET, FILM COATED ORAL ONCE
Status: COMPLETED | OUTPATIENT
Start: 2023-09-11 | End: 2023-09-11

## 2023-09-11 RX ORDER — METOPROLOL TARTRATE 5 MG/5ML
5 INJECTION INTRAVENOUS ONCE
Status: COMPLETED | OUTPATIENT
Start: 2023-09-11 | End: 2023-09-11

## 2023-09-11 RX ADMIN — MIDAZOLAM HYDROCHLORIDE 2 MG: 1 INJECTION, SOLUTION INTRAMUSCULAR; INTRAVENOUS at 17:49

## 2023-09-11 RX ADMIN — MIDAZOLAM HYDROCHLORIDE 2 MG: 1 INJECTION, SOLUTION INTRAMUSCULAR; INTRAVENOUS at 12:55

## 2023-09-11 RX ADMIN — BUSPIRONE HYDROCHLORIDE 15 MG: 7.5 TABLET ORAL at 09:00

## 2023-09-11 RX ADMIN — SODIUM CHLORIDE, PRESERVATIVE FREE 10 ML: 5 INJECTION INTRAVENOUS at 09:01

## 2023-09-11 RX ADMIN — CARBOXYMETHYLCELLULOSE SODIUM 1 DROP: 10 GEL OPHTHALMIC at 17:51

## 2023-09-11 RX ADMIN — HYDRALAZINE HYDROCHLORIDE 25 MG: 25 TABLET, FILM COATED ORAL at 12:50

## 2023-09-11 RX ADMIN — PROPOFOL 20 MCG/KG/MIN: 10 INJECTION, EMULSION INTRAVENOUS at 12:49

## 2023-09-11 RX ADMIN — HYDRALAZINE HYDROCHLORIDE 10 MG: 20 INJECTION INTRAMUSCULAR; INTRAVENOUS at 11:54

## 2023-09-11 RX ADMIN — SENNOSIDES 17.2 MG: 8.6 TABLET, FILM COATED ORAL at 20:21

## 2023-09-11 RX ADMIN — PROPOFOL 30 MCG/KG/MIN: 10 INJECTION, EMULSION INTRAVENOUS at 05:09

## 2023-09-11 RX ADMIN — CARBOXYMETHYLCELLULOSE SODIUM 1 DROP: 10 GEL OPHTHALMIC at 20:21

## 2023-09-11 RX ADMIN — WHITE PETROLATUM 57.7 %-MINERAL OIL 31.9 % EYE OINTMENT: at 01:36

## 2023-09-11 RX ADMIN — POTASSIUM BICARBONATE 40 MEQ: 782 TABLET, EFFERVESCENT ORAL at 11:54

## 2023-09-11 RX ADMIN — CARBOXYMETHYLCELLULOSE SODIUM 1 DROP: 10 GEL OPHTHALMIC at 09:00

## 2023-09-11 RX ADMIN — PROPOFOL 20 MCG/KG/MIN: 10 INJECTION, EMULSION INTRAVENOUS at 18:15

## 2023-09-11 RX ADMIN — POTASSIUM CHLORIDE 20 MEQ: 29.8 INJECTION, SOLUTION INTRAVENOUS at 12:00

## 2023-09-11 RX ADMIN — IPRATROPIUM BROMIDE AND ALBUTEROL SULFATE 1 DOSE: 2.5; .5 SOLUTION RESPIRATORY (INHALATION) at 03:11

## 2023-09-11 RX ADMIN — SERTRALINE 100 MG: 100 TABLET, FILM COATED ORAL at 20:21

## 2023-09-11 RX ADMIN — SENNOSIDES 17.2 MG: 8.6 TABLET, FILM COATED ORAL at 11:54

## 2023-09-11 RX ADMIN — BUSPIRONE HYDROCHLORIDE 15 MG: 7.5 TABLET ORAL at 20:21

## 2023-09-11 RX ADMIN — METOPROLOL TARTRATE 25 MG: 25 TABLET, FILM COATED ORAL at 20:24

## 2023-09-11 RX ADMIN — ENOXAPARIN SODIUM 30 MG: 100 INJECTION SUBCUTANEOUS at 09:00

## 2023-09-11 RX ADMIN — METOROPROLOL TARTRATE 5 MG: 5 INJECTION, SOLUTION INTRAVENOUS at 13:13

## 2023-09-11 RX ADMIN — IPRATROPIUM BROMIDE AND ALBUTEROL SULFATE 1 DOSE: 2.5; .5 SOLUTION RESPIRATORY (INHALATION) at 15:51

## 2023-09-11 RX ADMIN — POTASSIUM BICARBONATE 40 MEQ: 782 TABLET, EFFERVESCENT ORAL at 20:21

## 2023-09-11 RX ADMIN — HYDRALAZINE HYDROCHLORIDE 25 MG: 25 TABLET, FILM COATED ORAL at 05:38

## 2023-09-11 RX ADMIN — CHLORHEXIDINE GLUCONATE 0.12% ORAL RINSE 15 ML: 1.2 LIQUID ORAL at 20:20

## 2023-09-11 RX ADMIN — DOCUSATE SODIUM 100 MG: 50 LIQUID ORAL at 11:54

## 2023-09-11 RX ADMIN — POTASSIUM CHLORIDE 20 MEQ: 29.8 INJECTION, SOLUTION INTRAVENOUS at 20:51

## 2023-09-11 RX ADMIN — IPRATROPIUM BROMIDE AND ALBUTEROL SULFATE 1 DOSE: 2.5; .5 SOLUTION RESPIRATORY (INHALATION) at 19:52

## 2023-09-11 RX ADMIN — BUSPIRONE HYDROCHLORIDE 15 MG: 7.5 TABLET ORAL at 11:54

## 2023-09-11 RX ADMIN — CHLORHEXIDINE GLUCONATE 0.12% ORAL RINSE 15 ML: 1.2 LIQUID ORAL at 09:00

## 2023-09-11 RX ADMIN — HYDRALAZINE HYDROCHLORIDE 25 MG: 25 TABLET, FILM COATED ORAL at 17:47

## 2023-09-11 RX ADMIN — ENOXAPARIN SODIUM 30 MG: 100 INJECTION SUBCUTANEOUS at 20:20

## 2023-09-11 RX ADMIN — HYDRALAZINE HYDROCHLORIDE 50 MG: 50 TABLET, FILM COATED ORAL at 20:22

## 2023-09-11 RX ADMIN — ASPIRIN 81 MG: 81 TABLET, CHEWABLE ORAL at 09:00

## 2023-09-11 RX ADMIN — FUROSEMIDE 20 MG/HR: 10 INJECTION, SOLUTION INTRAMUSCULAR; INTRAVENOUS at 08:31

## 2023-09-11 RX ADMIN — IPRATROPIUM BROMIDE AND ALBUTEROL SULFATE 1 DOSE: 2.5; .5 SOLUTION RESPIRATORY (INHALATION) at 08:35

## 2023-09-11 RX ADMIN — PROPOFOL 30 MCG/KG/MIN: 10 INJECTION, EMULSION INTRAVENOUS at 01:31

## 2023-09-11 RX ADMIN — DEXAMETHASONE SODIUM PHOSPHATE 4 MG: 4 INJECTION, SOLUTION INTRAMUSCULAR; INTRAVENOUS at 20:21

## 2023-09-11 RX ADMIN — ATORVASTATIN CALCIUM 80 MG: 40 TABLET, FILM COATED ORAL at 20:20

## 2023-09-11 RX ADMIN — CARBOXYMETHYLCELLULOSE SODIUM 1 DROP: 10 GEL OPHTHALMIC at 15:20

## 2023-09-11 RX ADMIN — SERTRALINE 100 MG: 100 TABLET, FILM COATED ORAL at 09:00

## 2023-09-11 RX ADMIN — IPRATROPIUM BROMIDE AND ALBUTEROL SULFATE 1 DOSE: 2.5; .5 SOLUTION RESPIRATORY (INHALATION) at 12:18

## 2023-09-11 RX ADMIN — IPRATROPIUM BROMIDE AND ALBUTEROL SULFATE 1 DOSE: 2.5; .5 SOLUTION RESPIRATORY (INHALATION) at 22:57

## 2023-09-11 RX ADMIN — BUSPIRONE HYDROCHLORIDE 15 MG: 7.5 TABLET ORAL at 17:47

## 2023-09-11 RX ADMIN — PROPOFOL 30 MCG/KG/MIN: 10 INJECTION, EMULSION INTRAVENOUS at 23:14

## 2023-09-11 RX ADMIN — Medication 75 MCG/HR: at 05:10

## 2023-09-11 RX ADMIN — FUROSEMIDE 20 MG/HR: 10 INJECTION, SOLUTION INTRAMUSCULAR; INTRAVENOUS at 03:12

## 2023-09-11 RX ADMIN — METOPROLOL TARTRATE 5 MG: 5 INJECTION, SOLUTION INTRAVENOUS at 18:07

## 2023-09-11 RX ADMIN — FUROSEMIDE 10 MG/HR: 10 INJECTION, SOLUTION INTRAMUSCULAR; INTRAVENOUS at 15:07

## 2023-09-11 RX ADMIN — WHITE PETROLATUM 57.7 %-MINERAL OIL 31.9 % EYE OINTMENT: at 05:12

## 2023-09-11 RX ADMIN — PANTOPRAZOLE SODIUM 40 MG: 40 INJECTION, POWDER, FOR SOLUTION INTRAVENOUS at 09:00

## 2023-09-11 RX ADMIN — METOPROLOL TARTRATE 25 MG: 25 TABLET, FILM COATED ORAL at 09:00

## 2023-09-11 RX ADMIN — DEXAMETHASONE SODIUM PHOSPHATE 4 MG: 4 INJECTION, SOLUTION INTRAMUSCULAR; INTRAVENOUS at 09:01

## 2023-09-11 RX ADMIN — DOCUSATE SODIUM 100 MG: 50 LIQUID ORAL at 20:20

## 2023-09-11 RX ADMIN — SODIUM CHLORIDE 500 ML: 9 INJECTION, SOLUTION INTRAVENOUS at 20:18

## 2023-09-11 ASSESSMENT — PULMONARY FUNCTION TESTS
PIF_VALUE: 25
PIF_VALUE: 24
PIF_VALUE: 17
PIF_VALUE: 26
PIF_VALUE: 19
PIF_VALUE: 21
PIF_VALUE: 24
PIF_VALUE: 21
PIF_VALUE: 27
PIF_VALUE: 20
PIF_VALUE: 21
PIF_VALUE: 24
PIF_VALUE: 22
PIF_VALUE: 25
PIF_VALUE: 21
PIF_VALUE: 26
PIF_VALUE: 22
PIF_VALUE: 24
PIF_VALUE: 21
PIF_VALUE: 34
PIF_VALUE: 36
PIF_VALUE: 36
PIF_VALUE: 19
PIF_VALUE: 25
PIF_VALUE: 25
PIF_VALUE: 39

## 2023-09-11 NOTE — PROGRESS NOTES
19:15 Pt HR still 130s despite recent IV metoprolol that was given.  Dr. Troy Patrick paged and called back MD believes pt is intervascular dry r/t lasix gtt, MD gave telephone  orders to stop lasix gtt and states it may take up to 6 hours for pt to clear lasix medication

## 2023-09-11 NOTE — PROGRESS NOTES
09/11/23 0311   Patient Observation   Pulse 92   Respirations 29   SpO2 100 %   Vent Information   Equipment Changed Humidification   Vent Mode AC/VC   Ventilator Settings   FiO2  50 %   Vt (Set, mL) 320 mL   Resp Rate (Set) 25 bmp   PEEP/CPAP (cmH2O) 10   Pressure Support (cm H2O) 0 cm H2O   Vent Patient Data (Readings)   Vt (Measured) 331 mL   Peak Inspiratory Pressure (cmH2O) 25 cmH2O   Rate Measured 27 br/min   Minute Volume (L/min) 9.32 Liters   Mean Airway Pressure (cmH2O) 15 cmH20   Plateau Pressure (cm H2O) 31 cm H2O   Driving Pressure 21   I:E Ratio 1:3.40   Flow Sensitivity 3 L/min   Vent Alarm Settings   High Pressure (cmH2O) 50 cmH2O   Low Minute Volume (lpm) 3 L/min   High Minute Volume (lpm) 20 L/min   Low Exhaled Vt (ml) 200 mL   High Exhaled Vt (ml) 800 mL   RR High (bpm) 40 br/min   Apnea (secs) 20 secs   Additional Respiratoray Assessments   Humidification Source Heated wire   Humidification Temp 37   Airway Clearance   Suction ET Tube;Oral   Subglottic Suction Done Yes   Suction Device Inline suction catheter;Florianuer   Sputum Method Obtained Endotracheal   Non-Surgical Airway 09/01/23 Endotracheal tube   Placement Date/Time: 09/01/23 0240   Present on Admission/Arrival: Yes  Placed By: In ED  Placement Verified By: Auscultation;Capnometry; Chest X-ray;Colorimetric ETCO2 device  Mask Ventilation: Oral airway  Insertion attempts: 1  Location: Oral  Airwa. ..    Secured At 23 cm   Measured From Lips   Secured Location Right   Secured By Commercial tube mayer   Site Assessment Dry   Treatment   $Treatment Type $Inhaled Therapy/Meds

## 2023-09-11 NOTE — PROGRESS NOTES
19: 30 Handoff report given to night nurse Kasi Ram, pt remains intubated,sedated , stable @ handoff

## 2023-09-11 NOTE — PROGRESS NOTES
09/10/23 2343   Patient Observation   Pulse (!) 101   Respirations 25   SpO2 97 %   Breath Sounds   Breath Sounds Bilateral Diminished   Vent Information   Vent Mode AC/VC   Ventilator Settings   FiO2  50 %   Vt (Set, mL) 320 mL   Resp Rate (Set) 25 bmp   PEEP/CPAP (cmH2O) 10   Pressure Support (cm H2O) 0 cm H2O   Vent Patient Data (Readings)   Vt (Measured) 280 mL   Peak Inspiratory Pressure (cmH2O) 26 cmH2O   Rate Measured 27 br/min   Minute Volume (L/min) 8.47 Liters   Mean Airway Pressure (cmH2O) 15 cmH20   Plateau Pressure (cm H2O) 22 cm H2O   Driving Pressure 12   I:E Ratio 1:2.00   Flow Sensitivity 3 L/min   Vent Alarm Settings   High Pressure (cmH2O) 50 cmH2O   Low Minute Volume (lpm) 3 L/min   High Minute Volume (lpm) 20 L/min   Low Exhaled Vt (ml) 200 mL   High Exhaled Vt (ml) 800 mL   RR High (bpm) 40 br/min   Apnea (secs) 20 secs   Additional Respiratoray Assessments   Humidification Source Heated wire   Humidification Temp 37   Circuit Condensation Drained   Airway Clearance   Suction Oral;ET Tube   Subglottic Suction Done Yes   Suction Device Inline suction catheter;Juniorkauer   Sputum Method Obtained Endotracheal   Sputum Amount Scant   Sputum Color/Odor Tan   Sputum Consistency Thick   Non-Surgical Airway 09/01/23 Endotracheal tube   Placement Date/Time: 09/01/23 0240   Present on Admission/Arrival: Yes  Placed By: In ED  Placement Verified By: Auscultation;Capnometry; Chest X-ray;Colorimetric ETCO2 device  Mask Ventilation: Oral airway  Insertion attempts: 1  Location: Oral  Airwa. ..    Secured At 23 cm   Measured From Lips   Secured Location Left   Secured By Commercial tube mayer   Site Assessment Dry   Treatment   $Treatment Type $Inhaled Therapy/Meds

## 2023-09-11 NOTE — CARE COORDINATION
INTERDISCIPLINARY PLAN OF CARE CONFERENCE    Date/Time: 9/11/2023 11:38 AM  Completed by: Nayla Rebollar, Case Management      Patient Name:  Ellen Sena  YOB: 1945  Admitting Diagnosis: Respiratory failure ([address]) [J96.90]  HCAP (healthcare-associated pneumonia) [J18.9]  Acute respiratory failure with hypoxia and hypercapnia ([address]) [J96.01, J96.02]  New onset left bundle branch block (LBBB) [I44.7]  Sepsis, due to unspecified organism, unspecified whether acute organ dysfunction present ([address]) [A41.9]     Admit Date/Time:  9/1/2023  2:26 AM    Chart reviewed. Interdisciplinary team contacted or reviewed plan related to patient progress and discharge plans. Disciplines included Case Management, Nursing, and Dietitian. Current Status:stable  PT/OT recommendation for discharge plan of care: TBD    Expected D/C Disposition:  unknown  Confirmed plan with patient and/or family Yes confirmed with: (name) Hector   Met with:pt and grandson    Discharge Plan Comments: Reviewed chart, met with pt and grandson at bedside. Discussed with him pt will likely be DC with vent. Referral made to [facility] per family request. 's goal is to get pt back home but understands he it be a lot of work for him if pt remains vented. Will continue to monitor. [id number] at [facility] can accept pt at DC. Family requesting BN location. Called and updated grandson Brody Montes. Will continue to monitor.

## 2023-09-11 NOTE — PROGRESS NOTES
09/10/23 2029   Patient Observation   Pulse (!) 102   Respirations 22   SpO2 99 %   Breath Sounds   Breath Sounds Bilateral Diminished   Vent Information   Vent Mode AC/VC   Ventilator Settings   FiO2  50 %   Vt (Set, mL) 320 mL   Resp Rate (Set) 25 bmp   PEEP/CPAP (cmH2O) 10   Pressure Support (cm H2O) 0 cm H2O   Vent Patient Data (Readings)   Vt (Measured) 543 mL   Peak Inspiratory Pressure (cmH2O) 27 cmH2O   Rate Measured 30 br/min   Minute Volume (L/min) 9.41 Liters   Mean Airway Pressure (cmH2O) 16 cmH20   Plateau Pressure (cm H2O) 23 cm H2O   Driving Pressure 13   I:E Ratio 1:3.40   Flow Sensitivity 3 L/min   Backup Apnea On   Backup Rate 20 Breaths Per Minute   Backup Vt 320   Vent Alarm Settings   High Pressure (cmH2O) 50 cmH2O   Low Minute Volume (lpm) 3 L/min   High Minute Volume (lpm) 20 L/min   Low Exhaled Vt (ml) 200 mL   High Exhaled Vt (ml) 800 mL   RR High (bpm) 40 br/min   Apnea (secs) 20 secs   Additional Respiratoray Assessments   Humidification Source Heated wire   Humidification Temp 37   Circuit Condensation Drained   Airway Clearance   Suction ET Tube;Oral   Subglottic Suction Done Yes   Suction Device Inline suction catheter   Sputum Method Obtained Endotracheal   Non-Surgical Airway 09/01/23 Endotracheal tube   Placement Date/Time: 09/01/23 0240   Present on Admission/Arrival: Yes  Placed By: In ED  Placement Verified By: Auscultation;Capnometry; Chest X-ray;Colorimetric ETCO2 device  Mask Ventilation: Oral airway  Insertion attempts: 1  Location: Oral  Airwa. ..    Secured At 23 cm   Measured From [address] By Commercial tube mayer   Site Assessment Dry   Treatment   $Treatment Type $Inhaled Therapy/Meds

## 2023-09-11 NOTE — PLAN OF CARE
Problem: Discharge Planning  Goal: Discharge to home or other facility with appropriate resources  Outcome: Progressing  Flowsheets (Taken 9/10/2023 0231)  Discharge to home or other facility with appropriate resources:   Identify barriers to discharge with patient and caregiver   Identify discharge learning needs (meds, wound care, etc)     Problem: Pain  Goal: Verbalizes/displays adequate comfort level or baseline comfort level  Outcome: Progressing  Flowsheets (Taken 9/10/2023 0231)  Verbalizes/displays adequate comfort level or baseline comfort level:   Encourage patient to monitor pain and request assistance   Assess pain using appropriate pain scale     Problem: Safety - Adult  Goal: Free from fall injury  Outcome: Progressing  Flowsheets (Taken 9/10/2023 2252)  Free From Fall Injury: Based on caregiver fall risk screen, instruct family/caregiver to ask for assistance with transferring infant if caregiver noted to have fall risk factors     Problem: Nutrition Deficit:  Goal: Optimize nutritional status  Outcome: Progressing  Flowsheets (Taken 9/10/2023 2252)  Nutrient intake appropriate for improving, restoring, or maintaining nutritional needs:   Assess nutritional status and recommend course of action   Monitor oral intake, labs, and treatment plans     Problem: Skin/Tissue Integrity  Goal: Absence of new skin breakdown  Description: 1. Monitor for areas of redness and/or skin breakdown  2. Assess vascular access sites hourly  3. Every 4-6 hours minimum:  Change oxygen saturation probe site  4. Every 4-6 hours:  If on nasal continuous positive airway pressure, respiratory therapy assess nares and determine need for appliance change or resting period.   Outcome: Progressing     Problem: Respiratory - Adult  Goal: Achieves optimal ventilation and oxygenation  Outcome: Progressing  Flowsheets (Taken 9/10/2023 2252)  Achieves optimal ventilation and oxygenation:   Assess for changes in respiratory status

## 2023-09-11 NOTE — PROGRESS NOTES
Spontaneous Awaking [place]    08:43 Sedation off for Spontaneous Awaking [place] ,  Pt  does not w/d to pain does not respond to voice  and not following any verbal commands at this time  12:39 Pt spontaneously opens eyes now @ time but   Pt  does not w/d to pain does not respond to voice  and not following any verbal commands at this time, sedation restarted however pt appears very uncomfortable ; is breathing heavily and rapidly , sedation restarted @ low dose (see MAR)

## 2023-09-12 ENCOUNTER — APPOINTMENT (OUTPATIENT)
Dept: GENERAL RADIOLOGY | Age: 78
DRG: 870 | End: 2023-09-12
Payer: MEDICARE

## 2023-09-12 PROBLEM — E43 SEVERE PROTEIN-CALORIE MALNUTRITION (HCC): Status: ACTIVE | Noted: 2023-09-12

## 2023-09-12 LAB
ANION GAP SERPL CALCULATED.3IONS-SCNC: 12 MMOL/L (ref 3–16)
BASE EXCESS BLDA CALC-SCNC: 8.4 MMOL/L (ref -3–3)
BASOPHILS # BLD: 0 K/UL (ref 0–0.2)
BASOPHILS NFR BLD: 0 %
BUN SERPL-MCNC: 80 MG/DL (ref 7–20)
CALCIUM SERPL-MCNC: 8.4 MG/DL (ref 8.3–10.6)
CHLORIDE SERPL-SCNC: 102 MMOL/L (ref 99–110)
CO2 BLDA-SCNC: 32.6 MMOL/L
CO2 SERPL-SCNC: 35 MMOL/L (ref 21–32)
COHGB MFR BLDA: 0.3 % (ref 0–1.5)
CREAT SERPL-MCNC: 1.5 MG/DL (ref 0.6–1.2)
DEPRECATED RDW RBC AUTO: 14.8 % (ref 12.4–15.4)
EOSINOPHIL # BLD: 0.3 K/UL (ref 0–0.6)
EOSINOPHIL NFR BLD: 2.3 %
GFR SERPLBLD CREATININE-BSD FMLA CKD-EPI: 36 ML/MIN/{1.73_M2}
GLUCOSE BLD-MCNC: 129 MG/DL (ref 70–99)
GLUCOSE BLD-MCNC: 156 MG/DL (ref 70–99)
GLUCOSE BLD-MCNC: 169 MG/DL (ref 70–99)
GLUCOSE BLD-MCNC: 196 MG/DL (ref 70–99)
GLUCOSE SERPL-MCNC: 193 MG/DL (ref 70–99)
HCO3 BLDA-SCNC: 31.5 MMOL/L (ref 21–29)
HCT VFR BLD AUTO: 32.8 % (ref 36–48)
HGB BLD-MCNC: 11.4 G/DL (ref 12–16)
HGB BLDA-MCNC: 11.5 G/DL (ref 12–16)
LYMPHOCYTES # BLD: 0.5 K/UL (ref 1–5.1)
LYMPHOCYTES NFR BLD: 3.6 %
MCH RBC QN AUTO: 29.9 PG (ref 26–34)
MCHC RBC AUTO-ENTMCNC: 34.7 G/DL (ref 31–36)
MCV RBC AUTO: 86.2 FL (ref 80–100)
METHGB MFR BLDA: 0.3 %
MONOCYTES # BLD: 0.5 K/UL (ref 0–1.3)
MONOCYTES NFR BLD: 3.6 %
NEUTROPHILS # BLD: 12.4 K/UL (ref 1.7–7.7)
NEUTROPHILS NFR BLD: 90.5 %
O2 THERAPY: ABNORMAL
PCO2 BLDA: 37.8 MMHG (ref 35–45)
PERFORMED ON: ABNORMAL
PH BLDA: 7.54 [PH] (ref 7.35–7.45)
PLATELET # BLD AUTO: 156 K/UL (ref 135–450)
PMV BLD AUTO: 8.8 FL (ref 5–10.5)
PO2 BLDA: 94.7 MMHG (ref 75–108)
POTASSIUM SERPL-SCNC: 3.8 MMOL/L (ref 3.5–5.1)
RBC # BLD AUTO: 3.8 M/UL (ref 4–5.2)
SAO2 % BLDA: 97.9 %
SODIUM SERPL-SCNC: 149 MMOL/L (ref 136–145)
TRIGL SERPL-MCNC: 172 MG/DL (ref 0–150)
WBC # BLD AUTO: 13.7 K/UL (ref 4–11)

## 2023-09-12 PROCEDURE — 85025 COMPLETE CBC W/AUTO DIFF WBC: CPT

## 2023-09-12 PROCEDURE — 94640 AIRWAY INHALATION TREATMENT: CPT

## 2023-09-12 PROCEDURE — 6370000000 HC RX 637 (ALT 250 FOR IP): Performed by: INTERNAL MEDICINE

## 2023-09-12 PROCEDURE — 82803 BLOOD GASES ANY COMBINATION: CPT

## 2023-09-12 PROCEDURE — 6360000002 HC RX W HCPCS: Performed by: INTERNAL MEDICINE

## 2023-09-12 PROCEDURE — C9113 INJ PANTOPRAZOLE SODIUM, VIA: HCPCS | Performed by: INTERNAL MEDICINE

## 2023-09-12 PROCEDURE — 80048 BASIC METABOLIC PNL TOTAL CA: CPT

## 2023-09-12 PROCEDURE — 2000000000 HC ICU R&B

## 2023-09-12 PROCEDURE — 94761 N-INVAS EAR/PLS OXIMETRY MLT: CPT

## 2023-09-12 PROCEDURE — 2580000003 HC RX 258: Performed by: INTERNAL MEDICINE

## 2023-09-12 PROCEDURE — 94003 VENT MGMT INPAT SUBQ DAY: CPT

## 2023-09-12 PROCEDURE — 2500000003 HC RX 250 WO HCPCS: Performed by: INTERNAL MEDICINE

## 2023-09-12 PROCEDURE — 99291 CRITICAL CARE FIRST HOUR: CPT | Performed by: INTERNAL MEDICINE

## 2023-09-12 PROCEDURE — 2700000000 HC OXYGEN THERAPY PER DAY

## 2023-09-12 PROCEDURE — 71045 X-RAY EXAM CHEST 1 VIEW: CPT

## 2023-09-12 PROCEDURE — 99233 SBSQ HOSP IP/OBS HIGH 50: CPT | Performed by: INTERNAL MEDICINE

## 2023-09-12 RX ORDER — DEXTROSE MONOHYDRATE 50 MG/ML
INJECTION, SOLUTION INTRAVENOUS CONTINUOUS
Status: ACTIVE | OUTPATIENT
Start: 2023-09-12 | End: 2023-09-12

## 2023-09-12 RX ORDER — DEXAMETHASONE SODIUM PHOSPHATE 4 MG/ML
4 INJECTION, SOLUTION INTRA-ARTICULAR; INTRALESIONAL; INTRAMUSCULAR; INTRAVENOUS; SOFT TISSUE EVERY 24 HOURS
Status: DISCONTINUED | OUTPATIENT
Start: 2023-09-13 | End: 2023-09-15

## 2023-09-12 RX ADMIN — SODIUM CHLORIDE, PRESERVATIVE FREE 10 ML: 5 INJECTION INTRAVENOUS at 20:46

## 2023-09-12 RX ADMIN — ENOXAPARIN SODIUM 30 MG: 100 INJECTION SUBCUTANEOUS at 20:43

## 2023-09-12 RX ADMIN — DOCUSATE SODIUM 100 MG: 50 LIQUID ORAL at 08:32

## 2023-09-12 RX ADMIN — SODIUM CHLORIDE: 9 INJECTION, SOLUTION INTRAVENOUS at 14:32

## 2023-09-12 RX ADMIN — CARBOXYMETHYLCELLULOSE SODIUM 1 DROP: 10 GEL OPHTHALMIC at 08:33

## 2023-09-12 RX ADMIN — DEXTROSE MONOHYDRATE: 50 INJECTION, SOLUTION INTRAVENOUS at 10:15

## 2023-09-12 RX ADMIN — IPRATROPIUM BROMIDE AND ALBUTEROL SULFATE 1 DOSE: 2.5; .5 SOLUTION RESPIRATORY (INHALATION) at 22:52

## 2023-09-12 RX ADMIN — WHITE PETROLATUM 57.7 %-MINERAL OIL 31.9 % EYE OINTMENT: at 20:45

## 2023-09-12 RX ADMIN — CHLORHEXIDINE GLUCONATE 0.12% ORAL RINSE 15 ML: 1.2 LIQUID ORAL at 08:32

## 2023-09-12 RX ADMIN — IPRATROPIUM BROMIDE AND ALBUTEROL SULFATE 1 DOSE: 2.5; .5 SOLUTION RESPIRATORY (INHALATION) at 09:04

## 2023-09-12 RX ADMIN — HYDRALAZINE HYDROCHLORIDE 50 MG: 50 TABLET, FILM COATED ORAL at 20:44

## 2023-09-12 RX ADMIN — DEXAMETHASONE SODIUM PHOSPHATE 4 MG: 4 INJECTION, SOLUTION INTRAMUSCULAR; INTRAVENOUS at 08:34

## 2023-09-12 RX ADMIN — ASPIRIN 81 MG: 81 TABLET, CHEWABLE ORAL at 08:33

## 2023-09-12 RX ADMIN — ENOXAPARIN SODIUM 30 MG: 100 INJECTION SUBCUTANEOUS at 08:32

## 2023-09-12 RX ADMIN — HYDRALAZINE HYDROCHLORIDE 50 MG: 50 TABLET, FILM COATED ORAL at 06:19

## 2023-09-12 RX ADMIN — PANTOPRAZOLE SODIUM 40 MG: 40 INJECTION, POWDER, FOR SOLUTION INTRAVENOUS at 08:32

## 2023-09-12 RX ADMIN — Medication 25 MCG/HR: at 21:21

## 2023-09-12 RX ADMIN — SERTRALINE 100 MG: 100 TABLET, FILM COATED ORAL at 08:33

## 2023-09-12 RX ADMIN — DOCUSATE SODIUM 100 MG: 50 LIQUID ORAL at 20:44

## 2023-09-12 RX ADMIN — METOPROLOL TARTRATE 25 MG: 25 TABLET, FILM COATED ORAL at 08:33

## 2023-09-12 RX ADMIN — METOPROLOL TARTRATE 5 MG: 5 INJECTION, SOLUTION INTRAVENOUS at 20:51

## 2023-09-12 RX ADMIN — IPRATROPIUM BROMIDE AND ALBUTEROL SULFATE 1 DOSE: 2.5; .5 SOLUTION RESPIRATORY (INHALATION) at 11:23

## 2023-09-12 RX ADMIN — PROPOFOL 20 MCG/KG/MIN: 10 INJECTION, EMULSION INTRAVENOUS at 13:22

## 2023-09-12 RX ADMIN — IPRATROPIUM BROMIDE AND ALBUTEROL SULFATE 1 DOSE: 2.5; .5 SOLUTION RESPIRATORY (INHALATION) at 21:06

## 2023-09-12 RX ADMIN — CHLORHEXIDINE GLUCONATE 0.12% ORAL RINSE 15 ML: 1.2 LIQUID ORAL at 20:45

## 2023-09-12 RX ADMIN — METOPROLOL TARTRATE 25 MG: 25 TABLET, FILM COATED ORAL at 20:44

## 2023-09-12 RX ADMIN — SODIUM CHLORIDE, PRESERVATIVE FREE 10 ML: 5 INJECTION INTRAVENOUS at 08:34

## 2023-09-12 RX ADMIN — WHITE PETROLATUM 57.7 %-MINERAL OIL 31.9 % EYE OINTMENT: at 09:01

## 2023-09-12 RX ADMIN — BUSPIRONE HYDROCHLORIDE 15 MG: 7.5 TABLET ORAL at 20:44

## 2023-09-12 RX ADMIN — IPRATROPIUM BROMIDE AND ALBUTEROL SULFATE 1 DOSE: 2.5; .5 SOLUTION RESPIRATORY (INHALATION) at 14:48

## 2023-09-12 RX ADMIN — PROPOFOL 30 MCG/KG/MIN: 10 INJECTION, EMULSION INTRAVENOUS at 07:34

## 2023-09-12 RX ADMIN — SENNOSIDES 17.2 MG: 8.6 TABLET, FILM COATED ORAL at 20:44

## 2023-09-12 RX ADMIN — SERTRALINE 100 MG: 100 TABLET, FILM COATED ORAL at 20:43

## 2023-09-12 RX ADMIN — Medication 50 MCG/HR: at 04:59

## 2023-09-12 RX ADMIN — SENNOSIDES 17.2 MG: 8.6 TABLET, FILM COATED ORAL at 08:33

## 2023-09-12 RX ADMIN — ATORVASTATIN CALCIUM 80 MG: 40 TABLET, FILM COATED ORAL at 20:44

## 2023-09-12 RX ADMIN — BUSPIRONE HYDROCHLORIDE 15 MG: 7.5 TABLET ORAL at 12:14

## 2023-09-12 RX ADMIN — WHITE PETROLATUM 57.7 %-MINERAL OIL 31.9 % EYE OINTMENT: at 14:32

## 2023-09-12 RX ADMIN — POTASSIUM BICARBONATE 40 MEQ: 782 TABLET, EFFERVESCENT ORAL at 08:33

## 2023-09-12 RX ADMIN — PROPOFOL 20 MCG/KG/MIN: 10 INJECTION, EMULSION INTRAVENOUS at 20:42

## 2023-09-12 RX ADMIN — PROPOFOL 30 MCG/KG/MIN: 10 INJECTION, EMULSION INTRAVENOUS at 03:59

## 2023-09-12 RX ADMIN — CARBOXYMETHYLCELLULOSE SODIUM 1 DROP: 10 GEL OPHTHALMIC at 17:47

## 2023-09-12 RX ADMIN — IPRATROPIUM BROMIDE AND ALBUTEROL SULFATE 1 DOSE: 2.5; .5 SOLUTION RESPIRATORY (INHALATION) at 02:37

## 2023-09-12 RX ADMIN — BUSPIRONE HYDROCHLORIDE 15 MG: 7.5 TABLET ORAL at 17:47

## 2023-09-12 RX ADMIN — BUSPIRONE HYDROCHLORIDE 15 MG: 7.5 TABLET ORAL at 08:32

## 2023-09-12 ASSESSMENT — PULMONARY FUNCTION TESTS
PIF_VALUE: 19
PIF_VALUE: 20
PIF_VALUE: 21
PIF_VALUE: 21
PIF_VALUE: 26
PIF_VALUE: 21
PIF_VALUE: 21
PIF_VALUE: 24
PIF_VALUE: 20
PIF_VALUE: 21
PIF_VALUE: 20
PIF_VALUE: 23
PIF_VALUE: 20
PIF_VALUE: 21
PIF_VALUE: 22
PIF_VALUE: 23
PIF_VALUE: 21
PIF_VALUE: 25
PIF_VALUE: 21

## 2023-09-12 NOTE — PROGRESS NOTES
09/11/23 1954   Patient Observation   Pulse (!) 110   Respirations 22   SpO2 96 %   Observations ETT SIZE 7.5, SECURED AT 22 LIP LINE. AMBU BAG AT HEAD OF BED. WATER GOOD. Breath Sounds   Right Upper Lobe Diminished   Right Middle Lobe Diminished   Right Lower Lobe Diminished   Left Upper Lobe Diminished   Left Lower Lobe Diminished   Vent Information   Vent Mode AC/VC   Ventilator Settings   FiO2  45 %   Vt (Set, mL) 320 mL   Resp Rate (Set) 20 bmp   PEEP/CPAP (cmH2O) 10   Pressure Support (cm H2O) 0 cm H2O   Vent Patient Data (Readings)   Vt (Measured) 348 mL   Peak Inspiratory Pressure (cmH2O) 21 cmH2O   Rate Measured 26 br/min   Minute Volume (L/min) 8.84 Liters   Peak Inspiratory Flow (lpm) 65 L/sec   Mean Airway Pressure (cmH2O) 12 cmH20   Plateau Pressure (cm H2O) 21 cm H2O   Driving Pressure 11   I:E Ratio 1:3.90   Flow Sensitivity 3 L/min   Static Compliance (L/cm H2O) 32   Dynamic Compliance (L/cm H2O) 32 L/cm H2O   Vent Alarm Settings   High Pressure (cmH2O) 50 cmH2O   Low Minute Volume (lpm) 3 L/min   High Minute Volume (lpm) 20 L/min   Low Exhaled Vt (ml) 200 mL   High Exhaled Vt (ml) 800 mL   RR High (bpm) 40 br/min   Apnea (secs) 20 secs   Additional Respiratoray Assessments   Humidification Source Heated wire   Humidification Temp 36.9   Circuit Condensation Drained   Ambu Bag With Mask At Bedside Yes   Airway Clearance   Suction ET Tube   Suction Device Inline suction catheter   Sputum Method Obtained Endotracheal   Sputum Amount Small   Sputum Color/Odor Tan   Sputum Consistency Thick   Non-Surgical Airway 09/01/23 Endotracheal tube   Placement Date/Time: 09/01/23 0240   Present on Admission/Arrival: Yes  Placed By: In ED  Placement Verified By: Auscultation;Capnometry; Chest X-ray;Colorimetric ETCO2 device  Mask Ventilation: Oral airway  Insertion attempts: 1  Location: Oral  Airwa. ..    Secured At 22 cm   Measured From [address] By Commercial tube mayer   Site Assessment Dry

## 2023-09-12 NOTE — PROGRESS NOTES
No changes in assessment at this time. Pt repositioned. Foot drop boots in place.  Monitoring closely

## 2023-09-12 NOTE — PROGRESS NOTES
Daughter Berta Porter called for update on pt status. Daughter given updated/discussed plane of care and all questions were answered. Home

## 2023-09-12 NOTE — PROGRESS NOTES
Shift assessment completed as documented on flowsheet. VSS at this time. HR 's. Pt sedated and mechanically ventilated, RASS -2/ See Mar for meds. Abd round soft and nontender. Bridges patent to bsd. Tube feeding infusing as ordered. No concerns at this time.

## 2023-09-12 NOTE — PROGRESS NOTES
09/12/23 0238   Patient Observation   Pulse (!) 110   Respirations 28   SpO2 95 %   Observations ETT SIZE 7.5, SECURED AT 22 LIP LINE. AMBU BAG AT HEAD OF BED. WATER GOOD. Breath Sounds   Right Upper Lobe Diminished   Right Middle Lobe Diminished   Right Lower Lobe Diminished   Left Upper Lobe Diminished   Left Lower Lobe Diminished   Vent Information   Vent Mode AC/VC   Ventilator Settings   FiO2  45 %   Vt (Set, mL) 320 mL   Resp Rate (Set) 20 bmp   PEEP/CPAP (cmH2O) 10   Pressure Support (cm H2O) 0 cm H2O   Vent Patient Data (Readings)   Vt (Measured) 325 mL   Peak Inspiratory Pressure (cmH2O) 21 cmH2O   Rate Measured 30 br/min   Minute Volume (L/min) 9.81 Liters   Peak Inspiratory Flow (lpm) 65 L/sec   Mean Airway Pressure (cmH2O) 12 cmH20   Plateau Pressure (cm H2O) 22 cm H2O   Driving Pressure 12   I:E Ratio 1:2.50   Flow Sensitivity 3 L/min   Vent Alarm Settings   High Pressure (cmH2O) 50 cmH2O   Low Minute Volume (lpm) 3 L/min   High Minute Volume (lpm) 20 L/min   Low Exhaled Vt (ml) 200 mL   High Exhaled Vt (ml) 800 mL   RR High (bpm) 40 br/min   Apnea (secs) 20 secs   Additional Respiratoray Assessments   Humidification Source Heated wire   Humidification Temp 37   Circuit Condensation Drained   Ambu Bag With Mask At Bedside Yes   Airway Clearance   Suction ET Tube   Suction Device Inline suction catheter   Sputum Method Obtained Endotracheal   Sputum Amount Small   Sputum Color/Odor Tan   Sputum Consistency Thick   Non-Surgical Airway 09/01/23 Endotracheal tube   Placement Date/Time: 09/01/23 0240   Present on Admission/Arrival: Yes  Placed By: In ED  Placement Verified By: Auscultation;Capnometry; Chest X-ray;Colorimetric ETCO2 device  Mask Ventilation: Oral airway  Insertion attempts: 1  Location: Oral  Airwa. ..    Secured At 22 cm   Measured From [facility]   Secured By Commercial tube mayer   Site Assessment Dry

## 2023-09-12 NOTE — PROGRESS NOTES
Comprehensive Nutrition Assessment    Type and Reason for Visit:  Reassess    Nutrition Recommendations/Plan:   Continue TF order - ADULT TUBE FEEDING; Orogastric; Renal formula - Nepro with a goal rate of 20 ml/hr x 20 hours. Water flushes, 60 ml every 4 hours for tube patency. Monitor TF rate, intake, and tolerance + water flushes. Monitor respiratory/vent status, sedation type/amount (propofol is currently infusing at 30 mcg x 24 hours which = 546 kcals from lipids), TG checks, and plan of care. Monitor nutrition-related labs, bowel function (last documented BM was on 8/28/23), and weight trends.       Malnutrition Assessment:  Malnutrition Status:  Severe malnutrition (09/12/23 1315)    Context:  Acute Illness     Findings of the 6 clinical characteristics of malnutrition:  Energy Intake:  50% or less of estimated energy requirements for 5 or more days  Weight Loss:  Greater than 5% over 1 month (-14# or 5.2% weight loss since 9/1/23)     Body Fat Loss:  No significant body fat loss     Muscle Mass Loss:  No significant muscle mass loss    Fluid Accumulation:  No significant fluid accumulation     Strength:  Not Performed    Nutrition Assessment:    patient is slightly improved from a nutritional standpoint AEB she is tolerating Nepro TF at 20 ml/hr without issue at this time, however, she remains at risk for further compromise d/t inadequate nutrition intake x 7+ days admission, no documented BM since 8/28/23, and ongoing respiratory dysfunction; will continue Nepro with a goal rate of 20 ml/hr x 20 hours + 60 ml water flushes every 4 hours for tube patency    Nutrition Related Findings:    patient remains intubated and sedated with propofol at 30 mcg x 24 hours; patient has not had a documented BM since 8/28/23 and she was ordered a bowel regimen on 9/11/23; TG check was 172 mg/dl on 9/11/23; BS were elevated this am; D5W at 125 ml/hr x 4 hours ordered today; TG checks ordered for daily; she is tolerating Nepro TF at 20 ml/hr without issue Wound Type: None       Current Nutrition Intake & Therapies:    Average Meal Intake: NPO  Average Supplements Intake: NPO  Current Tube Feeding (TF) Orders:  Feeding Route: Orogastric  Formula: Renal Formula  Schedule: Continuous  Feeding Regimen: Nepro with a goal rate of 20 ml/hr x 20 hours  Additives/Modulars: None  Water Flushes: 60 ml every 4 hours for tube patency  Current TF & Flush Orders Provides: TF is infusing at current goal rate without issue  Goal TF & Flush Orders Provides: Nepro with a goal rate of 20 ml/hr x 20 hours = 400 ml TV, 720 kcals, 32 g protein, and 291 ml free water + 60 ml water flushes every 4 hours for tube patency    Anthropometric Measures:  Height: 5' 2\" (157.5 cm)  Ideal Body Weight (IBW): 110 lbs (50 kg)    Admission Body Weight: 268 lb (121.6 kg) (obtained on 9/1/23; actual weight)  Current Body Weight: 254 lb 3.1 oz (115.3 kg) (obtained on 9/12/23; actual weight), 231.1 % IBW.  Weight Source: Bed Scale  Current BMI (kg/m2): 46.5  Usual Body Weight: 268 lb (121.6 kg) (obtained on 9/1/23; actual weight)  % Weight Change (Calculated): -5.2  Weight Adjustment For: No Adjustment                 BMI Categories: Obese Class 3 (BMI 40.0 or greater) (46.49)    Estimated Daily Nutrient Needs:  Energy Requirements Based On: Kcal/kg  Weight Used for Energy Requirements: Current  Energy (kcal/day): 920 - 1265 kcals based on 8-11 kcals/kg/CBW  Weight Used for Protein Requirements: Ideal  Protein (g/day): 125 - 130 g protein based on 2.5-2.6 g/kg/IBW  Method Used for Fluid Requirements: 1 ml/kcal  Fluid (ml/day): 920 - 1265 ml    Nutrition Diagnosis:   Inadequate oral intake related to inadequate protein-energy intake, inadequate enteral nutrition infusion, impaired respiratory function as evidenced by NPO or clear liquid status due to medical condition, intubation, nutrition support - enteral nutrition, lab values    Nutrition Interventions:   Food

## 2023-09-12 NOTE — PROGRESS NOTES
09/11/23 2258   Patient Observation   Pulse (!) 112   Respirations (!) 35   SpO2 95 %   Observations ETT SIZE 7.5, SECURED AT 22 LIP LINE. AMBU BAG AT HEAD OF BED. WATER GOOD. Breath Sounds   Right Upper Lobe Diminished   Right Middle Lobe Diminished   Right Lower Lobe Diminished   Left Upper Lobe Diminished   Left Lower Lobe Diminished   Vent Information   Vent Mode AC/VC   Ventilator Settings   FiO2  45 %   Vt (Set, mL) 320 mL   Resp Rate (Set) 20 bmp   PEEP/CPAP (cmH2O) 10   Pressure Support (cm H2O) 0 cm H2O   Vent Patient Data (Readings)   Vt (Measured) 318 mL   Peak Inspiratory Pressure (cmH2O) 21 cmH2O   Rate Measured 35 br/min   Minute Volume (L/min) 11 Liters   Peak Inspiratory Flow (lpm) 65 L/sec   Mean Airway Pressure (cmH2O) 14 cmH20   Plateau Pressure (cm H2O) 23 cm H2O   Driving Pressure 13   I:E Ratio 1:2.00   Flow Sensitivity 3 L/min   Vent Alarm Settings   High Pressure (cmH2O) 50 cmH2O   Low Minute Volume (lpm) 3 L/min   High Minute Volume (lpm) 20 L/min   Low Exhaled Vt (ml) 200 mL   High Exhaled Vt (ml) 800 mL   RR High (bpm) 40 br/min   Apnea (secs) 20 secs   Additional Respiratoray Assessments   Humidification Source Heated wire   Humidification Temp 36.9   Circuit Condensation Drained   Ambu Bag With Mask At Bedside Yes   Airway Clearance   Suction ET Tube   Suction Device Inline suction catheter   Sputum Method Obtained Endotracheal   Sputum Amount Small   Sputum Color/Odor Tan   Sputum Consistency Thick   Non-Surgical Airway 09/01/23 Endotracheal tube   Placement Date/Time: 09/01/23 0240   Present on Admission/Arrival: Yes  Placed By: In ED  Placement Verified By: Auscultation;Capnometry; Chest X-ray;Colorimetric ETCO2 device  Mask Ventilation: Oral airway  Insertion attempts: 1  Location: Oral  Airwa. ..    Secured At 22 cm   Measured From Lips   Secured Location Left   Secured By Commercial tube mayer   Site Assessment Dry

## 2023-09-12 NOTE — PLAN OF CARE
Problem: Discharge Planning  Goal: Discharge to home or other facility with appropriate resources  9/12/2023 0113 by Grant Villareal RN  Outcome: Progressing  9/11/2023 1846 by Bernett Meigs, RN  Outcome: Progressing     Problem: Nutrition Deficit:  Goal: Optimize nutritional status  9/12/2023 0113 by Grant Villareal RN  Outcome: Progressing  9/11/2023 1846 by Bernett Meigs, RN  Outcome: Progressing     Problem: Skin/Tissue Integrity  Goal: Absence of new skin breakdown  Description: 1. Monitor for areas of redness and/or skin breakdown  2. Assess vascular access sites hourly  3. Every 4-6 hours minimum:  Change oxygen saturation probe site  4. Every 4-6 hours:  If on nasal continuous positive airway pressure, respiratory therapy assess nares and determine need for appliance change or resting period.   9/12/2023 0113 by Grant Villareal RN  Outcome: Progressing  9/11/2023 1846 by Bernett Meigs, RN  Outcome: Progressing     Problem: Respiratory - Adult  Goal: Achieves optimal ventilation and oxygenation  9/12/2023 0113 by Grant Villareal RN  Outcome: Progressing  9/11/2023 1846 by Bernett Meigs, RN  Outcome: Progressing

## 2023-09-12 NOTE — PLAN OF CARE
Problem: Skin/Tissue Integrity  Goal: Absence of new skin breakdown  Description: 1. Monitor for areas of redness and/or skin breakdown  2. Assess vascular access sites hourly  3. Every 4-6 hours minimum:  Change oxygen saturation probe site  4. Every 4-6 hours:  If on nasal continuous positive airway pressure, respiratory therapy assess nares and determine need for appliance change or resting period.   9/12/2023 1051 by Radha Hernandez RN  Outcome: Progressing     Problem: Respiratory - Adult  Goal: Achieves optimal ventilation and oxygenation  9/12/2023 1051 by Radha Hernandez RN  Outcome: Progressing     Problem: Metabolic/Fluid and Electrolytes - Adult  Goal: Hemodynamic stability and optimal renal function maintained  Outcome: Progressing     Problem: ABCDS Injury Assessment  Goal: Absence of physical injury  Outcome: Progressing

## 2023-09-12 NOTE — PROGRESS NOTES
Handoff report given to [address] Oskar Barron and Naman Esposito RN. Pt stable at this time. Bed locked and in lowest position.

## 2023-09-12 NOTE — PROGRESS NOTES
increased compared to prior study earlier in the day         XR CHEST PORTABLE   Final Result   No significant interval change in bilateral airspace disease. Stable support devices. XR CHEST PORTABLE   Final Result   1. ETT tip 2.5 cm above the ivis. 2. Bilateral perihilar airspace opacities with indistinct pulmonary   vasculature suggestive of pulmonary edema. Multi lobar pneumonia is in the   differential if patient has clinical symptoms of infection. XR CHEST PORTABLE    (Results Pending)     ECHO  Technically difficult examination secondary to habitus/intubated. Normal left ventricular size with mild left ventricular hypertrophy. Left ventricular systolic function is normal with ejection fraction   estimated at 55%. Apical windows limited for wall motion assessment. Within the limits of the   study, no clear regional wall motion abnormalities noted. Elevated left ventricular filling pressure. The right ventricle is seen in off-axis views, grossly appears normal in   size with normal function. Mild mitral regurgitation. Moderate tricuspid regurgitation. Systolic pulmonary artery pressure (SPAP) is estimated at 59 mmHg (Right   atrial pressure of 8 mmHg). Labs and imaging reviewed     Assessment & Plan:       Acute hypoxemic respiratory failure  - placed on vent in ER for severe distress. Etiology likely Asthma exacerbation vs aspiration   - admit to ICU, pulmonary /critical care consulted  - imaging with fluid overload , recent admission for diverticulitis and was given cipro/flagyl and IVF   - ECHO with normal EF and no RWMA   -Finished  meropenem and iv vanc for possible pna   - severe hypoxia needing paralysis , high PEEP and prone ventilation    now off nimbex  Started on lasix gtt for anasarca and improving now. Lasix held for now.  Sodium up some  Wean o2 as able  - pulmonary managing     Multifocal  pneumonia - started merrem and iv vanc day # 10/10   for presumable gram neg and/or MRSA pna   BAL remain neg so far   No fevers  Wbc improving      Asthma with acute exacerbation - on dexamethasone,IBD   Can wean steroids      Recent sigmoid diverticulitis  - uncomplicated and was tx with abx . No perf on last imaging  Given worsening leucocytosis, repeat CT with no clear diverticulitis but pictures not clear to perceive  Finished antibiotics. Hyperkalemia  Given  Lindaann Gosselin and now on lasix gtt       Abnormal EKG - Left BBB with no previous comparison since 2017 at which time it was normal . .    - ECHO with normal EF and no RWMA       Diet: ON TF   GI/DVT PX: /lovenox  CODE STATUS: full code         Anita Vicente MD 9/12/2023 1:57 PM

## 2023-09-12 NOTE — PROGRESS NOTES
IntraVENous Daily    busPIRone  15 mg Oral 4x daily    ipratropium 0.5 mg-albuterol 2.5 mg  1 Dose Inhalation Q4H    atorvastatin  80 mg Oral Nightly    aspirin  81 mg Oral Daily       Data:  CBC:   Recent Labs     09/10/23  0431 09/11/23  0458 09/12/23  0436   WBC 12.9* 14.2* 13.7*   HGB 10.8* 11.4* 11.4*   HCT 31.5* 32.9* 32.8*   MCV 86.8 86.1 86.2    155 156     BMP:   Recent Labs     09/10/23  0431 09/11/23 0458 09/11/23  1742 09/12/23 0436    141 142 149*   K 3.5 3.3* 3.5 3.8    97* 95* 102   CO2 26 32 33* 35*   PHOS 5.4*  --  4.9  --    BUN 71* 78* 73* 80*   CREATININE 1.8* 1.6* 1.6* 1.5*     LIVER PROFILE: No results for input(s): \"AST\", \"ALT\", \"LIPASE\", \"AMYLASE\", \"ALB\", \"BILIDIR\", \"BILITOT\", \"ALKPHOS\" in the last 72 hours. Microbiology:      9/1/2023 SARS-CoV-2 and influenza are negative  9/1/2023 blood NG   9/3/2023 BAL NG     Imaging:  CXR 9/11/2023 ET tube slightly low, diffuse bilateral infiltrates     Echo 9/2/2023 EF 55%, mild LVH, no wall motion abnormality, normal RV, PASP 59     ASSESSMENT:  Acute hypoxemic respiratory failure   Paralytic 9/1/2023-9/8/2023  Pulmonary edema v ARDS   Moderate persistent asthma with severe acute exacerbation  She had stopped her inhaled corticosteroid approximately 4 months ago due to cost, she follows with me in the pulmonary clinic  Hospital-acquired pneumonia v aspiration pneumonia   Mucous plugging of bronchi  NSTEMI  Recurrent diverticulitis, just discharged from [facility] day prior to admission     PLAN:  Mechanical ventilation as per my orders.  The ventilator was adjusted by me at the bedside for unstable, life threatening respiratory failure  IV propofol and fentanyl for sedation   Completed 10 days Merrem/Vanc    Inhaled bronchodilators  Decadron now 4 QD  ASA, statin per cardiology  Holding lasix, needs free water    Nutrition: nepro  Prophylaxis: lovenox, bactroban, protonix     Total critical care time caring for this patient with

## 2023-09-13 ENCOUNTER — APPOINTMENT (OUTPATIENT)
Dept: GENERAL RADIOLOGY | Age: 78
DRG: 870 | End: 2023-09-13
Payer: MEDICARE

## 2023-09-13 PROBLEM — R79.89 ELEVATED TROPONIN: Status: ACTIVE | Noted: 2023-09-13

## 2023-09-13 PROBLEM — I47.19 ATRIAL TACHYCARDIA: Status: ACTIVE | Noted: 2023-09-13

## 2023-09-13 LAB
ANION GAP SERPL CALCULATED.3IONS-SCNC: 10 MMOL/L (ref 3–16)
BASE EXCESS BLDA CALC-SCNC: 11.5 MMOL/L (ref -3–3)
BASOPHILS # BLD: 0 K/UL (ref 0–0.2)
BASOPHILS NFR BLD: 0.2 %
BUN SERPL-MCNC: 75 MG/DL (ref 7–20)
CALCIUM SERPL-MCNC: 8.4 MG/DL (ref 8.3–10.6)
CHLORIDE SERPL-SCNC: 97 MMOL/L (ref 99–110)
CO2 BLDA-SCNC: 36.6 MMOL/L
CO2 SERPL-SCNC: 35 MMOL/L (ref 21–32)
COHGB MFR BLDA: 0.2 % (ref 0–1.5)
CREAT SERPL-MCNC: 1.4 MG/DL (ref 0.6–1.2)
DEPRECATED RDW RBC AUTO: 14.5 % (ref 12.4–15.4)
EKG ATRIAL RATE: 126 BPM
EKG DIAGNOSIS: NORMAL
EKG P AXIS: -89 DEGREES
EKG P-R INTERVAL: 118 MS
EKG Q-T INTERVAL: 338 MS
EKG QRS DURATION: 118 MS
EKG QTC CALCULATION (BAZETT): 489 MS
EKG R AXIS: -31 DEGREES
EKG T AXIS: 144 DEGREES
EKG VENTRICULAR RATE: 126 BPM
EOSINOPHIL # BLD: 0.5 K/UL (ref 0–0.6)
EOSINOPHIL NFR BLD: 4.5 %
GFR SERPLBLD CREATININE-BSD FMLA CKD-EPI: 39 ML/MIN/{1.73_M2}
GLUCOSE BLD-MCNC: 128 MG/DL (ref 70–99)
GLUCOSE BLD-MCNC: 138 MG/DL (ref 70–99)
GLUCOSE BLD-MCNC: 142 MG/DL (ref 70–99)
GLUCOSE BLD-MCNC: 150 MG/DL (ref 70–99)
GLUCOSE BLD-MCNC: 153 MG/DL (ref 70–99)
GLUCOSE SERPL-MCNC: 166 MG/DL (ref 70–99)
HCO3 BLDA-SCNC: 35.3 MMOL/L (ref 21–29)
HCT VFR BLD AUTO: 31.6 % (ref 36–48)
HGB BLD-MCNC: 10.9 G/DL (ref 12–16)
HGB BLDA-MCNC: 11.9 G/DL (ref 12–16)
LACTATE BLDV-SCNC: 0.8 MMOL/L (ref 0.4–2)
LYMPHOCYTES # BLD: 0.8 K/UL (ref 1–5.1)
LYMPHOCYTES NFR BLD: 6.5 %
MAGNESIUM SERPL-MCNC: 2.1 MG/DL (ref 1.8–2.4)
MCH RBC QN AUTO: 30 PG (ref 26–34)
MCHC RBC AUTO-ENTMCNC: 34.4 G/DL (ref 31–36)
MCV RBC AUTO: 87.1 FL (ref 80–100)
METHGB MFR BLDA: 0.3 %
MONOCYTES # BLD: 0.6 K/UL (ref 0–1.3)
MONOCYTES NFR BLD: 5.1 %
NEUTROPHILS # BLD: 9.8 K/UL (ref 1.7–7.7)
NEUTROPHILS NFR BLD: 83.7 %
O2 THERAPY: ABNORMAL
PCO2 BLDA: 42.9 MMHG (ref 35–45)
PERFORMED ON: ABNORMAL
PH BLDA: 7.53 [PH] (ref 7.35–7.45)
PLATELET # BLD AUTO: 151 K/UL (ref 135–450)
PMV BLD AUTO: 9.1 FL (ref 5–10.5)
PO2 BLDA: 86.3 MMHG (ref 75–108)
POTASSIUM SERPL-SCNC: 3.8 MMOL/L (ref 3.5–5.1)
RBC # BLD AUTO: 3.63 M/UL (ref 4–5.2)
SAO2 % BLDA: 97.4 %
SODIUM SERPL-SCNC: 142 MMOL/L (ref 136–145)
TRIGL SERPL-MCNC: 170 MG/DL (ref 0–150)
TRIGL SERPL-MCNC: 171 MG/DL (ref 0–150)
TROPONIN, HIGH SENSITIVITY: 193 NG/L (ref 0–14)
WBC # BLD AUTO: 11.7 K/UL (ref 4–11)

## 2023-09-13 PROCEDURE — 83605 ASSAY OF LACTIC ACID: CPT

## 2023-09-13 PROCEDURE — 2000000000 HC ICU R&B

## 2023-09-13 PROCEDURE — C9113 INJ PANTOPRAZOLE SODIUM, VIA: HCPCS | Performed by: INTERNAL MEDICINE

## 2023-09-13 PROCEDURE — 2580000003 HC RX 258: Performed by: INTERNAL MEDICINE

## 2023-09-13 PROCEDURE — 71045 X-RAY EXAM CHEST 1 VIEW: CPT

## 2023-09-13 PROCEDURE — 82803 BLOOD GASES ANY COMBINATION: CPT

## 2023-09-13 PROCEDURE — 84478 ASSAY OF TRIGLYCERIDES: CPT

## 2023-09-13 PROCEDURE — 93010 ELECTROCARDIOGRAM REPORT: CPT | Performed by: INTERNAL MEDICINE

## 2023-09-13 PROCEDURE — 94761 N-INVAS EAR/PLS OXIMETRY MLT: CPT

## 2023-09-13 PROCEDURE — 93005 ELECTROCARDIOGRAM TRACING: CPT | Performed by: INTERNAL MEDICINE

## 2023-09-13 PROCEDURE — 6370000000 HC RX 637 (ALT 250 FOR IP): Performed by: INTERNAL MEDICINE

## 2023-09-13 PROCEDURE — 99291 CRITICAL CARE FIRST HOUR: CPT | Performed by: INTERNAL MEDICINE

## 2023-09-13 PROCEDURE — 94003 VENT MGMT INPAT SUBQ DAY: CPT

## 2023-09-13 PROCEDURE — 87070 CULTURE OTHR SPECIMN AEROBIC: CPT

## 2023-09-13 PROCEDURE — 85025 COMPLETE CBC W/AUTO DIFF WBC: CPT

## 2023-09-13 PROCEDURE — 2700000000 HC OXYGEN THERAPY PER DAY

## 2023-09-13 PROCEDURE — 99233 SBSQ HOSP IP/OBS HIGH 50: CPT | Performed by: INTERNAL MEDICINE

## 2023-09-13 PROCEDURE — 80048 BASIC METABOLIC PNL TOTAL CA: CPT

## 2023-09-13 PROCEDURE — 94640 AIRWAY INHALATION TREATMENT: CPT

## 2023-09-13 PROCEDURE — 6360000002 HC RX W HCPCS: Performed by: INTERNAL MEDICINE

## 2023-09-13 PROCEDURE — 83735 ASSAY OF MAGNESIUM: CPT

## 2023-09-13 PROCEDURE — 99223 1ST HOSP IP/OBS HIGH 75: CPT | Performed by: INTERNAL MEDICINE

## 2023-09-13 PROCEDURE — 2500000003 HC RX 250 WO HCPCS: Performed by: INTERNAL MEDICINE

## 2023-09-13 PROCEDURE — 84484 ASSAY OF TROPONIN QUANT: CPT

## 2023-09-13 RX ORDER — POLYETHYLENE GLYCOL 3350 17 G/17G
17 POWDER, FOR SOLUTION ORAL DAILY
Status: DISCONTINUED | OUTPATIENT
Start: 2023-09-13 | End: 2023-09-18

## 2023-09-13 RX ORDER — METOPROLOL TARTRATE 50 MG/1
50 TABLET, FILM COATED ORAL 2 TIMES DAILY
Status: DISCONTINUED | OUTPATIENT
Start: 2023-09-13 | End: 2023-09-14

## 2023-09-13 RX ADMIN — HYDRALAZINE HYDROCHLORIDE 50 MG: 50 TABLET, FILM COATED ORAL at 06:10

## 2023-09-13 RX ADMIN — CHLORHEXIDINE GLUCONATE 0.12% ORAL RINSE 15 ML: 1.2 LIQUID ORAL at 09:09

## 2023-09-13 RX ADMIN — BUSPIRONE HYDROCHLORIDE 15 MG: 7.5 TABLET ORAL at 13:39

## 2023-09-13 RX ADMIN — DOCUSATE SODIUM 100 MG: 50 LIQUID ORAL at 22:10

## 2023-09-13 RX ADMIN — SERTRALINE 100 MG: 100 TABLET, FILM COATED ORAL at 09:09

## 2023-09-13 RX ADMIN — IPRATROPIUM BROMIDE AND ALBUTEROL SULFATE 1 DOSE: 2.5; .5 SOLUTION RESPIRATORY (INHALATION) at 02:50

## 2023-09-13 RX ADMIN — ATORVASTATIN CALCIUM 80 MG: 40 TABLET, FILM COATED ORAL at 22:10

## 2023-09-13 RX ADMIN — ENOXAPARIN SODIUM 30 MG: 100 INJECTION SUBCUTANEOUS at 09:09

## 2023-09-13 RX ADMIN — BUSPIRONE HYDROCHLORIDE 15 MG: 7.5 TABLET ORAL at 22:10

## 2023-09-13 RX ADMIN — ACETAMINOPHEN 650 MG: 325 TABLET ORAL at 13:39

## 2023-09-13 RX ADMIN — POLYETHYLENE GLYCOL (3350) 17 G: 17 POWDER, FOR SOLUTION ORAL at 10:56

## 2023-09-13 RX ADMIN — ENOXAPARIN SODIUM 30 MG: 100 INJECTION SUBCUTANEOUS at 22:10

## 2023-09-13 RX ADMIN — WHITE PETROLATUM 57.7 %-MINERAL OIL 31.9 % EYE OINTMENT: at 22:11

## 2023-09-13 RX ADMIN — HYDRALAZINE HYDROCHLORIDE 50 MG: 50 TABLET, FILM COATED ORAL at 22:10

## 2023-09-13 RX ADMIN — PROPOFOL 20 MCG/KG/MIN: 10 INJECTION, EMULSION INTRAVENOUS at 01:26

## 2023-09-13 RX ADMIN — CARBOXYMETHYLCELLULOSE SODIUM 1 DROP: 10 GEL OPHTHALMIC at 17:39

## 2023-09-13 RX ADMIN — METOPROLOL TARTRATE 5 MG: 5 INJECTION, SOLUTION INTRAVENOUS at 02:28

## 2023-09-13 RX ADMIN — PANTOPRAZOLE SODIUM 40 MG: 40 INJECTION, POWDER, FOR SOLUTION INTRAVENOUS at 09:09

## 2023-09-13 RX ADMIN — CHLORHEXIDINE GLUCONATE 0.12% ORAL RINSE 15 ML: 1.2 LIQUID ORAL at 22:11

## 2023-09-13 RX ADMIN — SODIUM CHLORIDE, PRESERVATIVE FREE 10 ML: 5 INJECTION INTRAVENOUS at 22:11

## 2023-09-13 RX ADMIN — BUSPIRONE HYDROCHLORIDE 15 MG: 7.5 TABLET ORAL at 09:08

## 2023-09-13 RX ADMIN — BUSPIRONE HYDROCHLORIDE 15 MG: 7.5 TABLET ORAL at 17:38

## 2023-09-13 RX ADMIN — DEXAMETHASONE SODIUM PHOSPHATE 4 MG: 4 INJECTION, SOLUTION INTRAMUSCULAR; INTRAVENOUS at 09:08

## 2023-09-13 RX ADMIN — HYDRALAZINE HYDROCHLORIDE 50 MG: 50 TABLET, FILM COATED ORAL at 13:39

## 2023-09-13 RX ADMIN — SERTRALINE 100 MG: 100 TABLET, FILM COATED ORAL at 22:10

## 2023-09-13 RX ADMIN — SENNOSIDES 17.2 MG: 8.6 TABLET, FILM COATED ORAL at 22:10

## 2023-09-13 RX ADMIN — ASPIRIN 81 MG: 81 TABLET, CHEWABLE ORAL at 09:09

## 2023-09-13 RX ADMIN — SENNOSIDES 17.2 MG: 8.6 TABLET, FILM COATED ORAL at 09:09

## 2023-09-13 RX ADMIN — IPRATROPIUM BROMIDE AND ALBUTEROL SULFATE 1 DOSE: 2.5; .5 SOLUTION RESPIRATORY (INHALATION) at 15:48

## 2023-09-13 RX ADMIN — CARBOXYMETHYLCELLULOSE SODIUM 1 DROP: 10 GEL OPHTHALMIC at 09:09

## 2023-09-13 RX ADMIN — SODIUM CHLORIDE, PRESERVATIVE FREE 10 ML: 5 INJECTION INTRAVENOUS at 09:09

## 2023-09-13 RX ADMIN — IPRATROPIUM BROMIDE AND ALBUTEROL SULFATE 1 DOSE: 2.5; .5 SOLUTION RESPIRATORY (INHALATION) at 08:41

## 2023-09-13 RX ADMIN — DOCUSATE SODIUM 100 MG: 50 LIQUID ORAL at 09:09

## 2023-09-13 RX ADMIN — IPRATROPIUM BROMIDE AND ALBUTEROL SULFATE 1 DOSE: 2.5; .5 SOLUTION RESPIRATORY (INHALATION) at 23:37

## 2023-09-13 RX ADMIN — METOPROLOL TARTRATE 50 MG: 50 TABLET, FILM COATED ORAL at 22:10

## 2023-09-13 RX ADMIN — IPRATROPIUM BROMIDE AND ALBUTEROL SULFATE 1 DOSE: 2.5; .5 SOLUTION RESPIRATORY (INHALATION) at 20:07

## 2023-09-13 RX ADMIN — CARBOXYMETHYLCELLULOSE SODIUM 1 DROP: 10 GEL OPHTHALMIC at 01:26

## 2023-09-13 RX ADMIN — IPRATROPIUM BROMIDE AND ALBUTEROL SULFATE 1 DOSE: 2.5; .5 SOLUTION RESPIRATORY (INHALATION) at 11:26

## 2023-09-13 RX ADMIN — METOPROLOL TARTRATE 25 MG: 25 TABLET, FILM COATED ORAL at 09:09

## 2023-09-13 RX ADMIN — METOPROLOL TARTRATE 25 MG: 25 TABLET, FILM COATED ORAL at 10:56

## 2023-09-13 RX ADMIN — WHITE PETROLATUM 57.7 %-MINERAL OIL 31.9 % EYE OINTMENT: at 06:11

## 2023-09-13 ASSESSMENT — PULMONARY FUNCTION TESTS
PIF_VALUE: 28
PIF_VALUE: 36
PIF_VALUE: 17
PIF_VALUE: 11
PIF_VALUE: 36
PIF_VALUE: 18
PIF_VALUE: 28
PIF_VALUE: 31
PIF_VALUE: 18
PIF_VALUE: 19
PIF_VALUE: 21
PIF_VALUE: 23
PIF_VALUE: 26
PIF_VALUE: 28
PIF_VALUE: 12
PIF_VALUE: 19
PIF_VALUE: 16
PIF_VALUE: 35
PIF_VALUE: 24
PIF_VALUE: 20
PIF_VALUE: 21

## 2023-09-13 ASSESSMENT — PAIN SCALES - GENERAL: PAINLEVEL_OUTOF10: 0

## 2023-09-13 NOTE — PROGRESS NOTES
Care rounds completed with Dr. Zulema Macias and multidisciplinary team. Reviewed labs, meds, VS, assessment, & plan of care for today. See dictated note and new orders for details.      Increase metoprolol to 50 BID  Reconsult cardiology

## 2023-09-13 NOTE — PROGRESS NOTES
Reassessment completed, see flowsheets, unchanged from prior. VSS. Pt continues off propofol and fentanyl. Continues on SBT, pressure support 5/5. All ICU Lines and monitoring remain in place. Bed locked in lowest position. Call light within reach.

## 2023-09-13 NOTE — PROGRESS NOTES
Dr. Randall Geller at the bedside to examine pt. See progress note for details. Stated pt was in atrial tach overnight to this AM. Now in Sinus Tach. Potentially will change to full dose lovenox.

## 2023-09-13 NOTE — PROGRESS NOTES
4 Eyes Skin Assessment     NAME:  Dylan Hudson  YOB: 1945  MEDICAL RECORD NUMBER:  [id number]    The patient is being assessed for  Shift Handoff    I agree that at least one RN has performed a thorough Head to Toe Skin Assessment on the patient. ALL assessment sites listed below have been assessed. Areas assessed by both nurses:    Head, Face, Ears, Shoulders, Back, Chest, Arms, Elbows, Hands, Sacrum. Buttock, Coccyx, Ischium, Legs. Feet and Heels, and Under Medical Devices         Does the Patient have a Wound?  No noted wound(s)       Sloan Prevention initiated by RN: Yes  Wound Care Orders initiated by RN: No    Pressure Injury (Stage 3,4, Unstageable, DTI, NWPT, and Complex wounds) if present, place Wound referral order by RN under : No    New Ostomies, if present place, Ostomy referral order under : No     Nurse 1 eSignature: Electronically signed by Alejo Parikh RN on 9/12/23 at 10:32 PM EDT    **SHARE this note so that the co-signing nurse can place an eSignature**    Nurse 2 eSignature: Electronically signed by Yuri Bhardwaj RN on 9/12/23 at 11:34 PM EDT

## 2023-09-13 NOTE — PROGRESS NOTES
Dr. Yanci Wilson made aware of 100.8 temperature. New order for tracheal aspirate. Nursing communication order for if temperature greater than 101 then obtain blood and urine cultures. Tracheal aspirate obtained and sent to lab.

## 2023-09-13 NOTE — PROGRESS NOTES
09/12/23 2107   Patient Observation   Pulse (!) 106   Respirations 22   SpO2 96 %   Observations ETT SIZE 7.5, SECURED AT 22 LIP LINE. AMBU BAG AT HEAD OF BED. WATER GOOD. Breath Sounds   Right Upper Lobe Diminished   Right Middle Lobe Diminished   Right Lower Lobe Diminished   Left Upper Lobe Diminished   Left Lower Lobe Diminished   Vent Information   Vent Mode AC/VC   Ventilator Settings   FiO2  40 %   Vt (Set, mL) 320 mL   Resp Rate (Set) 20 bmp   PEEP/CPAP (cmH2O) 8   Vent Patient Data (Readings)   Vt (Measured) 312 mL   Peak Inspiratory Pressure (cmH2O) 24 cmH2O   Rate Measured 23 br/min   Minute Volume (L/min) 7.23 Liters   Peak Inspiratory Flow (lpm) 65 L/sec   Mean Airway Pressure (cmH2O) 12 cmH20   Plateau Pressure (cm H2O) 20 cm H2O   Driving Pressure 12   I:E Ratio 1:4.60   Flow Sensitivity 3 L/min   Static Compliance (L/cm H2O) 26   Dynamic Compliance (L/cm H2O) 20 L/cm H2O   Vent Alarm Settings   High Pressure (cmH2O) 50 cmH2O   Low Minute Volume (lpm) 3 L/min   High Minute Volume (lpm) 20 L/min   Low Exhaled Vt (ml) 200 mL   High Exhaled Vt (ml) 800 mL   RR High (bpm) 40 br/min   Apnea (secs) 20 secs   Additional Respiratoray Assessments   Humidification Source Heated wire   Humidification Temp 37   Circuit Condensation Drained   Ambu Bag With Mask At Bedside Yes   Airway Clearance   Suction ET Tube   Suction Device Inline suction catheter   Sputum Method Obtained Endotracheal   Sputum Amount Small   Sputum Color/Odor Tan   Sputum Consistency Thick   Non-Surgical Airway 09/01/23 Endotracheal tube   Placement Date/Time: 09/01/23 0240   Present on Admission/Arrival: Yes  Placed By: In ED  Placement Verified By: Auscultation;Capnometry; Chest X-ray;Colorimetric ETCO2 device  Mask Ventilation: Oral airway  Insertion attempts: 1  Location: Oral  Airwa. ..    Secured At 22 cm   Measured From Nic Location (S)  Center   Secured By Commercial tube mayer   Site Assessment Dry

## 2023-09-13 NOTE — PROGRESS NOTES
Pt placed back on AC by RT. Sedation remains off as pt tolerating, will restart at low dose if that changes. Pt still not waking up, nor following commands at this time. Will have non purposeful head movements and bite when trying to do oral care. TF restarted, increased to 25 ml/hr per order with 200 ml q4h water flushes    Reassessment completed, see flowsheets, unchanged from prior. All ICU Lines and monitoring remain in place. Bed locked in lowest position.

## 2023-09-13 NOTE — PLAN OF CARE
period.   9/12/2023 2228 by Jaya Pierre RN  Outcome: Progressing  9/12/2023 1051 by Vickie Webber RN  Outcome: Progressing     Problem: Respiratory - Adult  Goal: Achieves optimal ventilation and oxygenation  9/12/2023 2228 by Jaya Pierre RN  Outcome: Progressing  Flowsheets (Taken 9/12/2023 2228)  Achieves optimal ventilation and oxygenation:   Assess for changes in respiratory status   Position to facilitate oxygenation and minimize respiratory effort  9/12/2023 1051 by Vickie Webber RN  Outcome: Progressing     Problem: Cardiovascular - Adult  Goal: Maintains optimal cardiac output and hemodynamic stability  Outcome: Progressing  Flowsheets (Taken 9/12/2023 2228)  Maintains optimal cardiac output and hemodynamic stability:   Monitor blood pressure and heart rate   Monitor urine output and notify Licensed Independent Practitioner for values outside of normal range   Assess for signs of decreased cardiac output  Goal: Absence of cardiac dysrhythmias or at baseline  Outcome: Progressing  Flowsheets (Taken 9/10/2023 2252)  Absence of cardiac dysrhythmias or at baseline:   Monitor cardiac rate and rhythm   Assess for signs of decreased cardiac output     Problem: Skin/Tissue Integrity - Adult  Goal: Skin integrity remains intact  Outcome: Progressing  Flowsheets  Taken 9/12/2023 2228  Skin Integrity Remains Intact:   Monitor for areas of redness and/or skin breakdown   Assess vascular access sites hourly   Every 4-6 hours minimum: Change oxygen saturation probe site  Taken 9/12/2023 2206  Skin Integrity Remains Intact:   Monitor for areas of redness and/or skin breakdown   Assess vascular access sites hourly   Every 4-6 hours minimum: Change oxygen saturation probe site     Problem: Infection - Adult  Goal: Absence of infection during hospitalization  Outcome: Progressing  Flowsheets (Taken 9/12/2023 2228)  Absence of infection during hospitalization:   Assess and monitor for signs and symptoms of infection   Monitor lab/diagnostic results   Monitor all insertion sites i.e., indwelling lines, tubes and drains     Problem: Metabolic/Fluid and Electrolytes - Adult  Goal: Hemodynamic stability and optimal renal function maintained  9/12/2023 2228 by Kady Garber RN  Outcome: Progressing  Flowsheets (Taken 9/12/2023 2228)  Hemodynamic stability and optimal renal function maintained:   Monitor labs and assess for signs and symptoms of volume excess or deficit   Monitor intake, output and patient weight   Monitor urine specific gravity, serum osmolarity and serum sodium as indicated or ordered  9/12/2023 1051 by Yousif Garcia RN  Outcome: Progressing     Problem: ABCDS Injury Assessment  Goal: Absence of physical injury  9/12/2023 2228 by Kady Garber RN  Outcome: Progressing  Flowsheets (Taken 9/12/2023 2228)  Absence of Physical Injury: Implement safety measures based on patient assessment  9/12/2023 1051 by Yousif Garcia RN  Outcome: Progressing     Problem: Safety - Medical Restraint  Goal: Remains free of injury from restraints (Restraint for Interference with Medical Device)  Description: INTERVENTIONS:  1. Determine that other, less restrictive measures have been tried or would not be effective before applying the restraint  2. Evaluate the patient's condition at the time of restraint application  3. Inform patient/family regarding the reason for restraint  4.  Q2H: Monitor safety, psychosocial status, comfort, nutrition and hydration  Outcome: Progressing  Flowsheets  Taken 9/12/2023 2228 by Kady Garber RN  Remains free of injury from restraints (restraint for interference with medical device): Determine that other, less restrictive measures have been tried or would not be effective before applying the restraint  Taken 9/12/2023 2200 by Kady Garber RN  Remains free of injury from restraints (restraint for interference with medical device): Every 2 hours: Monitor safety, psychosocial

## 2023-09-13 NOTE — PROGRESS NOTES
09/13/23 0250   Patient Observation   Pulse (!) 120   Respirations 22   SpO2 97 %   Observations ETT SIZE 7.5, SECURED AT 22 LIP LINE. AMBU BAG AT HEAD OF BED. WATER GOOD. Breath Sounds   Right Upper Lobe Diminished   Right Middle Lobe Diminished   Right Lower Lobe Diminished   Left Upper Lobe Diminished   Left Lower Lobe Diminished   Vent Information   Vent Mode AC/VC   Ventilator Settings   FiO2  40 %   Vt (Set, mL) 320 mL   Resp Rate (Set) 20 bmp   PEEP/CPAP (cmH2O) 8   Vent Patient Data (Readings)   Vt (Measured) 322 mL   Peak Inspiratory Pressure (cmH2O) 31 cmH2O   Rate Measured 23 br/min   Minute Volume (L/min) 7.28 Liters   Peak Inspiratory Flow (lpm) 65 L/sec   Mean Airway Pressure (cmH2O) 11 cmH20   Plateau Pressure (cm H2O) 19 cm H2O   Driving Pressure 11   I:E Ratio 1:4.00   Flow Sensitivity 3 L/min   Vent Alarm Settings   High Pressure (cmH2O) 50 cmH2O   Low Minute Volume (lpm) 3 L/min   High Minute Volume (lpm) 20 L/min   Low Exhaled Vt (ml) 200 mL   High Exhaled Vt (ml) 800 mL   RR High (bpm) 40 br/min   Apnea (secs) 20 secs   Additional Respiratoray Assessments   Humidification Source Heated wire   Humidification Temp 37   Circuit Condensation Drained   Ambu Bag With Mask At Bedside Yes   Airway Clearance   Suction ET Tube   Suction Device Inline suction catheter   Sputum Method Obtained Endotracheal   Sputum Amount Small   Sputum Color/Odor Tan   Sputum Consistency Thick   Non-Surgical Airway 09/01/23 Endotracheal tube   Placement Date/Time: 09/01/23 0240   Present on Admission/Arrival: Yes  Placed By: In ED  Placement Verified By: Auscultation;Capnometry; Chest X-ray;Colorimetric ETCO2 device  Mask Ventilation: Oral airway  Insertion attempts: 1  Location: Oral  Airwa. ..    Secured At 22 cm   Measured From Nic Location (S)  Right   Secured By Commercial tube mayer   Site Assessment Dry

## 2023-09-13 NOTE — PROGRESS NOTES
Pt's 's EKG done, Showed Junctional Tachycardia. I/V Metoprolol 5 mg PRN order given. No improvement. Informed Dr. Edgar Gross and MD ordered Troponin and Lactic along with the morning labs. Morning CXR pending. Radiology aware for Stat CXR.

## 2023-09-13 NOTE — PROGRESS NOTES
DATE:  09/13/2023  Meets for Devices:    [x] Indwelling Cath       [x] Central Lines  A triple lumen catheterCentral venous Right IJ      1. Acute Urinary Retention or Bladder Outlet Obstruction or Irrigation    [x]Other: Please Specify:     Assessment: Per Dr Gustavo Guerrero MD, Nephro 9/13/23     # Non-Oliguric Acute Kidney Injury  due to ATN  - Required lasix gtt for hypervolemia     # Acute hypoxemic respiratory failure/Asthma exacerbation  - From PNA/Hypervolemia  - Completed antibiotics, diuresed with lasix drip. # Hypernatremia/Contraction alkalosis  - Due to diuresis     # Hypokalemia  - Due to diuresis    Plan:   Assessment: Per Dr Gustavo Guerrero MD, Nephro 9/13/23  -Lasix drip off from 9/11, allowing for re-equilibration today, monitor for transition to intermittent lasix  -Increased free water flushes to 200 ml every 4 hours  -Trend labs, bp's, & urine output    2.   Need for Accurate Measurements of Urinary Output in Critically Ill  Patients  Anticipated to Require Large Volume Infusions or Diuretics or Vasopressors / Inotropes et al:    []Large Volume of boluses of fluid for resuscitation  [x]Hemodynamic Instability: receiving IV: Vasopressors & Inotropes EX: Dopamine, Epinephrine, Norepinephrine, Phenylephrine, Vasopressin, Digoxin  [x]High-dose Diuretics  []Hourly Urine studies to measure life threatening laboratory abnormalities  []Other: Please Specify      CVP monitoring, Pulmonary artery catheterization     []Transvenous Pacing requires central venous access   [x]Other: Please Specify   Per Dr Lesa Durbin MD, Pulmonary 9/13/23  ASSESSMENT:  Acute hypoxemic respiratory failure   Paralytic 9/1/2023-9/8/2023  Pulmonary edema v ARDS   Moderate persistent asthma with severe acute exacerbation  She had stopped her inhaled corticosteroid approximately 4 months ago due to cost, she follows with me in the pulmonary clinic  Hospital-acquired pneumonia v aspiration pneumonia   Mucous plugging of

## 2023-09-13 NOTE — CONSULTS
[address] [place] [address]  [phone]        Reason for Consultation/Chief Complaint: \"I have been having respiratory failure. \"  Consulted for Change in Rhythm and elevated Marnie    History of Present Illness:    Rosalino Lino is a 68 y.o. patient who presented to [facility] 9/1/23 with complaints of respiratory failure, O2 sats 70's on room air. She has PMH HTN, HLD, asthma, diverticulitis (dx admit 8/23), and breast cancer. Echo 12/19/14 EF=55%. grade I DD. Mild MR and TR   Ms. Rodena Schlatter presented 9/1 with respiratory distress elevated Marnie and rhythm change. She was admitted 9/1/23 for respiratory failure and diagnosed PNA, asthma exacerbation, mucus plug, and NSTEMI (likely type II). Admitting EKG  bpm Possible LAE; LBBB (LBBB new from 2017); Repeat EKG 9/13/23 Wide QRS tachycardia 126 bpm Unusual P axis, possible ectopic atrial tachycardia Left axis deviation LBBB; LVH;  Echo 9/2/23 EF=55%. mild cLVH. Elevated LV filling pressure. Mild MR. Moderate TR; pulm HTN 59mmHg. Current Labs: Marnie 193; BUN/Cr 75/1.4; H/H 10.9/31.6; WBC 11.7. She cannot give ROS due to intubated and sedated. Cards asked to see again due to rhythm change overnight with tachycardia. I have been asked to provide consultation regarding further management and testing. Past Medical History:   has a past medical history of Anxiety attack, Arthritis, Cancer ([address]), Diastolic dysfunction, Diverticulitis, GERD (gastroesophageal reflux disease), Hearing loss, Hyperlipidemia, Hypertension, Left breast mass, Miscarriage, Moderate persistent asthma without complication, PONV (postoperative nausea and vomiting), Spastic colon, and Tinnitus. Surgical History:   has a past surgical history that includes Mastectomy (Right, 1990); Tonsillectomy (1973 or 74); Appendectomy; Breast reconstruction (Right, 1990); polypectomy (1986); Cholecystectomy (12/20/10); Upper gastrointestinal endoscopy (11/7/11);  Upper gastrointestinal endoscopy GERD    Diverticulitis    Respiratory failure ([address])    Hospital-acquired pneumonia    New onset left bundle branch block (LBBB)    Sepsis (HCC)    Mild intermittent asthma with exacerbation    Sigmoid diverticulitis    NSTEMI (non-ST elevated myocardial infarction) ([address])    Acute hypoxemic respiratory failure (HCC)    Severe persistent asthma with exacerbation    Mucus plugging of bronchi    HCAP (healthcare-associated pneumonia)    Severe protein-calorie malnutrition ([address])      Thank you for allowing to us to participate in the care or Manpreet Harding. Further evaluation will be based upon the patient's clinical course and testing results.

## 2023-09-13 NOTE — CARE COORDINATION
CM: 1120 Call placed to [employer] in reference to Pre-Cert being started. According to [facility] patient's insurance requires patient have a trach placed prior to admission to [facility]. I have shared this with Sonido ABBOTT and she will give the message to DR Keenna Angel. Will continue to follow for POC. Messi Velasco RN

## 2023-09-13 NOTE — PROGRESS NOTES
09/13/23 0257   Patient Observation   Pulse (!) 123   Respirations 23   SpO2 98 %   Ventilator Settings   FiO2  40 %   Vt (Set, mL) 320 mL   Resp Rate (Set) 20 bmp   PEEP/CPAP (cmH2O) (S)  5   Vent Patient Data (Readings)   Vt (Measured) 320 mL   Peak Inspiratory Pressure (cmH2O) 36 cmH2O   Rate Measured 24 br/min   Minute Volume (L/min) 7.87 Liters   Mean Airway Pressure (cmH2O) 9.7 cmH20   Plateau Pressure (cm H2O) 19 cm H2O   Driving Pressure 14   I:E Ratio 1:3.70   Vent Alarm Settings   High Pressure (cmH2O) 50 cmH2O   Low Minute Volume (lpm) 3 L/min   RR High (bpm) 40 br/min

## 2023-09-13 NOTE — PROGRESS NOTES
Shift assessment, completed, see flow sheet. Pt RASS -2, not following commands, nonpurposeful head movements. Biting, coughing, and gagging during oral care. Intubated and sedated with a # 7.5 ETT, at 21 LL. On  / 20 /40 %/ 5. , /66 (93), SpO2 63%. Respirations are easy, even, and unlabored. Bilateral lung sounds diminished. R Nare NG in place at 65, with TF at 20 mL/hr, stopped for SBT. RIJ CVC, WNL with propofol 20 mcg/kg/min, fentanyl 25 mcg/hr infusing. Both stopped for SBT. Bridges in place and patent with STAT lock. Bilateral soft wrist restraints in place for pt safety. Call light within reach. Bed in lowest position. Bed alarm on.

## 2023-09-13 NOTE — PROGRESS NOTES
09/12/23 2251   Patient Observation   Pulse (!) 102   Respirations 20   SpO2 96 %   Observations ETT SIZE 7.5, SECURED AT 22 LIP LINE. AMBU BAG AT HEAD OF BED. WATER GOOD. Breath Sounds   Right Upper Lobe Diminished   Right Middle Lobe Diminished   Right Lower Lobe Diminished   Left Upper Lobe Diminished   Left Lower Lobe Diminished   Vent Information   Vent Mode AC/VC   Ventilator Settings   FiO2  40 %   Vt (Set, mL) 320 mL   Resp Rate (Set) 20 bmp   PEEP/CPAP (cmH2O) 8   Vent Patient Data (Readings)   Vt (Measured) 368 mL   Peak Inspiratory Pressure (cmH2O) 22 cmH2O   Rate Measured 27 br/min   Minute Volume (L/min) 8.47 Liters   Peak Inspiratory Flow (lpm) 65 L/sec   Mean Airway Pressure (cmH2O) 12 cmH20   Plateau Pressure (cm H2O) 19 cm H2O   Driving Pressure 11   I:E Ratio 1:3.50   Flow Sensitivity 3 L/min   Vent Alarm Settings   High Pressure (cmH2O) 50 cmH2O   Low Minute Volume (lpm) 3 L/min   High Minute Volume (lpm) 20 L/min   Low Exhaled Vt (ml) 200 mL   High Exhaled Vt (ml) 800 mL   RR High (bpm) 40 br/min   Apnea (secs) 20 secs   Additional Respiratoray Assessments   Humidification Source Heated wire   Humidification Temp 36.8   Circuit Condensation Drained   Ambu Bag With Mask At Bedside Yes   Airway Clearance   Suction ET Tube   Suction Device Inline suction catheter   Sputum Method Obtained Endotracheal   Sputum Amount Small   Sputum Color/Odor Tan   Sputum Consistency Thick   Non-Surgical Airway 09/01/23 Endotracheal tube   Placement Date/Time: 09/01/23 0240   Present on Admission/Arrival: Yes  Placed By: In ED  Placement Verified By: Auscultation;Capnometry; Chest X-ray;Colorimetric ETCO2 device  Mask Ventilation: Oral airway  Insertion attempts: 1  Location: Oral  Airwa. ..    Secured At 22 cm   Measured From Nic Location (S)  Left   Secured By Commercial tube mayer   Site Assessment Dry

## 2023-09-14 ENCOUNTER — APPOINTMENT (OUTPATIENT)
Dept: GENERAL RADIOLOGY | Age: 78
DRG: 870 | End: 2023-09-14
Payer: MEDICARE

## 2023-09-14 LAB
ANION GAP SERPL CALCULATED.3IONS-SCNC: 9 MMOL/L (ref 3–16)
BASE EXCESS BLDA CALC-SCNC: 10.2 MMOL/L (ref -3–3)
BASOPHILS # BLD: 0 K/UL (ref 0–0.2)
BASOPHILS NFR BLD: 0.4 %
BUN SERPL-MCNC: 74 MG/DL (ref 7–20)
CALCIUM SERPL-MCNC: 8.5 MG/DL (ref 8.3–10.6)
CHLORIDE SERPL-SCNC: 102 MMOL/L (ref 99–110)
CO2 BLDA-SCNC: 35 MMOL/L
CO2 SERPL-SCNC: 35 MMOL/L (ref 21–32)
COHGB MFR BLDA: 0.2 % (ref 0–1.5)
CREAT SERPL-MCNC: 1.4 MG/DL (ref 0.6–1.2)
DEPRECATED RDW RBC AUTO: 14.7 % (ref 12.4–15.4)
EOSINOPHIL # BLD: 0.2 K/UL (ref 0–0.6)
EOSINOPHIL NFR BLD: 2 %
GFR SERPLBLD CREATININE-BSD FMLA CKD-EPI: 39 ML/MIN/{1.73_M2}
GLUCOSE BLD-MCNC: 121 MG/DL (ref 70–99)
GLUCOSE BLD-MCNC: 144 MG/DL (ref 70–99)
GLUCOSE BLD-MCNC: 148 MG/DL (ref 70–99)
GLUCOSE BLD-MCNC: 162 MG/DL (ref 70–99)
GLUCOSE BLD-MCNC: 163 MG/DL (ref 70–99)
GLUCOSE SERPL-MCNC: 155 MG/DL (ref 70–99)
HCO3 BLDA-SCNC: 33.7 MMOL/L (ref 21–29)
HCT VFR BLD AUTO: 30.9 % (ref 36–48)
HGB BLD-MCNC: 10.6 G/DL (ref 12–16)
HGB BLDA-MCNC: 11.7 G/DL (ref 12–16)
LYMPHOCYTES # BLD: 0.8 K/UL (ref 1–5.1)
LYMPHOCYTES NFR BLD: 7 %
MAGNESIUM SERPL-MCNC: 2.5 MG/DL (ref 1.8–2.4)
MCH RBC QN AUTO: 29.7 PG (ref 26–34)
MCHC RBC AUTO-ENTMCNC: 34.4 G/DL (ref 31–36)
MCV RBC AUTO: 86.4 FL (ref 80–100)
METHGB MFR BLDA: 0.3 %
MONOCYTES # BLD: 0.6 K/UL (ref 0–1.3)
MONOCYTES NFR BLD: 4.8 %
NEUTROPHILS # BLD: 10.4 K/UL (ref 1.7–7.7)
NEUTROPHILS NFR BLD: 85.8 %
O2 THERAPY: ABNORMAL
PCO2 BLDA: 40.8 MMHG (ref 35–45)
PERFORMED ON: ABNORMAL
PH BLDA: 7.54 [PH] (ref 7.35–7.45)
PLATELET # BLD AUTO: 156 K/UL (ref 135–450)
PMV BLD AUTO: 9.2 FL (ref 5–10.5)
PO2 BLDA: 89.9 MMHG (ref 75–108)
POTASSIUM SERPL-SCNC: 3.2 MMOL/L (ref 3.5–5.1)
PROCALCITONIN SERPL IA-MCNC: 0.31 NG/ML (ref 0–0.15)
RBC # BLD AUTO: 3.58 M/UL (ref 4–5.2)
SAO2 % BLDA: 97.6 %
SODIUM SERPL-SCNC: 146 MMOL/L (ref 136–145)
TRIGL SERPL-MCNC: 179 MG/DL (ref 0–150)
WBC # BLD AUTO: 12.2 K/UL (ref 4–11)

## 2023-09-14 PROCEDURE — 71045 X-RAY EXAM CHEST 1 VIEW: CPT

## 2023-09-14 PROCEDURE — 6370000000 HC RX 637 (ALT 250 FOR IP): Performed by: INTERNAL MEDICINE

## 2023-09-14 PROCEDURE — 99233 SBSQ HOSP IP/OBS HIGH 50: CPT | Performed by: INTERNAL MEDICINE

## 2023-09-14 PROCEDURE — 2500000003 HC RX 250 WO HCPCS: Performed by: INTERNAL MEDICINE

## 2023-09-14 PROCEDURE — 87641 MR-STAPH DNA AMP PROBE: CPT

## 2023-09-14 PROCEDURE — 94640 AIRWAY INHALATION TREATMENT: CPT

## 2023-09-14 PROCEDURE — 83735 ASSAY OF MAGNESIUM: CPT

## 2023-09-14 PROCEDURE — C9113 INJ PANTOPRAZOLE SODIUM, VIA: HCPCS | Performed by: INTERNAL MEDICINE

## 2023-09-14 PROCEDURE — 82803 BLOOD GASES ANY COMBINATION: CPT

## 2023-09-14 PROCEDURE — 6360000002 HC RX W HCPCS: Performed by: INTERNAL MEDICINE

## 2023-09-14 PROCEDURE — 84145 PROCALCITONIN (PCT): CPT

## 2023-09-14 PROCEDURE — 80048 BASIC METABOLIC PNL TOTAL CA: CPT

## 2023-09-14 PROCEDURE — 87040 BLOOD CULTURE FOR BACTERIA: CPT

## 2023-09-14 PROCEDURE — 94003 VENT MGMT INPAT SUBQ DAY: CPT

## 2023-09-14 PROCEDURE — 84478 ASSAY OF TRIGLYCERIDES: CPT

## 2023-09-14 PROCEDURE — 51702 INSERT TEMP BLADDER CATH: CPT

## 2023-09-14 PROCEDURE — 99291 CRITICAL CARE FIRST HOUR: CPT | Performed by: INTERNAL MEDICINE

## 2023-09-14 PROCEDURE — 94761 N-INVAS EAR/PLS OXIMETRY MLT: CPT

## 2023-09-14 PROCEDURE — 36415 COLL VENOUS BLD VENIPUNCTURE: CPT

## 2023-09-14 PROCEDURE — 36592 COLLECT BLOOD FROM PICC: CPT

## 2023-09-14 PROCEDURE — 2580000003 HC RX 258: Performed by: INTERNAL MEDICINE

## 2023-09-14 PROCEDURE — 2000000000 HC ICU R&B

## 2023-09-14 PROCEDURE — 85025 COMPLETE CBC W/AUTO DIFF WBC: CPT

## 2023-09-14 PROCEDURE — 6360000002 HC RX W HCPCS: Performed by: STUDENT IN AN ORGANIZED HEALTH CARE EDUCATION/TRAINING PROGRAM

## 2023-09-14 PROCEDURE — 2700000000 HC OXYGEN THERAPY PER DAY

## 2023-09-14 RX ORDER — METOPROLOL TARTRATE 50 MG/1
50 TABLET, FILM COATED ORAL EVERY 6 HOURS
Status: DISCONTINUED | OUTPATIENT
Start: 2023-09-14 | End: 2023-09-14

## 2023-09-14 RX ORDER — METOPROLOL TARTRATE 50 MG/1
50 TABLET, FILM COATED ORAL EVERY 6 HOURS
Status: DISCONTINUED | OUTPATIENT
Start: 2023-09-14 | End: 2023-09-24

## 2023-09-14 RX ORDER — LEVOFLOXACIN 5 MG/ML
750 INJECTION, SOLUTION INTRAVENOUS
Status: COMPLETED | OUTPATIENT
Start: 2023-09-14 | End: 2023-09-18

## 2023-09-14 RX ADMIN — BUSPIRONE HYDROCHLORIDE 15 MG: 7.5 TABLET ORAL at 21:15

## 2023-09-14 RX ADMIN — ACETAMINOPHEN 650 MG: 325 TABLET ORAL at 16:45

## 2023-09-14 RX ADMIN — MIDAZOLAM HYDROCHLORIDE 2 MG: 1 INJECTION, SOLUTION INTRAMUSCULAR; INTRAVENOUS at 15:28

## 2023-09-14 RX ADMIN — WHITE PETROLATUM 57.7 %-MINERAL OIL 31.9 % EYE OINTMENT: at 09:18

## 2023-09-14 RX ADMIN — POLYETHYLENE GLYCOL (3350) 17 G: 17 POWDER, FOR SOLUTION ORAL at 09:19

## 2023-09-14 RX ADMIN — ENOXAPARIN SODIUM 30 MG: 100 INJECTION SUBCUTANEOUS at 21:15

## 2023-09-14 RX ADMIN — IPRATROPIUM BROMIDE AND ALBUTEROL SULFATE 1 DOSE: 2.5; .5 SOLUTION RESPIRATORY (INHALATION) at 20:15

## 2023-09-14 RX ADMIN — BUSPIRONE HYDROCHLORIDE 15 MG: 7.5 TABLET ORAL at 13:53

## 2023-09-14 RX ADMIN — IPRATROPIUM BROMIDE AND ALBUTEROL SULFATE 1 DOSE: 2.5; .5 SOLUTION RESPIRATORY (INHALATION) at 23:49

## 2023-09-14 RX ADMIN — WHITE PETROLATUM 57.7 %-MINERAL OIL 31.9 % EYE OINTMENT: at 16:41

## 2023-09-14 RX ADMIN — METOPROLOL TARTRATE 50 MG: 50 TABLET, FILM COATED ORAL at 21:14

## 2023-09-14 RX ADMIN — ASPIRIN 81 MG: 81 TABLET, CHEWABLE ORAL at 09:19

## 2023-09-14 RX ADMIN — ENOXAPARIN SODIUM 30 MG: 100 INJECTION SUBCUTANEOUS at 09:18

## 2023-09-14 RX ADMIN — DEXAMETHASONE SODIUM PHOSPHATE 4 MG: 4 INJECTION, SOLUTION INTRAMUSCULAR; INTRAVENOUS at 09:19

## 2023-09-14 RX ADMIN — CARBOXYMETHYLCELLULOSE SODIUM 1 DROP: 10 GEL OPHTHALMIC at 21:15

## 2023-09-14 RX ADMIN — WHITE PETROLATUM 57.7 %-MINERAL OIL 31.9 % EYE OINTMENT: at 05:29

## 2023-09-14 RX ADMIN — METOPROLOL TARTRATE 50 MG: 50 TABLET, FILM COATED ORAL at 09:19

## 2023-09-14 RX ADMIN — HYDRALAZINE HYDROCHLORIDE 50 MG: 50 TABLET, FILM COATED ORAL at 05:29

## 2023-09-14 RX ADMIN — BUSPIRONE HYDROCHLORIDE 15 MG: 7.5 TABLET ORAL at 16:38

## 2023-09-14 RX ADMIN — CARBOXYMETHYLCELLULOSE SODIUM 1 DROP: 10 GEL OPHTHALMIC at 13:53

## 2023-09-14 RX ADMIN — SERTRALINE 100 MG: 100 TABLET, FILM COATED ORAL at 21:14

## 2023-09-14 RX ADMIN — SENNOSIDES 17.2 MG: 8.6 TABLET, FILM COATED ORAL at 21:14

## 2023-09-14 RX ADMIN — LEVOFLOXACIN 750 MG: 5 INJECTION, SOLUTION INTRAVENOUS at 12:19

## 2023-09-14 RX ADMIN — POTASSIUM BICARBONATE 40 MEQ: 782 TABLET, EFFERVESCENT ORAL at 05:29

## 2023-09-14 RX ADMIN — PROPOFOL 10 MCG/KG/MIN: 10 INJECTION, EMULSION INTRAVENOUS at 16:01

## 2023-09-14 RX ADMIN — IPRATROPIUM BROMIDE AND ALBUTEROL SULFATE 1 DOSE: 2.5; .5 SOLUTION RESPIRATORY (INHALATION) at 03:18

## 2023-09-14 RX ADMIN — SODIUM CHLORIDE, PRESERVATIVE FREE 10 ML: 5 INJECTION INTRAVENOUS at 21:14

## 2023-09-14 RX ADMIN — METOPROLOL TARTRATE 50 MG: 50 TABLET, FILM COATED ORAL at 16:38

## 2023-09-14 RX ADMIN — PANTOPRAZOLE SODIUM 40 MG: 40 INJECTION, POWDER, FOR SOLUTION INTRAVENOUS at 09:19

## 2023-09-14 RX ADMIN — IPRATROPIUM BROMIDE AND ALBUTEROL SULFATE 1 DOSE: 2.5; .5 SOLUTION RESPIRATORY (INHALATION) at 08:41

## 2023-09-14 RX ADMIN — BUSPIRONE HYDROCHLORIDE 15 MG: 7.5 TABLET ORAL at 09:19

## 2023-09-14 RX ADMIN — WHITE PETROLATUM 57.7 %-MINERAL OIL 31.9 % EYE OINTMENT: at 02:30

## 2023-09-14 RX ADMIN — CHLORHEXIDINE GLUCONATE 0.12% ORAL RINSE 15 ML: 1.2 LIQUID ORAL at 09:18

## 2023-09-14 RX ADMIN — POTASSIUM BICARBONATE 40 MEQ: 782 TABLET, EFFERVESCENT ORAL at 09:19

## 2023-09-14 RX ADMIN — VANCOMYCIN HYDROCHLORIDE 750 MG: 750 INJECTION, POWDER, LYOPHILIZED, FOR SOLUTION INTRAVENOUS at 11:15

## 2023-09-14 RX ADMIN — CHLORHEXIDINE GLUCONATE 0.12% ORAL RINSE 15 ML: 1.2 LIQUID ORAL at 21:16

## 2023-09-14 RX ADMIN — IPRATROPIUM BROMIDE AND ALBUTEROL SULFATE 1 DOSE: 2.5; .5 SOLUTION RESPIRATORY (INHALATION) at 15:24

## 2023-09-14 RX ADMIN — SERTRALINE 100 MG: 100 TABLET, FILM COATED ORAL at 09:20

## 2023-09-14 RX ADMIN — Medication 25 MCG/HR: at 16:02

## 2023-09-14 RX ADMIN — IPRATROPIUM BROMIDE AND ALBUTEROL SULFATE 1 DOSE: 2.5; .5 SOLUTION RESPIRATORY (INHALATION) at 11:32

## 2023-09-14 RX ADMIN — HYDRALAZINE HYDROCHLORIDE 50 MG: 50 TABLET, FILM COATED ORAL at 13:53

## 2023-09-14 RX ADMIN — DOCUSATE SODIUM 100 MG: 50 LIQUID ORAL at 09:19

## 2023-09-14 RX ADMIN — SODIUM CHLORIDE, PRESERVATIVE FREE 10 ML: 5 INJECTION INTRAVENOUS at 09:21

## 2023-09-14 RX ADMIN — ATORVASTATIN CALCIUM 80 MG: 40 TABLET, FILM COATED ORAL at 21:14

## 2023-09-14 RX ADMIN — DOCUSATE SODIUM 100 MG: 50 LIQUID ORAL at 21:14

## 2023-09-14 RX ADMIN — SENNOSIDES 17.2 MG: 8.6 TABLET, FILM COATED ORAL at 09:19

## 2023-09-14 RX ADMIN — HYDRALAZINE HYDROCHLORIDE 50 MG: 50 TABLET, FILM COATED ORAL at 21:15

## 2023-09-14 RX ADMIN — HYDRALAZINE HYDROCHLORIDE 10 MG: 20 INJECTION INTRAMUSCULAR; INTRAVENOUS at 11:24

## 2023-09-14 ASSESSMENT — PULMONARY FUNCTION TESTS
PIF_VALUE: 16
PIF_VALUE: 11
PIF_VALUE: 14
PIF_VALUE: 20
PIF_VALUE: 20
PIF_VALUE: 17
PIF_VALUE: 23
PIF_VALUE: 19
PIF_VALUE: 15
PIF_VALUE: 11
PIF_VALUE: 11
PIF_VALUE: 22
PIF_VALUE: 14
PIF_VALUE: 35
PIF_VALUE: 16
PIF_VALUE: 28
PIF_VALUE: 17
PIF_VALUE: 19
PIF_VALUE: 19
PIF_VALUE: 18
PIF_VALUE: 15
PIF_VALUE: 20
PIF_VALUE: 17
PIF_VALUE: 25
PIF_VALUE: 20
PIF_VALUE: 15

## 2023-09-14 ASSESSMENT — PAIN SCALES - GENERAL
PAINLEVEL_OUTOF10: 0
PAINLEVEL_OUTOF10: 0

## 2023-09-14 NOTE — PROGRESS NOTES
16:02 Pt appears  agitated and uncomfortable, RR in 30s sedation restarted for comfort per orders and protocol

## 2023-09-14 NOTE — PROGRESS NOTES
09/13/23 2008   Patient Observation   Pulse (!) 107   Respirations 26   SpO2 95 %   Observations ETT SIZE 7.5, SECURED AT 22 LIP LINE. AMBU BAG AT HEAD OF BED. WATER GOOD. Breath Sounds   Right Upper Lobe Diminished   Right Middle Lobe Diminished   Right Lower Lobe Diminished   Left Upper Lobe Diminished   Left Lower Lobe Diminished   Vent Information   Vent Mode AC/VC   Ventilator Settings   FiO2  40 %   Vt (Set, mL) 320 mL   Resp Rate (Set) 20 bmp   PEEP/CPAP (cmH2O) 5   Vent Patient Data (Readings)   Vt (Measured) 318 mL   Peak Inspiratory Pressure (cmH2O) 19 cmH2O   Rate Measured 26 br/min   Minute Volume (L/min) 8.38 Liters   Peak Inspiratory Flow (lpm) 65 L/sec   Mean Airway Pressure (cmH2O) 8.1 cmH20   Plateau Pressure (cm H2O) 19 cm H2O   Driving Pressure 14   I:E Ratio 1:3.20   Flow Sensitivity 3 L/min   Static Compliance (L/cm H2O) 23   Dynamic Compliance (L/cm H2O) 23 L/cm H2O   Vent Alarm Settings   High Pressure (cmH2O) 50 cmH2O   Low Minute Volume (lpm) 3 L/min   High Minute Volume (lpm) 20 L/min   Low Exhaled Vt (ml) 200 mL   High Exhaled Vt (ml) 800 mL   RR High (bpm) 40 br/min   Apnea (secs) 20 secs   Additional Respiratoray Assessments   Humidification Source Heated wire   Humidification Temp 36.8   Circuit Condensation Drained   Ambu Bag With Mask At Bedside Yes   Airway Clearance   Suction ET Tube   Suction Device Inline suction catheter   Sputum Method Obtained Endotracheal   Sputum Amount Small   Sputum Color/Odor Tan   Sputum Consistency Thick   Non-Surgical Airway 09/01/23 Endotracheal tube   Placement Date/Time: 09/01/23 0240   Present on Admission/Arrival: Yes  Placed By: In ED  Placement Verified By: Auscultation;Capnometry; Chest X-ray;Colorimetric ETCO2 device  Mask Ventilation: Oral airway  Insertion attempts: 1  Location: Oral  Airwa. ..    Secured At 22 cm   Measured From Nic Location (S)  Right   Secured By Commercial tube mayer   Site Assessment Dry

## 2023-09-14 NOTE — PROGRESS NOTES
15:30 Pt remains intubated but more alert than previous assessment , opens eyes spont. And to voice , answers y/n ?  With head nods, w/d to pain , Reassessment otherwise unchanged and as charted

## 2023-09-14 NOTE — PROGRESS NOTES
qd, and lipitor 80 qd. BP elevated and would titrate hydralazine. No need for cardiac testing at this time. Make sure lytes repleted properly  Full dose AC for PAT is not necessary after d/w EP partner unless prior CVA history. No need for [facility] at this time. Risk stratify for possible significant CAD with nuc study possibly as OP once recovered medically and likely as outpatient. Note elevated Marnie, Type II NSTEMI, atach can increase her morbidity and mortality requiring med tx    Will sign off. Call back if further issues. Please let us know when close to d/c so we can make appropriate f/u OV. Thanks.     Patient Active Problem List   Diagnosis    Gastroesophageal reflux disease with esophagitis    Essential hypertension    H/O malignant neoplasm of breast    Allergic reaction    Anxiety attack    Pes anserine bursitis    Right knee pain    Tear of right rotator cuff    Shoulder impingement syndrome    Shoulder pain, right    Peripheral neuropathic pain    Vitamin D deficiency    Environmental and seasonal allergies    Bilateral leg edema    Other osteoporosis without current pathological fracture    Anxiety    Moderate persistent asthma without complication    Chronic GERD    Diverticulitis    Respiratory failure (HCC)    Hospital-acquired pneumonia    New onset left bundle branch block (LBBB)    Sepsis (HCC)    Mild intermittent asthma with exacerbation    Sigmoid diverticulitis    NSTEMI (non-ST elevated myocardial infarction) ([address])    Acute hypoxemic respiratory failure (HCC)    Severe persistent asthma with exacerbation    Mucus plugging of bronchi    HCAP (healthcare-associated pneumonia)    Severe protein-calorie malnutrition (HCC)    Atrial tachycardia (HCC)    Elevated troponin

## 2023-09-14 NOTE — PROGRESS NOTES
09/14/23 1526   NICU Vent Information   Vt (Set, mL) 320 mL   Vt (Measured) 301 mL   Resp Rate (Set) 20 bmp   Rate Measured 28 br/min   Minute Volume (L/min) 8.68 Liters   Peak Flow 55 L/min   FiO2  40 %   Peak Inspiratory Pressure (cmH2O) 17 cmH2O   I:E Ratio 1:2.20   Sensitivity 3   PEEP/CPAP (cmH2O) 5   Mean Airway Pressure (cmH2O) 8.9 cmH20   Cough/Sputum   Sputum How Obtained Endotracheal;Suctioned   Cough Productive   Sputum Amount Moderate   Sputum Color Creamy   Tenacity Thick   Breath Sounds   Right Upper Lobe Diminished;Rhonchi   Right Middle Lobe Diminished   Right Lower Lobe Diminished   Left Upper Lobe Diminished;Rhonchi   Left Lower Lobe Diminished   Additional Respiratory Assessments   Pulse (!) 120   Respirations 30   SpO2 97 %   Position Semi-Diamond's   Humidification Source Heated wire   Humidification Temp 37   Cuff Pressure (cm H2O) 26 cm H2O   Vent Alarm Settings   High Pressure (cmH2O) 50 cmH2O   Low Minute Volume (lpm) 3 L/min   RR High (bpm) 40 br/min   Apnea (secs) 20 secs   Non-Surgical Airway 09/01/23 Endotracheal tube   Placement Date/Time: 09/01/23 0240   Present on Admission/Arrival: Yes  Placed By: In ED  Placement Verified By: Auscultation;Capnometry; Chest X-ray;Colorimetric ETCO2 device  Mask Ventilation: Oral airway  Insertion attempts: 1  Location: Oral  Airwa. ..    Secured At 22 cm   Measured From [address] By Commercial tube mayer   Site Assessment Dry   Treatment   $Treatment Type $Inhaled Therapy/Meds

## 2023-09-14 NOTE — PLAN OF CARE
Problem: Discharge Planning  Goal: Discharge to home or other facility with appropriate resources  Outcome: Progressing  Flowsheets (Taken 9/14/2023 0207)  Discharge to home or other facility with appropriate resources: Identify barriers to discharge with patient and caregiver     Problem: Pain  Goal: Verbalizes/displays adequate comfort level or baseline comfort level  Outcome: Progressing  Flowsheets (Taken 9/14/2023 0207)  Verbalizes/displays adequate comfort level or baseline comfort level:   Encourage patient to monitor pain and request assistance   Assess pain using appropriate pain scale     Problem: Safety - Adult  Goal: Free from fall injury  Outcome: Progressing  Flowsheets (Taken 9/14/2023 0207)  Free From Fall Injury:   Instruct family/caregiver on patient safety   Based on caregiver fall risk screen, instruct family/caregiver to ask for assistance with transferring infant if caregiver noted to have fall risk factors     Problem: Nutrition Deficit:  Goal: Optimize nutritional status  Outcome: Progressing  Flowsheets (Taken 9/14/2023 0207)  Nutrient intake appropriate for improving, restoring, or maintaining nutritional needs:   Assess nutritional status and recommend course of action   Monitor oral intake, labs, and treatment plans   Recommend appropriate diets, oral nutritional supplements, and vitamin/mineral supplements     Problem: Skin/Tissue Integrity  Goal: Absence of new skin breakdown  Description: 1. Monitor for areas of redness and/or skin breakdown  2. Assess vascular access sites hourly  3. Every 4-6 hours minimum:  Change oxygen saturation probe site  4. Every 4-6 hours:  If on nasal continuous positive airway pressure, respiratory therapy assess nares and determine need for appliance change or resting period.   Outcome: Progressing     Problem: Respiratory - Adult  Goal: Achieves optimal ventilation and oxygenation  Outcome: Progressing  Flowsheets (Taken 9/14/2023 0207)  Logan Morales optimal ventilation and oxygenation:   Assess for changes in respiratory status   Assess for changes in mentation and behavior   Position to facilitate oxygenation and minimize respiratory effort     Problem: Cardiovascular - Adult  Goal: Maintains optimal cardiac output and hemodynamic stability  Outcome: Progressing  Flowsheets (Taken 9/14/2023 0207)  Maintains optimal cardiac output and hemodynamic stability:   Monitor blood pressure and heart rate   Monitor urine output and notify Licensed Independent Practitioner for values outside of normal range  Goal: Absence of cardiac dysrhythmias or at baseline  Outcome: Progressing  Flowsheets (Taken 9/14/2023 0207)  Absence of cardiac dysrhythmias or at baseline:   Monitor cardiac rate and rhythm   Assess for signs of decreased cardiac output     Problem: Skin/Tissue Integrity - Adult  Goal: Skin integrity remains intact  Outcome: Progressing  Flowsheets  Taken 9/14/2023 0207  Skin Integrity Remains Intact:   Monitor for areas of redness and/or skin breakdown   Assess vascular access sites hourly   Every 4-6 hours minimum: Change oxygen saturation probe site  Taken 9/14/2023 0135  Skin Integrity Remains Intact:   Monitor for areas of redness and/or skin breakdown   Assess vascular access sites hourly   Every 4-6 hours minimum: Change oxygen saturation probe site  Goal: Incisions, wounds, or drain sites healing without S/S of infection  Outcome: Progressing  Flowsheets (Taken 9/14/2023 0207)  Incisions, Wounds, or Drain Sites Healing Without Sign and Symptoms of Infection: TWICE DAILY: Assess and document skin integrity  Goal: Oral mucous membranes remain intact  Outcome: Progressing  Flowsheets (Taken 9/14/2023 0207)  Oral Mucous Membranes Remain Intact: Assess oral mucosa and hygiene practices     Problem: Infection - Adult  Goal: Absence of infection during hospitalization  Outcome: Progressing  Flowsheets (Taken 9/14/2023 0207)  Absence of infection during

## 2023-09-14 NOTE — PROGRESS NOTES
08:46 Pt is lying in bed , pt intubated, opens eyes to voice, answers y/n ? To heads nods w/d to pain BUE, BLE. Bed in lowest position with the wheels locked. Will continue to monitor.  Critical care rounds completed @ bedside w/ Dr. Jessica Mercado , see MD notes and orders

## 2023-09-14 NOTE — PROGRESS NOTES
Flipped patient back to previous settings. 09/14/23 1133   NICU Vent Information   Vent Mode AC/VC   Vt (Set, mL) 320 mL   Vt (Measured) 339 mL   Resp Rate (Set) 20 bmp   Rate Measured 28 br/min   Minute Volume (L/min) 9.44 Liters   FiO2  40 %   Peak Inspiratory Pressure (cmH2O) 11 cmH2O   I:E Ratio 1:1.80   Sensitivity 3   PEEP/CPAP (cmH2O) 5   Mean Airway Pressure (cmH2O) 7.1 cmH20   Cough/Sputum   Sputum How Obtained Endotracheal;Suctioned   Frequency Occasional   Sputum Amount Moderate   Sputum Color Creamy   Tenacity Thick   Breath Sounds   Right Upper Lobe Rhonchi   Right Middle Lobe Diminished   Right Lower Lobe Diminished   Left Upper Lobe Rhonchi   Left Lower Lobe Diminished   Additional Respiratory Assessments   Pulse (!) 110   Respirations 24   SpO2 97 %   Vent Alarm Settings   High Pressure (cmH2O) 50 cmH2O   Low Minute Volume (lpm) 3 L/min   RR High (bpm) 40 br/min   Apnea (secs) 20 secs   Non-Surgical Airway 09/01/23 Endotracheal tube   Placement Date/Time: 09/01/23 0240   Present on Admission/Arrival: Yes  Placed By: In ED  Placement Verified By: Auscultation;Capnometry; Chest X-ray;Colorimetric ETCO2 device  Mask Ventilation: Oral airway  Insertion attempts: 1  Location: Oral  Airwa. ..    Secured At 22 cm   Measured From Lips   Treatment   $Treatment Type $Inhaled Therapy/Meds

## 2023-09-14 NOTE — PROGRESS NOTES
4 Eyes Skin Assessment     NAME:  Dylan Hudson  YOB: 1945  MEDICAL RECORD NUMBER:  [id number]    The patient is being assessed for  Shift Handoff    I agree that at least one RN has performed a thorough Head to Toe Skin Assessment on the patient. ALL assessment sites listed below have been assessed. Areas assessed by both nurses:    Head, Face, Ears, Shoulders, Back, Chest, Arms, Elbows, Hands, Sacrum. Buttock, Coccyx, Ischium, Legs. Feet and Heels, and Under Medical Devices         Does the Patient have a Wound? No noted wound(s)       Sloan Prevention initiated by RN: Yes  Wound Care Orders initiated by RN: Yes    Pressure Injury (Stage 3,4, Unstageable, DTI, NWPT, and Complex wounds) if present, place Wound referral order by RN under : No    New Ostomies, if present place, Ostomy referral order under : No     Nurse 1 eSignature: Electronically signed by Alejo Parikh RN on 9/14/23 at 2:11 AM EDT    **SHARE this note so that the co-signing nurse can place an eSignature**    Nurse 2 eSignature: Electronically signed by Juan Francisco Urban RN on 9/14/23 at 4:37 AM EDT     Patient is not able to demonstrated the ability to move from a reclining position to an upright position within the recliner. Patient is confused, demented and /or unable to follow instruction.

## 2023-09-14 NOTE — PROGRESS NOTES
4 Eyes Skin Assessment     The patient is being assess for   Shift Handoff    I agree that 2 RN's have performed a thorough Head to Toe Skin Assessment on the patient. ALL assessment sites listed below have been assessed. Areas assessed by both nurses:   [x]   Head, Face, and Ears   [x]   Shoulders, Back, and Chest, Abdomen  [x]   Arms, Elbows, and Hands   [x]   Coccyx, Sacrum, and Ischium  [x]   Legs, Feet, and Heels         **SHARE this note so that the co-signing nurse is able to place an eSignature**    Co-signer eSignature: Electronically signed by Katty Pederson RN on 9/14/23 at 6:40 PM EDT    Does the Patient have Skin Breakdown?   No          Sloan Prevention initiated:  Yes   Wound Care Orders initiated:  NA      [facility] nurse consulted for Pressure Injury (Stage 3,4, Unstageable, DTI, NWPT, Complex wounds)and New or Established Ostomies:  NA      Primary Nurse eSignature: Electronically signed by Woody Hay RN on 9/14/23 at 6:37 PM EDT

## 2023-09-14 NOTE — PROGRESS NOTES
Pt had  recent temp above 101 F , blood cultures ordered and sent,   urine cultures  ordered per MD and protocol

## 2023-09-14 NOTE — PROGRESS NOTES
09/14/23 0845   Patient Observation   Pulse (!) 105   Respirations 25   SpO2 99 %   Breath Sounds   Right Upper Lobe Diminished   Right Middle Lobe Diminished   Right Lower Lobe Rhonchi   Left Upper Lobe Rhonchi   Left Lower Lobe Diminished   Ventilator Settings   FiO2  40 %   Vt (Set, mL) 320 mL   Resp Rate (Set) 20 bmp   PEEP/CPAP (cmH2O) 5   Vent Patient Data (Readings)   Vt (Measured) 306 mL   Peak Inspiratory Pressure (cmH2O) 28 cmH2O   Rate Measured 33 br/min   Minute Volume (L/min) 10.5 Liters   Mean Airway Pressure (cmH2O) 11 cmH20   I:E Ratio 1:2.60   Vent Alarm Settings   High Pressure (cmH2O) 50 cmH2O   Low Minute Volume (lpm) 3 L/min   RR High (bpm) 40 br/min   Apnea (secs) 20 secs   Additional Respiratoray Assessments   Humidification Source Heated wire   Humidification Temp 37   Non-Surgical Airway 09/01/23 Endotracheal tube   Placement Date/Time: 09/01/23 0240   Present on Admission/Arrival: Yes  Placed By: In ED  Placement Verified By: Auscultation;Capnometry; Chest X-ray;Colorimetric ETCO2 device  Mask Ventilation: Oral airway  Insertion attempts: 1  Location: Oral  Airwa. ..    Secured At 22 cm   Measured From [address] By Commercial tube mayer   Site Assessment Dry   Ventilator Associated Pneumonia Bundle   Oral Suctioning Yes   Treatment   $Treatment Type $Inhaled Therapy/Meds

## 2023-09-14 NOTE — PROGRESS NOTES
11:54 Pt remains intubated opens eyes to voice , answers y/n ?  With head nods, w/d to pain , Reassessment unchanged and as charted

## 2023-09-14 NOTE — PROGRESS NOTES
Pt remains Intubated, ET T 7.5 LL 22. Vent setting AC/VC 20/320/40/5. Pt is off sedation since 0800 09/13/2023. Pt responding to painful stimuli and has a cough and gag reflex. ST on the monitor with PVC's. 's. N/G tube with bridle in right nares at 62 cm. Nepro infusing at 25 ml/hr with water flushes 200 ml Q 4 H. Right CVC in place. Bridges in place draining adequate amount of urine. BUE soft wrist restraints in place due to attempts of pulling at ETT and lines. No signs of injury noted and PROM performed.

## 2023-09-14 NOTE — PROGRESS NOTES
Admit: 9/1/2023    Name:  Serena Moy  Room:  [address] [place] [address]-  MRN:    [id number]    Critical Care Daily Progress Note for 9/14/2023   Admitted with acute hypoxic resp failure   Intubated on arrival , remains on vent for the last week     Interval History:     Severe hypoxia /ARDS needing proning , now on supine position  Started lasix gtt and improving UOP and peripheral edema  6.7 L neg yesterday     Off paralysis     Family at bedside today   Pt on 50 % fio2 and PEEP of 10     9/11- slowly improving. High urine output. 9/12- on the ventilator. PEEP of 10. Lasix on hold. Got a fluid bolus. Still has had good urine output. Sedated on the vent. 9/13- patient is slowly improving. 9/14-she has a low-grade fever and increased secretions. She did well on trials for 2 hours. No plans for extubation today.        Scheduled Meds:   metoprolol tartrate  50 mg Oral Q6H    levofloxacin  750 mg IntraVENous Q48H    polyethylene glycol  17 g Oral Daily    dexamethasone  4 mg IntraVENous Q24H    senna  2 tablet Oral BID    And    docusate  100 mg Oral BID    hydrALAZINE  50 mg Oral 3 times per day    enoxaparin  30 mg SubCUTAneous BID    sodium chloride  500 mL IntraVENous Once    sodium chloride flush  5-40 mL IntraVENous 2 times per day    chlorhexidine  15 mL Mouth/Throat BID    carboxymethylcellulose PF  1 drop Both Eyes Q4H    Or    artificial tears   Both Eyes Q4H    sertraline  100 mg Oral BID    pantoprazole  40 mg IntraVENous Daily    busPIRone  15 mg Oral 4x daily    ipratropium 0.5 mg-albuterol 2.5 mg  1 Dose Inhalation Q4H    atorvastatin  80 mg Oral Nightly    aspirin  81 mg Oral Daily       Continuous Infusions:   propofol Stopped (09/13/23 0806)    fentaNYL Stopped (09/13/23 0806)    sodium chloride Stopped (09/12/23 2058)       PRN Meds:  metoprolol, hydrALAZINE, albuterol, sodium chloride flush, sodium chloride, ondansetron **OR** ondansetron, polyethylene glycol, acetaminophen **OR** acetaminophen,

## 2023-09-14 NOTE — PROGRESS NOTES
09/14/23 0320   Patient Observation   Pulse 98   Respirations 26   SpO2 98 %   Observations ETT SIZE 7.5, SECURED AT 22 LIP LINE. AMBU BAG AT HEAD OF BED. WATER GOOD. Breath Sounds   Right Upper Lobe Diminished   Right Middle Lobe Diminished   Right Lower Lobe Diminished   Left Upper Lobe Diminished   Left Lower Lobe Diminished   Vent Information   Vent Mode AC/VC   Ventilator Settings   FiO2  40 %   Vt (Set, mL) 320 mL   Resp Rate (Set) 20 bmp   PEEP/CPAP (cmH2O) 5   Vent Patient Data (Readings)   Vt (Measured) 302 mL   Peak Inspiratory Pressure (cmH2O) 25 cmH2O   Rate Measured 28 br/min   Minute Volume (L/min) 8.99 Liters   Peak Inspiratory Flow (lpm) 65 L/sec   Mean Airway Pressure (cmH2O) 9.5 cmH20   Plateau Pressure (cm H2O) 17 cm H2O   Driving Pressure 12   I:E Ratio 1:2.70   Flow Sensitivity 3 L/min   Vent Alarm Settings   High Pressure (cmH2O) 50 cmH2O   Low Minute Volume (lpm) 3 L/min   High Minute Volume (lpm) 20 L/min   Low Exhaled Vt (ml) 200 mL   High Exhaled Vt (ml) 800 mL   RR High (bpm) 40 br/min   Apnea (secs) 20 secs   Additional Respiratoray Assessments   Humidification Source Heated wire   Humidification Temp 36.8   Circuit Condensation Drained   Ambu Bag With Mask At Bedside Yes   Airway Clearance   Suction ET Tube   Suction Device Inline suction catheter   Sputum Method Obtained Endotracheal   Sputum Amount Small   Sputum Color/Odor Tan   Sputum Consistency Thick   Non-Surgical Airway 09/01/23 Endotracheal tube   Placement Date/Time: 09/01/23 0240   Present on Admission/Arrival: Yes  Placed By: In ED  Placement Verified By: Auscultation;Capnometry; Chest X-ray;Colorimetric ETCO2 device  Mask Ventilation: Oral airway  Insertion attempts: 1  Location: Oral  Airwa. ..    Secured At 22 cm   Measured From Nic Location (S)  Left   Secured By Commercial tube mayer   Site Assessment Dry

## 2023-09-14 NOTE — PROGRESS NOTES
09/13/23 2337   Patient Observation   Pulse 96   Respirations 28   SpO2 94 %   Observations ETT SIZE 7.5, SECURED AT 22 LIP LINE. AMBU BAG AT HEAD OF BED. WATER GOOD. Breath Sounds   Right Upper Lobe Diminished   Right Middle Lobe Diminished   Right Lower Lobe Diminished   Left Upper Lobe Diminished   Left Lower Lobe Diminished   Vent Information   Vent Mode AC/VC   Ventilator Settings   FiO2  40 %   Vt (Set, mL) 320 mL   Resp Rate (Set) 20 bmp   PEEP/CPAP (cmH2O) 5   Vent Patient Data (Readings)   Vt (Measured) 360 mL   Peak Inspiratory Pressure (cmH2O) 21 cmH2O   Rate Measured 26 br/min   Minute Volume (L/min) 8.48 Liters   Peak Inspiratory Flow (lpm) 65 L/sec   Mean Airway Pressure (cmH2O) 8.9 cmH20   Plateau Pressure (cm H2O) 19 cm H2O   Driving Pressure 14   I:E Ratio 1:4.20   Flow Sensitivity 3 L/min   Vent Alarm Settings   High Pressure (cmH2O) 50 cmH2O   Low Minute Volume (lpm) 3 L/min   High Minute Volume (lpm) 20 L/min   Low Exhaled Vt (ml) 200 mL   High Exhaled Vt (ml) 800 mL   RR High (bpm) 40 br/min   Apnea (secs) 20 secs   Additional Respiratoray Assessments   Humidification Source Heated wire   Humidification Temp 37   Circuit Condensation Drained   Ambu Bag With Mask At Bedside Yes   Airway Clearance   Suction ET Tube   Suction Device Inline suction catheter   Sputum Method Obtained Endotracheal   Sputum Amount Small   Sputum Color/Odor Tan   Sputum Consistency Thick   Non-Surgical Airway 09/01/23 Endotracheal tube   Placement Date/Time: 09/01/23 0240   Present on Admission/Arrival: Yes  Placed By: In ED  Placement Verified By: Auscultation;Capnometry; Chest X-ray;Colorimetric ETCO2 device  Mask Ventilation: Oral airway  Insertion attempts: 1  Location: Oral  Airwa. ..    Secured At 22 cm   Measured From Nic Location (S)  Center   Secured By Commercial tube mayer   Site Assessment Dry

## 2023-09-15 ENCOUNTER — APPOINTMENT (OUTPATIENT)
Dept: GENERAL RADIOLOGY | Age: 78
DRG: 870 | End: 2023-09-15
Payer: MEDICARE

## 2023-09-15 PROBLEM — J45.41 MODERATE PERSISTENT ASTHMA WITH EXACERBATION: Status: ACTIVE | Noted: 2023-09-15

## 2023-09-15 PROBLEM — T17.998A MUCUS PLUG IN RESPIRATORY TRACT: Status: ACTIVE | Noted: 2023-09-15

## 2023-09-15 PROBLEM — I10 UNCONTROLLED HYPERTENSION: Status: ACTIVE | Noted: 2023-09-15

## 2023-09-15 LAB
ALBUMIN SERPL-MCNC: 2.4 G/DL (ref 3.4–5)
ANION GAP SERPL CALCULATED.3IONS-SCNC: 11 MMOL/L (ref 3–16)
ANION GAP SERPL CALCULATED.3IONS-SCNC: 8 MMOL/L (ref 3–16)
BACTERIA SPEC RESP CULT: ABNORMAL
BASE EXCESS BLDA CALC-SCNC: 11.2 MMOL/L (ref -3–3)
BASE EXCESS BLDA CALC-SCNC: 9.7 MMOL/L (ref -3–3)
BASOPHILS # BLD: 0 K/UL (ref 0–0.2)
BASOPHILS NFR BLD: 0.2 %
BUN SERPL-MCNC: 72 MG/DL (ref 7–20)
BUN SERPL-MCNC: 75 MG/DL (ref 7–20)
CALCIUM SERPL-MCNC: 8.4 MG/DL (ref 8.3–10.6)
CALCIUM SERPL-MCNC: 8.5 MG/DL (ref 8.3–10.6)
CHLORIDE SERPL-SCNC: 103 MMOL/L (ref 99–110)
CHLORIDE SERPL-SCNC: 104 MMOL/L (ref 99–110)
CO2 BLDA-SCNC: 34.8 MMOL/L
CO2 BLDA-SCNC: 36 MMOL/L
CO2 SERPL-SCNC: 32 MMOL/L (ref 21–32)
CO2 SERPL-SCNC: 34 MMOL/L (ref 21–32)
COHGB MFR BLDA: 0.3 % (ref 0–1.5)
COHGB MFR BLDA: 0.3 % (ref 0–1.5)
CREAT SERPL-MCNC: 1.4 MG/DL (ref 0.6–1.2)
CREAT SERPL-MCNC: 1.5 MG/DL (ref 0.6–1.2)
DEPRECATED RDW RBC AUTO: 14.6 % (ref 12.4–15.4)
EOSINOPHIL # BLD: 0.2 K/UL (ref 0–0.6)
EOSINOPHIL NFR BLD: 1.9 %
GFR SERPLBLD CREATININE-BSD FMLA CKD-EPI: 36 ML/MIN/{1.73_M2}
GFR SERPLBLD CREATININE-BSD FMLA CKD-EPI: 39 ML/MIN/{1.73_M2}
GLUCOSE BLD-MCNC: 116 MG/DL (ref 70–99)
GLUCOSE BLD-MCNC: 132 MG/DL (ref 70–99)
GLUCOSE BLD-MCNC: 145 MG/DL (ref 70–99)
GLUCOSE BLD-MCNC: 152 MG/DL (ref 70–99)
GLUCOSE BLD-MCNC: 171 MG/DL (ref 70–99)
GLUCOSE BLD-MCNC: 174 MG/DL (ref 70–99)
GLUCOSE SERPL-MCNC: 129 MG/DL (ref 70–99)
GLUCOSE SERPL-MCNC: 169 MG/DL (ref 70–99)
HCO3 BLDA-SCNC: 33.5 MMOL/L (ref 21–29)
HCO3 BLDA-SCNC: 34.8 MMOL/L (ref 21–29)
HCT VFR BLD AUTO: 28 % (ref 36–48)
HGB BLD-MCNC: 9.3 G/DL (ref 12–16)
HGB BLDA-MCNC: 10.2 G/DL (ref 12–16)
HGB BLDA-MCNC: 11.8 G/DL (ref 12–16)
LYMPHOCYTES # BLD: 0.9 K/UL (ref 1–5.1)
LYMPHOCYTES NFR BLD: 6.9 %
MAGNESIUM SERPL-MCNC: 2.4 MG/DL (ref 1.8–2.4)
MCH RBC QN AUTO: 29.6 PG (ref 26–34)
MCHC RBC AUTO-ENTMCNC: 33.2 G/DL (ref 31–36)
MCV RBC AUTO: 89.1 FL (ref 80–100)
METHGB MFR BLDA: 0.3 %
METHGB MFR BLDA: 0.3 %
MONOCYTES # BLD: 0.6 K/UL (ref 0–1.3)
MONOCYTES NFR BLD: 4.6 %
NEUTROPHILS # BLD: 10.8 K/UL (ref 1.7–7.7)
NEUTROPHILS NFR BLD: 86.4 %
O2 THERAPY: ABNORMAL
O2 THERAPY: ABNORMAL
ORGANISM: ABNORMAL
PCO2 BLDA: 41.6 MMHG (ref 35–45)
PCO2 BLDA: 42 MMHG (ref 35–45)
PERFORMED ON: ABNORMAL
PH BLDA: 7.52 [PH] (ref 7.35–7.45)
PH BLDA: 7.54 [PH] (ref 7.35–7.45)
PHOSPHATE SERPL-MCNC: 4.7 MG/DL (ref 2.5–4.9)
PLATELET # BLD AUTO: 149 K/UL (ref 135–450)
PMV BLD AUTO: 9.2 FL (ref 5–10.5)
PO2 BLDA: 75.8 MMHG (ref 75–108)
PO2 BLDA: 90.3 MMHG (ref 75–108)
POTASSIUM SERPL-SCNC: 3 MMOL/L (ref 3.5–5.1)
POTASSIUM SERPL-SCNC: 3.1 MMOL/L (ref 3.5–5.1)
RBC # BLD AUTO: 3.14 M/UL (ref 4–5.2)
SAO2 % BLDA: 96.3 %
SAO2 % BLDA: 97.7 %
SODIUM SERPL-SCNC: 145 MMOL/L (ref 136–145)
SODIUM SERPL-SCNC: 147 MMOL/L (ref 136–145)
TRIGL SERPL-MCNC: 196 MG/DL (ref 0–150)
WBC # BLD AUTO: 12.6 K/UL (ref 4–11)

## 2023-09-15 PROCEDURE — 99291 CRITICAL CARE FIRST HOUR: CPT | Performed by: INTERNAL MEDICINE

## 2023-09-15 PROCEDURE — 94761 N-INVAS EAR/PLS OXIMETRY MLT: CPT

## 2023-09-15 PROCEDURE — 94003 VENT MGMT INPAT SUBQ DAY: CPT

## 2023-09-15 PROCEDURE — 87086 URINE CULTURE/COLONY COUNT: CPT

## 2023-09-15 PROCEDURE — 6360000002 HC RX W HCPCS: Performed by: INTERNAL MEDICINE

## 2023-09-15 PROCEDURE — 6370000000 HC RX 637 (ALT 250 FOR IP): Performed by: INTERNAL MEDICINE

## 2023-09-15 PROCEDURE — 2580000003 HC RX 258: Performed by: INTERNAL MEDICINE

## 2023-09-15 PROCEDURE — 94640 AIRWAY INHALATION TREATMENT: CPT

## 2023-09-15 PROCEDURE — 71045 X-RAY EXAM CHEST 1 VIEW: CPT

## 2023-09-15 PROCEDURE — 99233 SBSQ HOSP IP/OBS HIGH 50: CPT | Performed by: INTERNAL MEDICINE

## 2023-09-15 PROCEDURE — 6360000002 HC RX W HCPCS: Performed by: STUDENT IN AN ORGANIZED HEALTH CARE EDUCATION/TRAINING PROGRAM

## 2023-09-15 PROCEDURE — 2500000003 HC RX 250 WO HCPCS: Performed by: INTERNAL MEDICINE

## 2023-09-15 PROCEDURE — 84478 ASSAY OF TRIGLYCERIDES: CPT

## 2023-09-15 PROCEDURE — 80069 RENAL FUNCTION PANEL: CPT

## 2023-09-15 PROCEDURE — 2700000000 HC OXYGEN THERAPY PER DAY

## 2023-09-15 PROCEDURE — C9113 INJ PANTOPRAZOLE SODIUM, VIA: HCPCS | Performed by: INTERNAL MEDICINE

## 2023-09-15 PROCEDURE — 82803 BLOOD GASES ANY COMBINATION: CPT

## 2023-09-15 PROCEDURE — 83735 ASSAY OF MAGNESIUM: CPT

## 2023-09-15 PROCEDURE — 85025 COMPLETE CBC W/AUTO DIFF WBC: CPT

## 2023-09-15 PROCEDURE — 2000000000 HC ICU R&B

## 2023-09-15 PROCEDURE — 51702 INSERT TEMP BLADDER CATH: CPT

## 2023-09-15 RX ORDER — HYDRALAZINE HYDROCHLORIDE 50 MG/1
100 TABLET, FILM COATED ORAL EVERY 8 HOURS SCHEDULED
Status: DISCONTINUED | OUTPATIENT
Start: 2023-09-15 | End: 2023-09-24

## 2023-09-15 RX ORDER — DEXAMETHASONE SODIUM PHOSPHATE 4 MG/ML
3 INJECTION, SOLUTION INTRA-ARTICULAR; INTRALESIONAL; INTRAMUSCULAR; INTRAVENOUS; SOFT TISSUE EVERY 24 HOURS
Status: DISCONTINUED | OUTPATIENT
Start: 2023-09-16 | End: 2023-09-19

## 2023-09-15 RX ADMIN — ENOXAPARIN SODIUM 30 MG: 100 INJECTION SUBCUTANEOUS at 08:24

## 2023-09-15 RX ADMIN — ASPIRIN 81 MG: 81 TABLET, CHEWABLE ORAL at 08:24

## 2023-09-15 RX ADMIN — CARBOXYMETHYLCELLULOSE SODIUM 1 DROP: 10 GEL OPHTHALMIC at 08:24

## 2023-09-15 RX ADMIN — BUSPIRONE HYDROCHLORIDE 15 MG: 7.5 TABLET ORAL at 20:23

## 2023-09-15 RX ADMIN — METOPROLOL TARTRATE 50 MG: 50 TABLET, FILM COATED ORAL at 04:28

## 2023-09-15 RX ADMIN — FUROSEMIDE 5 MG/HR: 10 INJECTION, SOLUTION INTRAMUSCULAR; INTRAVENOUS at 13:06

## 2023-09-15 RX ADMIN — IPRATROPIUM BROMIDE AND ALBUTEROL SULFATE 1 DOSE: 2.5; .5 SOLUTION RESPIRATORY (INHALATION) at 23:51

## 2023-09-15 RX ADMIN — BUSPIRONE HYDROCHLORIDE 15 MG: 7.5 TABLET ORAL at 17:25

## 2023-09-15 RX ADMIN — IPRATROPIUM BROMIDE AND ALBUTEROL SULFATE 1 DOSE: 2.5; .5 SOLUTION RESPIRATORY (INHALATION) at 15:51

## 2023-09-15 RX ADMIN — VANCOMYCIN HYDROCHLORIDE 750 MG: 750 INJECTION, POWDER, LYOPHILIZED, FOR SOLUTION INTRAVENOUS at 11:56

## 2023-09-15 RX ADMIN — SENNOSIDES 17.2 MG: 8.6 TABLET, FILM COATED ORAL at 20:24

## 2023-09-15 RX ADMIN — HYDRALAZINE HYDROCHLORIDE 100 MG: 50 TABLET, FILM COATED ORAL at 13:15

## 2023-09-15 RX ADMIN — CARBOXYMETHYLCELLULOSE SODIUM 1 DROP: 10 GEL OPHTHALMIC at 17:26

## 2023-09-15 RX ADMIN — SODIUM CHLORIDE, PRESERVATIVE FREE 10 ML: 5 INJECTION INTRAVENOUS at 08:25

## 2023-09-15 RX ADMIN — IPRATROPIUM BROMIDE AND ALBUTEROL SULFATE 1 DOSE: 2.5; .5 SOLUTION RESPIRATORY (INHALATION) at 08:03

## 2023-09-15 RX ADMIN — SODIUM CHLORIDE, PRESERVATIVE FREE 10 ML: 5 INJECTION INTRAVENOUS at 20:23

## 2023-09-15 RX ADMIN — PROPOFOL 10 MCG/KG/MIN: 10 INJECTION, EMULSION INTRAVENOUS at 15:13

## 2023-09-15 RX ADMIN — CARBOXYMETHYLCELLULOSE SODIUM 1 DROP: 10 GEL OPHTHALMIC at 13:06

## 2023-09-15 RX ADMIN — CARBOXYMETHYLCELLULOSE SODIUM 1 DROP: 10 GEL OPHTHALMIC at 00:26

## 2023-09-15 RX ADMIN — DOCUSATE SODIUM 100 MG: 50 LIQUID ORAL at 20:23

## 2023-09-15 RX ADMIN — HYDRALAZINE HYDROCHLORIDE 10 MG: 20 INJECTION INTRAMUSCULAR; INTRAVENOUS at 13:37

## 2023-09-15 RX ADMIN — SERTRALINE 100 MG: 100 TABLET, FILM COATED ORAL at 20:23

## 2023-09-15 RX ADMIN — MIDAZOLAM HYDROCHLORIDE 2 MG: 1 INJECTION, SOLUTION INTRAMUSCULAR; INTRAVENOUS at 04:28

## 2023-09-15 RX ADMIN — SENNOSIDES 17.2 MG: 8.6 TABLET, FILM COATED ORAL at 08:24

## 2023-09-15 RX ADMIN — BUSPIRONE HYDROCHLORIDE 15 MG: 7.5 TABLET ORAL at 13:15

## 2023-09-15 RX ADMIN — METOPROLOL TARTRATE 50 MG: 50 TABLET, FILM COATED ORAL at 08:24

## 2023-09-15 RX ADMIN — BUSPIRONE HYDROCHLORIDE 15 MG: 7.5 TABLET ORAL at 08:24

## 2023-09-15 RX ADMIN — HYDRALAZINE HYDROCHLORIDE 100 MG: 50 TABLET, FILM COATED ORAL at 20:23

## 2023-09-15 RX ADMIN — CARBOXYMETHYLCELLULOSE SODIUM 1 DROP: 10 GEL OPHTHALMIC at 04:28

## 2023-09-15 RX ADMIN — PANTOPRAZOLE SODIUM 40 MG: 40 INJECTION, POWDER, FOR SOLUTION INTRAVENOUS at 08:24

## 2023-09-15 RX ADMIN — METOPROLOL TARTRATE 50 MG: 50 TABLET, FILM COATED ORAL at 15:12

## 2023-09-15 RX ADMIN — DOCUSATE SODIUM 100 MG: 50 LIQUID ORAL at 08:24

## 2023-09-15 RX ADMIN — CHLORHEXIDINE GLUCONATE 0.12% ORAL RINSE 15 ML: 1.2 LIQUID ORAL at 08:24

## 2023-09-15 RX ADMIN — METOPROLOL TARTRATE 50 MG: 50 TABLET, FILM COATED ORAL at 20:23

## 2023-09-15 RX ADMIN — SERTRALINE 100 MG: 100 TABLET, FILM COATED ORAL at 08:24

## 2023-09-15 RX ADMIN — PROPOFOL 10 MCG/KG/MIN: 10 INJECTION, EMULSION INTRAVENOUS at 01:10

## 2023-09-15 RX ADMIN — CARBOXYMETHYLCELLULOSE SODIUM 1 DROP: 10 GEL OPHTHALMIC at 20:22

## 2023-09-15 RX ADMIN — CHLORHEXIDINE GLUCONATE 0.12% ORAL RINSE 15 ML: 1.2 LIQUID ORAL at 20:26

## 2023-09-15 RX ADMIN — POTASSIUM BICARBONATE 40 MEQ: 782 TABLET, EFFERVESCENT ORAL at 11:50

## 2023-09-15 RX ADMIN — IPRATROPIUM BROMIDE AND ALBUTEROL SULFATE 1 DOSE: 2.5; .5 SOLUTION RESPIRATORY (INHALATION) at 11:38

## 2023-09-15 RX ADMIN — IPRATROPIUM BROMIDE AND ALBUTEROL SULFATE 1 DOSE: 2.5; .5 SOLUTION RESPIRATORY (INHALATION) at 02:36

## 2023-09-15 RX ADMIN — ATORVASTATIN CALCIUM 80 MG: 40 TABLET, FILM COATED ORAL at 20:23

## 2023-09-15 RX ADMIN — ENOXAPARIN SODIUM 30 MG: 100 INJECTION SUBCUTANEOUS at 21:00

## 2023-09-15 RX ADMIN — DEXAMETHASONE SODIUM PHOSPHATE 4 MG: 4 INJECTION, SOLUTION INTRAMUSCULAR; INTRAVENOUS at 08:24

## 2023-09-15 RX ADMIN — IPRATROPIUM BROMIDE AND ALBUTEROL SULFATE 1 DOSE: 2.5; .5 SOLUTION RESPIRATORY (INHALATION) at 20:05

## 2023-09-15 RX ADMIN — HYDRALAZINE HYDROCHLORIDE 50 MG: 50 TABLET, FILM COATED ORAL at 04:28

## 2023-09-15 RX ADMIN — METOPROLOL TARTRATE 5 MG: 5 INJECTION, SOLUTION INTRAVENOUS at 10:35

## 2023-09-15 ASSESSMENT — PULMONARY FUNCTION TESTS
PIF_VALUE: 16
PIF_VALUE: 21
PIF_VALUE: 16
PIF_VALUE: 15
PIF_VALUE: 15
PIF_VALUE: 16
PIF_VALUE: 18
PIF_VALUE: 15
PIF_VALUE: 15
PIF_VALUE: 16
PIF_VALUE: 15
PIF_VALUE: 15
PIF_VALUE: 16
PIF_VALUE: 15
PIF_VALUE: 16
PIF_VALUE: 17
PIF_VALUE: 16
PIF_VALUE: 19
PIF_VALUE: 15
PIF_VALUE: 16
PIF_VALUE: 17
PIF_VALUE: 16
PIF_VALUE: 15
PIF_VALUE: 16
PIF_VALUE: 15
PIF_VALUE: 16
PIF_VALUE: 15
PIF_VALUE: 16
PIF_VALUE: 15
PIF_VALUE: 16
PIF_VALUE: 16
PIF_VALUE: 18
PIF_VALUE: 16
PIF_VALUE: 15
PIF_VALUE: 16
PIF_VALUE: 15
PIF_VALUE: 16
PIF_VALUE: 15
PIF_VALUE: 13
PIF_VALUE: 16
PIF_VALUE: 15
PIF_VALUE: 16
PIF_VALUE: 17
PIF_VALUE: 16
PIF_VALUE: 15
PIF_VALUE: 16
PIF_VALUE: 17
PIF_VALUE: 16
PIF_VALUE: 17
PIF_VALUE: 16
PIF_VALUE: 15
PIF_VALUE: 16
PIF_VALUE: 16
PIF_VALUE: 17
PIF_VALUE: 16
PIF_VALUE: 11
PIF_VALUE: 18
PIF_VALUE: 16
PIF_VALUE: 15
PIF_VALUE: 16
PIF_VALUE: 15
PIF_VALUE: 16
PIF_VALUE: 18
PIF_VALUE: 16
PIF_VALUE: 15
PIF_VALUE: 16
PIF_VALUE: 15
PIF_VALUE: 16

## 2023-09-15 NOTE — PROGRESS NOTES
Comprehensive Nutrition Assessment    Type and Reason for Visit:  Reassess    Nutrition Recommendations/Plan:   Continue TF order - ADULT TUBE FEEDING; Orogastric; Renal formula - Nepro with a goal rate of 30 ml/hr x 20 hours + 200 ml water flushes every 3 hours + one prosource TF 20 protein packet TWICE daily via feeding tube. Monitor TF rate, intake, and tolerance + water flushes + administration of one prosource TF 20 protein packet TWICE daily via feeding tube. Monitor respiratory/vent status and plan of care. Monitor nutrition-related labs, bowel function, and weight trends. Malnutrition Assessment:  Malnutrition Status:   At risk for malnutrition (09/15/23 1457)    Context:  Acute Illness     Findings of the 6 clinical characteristics of malnutrition:  Energy Intake:  50% or less of estimated energy requirements for 5 or more days  Weight Loss:  Unable to assess     Body Fat Loss:  No significant body fat loss     Muscle Mass Loss:  No significant muscle mass loss    Fluid Accumulation:  No significant fluid accumulation     Strength:  Not Performed    Nutrition Assessment:    patient continues to improve from a nutritional standpoint AEB she is tolerating TF at current rate of 30 ml/hr x 20 hours without issue, however, she remains at risk for further compromise d/t overall weakness, altered nutrition-related labs, and need to remain intubated at this time; will continue Nepro with a goal rate of 30 ml/hr x 20 hours + 200 ml water flushes every 3 hours + one prosource TF 20 protein packet TWICE daily    Nutrition Related Findings:    patient remains intubated; propofol is off and other sedation is off at this time; + BM on 9/14/23; TF is infusing at current goal rate without issue; she had a low-grade temp overnight; she passed SBT this am but is very weak overall Wound Type: None       Current Nutrition Intake & Therapies:    Average Meal Intake: NPO  Average Supplements Intake: NPO  Current Tube Feeding (TF) Orders:  Feeding Route: Orogastric  Formula: Renal Formula  Schedule: Continuous  Feeding Regimen: Nepro with a goal rate of 30 ml/hr x 20 hours  Additives/Modulars: Protein (one prosource TF 20 protein packet TWICE daily)  Water Flushes: 200 ml every 3 hours  Current TF & Flush Orders Provides: TF is infusing at current goal rate without issue  Goal TF & Flush Orders Provides: Nepro with a goal rate of 30 ml/hr x 20 hours = 600 ml TV, 1080 kcals, 49 g protein, and 436 ml free water + 200 ml water flushes every 3 hours + 40 g protein and 160 kcals from one prosource TF 20 protein packet TWICE daily (89 g protein and 1240 kcals total)    Anthropometric Measures:  Height: 5' 2\" (157.5 cm)  Ideal Body Weight (IBW): 110 lbs (50 kg)    Admission Body Weight: 268 lb (121.6 kg) (obtained on 9/1/23; actual weight)  Current Body Weight: 266 lb 5.1 oz (120.8 kg) (obtained on 9/15/23; actual weight), 242.1 % IBW.  Weight Source: Bed Scale  Current BMI (kg/m2): 48.7  Usual Body Weight: 268 lb (121.6 kg) (obtained on 9/1/23; actual weight)  % Weight Change (Calculated): -0.6  Weight Adjustment For: No Adjustment                 BMI Categories: Obese Class 3 (BMI 40.0 or greater) (48.71)    Estimated Daily Nutrient Needs:  Energy Requirements Based On: Kcal/kg  Weight Used for Energy Requirements: Current  Energy (kcal/day): 920 - 1265 kcals based on 8-11 kcals/kg/CBW  Weight Used for Protein Requirements: Ideal  Protein (g/day): 125 - 130 g protein based on 2.5-2.6 g/kg/IBW  Method Used for Fluid Requirements: 1 ml/kcal  Fluid (ml/day): 920 - 1265 ml    Nutrition Diagnosis:   Inadequate oral intake related to inadequate protein-energy intake, inadequate enteral nutrition infusion, impaired respiratory function as evidenced by NPO or clear liquid status due to medical condition, intubation, nutrition support - enteral nutrition, lab values    Nutrition Interventions:   Food and/or Nutrient Delivery: Continue NPO,

## 2023-09-15 NOTE — PROGRESS NOTES
AM assessment complete, see flowsheet. Medications given per MAR. RASS 0/+1. Sedation turned off for SBT. BP elevated, PRN metoprolol to be given. Spouse at the bedside and updated. IV lines and monitors assessed. No other needs noted. Awaiting MD rounds.

## 2023-09-15 NOTE — PROGRESS NOTES
09/15/23 1139   Patient Observation   Pulse 78   Respirations 21   SpO2 97 %   Breath Sounds   Right Upper Lobe Clear   Right Middle Lobe Clear;Diminished   Right Lower Lobe Diminished   Left Upper Lobe Clear   Left Lower Lobe Diminished   Ventilator Settings   FiO2  40 %   PEEP/CPAP (cmH2O) 5   Vent Patient Data (Readings)   Vt (Measured) 331 mL   Peak Inspiratory Pressure (cmH2O) 16 cmH2O   Rate Measured 21 br/min   Minute Volume (L/min) 7.08 Liters   Mean Airway Pressure (cmH2O) 8 cmH20   I:E Ratio 1:2.80   Vent Alarm Settings   High Pressure (cmH2O) 50 cmH2O   Low Minute Volume (lpm) 3 L/min   RR High (bpm) 40 br/min   Additional Respiratoray Assessments   Humidification Source Heated wire   Humidification Temp 37   Non-Surgical Airway 09/01/23 Endotracheal tube   Placement Date/Time: 09/01/23 0240   Present on Admission/Arrival: Yes  Placed By: In ED  Placement Verified By: Auscultation;Capnometry; Chest X-ray;Colorimetric ETCO2 device  Mask Ventilation: Oral airway  Insertion attempts: 1  Location: Oral  Airwa. ..    Secured At 22 cm   Measured From Lips   Secured Location Left   Secured By Commercial tube mayer   Site Assessment Dry   Ventilator Associated Pneumonia Bundle   Oral Care Performed Mouth suctioned   Treatment   $Treatment Type $Inhaled Therapy/Meds

## 2023-09-15 NOTE — PROGRESS NOTES
Shift assessment done,patient is intubated with an ETT size 7.5 secured at 22 lipline,on vent settings ACVC,FIO2-40%,TV-320,RR-20,PEEP-5,coughs at times but tolerates the vent. She opens eyes to pain and is able to nod to some questions. She has diminished breath sounds bilaterally,with rhonchi on the upper lobes. She is sedated on fentanyl 25mcg/hr and propofol 10mcg/kg/min. She has bilateral wrist restraints on for safety. She has an NG at the right nare secured with a bridle,feeding on continuous tube feeds renal formula at 30mls/hr and water flush 200mls Q 3hrs. She has a villaseñor in for urine output,urine noted to be yellow in colour. Bed is at its lowest level,will continue to monitor patient.

## 2023-09-15 NOTE — PROGRESS NOTES
Vanc level missed this am, [facility] 8/98/[id number] PM  .

## 2023-09-15 NOTE — PROGRESS NOTES
09/15/23 0236   Patient Observation   Pulse 95   Respirations 22   SpO2 98 %   Breath Sounds   Breath Sounds Bilateral Diminished   Vent Information   Vent Mode AC/VC   Ventilator Settings   FiO2  40 %   Vt (Set, mL) 320 mL   Resp Rate (Set) 20 bmp   PEEP/CPAP (cmH2O) 5   Vent Patient Data (Readings)   Vt (Measured) 344 mL   Peak Inspiratory Pressure (cmH2O) 16 cmH2O   Rate Measured 22 br/min   Minute Volume (L/min) 7.19 Liters   Mean Airway Pressure (cmH2O) 7.7 cmH20   Plateau Pressure (cm H2O) 18 cm H2O   Driving Pressure 13   I:E Ratio 1:3.70   Vent Alarm Settings   High Pressure (cmH2O) 50 cmH2O   Low Minute Volume (lpm) 3 L/min   RR High (bpm) 40 br/min   Additional Respiratoray Assessments   Circuit Condensation Drained   Airway Clearance   Suction ET Tube   Suction Device Inline suction catheter   Sputum Method Obtained Endotracheal   Sputum Amount Small   Sputum Color/Odor Tan   Sputum Consistency Thick   Non-Surgical Airway 09/01/23 Endotracheal tube   Placement Date/Time: 09/01/23 0240   Present on Admission/Arrival: Yes  Placed By: In ED  Placement Verified By: Auscultation;Capnometry; Chest X-ray;Colorimetric ETCO2 device  Mask Ventilation: Oral airway  Insertion attempts: 1  Location: Oral  Airwa. ..    Secured Location Right   Treatment   $Treatment Type $Inhaled Therapy/Meds

## 2023-09-15 NOTE — PROGRESS NOTES
Reassessment done,patient is intubated on vent settings ACVC,FIO2-40%,TV-320,RR-20,PEEP-5. She is sedated on propofol 10mcg/kg/min and fentanyl 25mcg/hr. She has bilateral soft wrist restraints on for safety. She has an NG feeding on continuous tube feeds at 30mls/hr and water flush 200mls Q 3hrs. She has a villaseñor in for urine output. Bed is at its lowest level,will continue to monitor patient.

## 2023-09-15 NOTE — PROGRESS NOTES
Reassessment done,patient is on mechanical ventilation ACVC,FIO2-40%,TV-320,RR-20,PEEP-5. She is sedated on propofol 10mcg/kg/min and fentanyl 25mcg/hr. She has bilateral soft wrist restraints on for safety. She has a villaseñor in for urine output. She is opening eyes to speech,able to nod and is localizing pain. Bed is at its lowest level,call light within reach,will continue to monitor patient.

## 2023-09-15 NOTE — PROGRESS NOTES
09/15/23 1552   Patient Observation   Pulse 86   Respirations 21   SpO2 97 %   Breath Sounds   Right Upper Lobe Clear   Right Middle Lobe Clear;Diminished   Right Lower Lobe Diminished   Left Upper Lobe Clear   Left Lower Lobe Diminished   Ventilator Settings   FiO2  40 %   PEEP/CPAP (cmH2O) 5   Vent Patient Data (Readings)   Vt (Measured) 376 mL   Peak Inspiratory Pressure (cmH2O) 16 cmH2O   Rate Measured 21 br/min   Minute Volume (L/min) 7.58 Liters   Mean Airway Pressure (cmH2O) 8 cmH20   I:E Ratio 1:2.50   Vent Alarm Settings   High Pressure (cmH2O) 50 cmH2O   Low Minute Volume (lpm) 3 L/min   RR High (bpm) 40 br/min   Additional Respiratoray Assessments   Humidification Source Heated wire   Humidification Temp 37   Non-Surgical Airway 09/01/23 Endotracheal tube   Placement Date/Time: 09/01/23 0240   Present on Admission/Arrival: Yes  Placed By: In ED  Placement Verified By: Auscultation;Capnometry; Chest X-ray;Colorimetric ETCO2 device  Mask Ventilation: Oral airway  Insertion attempts: 1  Location: Oral  Airwa. ..    Secured At 22 cm   Measured From [address] By Commercial tube mayer   Site Assessment Dry   Ventilator Associated Pneumonia Bundle   Oral Care Completed Yes   Oral Care Performed Mouth suctioned   Treatment   $Treatment Type $Inhaled Therapy/Meds

## 2023-09-15 NOTE — PROGRESS NOTES
09/14/23 2017   Patient Observation   Pulse (!) 107   Respirations 29   SpO2 96 %   Observations 7.5 ETT / 22 @ Lip   Breath Sounds   Breath Sounds Bilateral Diminished   Vent Information   Vent Mode AC/VC   Ventilator Settings   FiO2  40 %   Vt (Set, mL) 320 mL   Resp Rate (Set) 20 bmp   PEEP/CPAP (cmH2O) 5   Vent Patient Data (Readings)   Vt (Measured) 314 mL   Peak Inspiratory Pressure (cmH2O) 35 cmH2O   Rate Measured 39 br/min   Minute Volume (L/min) 6.58 Liters   Mean Airway Pressure (cmH2O) 13 cmH20   Plateau Pressure (cm H2O) 18 cm H2O   Driving Pressure 13   I:E Ratio 1.00:1   Static Compliance (L/cm H2O) 24   Dynamic Compliance (L/cm H2O) 10.47 L/cm H2O   Vent Alarm Settings   High Pressure (cmH2O) 50 cmH2O   Low Minute Volume (lpm) 3 L/min   RR High (bpm) 40 br/min   Additional Respiratoray Assessments   Humidification Source Heated wire   Humidification Temp 37   Circuit Condensation Drained   Ambu Bag With Mask At Bedside Yes   Airway Clearance   Suction ET Tube;Oral   Subglottic Suction Done Yes   Suction Device Inline suction catheter;Juniorkauer   Sputum Method Obtained Endotracheal   Sputum Amount Moderate   Sputum Color/Odor Tan   Sputum Consistency Thick   Non-Surgical Airway 09/01/23 Endotracheal tube   Placement Date/Time: 09/01/23 0240   Present on Admission/Arrival: Yes  Placed By: In ED  Placement Verified By: Auscultation;Capnometry; Chest X-ray;Colorimetric ETCO2 device  Mask Ventilation: Oral airway  Insertion attempts: 1  Location: Oral  Airwa. ..    Secured At 22 cm   Measured From Lips   Secured Location Right   Secured By Commercial tube mayer   Site Assessment Dry   Treatment   $Treatment Type $Inhaled Therapy/Meds

## 2023-09-15 NOTE — PROGRESS NOTES
Care rounds completed with Dr. Benito Chavez and multidisciplinary team. Reviewed labs, meds, VS, assessment, & plan of care for today. See dictated note and new orders for details.      -replace K  -increase dose of Hydralazine

## 2023-09-15 NOTE — PROGRESS NOTES
4 Eyes Skin Assessment     NAME:  Scott Sierra  YOB: 1945  MEDICAL RECORD NUMBER:  [id number]    The patient is being assessed for  Shift Handoff    I agree that at least one RN has performed a thorough Head to Toe Skin Assessment on the patient. ALL assessment sites listed below have been assessed. Areas assessed by both nurses:    Head, Face, Ears, Shoulders, Back, Chest, Arms, Elbows, Hands, Sacrum. Buttock, Coccyx, Ischium, Legs. Feet and Heels, and Under Medical Devices         Does the Patient have a Wound?  No noted wound(s)       Sloan Prevention initiated by RN: Yes  Wound Care Orders initiated by RN: No    Pressure Injury (Stage 3,4, Unstageable, DTI, NWPT, and Complex wounds) if present, place Wound referral order by RN under : Yes    New Ostomies, if present place, Ostomy referral order under : No     Nurse 1 eSignature: Electronically signed by Jen Armstrong RN on 9/15/23 at 5:45 AM EDT    **SHARE this note so that the co-signing nurse can place an eSignature**    Nurse 2 eSignature: Electronically signed by Nathan Ayala RN on 9/15/23 at 6:42 AM EDT

## 2023-09-15 NOTE — CARE COORDINATION
INTERDISCIPLINARY PLAN OF CARE CONFERENCE    Date/Time: 9/15/2023  Completed by: Laila Delacruz RN, Case Management      Patient Name:  Gretchen Uriarte  YOB: 1945  Admitting Diagnosis: Respiratory failure ([address]) [J96.90]  HCAP (healthcare-associated pneumonia) [J18.9]  Acute respiratory failure with hypoxia and hypercapnia ([address]) [J96.01, J96.02]  New onset left bundle branch block (LBBB) [I44.7]  Sepsis, due to unspecified organism, unspecified whether acute organ dysfunction present ([address]) [A41.9]     Admit Date/Time:  9/1/2023  2:26 AM    Chart reviewed. Interdisciplinary team contacted or reviewed plan related to patient progress and discharge plans. Disciplines included Case Management, Nursing, and Dietitian. Current Status:9/1/23  PT/OT recommendation for discharge plan of care: NA    Expected D/C Disposition:  Pt slowly improving. Passed SBT on 9/15. Being followed by [facility]. [facility] will start precert closer to DC for [facility].

## 2023-09-15 NOTE — PLAN OF CARE
saturation probe site  Goal: Incisions, wounds, or drain sites healing without S/S of infection  Outcome: Progressing  Goal: Oral mucous membranes remain intact  Outcome: Progressing     Problem: Infection - Adult  Goal: Absence of infection at discharge  Outcome: Progressing  Goal: Absence of infection during hospitalization  Outcome: Progressing  Goal: Absence of fever/infection during anticipated neutropenic period  Outcome: Progressing     Problem: Metabolic/Fluid and Electrolytes - Adult  Goal: Electrolytes maintained within normal limits  Outcome: Progressing  Goal: Hemodynamic stability and optimal renal function maintained  Outcome: Progressing  Goal: Glucose maintained within prescribed range  Outcome: Progressing     Problem: ABCDS Injury Assessment  Goal: Absence of physical injury  Outcome: Progressing     Problem: Safety - Medical Restraint  Goal: Remains free of injury from restraints (Restraint for Interference with Medical Device)  Description: INTERVENTIONS:  1. Determine that other, less restrictive measures have been tried or would not be effective before applying the restraint  2. Evaluate the patient's condition at the time of restraint application  3. Inform patient/family regarding the reason for restraint  4.  Q2H: Monitor safety, psychosocial status, comfort, nutrition and hydration  9/14/2023 2233 by Asha Marroquin RN  Outcome: Progressing  Flowsheets  Taken 9/14/2023 2200 by Asha Marroquin RN  Remains free of injury from restraints (restraint for interference with medical device): Every 2 hours: Monitor safety, psychosocial status, comfort, nutrition and hydration  Taken 9/14/2023 2000 by Asha Marroquin RN  Remains free of injury from restraints (restraint for interference with medical device): Every 2 hours: Monitor safety, psychosocial status, comfort, nutrition and hydration  Taken 9/14/2023 1800 by Montana Hubbard RN  Remains free of injury from restraints (restraint for interference with medical device): Every 2 hours: Monitor safety, psychosocial status, comfort, nutrition and hydration  Taken 9/14/2023 1600 by Mckenzie Hernández RN  Remains free of injury from restraints (restraint for interference with medical device): Every 2 hours: Monitor safety, psychosocial status, comfort, nutrition and hydration  Taken 9/14/2023 1400 by Mckenzie Hernández RN  Remains free of injury from restraints (restraint for interference with medical device): Every 2 hours: Monitor safety, psychosocial status, comfort, nutrition and hydration  Taken 9/14/2023 1200 by Mckenzie Hernández RN  Remains free of injury from restraints (restraint for interference with medical device): Every 2 hours: Monitor safety, psychosocial status, comfort, nutrition and hydration  Taken 9/14/2023 1000 by Mckenzie Hernández RN  Remains free of injury from restraints (restraint for interference with medical device): Every 2 hours: Monitor safety, psychosocial status, comfort, nutrition and hydration  9/14/2023 0956 by Mckenzie Hernández RN  Outcome: Progressing  Flowsheets  Taken 9/14/2023 0800 by Mckenzie Hernández RN  Remains free of injury from restraints (restraint for interference with medical device): Every 2 hours: Monitor safety, psychosocial status, comfort, nutrition and hydration  Taken 9/14/2023 0600 by Lincoln Dickinson RN  Remains free of injury from restraints (restraint for interference with medical device): Every 2 hours: Monitor safety, psychosocial status, comfort, nutrition and hydration  Taken 9/14/2023 0400 by Lnicoln Dickinson RN  Remains free of injury from restraints (restraint for interference with medical device): Every 2 hours: Monitor safety, psychosocial status, comfort, nutrition and hydration

## 2023-09-15 NOTE — PROGRESS NOTES
PS increased to 10/5       09/15/23 1113   NICU Vent Information   Vt (Measured) 357 mL   Rate Measured 22 br/min   Minute Volume (L/min) 7.18 Liters   Pressure Support 10 cmH20   FiO2  40 %   Peak Inspiratory Pressure (cmH2O) 15 cmH2O   I:E Ratio 1:2.30   Sensitivity 3   PEEP/CPAP (cmH2O) 5   Mean Airway Pressure (cmH2O) 8.1 cmH20   Additional Respiratory Assessments   Pulse 79   Respirations 22   SpO2 97 %   Vent Alarm Settings   High Pressure (cmH2O) 50 cmH2O   Low Minute Volume (lpm) 3 L/min   RR High (bpm) 40 br/min

## 2023-09-15 NOTE — PROGRESS NOTES
Admit: 9/1/2023    Name:  Tonya Castellano  Room:  3013/3013-01  MRN:    [id number]    Critical Care Daily Progress Note for 9/15/2023   Admitted with acute hypoxic resp failure   Intubated on arrival , remains on vent for the last week     Interval History:     Severe hypoxia /ARDS needing proning , now on supine position  Started lasix gtt and improving UOP and peripheral edema  6.7 L neg yesterday     Off paralysis     Family at bedside today   Pt on 50 % fio2 and PEEP of 10     9/11- slowly improving. High urine output. 9/12- on the ventilator. PEEP of 10. Lasix on hold. Got a fluid bolus. Still has had good urine output. Sedated on the vent. 9/13- patient is slowly improving. 9/14-she has a low-grade fever and increased secretions. She did well on trials for 2 hours. No plans for extubation today. 9/15- off sedation. Passed SBT. Had a fever. Having moderate secretions.        Scheduled Meds:   hydrALAZINE  100 mg Oral 3 times per day    [START ON 9/16/2023] dexamethasone  3 mg IntraVENous Q24H    metoprolol tartrate  50 mg Oral Q6H    levofloxacin  750 mg IntraVENous Q48H    vancomycin  750 mg IntraVENous Q24H    polyethylene glycol  17 g Oral Daily    senna  2 tablet Oral BID    And    docusate  100 mg Oral BID    enoxaparin  30 mg SubCUTAneous BID    sodium chloride  500 mL IntraVENous Once    sodium chloride flush  5-40 mL IntraVENous 2 times per day    chlorhexidine  15 mL Mouth/Throat BID    carboxymethylcellulose PF  1 drop Both Eyes Q4H    Or    artificial tears   Both Eyes Q4H    sertraline  100 mg Oral BID    pantoprazole  40 mg IntraVENous Daily    busPIRone  15 mg Oral 4x daily    ipratropium 0.5 mg-albuterol 2.5 mg  1 Dose Inhalation Q4H    atorvastatin  80 mg Oral Nightly    aspirin  81 mg Oral Daily       Continuous Infusions:   furosemide (LASIX) 100 mg in sodium chloride 0.9 % 100 mL infusion 5 mg/hr (09/15/23 1306)    propofol 10 mcg/kg/min (09/15/23 3934)    fentaNYL 25 mcg/hr suggesting a component of fluid   overload-pulmonary edema. Abdomen pelvis      There is subtle injection of the fat surrounding the descending duodenum and   in the region of the pancreatic head. .  This appearance could be due to   duodenitis, pancreatitis, or early fluid overload. Area of inflammatory change surrounding the sigmoid colon seen previously is   now difficult to perceive         IR PICC WO SQ PORT/PUMP > 5 YEARS   Final Result   Right-sided PICC is seen. Tip projects in region of distal SVC      Bilateral pleuroparenchymal disease, right greater than left, minimally   increased compared to prior study earlier in the day         XR CHEST PORTABLE   Final Result   Right-sided PICC is seen. Tip projects in region of distal SVC      Bilateral pleuroparenchymal disease, right greater than left, minimally   increased compared to prior study earlier in the day         XR CHEST PORTABLE   Final Result   No significant interval change in bilateral airspace disease. Stable support devices. XR CHEST PORTABLE   Final Result   1. ETT tip 2.5 cm above the ivis. 2. Bilateral perihilar airspace opacities with indistinct pulmonary   vasculature suggestive of pulmonary edema. Multi lobar pneumonia is in the   differential if patient has clinical symptoms of infection. XR CHEST PORTABLE    (Results Pending)     ECHO  Technically difficult examination secondary to habitus/intubated. Normal left ventricular size with mild left ventricular hypertrophy. Left ventricular systolic function is normal with ejection fraction   estimated at 55%. Apical windows limited for wall motion assessment. Within the limits of the   study, no clear regional wall motion abnormalities noted. Elevated left ventricular filling pressure. The right ventricle is seen in off-axis views, grossly appears normal in   size with normal function. Mild mitral regurgitation.    Moderate tricuspid

## 2023-09-15 NOTE — PROGRESS NOTES
09/14/23 2351   Patient Observation   Pulse 93   Respirations 27   SpO2 97 %   Breath Sounds   Breath Sounds Bilateral Diminished   Vent Information   Vent Mode AC/VC   Ventilator Settings   FiO2  40 %   Vt (Set, mL) 320 mL   Resp Rate (Set) 20 bmp   PEEP/CPAP (cmH2O) 5   Vent Patient Data (Readings)   Vt (Measured) 327 mL   Peak Inspiratory Pressure (cmH2O) 17 cmH2O   Rate Measured 28 br/min   Minute Volume (L/min) 8.94 Liters   Mean Airway Pressure (cmH2O) 8.9 cmH20   Plateau Pressure (cm H2O) 18 cm H2O   Driving Pressure 13   I:E Ratio 1:2.20   Vent Alarm Settings   High Pressure (cmH2O) 50 cmH2O   Low Minute Volume (lpm) 3 L/min   RR High (bpm) 40 br/min   Additional Respiratoray Assessments   Circuit Condensation Drained   Airway Clearance   Suction ET Tube   Suction Device Inline suction catheter   Sputum Method Obtained Endotracheal   Sputum Amount Small   Sputum Color/Odor Tan   Sputum Consistency Thick   Non-Surgical Airway 09/01/23 Endotracheal tube   Placement Date/Time: 09/01/23 0240   Present on Admission/Arrival: Yes  Placed By: In ED  Placement Verified By: Auscultation;Capnometry; Chest X-ray;Colorimetric ETCO2 device  Mask Ventilation: Oral airway  Insertion attempts: 1  Location: Oral  Airwa. ..    [address]   Treatment   $Treatment Type $Inhaled Therapy/Meds

## 2023-09-15 NOTE — PROGRESS NOTES
4 Eyes Skin Assessment     NAME:  Manpreet Harding  YOB: 1945  MEDICAL RECORD NUMBER:  [id number]    The patient is being assessed for  Other Shift assessment     I agree that at least one RN has performed a thorough Head to Toe Skin Assessment on the patient. ALL assessment sites listed below have been assessed. Areas assessed by both nurses:    Head, Face, Ears, Shoulders, Back, Chest, Arms, Elbows, Hands, Sacrum. Buttock, Coccyx, Ischium, Legs. Feet and Heels, and Under Medical Devices         Does the Patient have a Wound?  No noted wound(s)       Sloan Prevention initiated by RN: Yes  Wound Care Orders initiated by RN: No    Pressure Injury (Stage 3,4, Unstageable, DTI, NWPT, and Complex wounds) if present, place Wound referral order by RN under : No    New Ostomies, if present place, Ostomy referral order under : No     Nurse 1 eSignature: Electronically signed by Rey Steve RN on 9/15/23 at 11:28 AM EDT    **SHARE this note so that the co-signing nurse can place an eSignature**    Nurse 2 eSignature: Electronically signed by Art Suazo RN on 9/15/23 at 6:45 PM EDT

## 2023-09-15 NOTE — PROGRESS NOTES
bronchodilator orders with one order with BID Frequency and one order with Frequency of every 4 hours PRN wheezing or increased work of breathing using Per Protocol order mode. 7-10 - enter or revise RT Bronchodilator order(s) to two equivalent RT bronchodilator orders with one order with TID Frequency and one order with Frequency of every 4 hours PRN wheezing or increased work of breathing using Per Protocol order mode. 11-13 - enter or revise RT Bronchodilator order(s) to one equivalent RT bronchodilator order with QID Frequency and an Albuterol order with Frequency of every 4 hours PRN wheezing or increased work of breathing using Per Protocol order mode. Greater than 13 - enter or revise RT Bronchodilator order(s) to one equivalent RT bronchodilator order with every 4 hours Frequency and an Albuterol order with Frequency of every 2 hours PRN wheezing or increased work of breathing using Per Protocol order mode. RT to enter RT Home Evaluation for COPD & MDI Assessment order using Per Protocol order mode.     Electronically signed by Shaw Hunt RCP on 9/15/2023 at 4:52 PM

## 2023-09-16 ENCOUNTER — APPOINTMENT (OUTPATIENT)
Dept: GENERAL RADIOLOGY | Age: 78
DRG: 870 | End: 2023-09-16
Payer: MEDICARE

## 2023-09-16 LAB
ANION GAP SERPL CALCULATED.3IONS-SCNC: 10 MMOL/L (ref 3–16)
BACTERIA UR CULT: NORMAL
BASE EXCESS BLDA CALC-SCNC: 9 MMOL/L (ref -3–3)
BASOPHILS # BLD: 0.1 K/UL (ref 0–0.2)
BASOPHILS NFR BLD: 0.5 %
BUN SERPL-MCNC: 71 MG/DL (ref 7–20)
CALCIUM SERPL-MCNC: 8.5 MG/DL (ref 8.3–10.6)
CHLORIDE SERPL-SCNC: 102 MMOL/L (ref 99–110)
CO2 BLDA-SCNC: 34 MMOL/L
CO2 SERPL-SCNC: 34 MMOL/L (ref 21–32)
COHGB MFR BLDA: 0.3 % (ref 0–1.5)
CREAT SERPL-MCNC: 1.4 MG/DL (ref 0.6–1.2)
DEPRECATED RDW RBC AUTO: 14.5 % (ref 12.4–15.4)
EOSINOPHIL # BLD: 0.2 K/UL (ref 0–0.6)
EOSINOPHIL NFR BLD: 1.3 %
GFR SERPLBLD CREATININE-BSD FMLA CKD-EPI: 39 ML/MIN/{1.73_M2}
GLUCOSE BLD-MCNC: 116 MG/DL (ref 70–99)
GLUCOSE BLD-MCNC: 141 MG/DL (ref 70–99)
GLUCOSE BLD-MCNC: 147 MG/DL (ref 70–99)
GLUCOSE BLD-MCNC: 171 MG/DL (ref 70–99)
GLUCOSE BLD-MCNC: 181 MG/DL (ref 70–99)
GLUCOSE SERPL-MCNC: 156 MG/DL (ref 70–99)
HCO3 BLDA-SCNC: 32.7 MMOL/L (ref 21–29)
HCT VFR BLD AUTO: 28.2 % (ref 36–48)
HGB BLD-MCNC: 9.5 G/DL (ref 12–16)
HGB BLDA-MCNC: 10.9 G/DL (ref 12–16)
LYMPHOCYTES # BLD: 0.6 K/UL (ref 1–5.1)
LYMPHOCYTES NFR BLD: 4.6 %
MCH RBC QN AUTO: 29.8 PG (ref 26–34)
MCHC RBC AUTO-ENTMCNC: 33.5 G/DL (ref 31–36)
MCV RBC AUTO: 88.9 FL (ref 80–100)
METHGB MFR BLDA: 0 %
MONOCYTES # BLD: 0.5 K/UL (ref 0–1.3)
MONOCYTES NFR BLD: 3.9 %
MRSA DNA SPEC QL NAA+PROBE: ABNORMAL
NEUTROPHILS # BLD: 12.4 K/UL (ref 1.7–7.7)
NEUTROPHILS NFR BLD: 89.7 %
O2 THERAPY: ABNORMAL
ORGANISM: ABNORMAL
PCO2 BLDA: 41.4 MMHG (ref 35–45)
PERFORMED ON: ABNORMAL
PH BLDA: 7.52 [PH] (ref 7.35–7.45)
PLATELET # BLD AUTO: 153 K/UL (ref 135–450)
PMV BLD AUTO: 9.4 FL (ref 5–10.5)
PO2 BLDA: 120.3 MMHG (ref 75–108)
POTASSIUM SERPL-SCNC: 2.8 MMOL/L (ref 3.5–5.1)
POTASSIUM SERPL-SCNC: 3.5 MMOL/L (ref 3.5–5.1)
POTASSIUM SERPL-SCNC: 3.9 MMOL/L (ref 3.5–5.1)
RBC # BLD AUTO: 3.18 M/UL (ref 4–5.2)
SAO2 % BLDA: 98.7 %
SODIUM SERPL-SCNC: 146 MMOL/L (ref 136–145)
TRIGL SERPL-MCNC: 235 MG/DL (ref 0–150)
VANCOMYCIN TROUGH SERPL-MCNC: 11.7 UG/ML (ref 10–20)
WBC # BLD AUTO: 13.9 K/UL (ref 4–11)

## 2023-09-16 PROCEDURE — C9113 INJ PANTOPRAZOLE SODIUM, VIA: HCPCS | Performed by: INTERNAL MEDICINE

## 2023-09-16 PROCEDURE — 2700000000 HC OXYGEN THERAPY PER DAY

## 2023-09-16 PROCEDURE — 6360000002 HC RX W HCPCS: Performed by: INTERNAL MEDICINE

## 2023-09-16 PROCEDURE — 94640 AIRWAY INHALATION TREATMENT: CPT

## 2023-09-16 PROCEDURE — 84478 ASSAY OF TRIGLYCERIDES: CPT

## 2023-09-16 PROCEDURE — 84132 ASSAY OF SERUM POTASSIUM: CPT

## 2023-09-16 PROCEDURE — 6370000000 HC RX 637 (ALT 250 FOR IP): Performed by: SURGERY

## 2023-09-16 PROCEDURE — 2500000003 HC RX 250 WO HCPCS: Performed by: INTERNAL MEDICINE

## 2023-09-16 PROCEDURE — 6370000000 HC RX 637 (ALT 250 FOR IP): Performed by: INTERNAL MEDICINE

## 2023-09-16 PROCEDURE — 99233 SBSQ HOSP IP/OBS HIGH 50: CPT | Performed by: INTERNAL MEDICINE

## 2023-09-16 PROCEDURE — 2580000003 HC RX 258: Performed by: INTERNAL MEDICINE

## 2023-09-16 PROCEDURE — 80202 ASSAY OF VANCOMYCIN: CPT

## 2023-09-16 PROCEDURE — 71045 X-RAY EXAM CHEST 1 VIEW: CPT

## 2023-09-16 PROCEDURE — 36592 COLLECT BLOOD FROM PICC: CPT

## 2023-09-16 PROCEDURE — 80048 BASIC METABOLIC PNL TOTAL CA: CPT

## 2023-09-16 PROCEDURE — 51702 INSERT TEMP BLADDER CATH: CPT

## 2023-09-16 PROCEDURE — 85025 COMPLETE CBC W/AUTO DIFF WBC: CPT

## 2023-09-16 PROCEDURE — 2000000000 HC ICU R&B

## 2023-09-16 PROCEDURE — 94761 N-INVAS EAR/PLS OXIMETRY MLT: CPT

## 2023-09-16 PROCEDURE — 82803 BLOOD GASES ANY COMBINATION: CPT

## 2023-09-16 PROCEDURE — 99291 CRITICAL CARE FIRST HOUR: CPT | Performed by: INTERNAL MEDICINE

## 2023-09-16 RX ADMIN — SODIUM CHLORIDE, PRESERVATIVE FREE 10 ML: 5 INJECTION INTRAVENOUS at 07:42

## 2023-09-16 RX ADMIN — DEXAMETHASONE SODIUM PHOSPHATE 3 MG: 4 INJECTION, SOLUTION INTRAMUSCULAR; INTRAVENOUS at 07:41

## 2023-09-16 RX ADMIN — POTASSIUM BICARBONATE 50 MEQ: 977.5 TABLET, EFFERVESCENT ORAL at 06:42

## 2023-09-16 RX ADMIN — ASPIRIN 81 MG: 81 TABLET, CHEWABLE ORAL at 07:41

## 2023-09-16 RX ADMIN — Medication 25 MCG/HR: at 02:23

## 2023-09-16 RX ADMIN — CARBOXYMETHYLCELLULOSE SODIUM 1 DROP: 10 GEL OPHTHALMIC at 09:51

## 2023-09-16 RX ADMIN — METOPROLOL TARTRATE 50 MG: 50 TABLET, FILM COATED ORAL at 20:58

## 2023-09-16 RX ADMIN — IPRATROPIUM BROMIDE AND ALBUTEROL SULFATE 1 DOSE: 2.5; .5 SOLUTION RESPIRATORY (INHALATION) at 03:19

## 2023-09-16 RX ADMIN — PANTOPRAZOLE SODIUM 40 MG: 40 INJECTION, POWDER, FOR SOLUTION INTRAVENOUS at 07:41

## 2023-09-16 RX ADMIN — SODIUM CHLORIDE, PRESERVATIVE FREE 10 ML: 5 INJECTION INTRAVENOUS at 21:00

## 2023-09-16 RX ADMIN — ENOXAPARIN SODIUM 30 MG: 100 INJECTION SUBCUTANEOUS at 07:41

## 2023-09-16 RX ADMIN — CARBOXYMETHYLCELLULOSE SODIUM 1 DROP: 10 GEL OPHTHALMIC at 14:23

## 2023-09-16 RX ADMIN — IPRATROPIUM BROMIDE AND ALBUTEROL SULFATE 1 DOSE: 2.5; .5 SOLUTION RESPIRATORY (INHALATION) at 15:54

## 2023-09-16 RX ADMIN — HYDRALAZINE HYDROCHLORIDE 100 MG: 50 TABLET, FILM COATED ORAL at 04:14

## 2023-09-16 RX ADMIN — FUROSEMIDE 5 MG/HR: 10 INJECTION, SOLUTION INTRAMUSCULAR; INTRAVENOUS at 04:32

## 2023-09-16 RX ADMIN — FUROSEMIDE 5 MG/HR: 10 INJECTION, SOLUTION INTRAMUSCULAR; INTRAVENOUS at 11:38

## 2023-09-16 RX ADMIN — CARBOXYMETHYLCELLULOSE SODIUM 1 DROP: 10 GEL OPHTHALMIC at 04:20

## 2023-09-16 RX ADMIN — BUSPIRONE HYDROCHLORIDE 15 MG: 7.5 TABLET ORAL at 20:57

## 2023-09-16 RX ADMIN — IPRATROPIUM BROMIDE AND ALBUTEROL SULFATE 1 DOSE: 2.5; .5 SOLUTION RESPIRATORY (INHALATION) at 08:13

## 2023-09-16 RX ADMIN — BUSPIRONE HYDROCHLORIDE 15 MG: 7.5 TABLET ORAL at 13:14

## 2023-09-16 RX ADMIN — METOPROLOL TARTRATE 50 MG: 50 TABLET, FILM COATED ORAL at 04:14

## 2023-09-16 RX ADMIN — SERTRALINE 100 MG: 100 TABLET, FILM COATED ORAL at 20:57

## 2023-09-16 RX ADMIN — POTASSIUM BICARBONATE 50 MEQ: 977.5 TABLET, EFFERVESCENT ORAL at 09:51

## 2023-09-16 RX ADMIN — IPRATROPIUM BROMIDE AND ALBUTEROL SULFATE 1 DOSE: 2.5; .5 SOLUTION RESPIRATORY (INHALATION) at 11:44

## 2023-09-16 RX ADMIN — HYDRALAZINE HYDROCHLORIDE 100 MG: 50 TABLET, FILM COATED ORAL at 20:57

## 2023-09-16 RX ADMIN — HYDRALAZINE HYDROCHLORIDE 100 MG: 50 TABLET, FILM COATED ORAL at 14:23

## 2023-09-16 RX ADMIN — LEVOFLOXACIN 750 MG: 5 INJECTION, SOLUTION INTRAVENOUS at 13:15

## 2023-09-16 RX ADMIN — PROPOFOL 10 MCG/KG/MIN: 10 INJECTION, EMULSION INTRAVENOUS at 00:57

## 2023-09-16 RX ADMIN — ENOXAPARIN SODIUM 30 MG: 100 INJECTION SUBCUTANEOUS at 20:58

## 2023-09-16 RX ADMIN — CARBOXYMETHYLCELLULOSE SODIUM 1 DROP: 10 GEL OPHTHALMIC at 00:20

## 2023-09-16 RX ADMIN — CHLORHEXIDINE GLUCONATE 0.12% ORAL RINSE 15 ML: 1.2 LIQUID ORAL at 07:41

## 2023-09-16 RX ADMIN — BUSPIRONE HYDROCHLORIDE 15 MG: 7.5 TABLET ORAL at 07:41

## 2023-09-16 RX ADMIN — SERTRALINE 100 MG: 100 TABLET, FILM COATED ORAL at 07:41

## 2023-09-16 RX ADMIN — METOPROLOL TARTRATE 50 MG: 50 TABLET, FILM COATED ORAL at 16:24

## 2023-09-16 RX ADMIN — ATORVASTATIN CALCIUM 80 MG: 40 TABLET, FILM COATED ORAL at 20:57

## 2023-09-16 RX ADMIN — VANCOMYCIN HYDROCHLORIDE 750 MG: 750 INJECTION, POWDER, LYOPHILIZED, FOR SOLUTION INTRAVENOUS at 11:35

## 2023-09-16 RX ADMIN — BUSPIRONE HYDROCHLORIDE 15 MG: 7.5 TABLET ORAL at 16:24

## 2023-09-16 RX ADMIN — METOPROLOL TARTRATE 50 MG: 50 TABLET, FILM COATED ORAL at 07:41

## 2023-09-16 RX ADMIN — ACETAMINOPHEN 650 MG: 325 TABLET ORAL at 21:34

## 2023-09-16 RX ADMIN — CHLORHEXIDINE GLUCONATE 0.12% ORAL RINSE 15 ML: 1.2 LIQUID ORAL at 20:58

## 2023-09-16 RX ADMIN — CARBOXYMETHYLCELLULOSE SODIUM 1 DROP: 10 GEL OPHTHALMIC at 20:58

## 2023-09-16 RX ADMIN — PROPOFOL 15 MCG/KG/MIN: 10 INJECTION, EMULSION INTRAVENOUS at 11:39

## 2023-09-16 RX ADMIN — PROPOFOL 15 MCG/KG/MIN: 10 INJECTION, EMULSION INTRAVENOUS at 18:54

## 2023-09-16 RX ADMIN — IPRATROPIUM BROMIDE AND ALBUTEROL SULFATE 1 DOSE: 2.5; .5 SOLUTION RESPIRATORY (INHALATION) at 23:42

## 2023-09-16 RX ADMIN — IPRATROPIUM BROMIDE AND ALBUTEROL SULFATE 1 DOSE: 2.5; .5 SOLUTION RESPIRATORY (INHALATION) at 19:58

## 2023-09-16 RX ADMIN — POTASSIUM BICARBONATE 50 MEQ: 977.5 TABLET, EFFERVESCENT ORAL at 07:44

## 2023-09-16 ASSESSMENT — PULMONARY FUNCTION TESTS
PIF_VALUE: 22
PIF_VALUE: 21
PIF_VALUE: 18
PIF_VALUE: 21
PIF_VALUE: 18
PIF_VALUE: 21

## 2023-09-16 NOTE — PROGRESS NOTES
AM assessment completed. AM labs reviewed- K+ replacements ordered.    Pt intubated/sedated #7.5 22 cm at the lip  AC 20  fio2 40% peep +5   NG to R nare secured at 62 cm TF infusing at goal- residuals minimal  R IJ CVC WNL arterial line previously d/c   Propofol gtt infusing at 10 mcg, fentanyl gtt at 25 mcg   Pt awake, following commands- pt [place]      High risk vesicant drug infusing:  __________    Multiple incompatible medications infusing:  _________    CVP Monitoring:  _________    Extremely difficult IV access challenge:  ___x_____    Continued need for central line access:  _______x___    Addressed with physician:  __x______    RIGHT PATIENT, RIGHT TIME, RIGHT LINE

## 2023-09-16 NOTE — PROGRESS NOTES
Perfect serve sent to the nocturnist in regards to the potassium-2. 8. Potassium replacement ordered and the nocturnist held the lasix drip.

## 2023-09-16 NOTE — PROGRESS NOTES
Admit: 9/1/2023    Name:  Ronan Frazier  Room:  3013/3013-01  MRN:    [id number]    Critical Care Daily Progress Note for 9/16/2023   Admitted with acute hypoxic resp failure   Intubated on arrival , remains on vent for the last week     Interval History:     Severe hypoxia /ARDS needing proning , now on supine position  Started lasix gtt and improving UOP and peripheral edema  6.7 L neg yesterday     Off paralysis     Family at bedside today   Pt on 50 % fio2 and PEEP of 10     9/11- slowly improving. High urine output. 9/12- on the ventilator. PEEP of 10. Lasix on hold. Got a fluid bolus. Still has had good urine output. Sedated on the vent. 9/13- patient is slowly improving. 9/14-she has a low-grade fever and increased secretions. She did well on trials for 2 hours. No plans for extubation today. 9/15- off sedation. Passed SBT. Had a fever. Having moderate secretions. 9/16- no fever. Awake and alert. Appears comfortable.  SBT this am.       Scheduled Meds:   potassium bicarbonate  50 mEq Oral Q1H    hydrALAZINE  100 mg Oral 3 times per day    dexamethasone  3 mg IntraVENous Q24H    metoprolol tartrate  50 mg Oral Q6H    levofloxacin  750 mg IntraVENous Q48H    vancomycin  750 mg IntraVENous Q24H    polyethylene glycol  17 g Oral Daily    senna  2 tablet Oral BID    And    docusate  100 mg Oral BID    enoxaparin  30 mg SubCUTAneous BID    sodium chloride  500 mL IntraVENous Once    sodium chloride flush  5-40 mL IntraVENous 2 times per day    chlorhexidine  15 mL Mouth/Throat BID    carboxymethylcellulose PF  1 drop Both Eyes Q4H    Or    artificial tears   Both Eyes Q4H    sertraline  100 mg Oral BID    pantoprazole  40 mg IntraVENous Daily    busPIRone  15 mg Oral 4x daily    ipratropium 0.5 mg-albuterol 2.5 mg  1 Dose Inhalation Q4H    atorvastatin  80 mg Oral Nightly    aspirin  81 mg Oral Daily       Continuous Infusions:   [Held by provider] furosemide (LASIX) 100 mg in sodium chloride 0.9 Hyperkalemia  Resolved. Previously received Lokelma and also Lasix. Abnormal EKG -NSTEMI type II. Left BBB with no previous comparison since 2017 at which time it was normal . On ASA and statin per cardiology.   - ECHO with normal EF and no RWMA       Diet: ON TF   GI/DVT PX: /lovenox  CODE STATUS: full code         Patience Bernal MD 9/16/2023 8:04 AM

## 2023-09-16 NOTE — PROGRESS NOTES
Reassessment done,patient is intubated on vent settings ACVC,FIO2-40%,TV-320,RR-20,PEEP-5. She is sedated on propofol 10mcg/kg/min and fentanyl 25mcg/hr. She is on lasix drip at 5mg/hr  Al ICU lines and connections checked. She has bilateral soft wrist restraints on for safety. She has an NG feeding on continuous tube feeds at 30mls/hr and water flush 200mls Q 3hrs. She has a villaseñor in for urine output. Bed is at its lowest level,restraints on will continue to monitor patient.

## 2023-09-16 NOTE — PROGRESS NOTES
4 Eyes Skin Assessment     NAME:  Vandana Choi  YOB: 1945  MEDICAL RECORD NUMBER:  [id number]    The patient is being assessed for  Shift Handoff    I agree that at least one RN has performed a thorough Head to Toe Skin Assessment on the patient. ALL assessment sites listed below have been assessed. Areas assessed by both nurses:    Head, Face, Ears, Shoulders, Back, Chest, Arms, Elbows, Hands, Sacrum. Buttock, Coccyx, Ischium, Legs. Feet and Heels, and Under Medical Devices         Does the Patient have a Wound?  No noted wound(s)       Sloan Prevention initiated by RN: Yes  Wound Care Orders initiated by RN: No    Pressure Injury (Stage 3,4, Unstageable, DTI, NWPT, and Complex wounds) if present, place Wound referral order by RN under : No    New Ostomies, if present place, Ostomy referral order under : No     Nurse 1 eSignature: Electronically signed by Mallory Hedrick RN on 9/16/23 at 2:13 AM EDT    **SHARE this note so that the co-signing nurse can place an eSignature**    Nurse 2 eSignature: Electronically signed by Maryann Rutherford RN on 9/16/23 at 5:30 AM EDT

## 2023-09-16 NOTE — PROGRESS NOTES
Reassessment done,patient is intubated on mechanical ventilation ACVC,FI02-40%,TV-320,RR-20,PEEP-5,coughs but tolerates the vent. She is sedated on propofol 10mcg/kg/min and fentanyl 25mcg/hr. She has an NG feeding on continuous tube feeds at 30mls/hr and water flush 200mls Q 3hrs. All ICU lines and connections checked. She has a villaseñor in for urine output. Bed is at its lowest level,restraints on,will continue to monitor patient.

## 2023-09-16 NOTE — PROGRESS NOTES
Repeat k+ 3.9  Assessment unchanged. VSS. Pt remains off fentanyl on SBT. Family updated. Pt q2h turning schedule.

## 2023-09-16 NOTE — PROGRESS NOTES
09/16/23 0321   Patient Observation   Pulse 89   Respirations 21   SpO2 100 %   Breath Sounds   Breath Sounds Bilateral Diminished   Vent Information   Vent Mode AC/VC   Ventilator Settings   FiO2  40 %   Vt (Set, mL) 320 mL   Resp Rate (Set) 20 bmp   PEEP/CPAP (cmH2O) 5   Vent Patient Data (Readings)   Vt (Measured) 310 mL   Peak Inspiratory Pressure (cmH2O) 18 cmH2O   Rate Measured 26 br/min   Minute Volume (L/min) 8.34 Liters   Mean Airway Pressure (cmH2O) 8.5 cmH20   Plateau Pressure (cm H2O) 18 cm H2O   Driving Pressure 13   I:E Ratio 1:3.90   Vent Alarm Settings   High Pressure (cmH2O) 50 cmH2O   Low Minute Volume (lpm) 3 L/min   RR High (bpm) 40 br/min   Airway Clearance   Suction ET Tube   Suction Device Inline suction catheter   Sputum Method Obtained Endotracheal   Sputum Amount Small   Sputum Color/Odor Tan   Sputum Consistency Thick   Non-Surgical Airway 09/01/23 Endotracheal tube   Placement Date/Time: 09/01/23 0240   Present on Admission/Arrival: Yes  Placed By: In ED  Placement Verified By: Auscultation;Capnometry; Chest X-ray;Colorimetric ETCO2 device  Mask Ventilation: Oral airway  Insertion attempts: 1  Location: Oral  Airwa. ..    Secured Location Right   Treatment   $Treatment Type $Inhaled Therapy/Meds

## 2023-09-16 NOTE — PROGRESS NOTES
92 cc fentanyl wasted w/ Tobi Favre RN. Witnessed waste of 92mL Fentanyl via drug buster with GEREMIAS Dsouza RN.    Electronically signed by Ryan Guthrie RN on 9/17/2023 at 6:14 AM

## 2023-09-16 NOTE — PROGRESS NOTES
Shift assessment done,patient is intubated with an ETT size 7.5 secured at the 22 lipline,on vent settings ACVC,FIO2-40%,TV-320,RR-20,PEEP-5. She is sedated on propofol 10mcg/kg/min and fentanyl 25 mcg/hr. She is on lasix drip 5mg/hr. She has bilateral wrist soft restraints on for safety. She has an NG secured with a bridle at the 62cm lipline,feeding on continuous tube feeds renal formula at 30mls/hr and water flush 200mls Q 3hrs. She has diminished breath sounds bilaterally. She has a villaseñor in for urine output. She has oedema of upper and lower extremities. Bed is at its lowest level,restraints on,will continue to monitor patient.

## 2023-09-16 NOTE — PLAN OF CARE
Problem: Discharge Planning  Goal: Discharge to home or other facility with appropriate resources  Outcome: Progressing     Problem: Pain  Goal: Verbalizes/displays adequate comfort level or baseline comfort level  Outcome: Progressing     Problem: Safety - Adult  Goal: Free from fall injury  Outcome: Progressing     Problem: Nutrition Deficit:  Goal: Optimize nutritional status  Outcome: Progressing  Flowsheets (Taken 9/15/2023 1451 by Mari Calderón, RD, LD)  Nutrient intake appropriate for improving, restoring, or maintaining nutritional needs:   Assess nutritional status and recommend course of action   Recommend, monitor, and adjust tube feedings and TPN/PPN based on assessed needs     Problem: Skin/Tissue Integrity  Goal: Absence of new skin breakdown  Description: 1. Monitor for areas of redness and/or skin breakdown  2. Assess vascular access sites hourly  3. Every 4-6 hours minimum:  Change oxygen saturation probe site  4. Every 4-6 hours:  If on nasal continuous positive airway pressure, respiratory therapy assess nares and determine need for appliance change or resting period.   Outcome: Progressing     Problem: Respiratory - Adult  Goal: Achieves optimal ventilation and oxygenation  Outcome: Progressing     Problem: Cardiovascular - Adult  Goal: Maintains optimal cardiac output and hemodynamic stability  Outcome: Progressing  Goal: Absence of cardiac dysrhythmias or at baseline  Outcome: Progressing     Problem: Skin/Tissue Integrity - Adult  Goal: Skin integrity remains intact  Outcome: Progressing  Flowsheets (Taken 9/15/2023 2213)  Skin Integrity Remains Intact:   Monitor for areas of redness and/or skin breakdown   Assess vascular access sites hourly   Every 4-6 hours minimum: Change oxygen saturation probe site  Goal: Incisions, wounds, or drain sites healing without S/S of infection  Outcome: Progressing  Goal: Oral mucous membranes remain intact  Outcome: Progressing     Problem: Infection - Adult  Goal: Absence of infection at discharge  Outcome: Progressing  Goal: Absence of infection during hospitalization  Outcome: Progressing  Goal: Absence of fever/infection during anticipated neutropenic period  Outcome: Progressing     Problem: Metabolic/Fluid and Electrolytes - Adult  Goal: Electrolytes maintained within normal limits  Outcome: Progressing  Goal: Hemodynamic stability and optimal renal function maintained  Outcome: Progressing  Goal: Glucose maintained within prescribed range  Outcome: Progressing     Problem: ABCDS Injury Assessment  Goal: Absence of physical injury  Outcome: Progressing     Problem: Safety - Medical Restraint  Goal: Remains free of injury from restraints (Restraint for Interference with Medical Device)  Description: INTERVENTIONS:  1. Determine that other, less restrictive measures have been tried or would not be effective before applying the restraint  2. Evaluate the patient's condition at the time of restraint application  3. Inform patient/family regarding the reason for restraint  4.  Q2H: Monitor safety, psychosocial status, comfort, nutrition and hydration  Outcome: Progressing  Flowsheets  Taken 9/15/2023 2200  Remains free of injury from restraints (restraint for interference with medical device): Every 2 hours: Monitor safety, psychosocial status, comfort, nutrition and hydration  Taken 9/15/2023 2000  Remains free of injury from restraints (restraint for interference with medical device): Every 2 hours: Monitor safety, psychosocial status, comfort, nutrition and hydration

## 2023-09-16 NOTE — PROGRESS NOTES
09/15/23 2009   Patient Observation   Pulse 81   Respirations 20   SpO2 100 %   Observations 7.5 ETT / 22 @ Lip   Breath Sounds   Breath Sounds Bilateral Diminished   Vent Information   Equipment Changed Humidification   Vent Mode AC/VC   Ventilator Settings   FiO2  40 %   Vt (Set, mL) 320 mL   Resp Rate (Set) 20 bmp   PEEP/CPAP (cmH2O) 5   Vent Patient Data (Readings)   Vt (Measured) 343 mL   Peak Inspiratory Pressure (cmH2O) 17 cmH2O   Rate Measured 26 br/min   Minute Volume (L/min) 8.05 Liters   Mean Airway Pressure (cmH2O) 8.5 cmH20   Plateau Pressure (cm H2O) 18 cm H2O   Driving Pressure 13   I:E Ratio 1:3.50   Static Compliance (L/cm H2O) 26   Dynamic Compliance (L/cm H2O) 28.75 L/cm H2O   Vent Alarm Settings   High Pressure (cmH2O) 50 cmH2O   Low Minute Volume (lpm) 3 L/min   RR High (bpm) 40 br/min   Additional Respiratoray Assessments   Humidification Source Heated wire   Humidification Temp 37   Circuit Condensation Drained   Ambu Bag With Mask At Bedside Yes   Airway Clearance   Suction ET Tube;Oral   Subglottic Suction Done Yes   Suction Device Inline suction catheter;Juniorkauer   Sputum Method Obtained Endotracheal   Sputum Amount Small   Sputum Color/Odor Tan   Sputum Consistency Thick   Non-Surgical Airway 09/01/23 Endotracheal tube   Placement Date/Time: 09/01/23 0240   Present on Admission/Arrival: Yes  Placed By: In ED  Placement Verified By: Auscultation;Capnometry; Chest X-ray;Colorimetric ETCO2 device  Mask Ventilation: Oral airway  Insertion attempts: 1  Location: Oral  Airwa. ..    Secured At 22 cm   Measured From Lips   Secured Location Left   Secured By Commercial tube mayer   Site Assessment Dry   Treatment   $Treatment Type $Inhaled Therapy/Meds

## 2023-09-16 NOTE — PROGRESS NOTES
09/15/23 2352   Patient Observation   Pulse 84   Respirations 21   SpO2 98 %   Breath Sounds   Breath Sounds Bilateral Diminished   Vent Information   Vent Mode AC/VC   Ventilator Settings   FiO2  40 %   Vt (Set, mL) 320 mL   Resp Rate (Set) 20 bmp   PEEP/CPAP (cmH2O) 5   Vent Patient Data (Readings)   Vt (Measured) 309 mL   Peak Inspiratory Pressure (cmH2O) 18 cmH2O   Rate Measured 22 br/min   Minute Volume (L/min) 7.5 Liters   Mean Airway Pressure (cmH2O) 8.2 cmH20   Plateau Pressure (cm H2O) 18 cm H2O   Driving Pressure 13   I:E Ratio 1:3.60   Vent Alarm Settings   High Pressure (cmH2O) 50 cmH2O   Low Minute Volume (lpm) 3 L/min   RR High (bpm) 40 br/min   Additional Respiratoray Assessments   Circuit Condensation Drained   Airway Clearance   Suction ET Tube   Suction Device Inline suction catheter   Sputum Method Obtained Endotracheal   Sputum Amount Scant   Sputum Color/Odor Tan   Sputum Consistency Thick   Non-Surgical Airway 09/01/23 Endotracheal tube   Placement Date/Time: 09/01/23 0240   Present on Admission/Arrival: Yes  Placed By: In ED  Placement Verified By: Auscultation;Capnometry; Chest X-ray;Colorimetric ETCO2 device  Mask Ventilation: Oral airway  Insertion attempts: 1  Location: Oral  Airwa. ..    [employer]

## 2023-09-17 ENCOUNTER — APPOINTMENT (OUTPATIENT)
Dept: GENERAL RADIOLOGY | Age: 78
DRG: 870 | End: 2023-09-17
Payer: MEDICARE

## 2023-09-17 LAB
ALBUMIN SERPL-MCNC: 2.3 G/DL (ref 3.4–5)
ANION GAP SERPL CALCULATED.3IONS-SCNC: 10 MMOL/L (ref 3–16)
ANION GAP SERPL CALCULATED.3IONS-SCNC: 9 MMOL/L (ref 3–16)
BASE EXCESS BLDA CALC-SCNC: 12.7 MMOL/L (ref -3–3)
BASE EXCESS BLDA CALC-SCNC: 8.5 MMOL/L (ref -3–3)
BASOPHILS # BLD: 0.1 K/UL (ref 0–0.2)
BASOPHILS NFR BLD: 0.5 %
BUN SERPL-MCNC: 61 MG/DL (ref 7–20)
BUN SERPL-MCNC: 73 MG/DL (ref 7–20)
CALCIUM SERPL-MCNC: 7.9 MG/DL (ref 8.3–10.6)
CALCIUM SERPL-MCNC: 8.4 MG/DL (ref 8.3–10.6)
CHLORIDE SERPL-SCNC: 102 MMOL/L (ref 99–110)
CHLORIDE SERPL-SCNC: 99 MMOL/L (ref 99–110)
CO2 BLDA-SCNC: 33.1 MMOL/L
CO2 BLDA-SCNC: 36.9 MMOL/L
CO2 SERPL-SCNC: 32 MMOL/L (ref 21–32)
CO2 SERPL-SCNC: 33 MMOL/L (ref 21–32)
COHGB MFR BLDA: 0 % (ref 0–1.5)
COHGB MFR BLDA: 0.3 % (ref 0–1.5)
CREAT SERPL-MCNC: 1.4 MG/DL (ref 0.6–1.2)
CREAT SERPL-MCNC: 1.5 MG/DL (ref 0.6–1.2)
DEPRECATED RDW RBC AUTO: 14.3 % (ref 12.4–15.4)
EOSINOPHIL # BLD: 0.2 K/UL (ref 0–0.6)
EOSINOPHIL NFR BLD: 2 %
GFR SERPLBLD CREATININE-BSD FMLA CKD-EPI: 36 ML/MIN/{1.73_M2}
GFR SERPLBLD CREATININE-BSD FMLA CKD-EPI: 39 ML/MIN/{1.73_M2}
GLUCOSE BLD-MCNC: 120 MG/DL (ref 70–99)
GLUCOSE BLD-MCNC: 130 MG/DL (ref 70–99)
GLUCOSE BLD-MCNC: 142 MG/DL (ref 70–99)
GLUCOSE SERPL-MCNC: 140 MG/DL (ref 70–99)
GLUCOSE SERPL-MCNC: 162 MG/DL (ref 70–99)
HCO3 BLDA-SCNC: 31.9 MMOL/L (ref 21–29)
HCO3 BLDA-SCNC: 35.7 MMOL/L (ref 21–29)
HCT VFR BLD AUTO: 26.3 % (ref 36–48)
HGB BLD-MCNC: 8.7 G/DL (ref 12–16)
HGB BLDA-MCNC: 2.1 G/DL (ref 12–16)
HGB BLDA-MCNC: 9.9 G/DL (ref 12–16)
LYMPHOCYTES # BLD: 0.9 K/UL (ref 1–5.1)
LYMPHOCYTES NFR BLD: 7.8 %
MCH RBC QN AUTO: 29.5 PG (ref 26–34)
MCHC RBC AUTO-ENTMCNC: 33.2 G/DL (ref 31–36)
MCV RBC AUTO: 88.9 FL (ref 80–100)
METHGB MFR BLDA: 0 %
METHGB MFR BLDA: 0.3 %
MONOCYTES # BLD: 0.5 K/UL (ref 0–1.3)
MONOCYTES NFR BLD: 4 %
NEUTROPHILS # BLD: 10 K/UL (ref 1.7–7.7)
NEUTROPHILS NFR BLD: 85.7 %
O2 THERAPY: ABNORMAL
O2 THERAPY: ABNORMAL
PCO2 BLDA: 39 MMHG (ref 35–45)
PCO2 BLDA: 39.2 MMHG (ref 35–45)
PERFORMED ON: ABNORMAL
PH BLDA: 7.53 [PH] (ref 7.35–7.45)
PH BLDA: 7.58 [PH] (ref 7.35–7.45)
PHOSPHATE SERPL-MCNC: 4.3 MG/DL (ref 2.5–4.9)
PLATELET # BLD AUTO: 154 K/UL (ref 135–450)
PMV BLD AUTO: 9.5 FL (ref 5–10.5)
PO2 BLDA: 367.2 MMHG (ref 75–108)
PO2 BLDA: 81.7 MMHG (ref 75–108)
POTASSIUM SERPL-SCNC: 3 MMOL/L (ref 3.5–5.1)
POTASSIUM SERPL-SCNC: 3 MMOL/L (ref 3.5–5.1)
POTASSIUM SERPL-SCNC: 3.2 MMOL/L (ref 3.5–5.1)
RBC # BLD AUTO: 2.96 M/UL (ref 4–5.2)
SAO2 % BLDA: 97.3 %
SAO2 % BLDA: 99.8 %
SODIUM SERPL-SCNC: 141 MMOL/L (ref 136–145)
SODIUM SERPL-SCNC: 144 MMOL/L (ref 136–145)
TRIGL SERPL-MCNC: 184 MG/DL (ref 0–150)
WBC # BLD AUTO: 11.6 K/UL (ref 4–11)

## 2023-09-17 PROCEDURE — 71045 X-RAY EXAM CHEST 1 VIEW: CPT

## 2023-09-17 PROCEDURE — C9113 INJ PANTOPRAZOLE SODIUM, VIA: HCPCS | Performed by: INTERNAL MEDICINE

## 2023-09-17 PROCEDURE — 6360000002 HC RX W HCPCS: Performed by: INTERNAL MEDICINE

## 2023-09-17 PROCEDURE — 94640 AIRWAY INHALATION TREATMENT: CPT

## 2023-09-17 PROCEDURE — 84478 ASSAY OF TRIGLYCERIDES: CPT

## 2023-09-17 PROCEDURE — 2500000003 HC RX 250 WO HCPCS: Performed by: INTERNAL MEDICINE

## 2023-09-17 PROCEDURE — 94761 N-INVAS EAR/PLS OXIMETRY MLT: CPT

## 2023-09-17 PROCEDURE — 2580000003 HC RX 258: Performed by: INTERNAL MEDICINE

## 2023-09-17 PROCEDURE — 99233 SBSQ HOSP IP/OBS HIGH 50: CPT | Performed by: INTERNAL MEDICINE

## 2023-09-17 PROCEDURE — 84132 ASSAY OF SERUM POTASSIUM: CPT

## 2023-09-17 PROCEDURE — 36592 COLLECT BLOOD FROM PICC: CPT

## 2023-09-17 PROCEDURE — 36600 WITHDRAWAL OF ARTERIAL BLOOD: CPT

## 2023-09-17 PROCEDURE — 6370000000 HC RX 637 (ALT 250 FOR IP): Performed by: INTERNAL MEDICINE

## 2023-09-17 PROCEDURE — 80069 RENAL FUNCTION PANEL: CPT

## 2023-09-17 PROCEDURE — 2000000000 HC ICU R&B

## 2023-09-17 PROCEDURE — 6360000002 HC RX W HCPCS: Performed by: STUDENT IN AN ORGANIZED HEALTH CARE EDUCATION/TRAINING PROGRAM

## 2023-09-17 PROCEDURE — 94003 VENT MGMT INPAT SUBQ DAY: CPT

## 2023-09-17 PROCEDURE — 82803 BLOOD GASES ANY COMBINATION: CPT

## 2023-09-17 PROCEDURE — 2700000000 HC OXYGEN THERAPY PER DAY

## 2023-09-17 PROCEDURE — 99291 CRITICAL CARE FIRST HOUR: CPT | Performed by: INTERNAL MEDICINE

## 2023-09-17 PROCEDURE — 85025 COMPLETE CBC W/AUTO DIFF WBC: CPT

## 2023-09-17 RX ORDER — POTASSIUM CHLORIDE 750 MG/1
40 TABLET, EXTENDED RELEASE ORAL ONCE
Status: DISCONTINUED | OUTPATIENT
Start: 2023-09-17 | End: 2023-09-17

## 2023-09-17 RX ORDER — DEXMEDETOMIDINE HYDROCHLORIDE 4 UG/ML
.1-1.5 INJECTION, SOLUTION INTRAVENOUS CONTINUOUS
Status: DISCONTINUED | OUTPATIENT
Start: 2023-09-17 | End: 2023-09-18

## 2023-09-17 RX ORDER — ACETAZOLAMIDE 500 MG/5ML
500 INJECTION, POWDER, LYOPHILIZED, FOR SOLUTION INTRAVENOUS ONCE
Status: COMPLETED | OUTPATIENT
Start: 2023-09-17 | End: 2023-09-17

## 2023-09-17 RX ORDER — POTASSIUM CHLORIDE 29.8 MG/ML
20 INJECTION INTRAVENOUS
Status: COMPLETED | OUTPATIENT
Start: 2023-09-18 | End: 2023-09-18

## 2023-09-17 RX ADMIN — VANCOMYCIN HYDROCHLORIDE 750 MG: 750 INJECTION, POWDER, LYOPHILIZED, FOR SOLUTION INTRAVENOUS at 11:26

## 2023-09-17 RX ADMIN — HYDRALAZINE HYDROCHLORIDE 100 MG: 50 TABLET, FILM COATED ORAL at 14:00

## 2023-09-17 RX ADMIN — CARBOXYMETHYLCELLULOSE SODIUM 1 DROP: 10 GEL OPHTHALMIC at 02:58

## 2023-09-17 RX ADMIN — POTASSIUM BICARBONATE 40 MEQ: 782 TABLET, EFFERVESCENT ORAL at 11:23

## 2023-09-17 RX ADMIN — METOPROLOL TARTRATE 50 MG: 50 TABLET, FILM COATED ORAL at 14:34

## 2023-09-17 RX ADMIN — SERTRALINE 100 MG: 100 TABLET, FILM COATED ORAL at 19:27

## 2023-09-17 RX ADMIN — IPRATROPIUM BROMIDE AND ALBUTEROL SULFATE 1 DOSE: 2.5; .5 SOLUTION RESPIRATORY (INHALATION) at 20:14

## 2023-09-17 RX ADMIN — BUSPIRONE HYDROCHLORIDE 15 MG: 7.5 TABLET ORAL at 19:27

## 2023-09-17 RX ADMIN — IPRATROPIUM BROMIDE AND ALBUTEROL SULFATE 1 DOSE: 2.5; .5 SOLUTION RESPIRATORY (INHALATION) at 08:25

## 2023-09-17 RX ADMIN — BUSPIRONE HYDROCHLORIDE 15 MG: 7.5 TABLET ORAL at 07:31

## 2023-09-17 RX ADMIN — CARBOXYMETHYLCELLULOSE SODIUM 1 DROP: 10 GEL OPHTHALMIC at 22:25

## 2023-09-17 RX ADMIN — CHLORHEXIDINE GLUCONATE 0.12% ORAL RINSE 15 ML: 1.2 LIQUID ORAL at 19:28

## 2023-09-17 RX ADMIN — BUSPIRONE HYDROCHLORIDE 15 MG: 7.5 TABLET ORAL at 14:29

## 2023-09-17 RX ADMIN — ATORVASTATIN CALCIUM 80 MG: 40 TABLET, FILM COATED ORAL at 19:27

## 2023-09-17 RX ADMIN — POTASSIUM BICARBONATE 40 MEQ: 782 TABLET, EFFERVESCENT ORAL at 18:50

## 2023-09-17 RX ADMIN — SODIUM CHLORIDE, PRESERVATIVE FREE 10 ML: 5 INJECTION INTRAVENOUS at 19:30

## 2023-09-17 RX ADMIN — PROPOFOL 15 MCG/KG/MIN: 10 INJECTION, EMULSION INTRAVENOUS at 01:32

## 2023-09-17 RX ADMIN — ASPIRIN 81 MG: 81 TABLET, CHEWABLE ORAL at 07:32

## 2023-09-17 RX ADMIN — ENOXAPARIN SODIUM 30 MG: 100 INJECTION SUBCUTANEOUS at 07:32

## 2023-09-17 RX ADMIN — CARBOXYMETHYLCELLULOSE SODIUM 1 DROP: 10 GEL OPHTHALMIC at 14:31

## 2023-09-17 RX ADMIN — FUROSEMIDE 5 MG/HR: 10 INJECTION, SOLUTION INTRAMUSCULAR; INTRAVENOUS at 05:15

## 2023-09-17 RX ADMIN — ACETAZOLAMIDE 500 MG: 500 INJECTION, POWDER, LYOPHILIZED, FOR SOLUTION INTRAVENOUS at 11:23

## 2023-09-17 RX ADMIN — IPRATROPIUM BROMIDE AND ALBUTEROL SULFATE 1 DOSE: 2.5; .5 SOLUTION RESPIRATORY (INHALATION) at 15:28

## 2023-09-17 RX ADMIN — POTASSIUM CHLORIDE 20 MEQ: 29.8 INJECTION, SOLUTION INTRAVENOUS at 23:41

## 2023-09-17 RX ADMIN — SODIUM CHLORIDE: 9 INJECTION, SOLUTION INTRAVENOUS at 23:40

## 2023-09-17 RX ADMIN — SODIUM CHLORIDE, PRESERVATIVE FREE 10 ML: 5 INJECTION INTRAVENOUS at 07:31

## 2023-09-17 RX ADMIN — MIDAZOLAM HYDROCHLORIDE 2 MG: 1 INJECTION, SOLUTION INTRAMUSCULAR; INTRAVENOUS at 14:29

## 2023-09-17 RX ADMIN — Medication 0.2 MCG/KG/HR: at 19:27

## 2023-09-17 RX ADMIN — METOPROLOL TARTRATE 50 MG: 50 TABLET, FILM COATED ORAL at 05:41

## 2023-09-17 RX ADMIN — DEXAMETHASONE SODIUM PHOSPHATE 3 MG: 4 INJECTION, SOLUTION INTRAMUSCULAR; INTRAVENOUS at 07:32

## 2023-09-17 RX ADMIN — ENOXAPARIN SODIUM 30 MG: 100 INJECTION SUBCUTANEOUS at 19:27

## 2023-09-17 RX ADMIN — SERTRALINE 100 MG: 100 TABLET, FILM COATED ORAL at 07:32

## 2023-09-17 RX ADMIN — IPRATROPIUM BROMIDE AND ALBUTEROL SULFATE 1 DOSE: 2.5; .5 SOLUTION RESPIRATORY (INHALATION) at 12:08

## 2023-09-17 RX ADMIN — METOPROLOL TARTRATE 50 MG: 50 TABLET, FILM COATED ORAL at 07:32

## 2023-09-17 RX ADMIN — CHLORHEXIDINE GLUCONATE 0.12% ORAL RINSE 15 ML: 1.2 LIQUID ORAL at 07:31

## 2023-09-17 RX ADMIN — IPRATROPIUM BROMIDE AND ALBUTEROL SULFATE 1 DOSE: 2.5; .5 SOLUTION RESPIRATORY (INHALATION) at 04:07

## 2023-09-17 RX ADMIN — IPRATROPIUM BROMIDE AND ALBUTEROL SULFATE 1 DOSE: 2.5; .5 SOLUTION RESPIRATORY (INHALATION) at 23:38

## 2023-09-17 RX ADMIN — CARBOXYMETHYLCELLULOSE SODIUM 1 DROP: 10 GEL OPHTHALMIC at 11:23

## 2023-09-17 RX ADMIN — CARBOXYMETHYLCELLULOSE SODIUM 1 DROP: 10 GEL OPHTHALMIC at 05:42

## 2023-09-17 RX ADMIN — PANTOPRAZOLE SODIUM 40 MG: 40 INJECTION, POWDER, FOR SOLUTION INTRAVENOUS at 07:32

## 2023-09-17 RX ADMIN — CARBOXYMETHYLCELLULOSE SODIUM 1 DROP: 10 GEL OPHTHALMIC at 16:56

## 2023-09-17 RX ADMIN — MIDAZOLAM HYDROCHLORIDE 2 MG: 1 INJECTION, SOLUTION INTRAMUSCULAR; INTRAVENOUS at 18:28

## 2023-09-17 RX ADMIN — HYDRALAZINE HYDROCHLORIDE 100 MG: 50 TABLET, FILM COATED ORAL at 05:41

## 2023-09-17 RX ADMIN — BUSPIRONE HYDROCHLORIDE 15 MG: 7.5 TABLET ORAL at 16:56

## 2023-09-17 ASSESSMENT — PULMONARY FUNCTION TESTS
PIF_VALUE: 21
PIF_VALUE: 36
PIF_VALUE: 27
PIF_VALUE: 18
PIF_VALUE: 20
PIF_VALUE: 33
PIF_VALUE: 18
PIF_VALUE: 17
PIF_VALUE: 29
PIF_VALUE: 20
PIF_VALUE: 19
PIF_VALUE: 26
PIF_VALUE: 20

## 2023-09-17 NOTE — PLAN OF CARE
Problem: Discharge Planning  Goal: Discharge to home or other facility with appropriate resources  Outcome: Progressing     Problem: Pain  Goal: Verbalizes/displays adequate comfort level or baseline comfort level  Outcome: Progressing     Problem: Safety - Adult  Goal: Free from fall injury  Outcome: Progressing  Note: Fall precautions in place. Problem: Nutrition Deficit:  Goal: Optimize nutritional status  Outcome: Progressing  Note: Patient receiving nutrition via continuous tube feed. Problem: Skin/Tissue Integrity  Goal: Absence of new skin breakdown  Description: 1. Monitor for areas of redness and/or skin breakdown  2. Assess vascular access sites hourly  3. Every 4-6 hours minimum:  Change oxygen saturation probe site  4. Every 4-6 hours:  If on nasal continuous positive airway pressure, respiratory therapy assess nares and determine need for appliance change or resting period. Outcome: Progressing  Note: Patient turned/repositioned every 2 hours and as needed to maintain and improve skin integrity.       Problem: Respiratory - Adult  Goal: Achieves optimal ventilation and oxygenation  Outcome: Progressing     Problem: Cardiovascular - Adult  Goal: Maintains optimal cardiac output and hemodynamic stability  Outcome: Progressing  Goal: Absence of cardiac dysrhythmias or at baseline  Outcome: Progressing     Problem: Skin/Tissue Integrity - Adult  Goal: Skin integrity remains intact  Outcome: Progressing  Goal: Incisions, wounds, or drain sites healing without S/S of infection  Outcome: Progressing  Goal: Oral mucous membranes remain intact  Outcome: Progressing     Problem: Infection - Adult  Goal: Absence of infection at discharge  Outcome: Progressing  Goal: Absence of infection during hospitalization  Outcome: Progressing  Goal: Absence of fever/infection during anticipated neutropenic period  Outcome: Progressing     Problem: Metabolic/Fluid and Electrolytes - Adult  Goal: Electrolytes maintained within normal limits  Outcome: Progressing  Goal: Hemodynamic stability and optimal renal function maintained  Outcome: Progressing  Goal: Glucose maintained within prescribed range  Outcome: Progressing     Problem: ABCDS Injury Assessment  Goal: Absence of physical injury  Outcome: Progressing     Problem: Safety - Medical Restraint  Goal: Remains free of injury from restraints (Restraint for Interference with Medical Device)  Description: INTERVENTIONS:  1. Determine that other, less restrictive measures have been tried or would not be effective before applying the restraint  2. Evaluate the patient's condition at the time of restraint application  3. Inform patient/family regarding the reason for restraint  4.  Q2H: Monitor safety, psychosocial status, comfort, nutrition and hydration  Outcome: Progressing  Flowsheets  Taken 9/16/2023 2200 by Lonnie Forrest RN  Remains free of injury from restraints (restraint for interference with medical device):   Determine that other, less restrictive measures have been tried or would not be effective before applying the restraint   Every 2 hours: Monitor safety, psychosocial status, comfort, nutrition and hydration  Taken 9/16/2023 2000 by Lonnie Forrest RN  Remains free of injury from restraints (restraint for interference with medical device):   Determine that other, less restrictive measures have been tried or would not be effective before applying the restraint   Every 2 hours: Monitor safety, psychosocial status, comfort, nutrition and hydration  Taken 9/16/2023 1800 by Delvis Spears RN  Remains free of injury from restraints (restraint for interference with medical device): Every 2 hours: Monitor safety, psychosocial status, comfort, nutrition and hydration  Taken 9/16/2023 1600 by Delvis Spears RN  Remains free of injury from restraints (restraint for interference with medical device): Every 2 hours: Monitor safety, psychosocial status, comfort, nutrition

## 2023-09-17 NOTE — PROGRESS NOTES
Patient care assumed, assessment completed as charted. Patient continues on ventilator, #7.5 at the 22 lip line. A/C 20, , FiO2 35%, PEEP 5. Patient alert with minimal stimulation, shakes head back and forth. Right IJ CVC in place with Propofol infusing at 15mcg/kg/hr, and Lasix at 5mg/hr. Patient incontinent of large amount liquid brown stool. While cleaning, patient continued to stool with every cough. Rectal tube placed to collect stool and maintain skin integrity. NG in place with tube feed running at 30mL/hr, villaseñor catheter patent, bilateral soft wrist restraints in place for continued patient safety. No further needs assessed at this time. Will monitor.

## 2023-09-17 NOTE — PROGRESS NOTES
Spoke w/ Dr. Stanley Sifuentes in regards to K+ 3.0   -hold lasix gtt until repeat K+ 3.5 or higher  -replace potassium  -repeat potassium @ 179-00 MACK CruzN

## 2023-09-17 NOTE — PROGRESS NOTES
Patient updates given to SCAR Fernandez. Patient resting comfortably on ventilator at present. Care transferred.

## 2023-09-17 NOTE — PROGRESS NOTES
Vancomycin Day:     Current Dosin mg IV every 24 hours    Patient's labs, cultures, vitals, and vancomycin regimen reviewed. SCr increased slightly from 1.4 to 1.5    Plan:   No change today. Vanc level ordered for .

## 2023-09-17 NOTE — PROGRESS NOTES
09/16/23 2342   Patient Observation   Observations 7.5 ett 22 at lip   Breath Sounds   Right Upper Lobe Clear   Right Middle Lobe Diminished   Right Lower Lobe Diminished   Left Upper Lobe Clear   Left Lower Lobe Diminished   Vent Information   Vent Mode AC/VC   Ventilator Settings   FiO2  35 %   Vt (Set, mL) 320 mL   Resp Rate (Set) 20 bmp   PEEP/CPAP (cmH2O) 5   Vent Patient Data (Readings)   Vt (Measured) 320 mL   Peak Inspiratory Pressure (cmH2O) 21 cmH2O   Rate Measured 23 br/min   Peak Inspiratory Flow (lpm) 65 L/sec   Mean Airway Pressure (cmH2O) 8.3 cmH20   I:E Ratio 1:3.3   Flow Sensitivity 3 L/min   Vent Alarm Settings   High Pressure (cmH2O) 50 cmH2O   Low Minute Volume (lpm) 3 L/min   Low Exhaled Vt (ml) 200 mL   RR High (bpm) 40 br/min   Apnea (secs) 20 secs   Additional Respiratoray Assessments   Humidification Source Heated wire   Humidification Temp 36.9   Circuit Condensation Drained   Ambu Bag With Mask At Bedside Yes   Airway Clearance   Suction ET Tube   Suction Device Inline suction catheter   Sputum Method Obtained Endotracheal   Sputum Amount Small   Sputum Color/Odor White;Yellow   Sputum Consistency Thick   Non-Surgical Airway 09/01/23 Endotracheal tube   Placement Date/Time: 09/01/23 0240   Present on Admission/Arrival: Yes  Placed By: In ED  Placement Verified By: Auscultation;Capnometry; Chest X-ray;Colorimetric ETCO2 device  Mask Ventilation: Oral airway  Insertion attempts: 1  Location: Oral  Airwa. ..    Secured At 22 cm   Measured From [address] By Commercial tube mayer

## 2023-09-17 NOTE — PROGRESS NOTES
4 Eyes Skin Assessment     NAME:  Gino Mesa  YOB: 1945  MEDICAL RECORD NUMBER:  [id number]    The patient is being assessed for  Shift Handoff    I agree that at least one RN has performed a thorough Head to Toe Skin Assessment on the patient. ALL assessment sites listed below have been assessed. Areas assessed by both nurses:    Head, Face, Ears, Shoulders, Back, Chest, Arms, Elbows, Hands, Sacrum. Buttock, Coccyx, Ischium, Legs. Feet and Heels, and Under Medical Devices         Does the Patient have a Wound? No noted wound(s) Abrasion noted to right anterior chest and mid back.        Sloan Prevention initiated by RN: Yes  Wound Care Orders initiated by RN: No    Pressure Injury (Stage 3,4, Unstageable, DTI, NWPT, and Complex wounds) if present, place Wound referral order by RN under : No    New Ostomies, if present place, Ostomy referral order under : No     Nurse 1 eSignature: Electronically signed by Janis Trejo RN on 9/17/23 at 5:47 AM EDT    **SHARE this note so that the co-signing nurse can place an eSignature**    Nurse 2 eSignature: Electronically signed by Cheyenne Padron RN on 9/17/23 at 6:18 AM EDT

## 2023-09-17 NOTE — PROGRESS NOTES
Admit: 9/1/2023    Name:  Sarthak Powers  Room:  3013/3013-01  MRN:    [id number]    Critical Care Daily Progress Note for 9/17/2023   Admitted with acute hypoxic resp failure   Intubated on arrival , remains on vent for the last week     Interval History:     Severe hypoxia /ARDS needing proning , now on supine position  Started lasix gtt and improving UOP and peripheral edema  6.7 L neg yesterday     Off paralysis     Family at bedside today   Pt on 50 % fio2 and PEEP of 10     9/11- slowly improving. High urine output. 9/12- on the ventilator. PEEP of 10. Lasix on hold. Got a fluid bolus. Still has had good urine output. Sedated on the vent. 9/13- patient is slowly improving. 9/14-she has a low-grade fever and increased secretions. She did well on trials for 2 hours. No plans for extubation today. 9/15- off sedation. Passed SBT. Had a fever. Having moderate secretions. 9/16- no fever. Awake and alert. Appears comfortable. SBT this am.     9/17- on SBT. Started back on Lasix drip yesterday. Eyes open. Low grade fever yesterday but better today.     Scheduled Meds:   hydrALAZINE  100 mg Oral 3 times per day    dexamethasone  3 mg IntraVENous Q24H    metoprolol tartrate  50 mg Oral Q6H    levofloxacin  750 mg IntraVENous Q48H    vancomycin  750 mg IntraVENous Q24H    polyethylene glycol  17 g Oral Daily    senna  2 tablet Oral BID    And    docusate  100 mg Oral BID    enoxaparin  30 mg SubCUTAneous BID    sodium chloride  500 mL IntraVENous Once    sodium chloride flush  5-40 mL IntraVENous 2 times per day    chlorhexidine  15 mL Mouth/Throat BID    carboxymethylcellulose PF  1 drop Both Eyes Q4H    Or    artificial tears   Both Eyes Q4H    sertraline  100 mg Oral BID    pantoprazole  40 mg IntraVENous Daily    busPIRone  15 mg Oral 4x daily    ipratropium 0.5 mg-albuterol 2.5 mg  1 Dose Inhalation Q4H    atorvastatin  80 mg Oral Nightly    aspirin  81 mg Oral Daily       Continuous Infusions: 2.5 cm above the ivis. 2. Bilateral perihilar airspace opacities with indistinct pulmonary   vasculature suggestive of pulmonary edema. Multi lobar pneumonia is in the   differential if patient has clinical symptoms of infection. XR CHEST PORTABLE    (Results Pending)     ECHO  Technically difficult examination secondary to habitus/intubated. Normal left ventricular size with mild left ventricular hypertrophy. Left ventricular systolic function is normal with ejection fraction   estimated at 55%. Apical windows limited for wall motion assessment. Within the limits of the   study, no clear regional wall motion abnormalities noted. Elevated left ventricular filling pressure. The right ventricle is seen in off-axis views, grossly appears normal in   size with normal function. Mild mitral regurgitation. Moderate tricuspid regurgitation. Systolic pulmonary artery pressure (SPAP) is estimated at 59 mmHg (Right   atrial pressure of 8 mmHg). Labs and imaging reviewed     Assessment & Plan:       Acute hypoxemic respiratory failure  - placed on vent in ER for severe distress. Etiology likely Asthma exacerbation vs aspiration   - admit to ICU, pulmonary /critical care consulted  - imaging with fluid overload , recent admission for diverticulitis and was given cipro/flagyl and IVF   - ECHO with normal EF and no RWMA   -Finished  meropenem and iv vanc for possible pna   - severe hypoxia needing paralysis , high PEEP and prone ventilation    now off nimbex  -On Decadron 3 mg a day  Started on lasix gtt for anasarca and improving now. Lasix held for now. Started back on Lasix drip on 9/16/23. . Nephrology seeing patient. - pulmonary managing. Did well on her breathing trial. SBT today. Hopefully extubation soon. Multifocal  pneumonia -received merrem and iv vanc for 10 days for presumable gram neg and/or MRSA pna.   With low-grade fever and increased secretions started on IV vancomycin and

## 2023-09-17 NOTE — PROGRESS NOTES
09/16/23 1958   Patient Observation   Observations 7.5 ett 22 at lip   Breath Sounds   Right Upper Lobe Clear   Right Middle Lobe Clear   Right Lower Lobe Diminished   Left Upper Lobe Clear   Left Lower Lobe Diminished   Vent Information   Vent Mode (S)  AC/VC   Ventilator Settings   FiO2  35 %   Vt (Set, mL) 320 mL   Resp Rate (Set) 20 bmp   PEEP/CPAP (cmH2O) 5   Vent Patient Data (Readings)   Vt (Measured) 347 mL   Peak Inspiratory Pressure (cmH2O) 22 cmH2O   Rate Measured 26 br/min   Minute Volume (L/min) 8.1 Liters   Peak Inspiratory Flow (lpm) 65 L/sec   Mean Airway Pressure (cmH2O) 8.9 cmH20   I:E Ratio 1:3.3   Flow Sensitivity 3 L/min   Static Compliance (L/cm H2O) 27   Dynamic Compliance (L/cm H2O) 19 L/cm H2O   Vent Alarm Settings   High Pressure (cmH2O) 50 cmH2O   Low Minute Volume (lpm) 3 L/min   Low Exhaled Vt (ml) 200 mL   RR High (bpm) 40 br/min   Apnea (secs) 20 secs   Additional Respiratoray Assessments   Humidification Source Heated wire   Humidification Temp 37   Circuit Condensation Drained   Ambu Bag With Mask At Bedside Yes   Airway Clearance   Suction ET Tube   Suction Device Inline suction catheter   Sputum Method Obtained Endotracheal   Sputum Amount Scant   Non-Surgical Airway 09/01/23 Endotracheal tube   Placement Date/Time: 09/01/23 0240   Present on Admission/Arrival: Yes  Placed By: In ED  Placement Verified By: Auscultation;Capnometry; Chest X-ray;Colorimetric ETCO2 device  Mask Ventilation: Oral airway  Insertion attempts: 1  Location: Oral  Airwa. ..    Secured At 22 cm   Measured From Lips   Secured Location Right   Secured By Commercial tube mayer

## 2023-09-17 NOTE — PROGRESS NOTES
Ventilator alarming- RR 34, pt moving head back and forth. Propofol gtt increased to 20 mcg. Pt suctioned large amount from ETT. AM meds administered. Dr. Jairo Cha for k+ replacements. High risk vesicant drug infusing:  __________    Multiple incompatible medications infusing:  _________    CVP Monitoring:  _________    Extremely difficult IV access challenge:  ___x_____    Continued need for central line access:  ___x_______    Addressed with physician:  _x_______    RIGHT PATIENT, RIGHT TIME, RIGHT LINE      Pt has villaseñor catheter, secured to thigh & patent. Pt in bilateral soft wrist restraints for safety- see flowsheet.      Pt intubated/sedated #7.5 22 cm at the lip  AC 20  fio2 35% peep +5

## 2023-09-17 NOTE — PROGRESS NOTES
09/17/23 0407   Patient Observation   Observations 7.5 ett 22 at lip   Breath Sounds   Right Upper Lobe Clear   Right Middle Lobe Diminished   Right Lower Lobe Diminished   Left Upper Lobe Clear   Left Lower Lobe Diminished   Vent Information   Vent Mode AC/VC   Ventilator Settings   FiO2  35 %   Vt (Set, mL) 320 mL   Resp Rate (Set) 20 bmp   PEEP/CPAP (cmH2O) 5   Vent Patient Data (Readings)   Vt (Measured) 322 mL   Peak Inspiratory Pressure (cmH2O) 19 cmH2O   Rate Measured 25 br/min   Minute Volume (L/min) 9.3 Liters   Peak Inspiratory Flow (lpm) 65 L/sec   Mean Airway Pressure (cmH2O) 8.5 cmH20   I:E Ratio 1:4.6   Flow Sensitivity 3 L/min   Vent Alarm Settings   High Pressure (cmH2O) 50 cmH2O   Low Minute Volume (lpm) 3 L/min   Low Exhaled Vt (ml) 200 mL   RR High (bpm) 40 br/min   Apnea (secs) 20 secs   Additional Respiratoray Assessments   Humidification Source Heated wire   Humidification Temp 36.9   Circuit Condensation Drained   Ambu Bag With Mask At Bedside Yes   Airway Clearance   Suction ET Tube   Suction Device Inline suction catheter   Sputum Method Obtained Endotracheal   Sputum Amount Small   Sputum Color/Odor White;Yellow   Sputum Consistency Thick   Non-Surgical Airway 09/01/23 Endotracheal tube   Placement Date/Time: 09/01/23 0240   Present on Admission/Arrival: Yes  Placed By: In ED  Placement Verified By: Auscultation;Capnometry; Chest X-ray;Colorimetric ETCO2 device  Mask Ventilation: Oral airway  Insertion attempts: 1  Location: Oral  Airwa. ..    Secured At 22 cm   Measured From Lips   Secured Location Left   Secured By Commercial tube mayer

## 2023-09-17 NOTE — PROGRESS NOTES
Patient care assumed, assessment completed as charted. Patient continues on ventilator, #7.5 at the 22 lip line. A/C 20, , FiO2 25%, PEEP 5. Patient alert, agitated, shaking head back and forth, and coughing. Precedex drip initiated at 0.2mcg/kg/hr through intact right IJ CVC. NG in place with tube feed running at 30mL/hr, villaseñor catheter and rectal tube patent, bilateral soft wrist restraints in place for continued patient safety. No further needs assessed at this time. Will monitor.

## 2023-09-18 ENCOUNTER — APPOINTMENT (OUTPATIENT)
Dept: GENERAL RADIOLOGY | Age: 78
DRG: 870 | End: 2023-09-18
Payer: MEDICARE

## 2023-09-18 LAB
ANION GAP SERPL CALCULATED.3IONS-SCNC: 10 MMOL/L (ref 3–16)
BACTERIA BLD CULT ORG #2: NORMAL
BACTERIA BLD CULT: NORMAL
BASE EXCESS BLDA CALC-SCNC: 7 MMOL/L (ref -3–3)
BASE EXCESS BLDA CALC-SCNC: 9.1 MMOL/L (ref -3–3)
BASOPHILS # BLD: 0 K/UL (ref 0–0.2)
BASOPHILS NFR BLD: 0.4 %
BUN SERPL-MCNC: 66 MG/DL (ref 7–20)
CALCIUM SERPL-MCNC: 8.4 MG/DL (ref 8.3–10.6)
CHLORIDE SERPL-SCNC: 104 MMOL/L (ref 99–110)
CO2 BLDA-SCNC: 32.1 MMOL/L
CO2 BLDA-SCNC: 33.7 MMOL/L
CO2 SERPL-SCNC: 31 MMOL/L (ref 21–32)
COHGB MFR BLDA: 0.3 % (ref 0–1.5)
COHGB MFR BLDA: 0.3 % (ref 0–1.5)
CREAT SERPL-MCNC: 1.5 MG/DL (ref 0.6–1.2)
DEPRECATED RDW RBC AUTO: 14.1 % (ref 12.4–15.4)
EKG ATRIAL RATE: 106 BPM
EKG DIAGNOSIS: NORMAL
EKG P AXIS: 62 DEGREES
EKG P-R INTERVAL: 148 MS
EKG Q-T INTERVAL: 380 MS
EKG QRS DURATION: 128 MS
EKG QTC CALCULATION (BAZETT): 504 MS
EKG R AXIS: -56 DEGREES
EKG T AXIS: 122 DEGREES
EKG VENTRICULAR RATE: 106 BPM
EOSINOPHIL # BLD: 0.2 K/UL (ref 0–0.6)
EOSINOPHIL NFR BLD: 1.9 %
GFR SERPLBLD CREATININE-BSD FMLA CKD-EPI: 36 ML/MIN/{1.73_M2}
GLUCOSE BLD-MCNC: 118 MG/DL (ref 70–99)
GLUCOSE BLD-MCNC: 135 MG/DL (ref 70–99)
GLUCOSE BLD-MCNC: 143 MG/DL (ref 70–99)
GLUCOSE SERPL-MCNC: 139 MG/DL (ref 70–99)
HCO3 BLDA-SCNC: 30.8 MMOL/L (ref 21–29)
HCO3 BLDA-SCNC: 32.5 MMOL/L (ref 21–29)
HCT VFR BLD AUTO: 22.7 % (ref 36–48)
HGB BLD-MCNC: 7.8 G/DL (ref 12–16)
HGB BLDA-MCNC: 10.8 G/DL (ref 12–16)
HGB BLDA-MCNC: 9.3 G/DL (ref 12–16)
LYMPHOCYTES # BLD: 0.6 K/UL (ref 1–5.1)
LYMPHOCYTES NFR BLD: 7.1 %
MAGNESIUM SERPL-MCNC: 2.2 MG/DL (ref 1.8–2.4)
MCH RBC QN AUTO: 30.2 PG (ref 26–34)
MCHC RBC AUTO-ENTMCNC: 34.6 G/DL (ref 31–36)
MCV RBC AUTO: 87.5 FL (ref 80–100)
METHGB MFR BLDA: 0.3 %
METHGB MFR BLDA: 0.3 %
MONOCYTES # BLD: 0.3 K/UL (ref 0–1.3)
MONOCYTES NFR BLD: 3.8 %
NEUTROPHILS # BLD: 7.2 K/UL (ref 1.7–7.7)
NEUTROPHILS NFR BLD: 86.8 %
O2 THERAPY: ABNORMAL
O2 THERAPY: ABNORMAL
PCO2 BLDA: 39.9 MMHG (ref 35–45)
PCO2 BLDA: 40.6 MMHG (ref 35–45)
PERFORMED ON: ABNORMAL
PH BLDA: 7.5 [PH] (ref 7.35–7.45)
PH BLDA: 7.53 [PH] (ref 7.35–7.45)
PLATELET # BLD AUTO: 145 K/UL (ref 135–450)
PMV BLD AUTO: 9.5 FL (ref 5–10.5)
PO2 BLDA: 129.7 MMHG (ref 75–108)
PO2 BLDA: 139.9 MMHG (ref 75–108)
POTASSIUM SERPL-SCNC: 2.8 MMOL/L (ref 3.5–5.1)
POTASSIUM SERPL-SCNC: 3 MMOL/L (ref 3.5–5.1)
POTASSIUM SERPL-SCNC: 3.4 MMOL/L (ref 3.5–5.1)
POTASSIUM SERPL-SCNC: 3.5 MMOL/L (ref 3.5–5.1)
RBC # BLD AUTO: 2.59 M/UL (ref 4–5.2)
SAO2 % BLDA: 98.9 %
SAO2 % BLDA: 99 %
SODIUM SERPL-SCNC: 145 MMOL/L (ref 136–145)
TRIGL SERPL-MCNC: 156 MG/DL (ref 0–150)
WBC # BLD AUTO: 8.3 K/UL (ref 4–11)

## 2023-09-18 PROCEDURE — 93005 ELECTROCARDIOGRAM TRACING: CPT | Performed by: INTERNAL MEDICINE

## 2023-09-18 PROCEDURE — 99291 CRITICAL CARE FIRST HOUR: CPT | Performed by: INTERNAL MEDICINE

## 2023-09-18 PROCEDURE — 82803 BLOOD GASES ANY COMBINATION: CPT

## 2023-09-18 PROCEDURE — 93010 ELECTROCARDIOGRAM REPORT: CPT | Performed by: INTERNAL MEDICINE

## 2023-09-18 PROCEDURE — 6370000000 HC RX 637 (ALT 250 FOR IP): Performed by: INTERNAL MEDICINE

## 2023-09-18 PROCEDURE — 2700000000 HC OXYGEN THERAPY PER DAY

## 2023-09-18 PROCEDURE — C9113 INJ PANTOPRAZOLE SODIUM, VIA: HCPCS | Performed by: INTERNAL MEDICINE

## 2023-09-18 PROCEDURE — 99233 SBSQ HOSP IP/OBS HIGH 50: CPT | Performed by: INTERNAL MEDICINE

## 2023-09-18 PROCEDURE — 2580000003 HC RX 258: Performed by: INTERNAL MEDICINE

## 2023-09-18 PROCEDURE — 6360000002 HC RX W HCPCS: Performed by: INTERNAL MEDICINE

## 2023-09-18 PROCEDURE — 36592 COLLECT BLOOD FROM PICC: CPT

## 2023-09-18 PROCEDURE — 83735 ASSAY OF MAGNESIUM: CPT

## 2023-09-18 PROCEDURE — 71045 X-RAY EXAM CHEST 1 VIEW: CPT

## 2023-09-18 PROCEDURE — 2000000000 HC ICU R&B

## 2023-09-18 PROCEDURE — 94003 VENT MGMT INPAT SUBQ DAY: CPT

## 2023-09-18 PROCEDURE — 2500000003 HC RX 250 WO HCPCS: Performed by: INTERNAL MEDICINE

## 2023-09-18 PROCEDURE — 84132 ASSAY OF SERUM POTASSIUM: CPT

## 2023-09-18 PROCEDURE — 36600 WITHDRAWAL OF ARTERIAL BLOOD: CPT

## 2023-09-18 PROCEDURE — 94761 N-INVAS EAR/PLS OXIMETRY MLT: CPT

## 2023-09-18 PROCEDURE — 94640 AIRWAY INHALATION TREATMENT: CPT

## 2023-09-18 PROCEDURE — 85025 COMPLETE CBC W/AUTO DIFF WBC: CPT

## 2023-09-18 PROCEDURE — 84478 ASSAY OF TRIGLYCERIDES: CPT

## 2023-09-18 PROCEDURE — 80048 BASIC METABOLIC PNL TOTAL CA: CPT

## 2023-09-18 RX ORDER — IPRATROPIUM BROMIDE AND ALBUTEROL SULFATE 2.5; .5 MG/3ML; MG/3ML
1 SOLUTION RESPIRATORY (INHALATION)
Status: DISCONTINUED | OUTPATIENT
Start: 2023-09-19 | End: 2023-09-19

## 2023-09-18 RX ORDER — POTASSIUM CHLORIDE 29.8 MG/ML
20 INJECTION INTRAVENOUS
Status: COMPLETED | OUTPATIENT
Start: 2023-09-18 | End: 2023-09-18

## 2023-09-18 RX ORDER — IPRATROPIUM BROMIDE AND ALBUTEROL SULFATE 2.5; .5 MG/3ML; MG/3ML
1 SOLUTION RESPIRATORY (INHALATION) EVERY 4 HOURS PRN
Status: DISCONTINUED | OUTPATIENT
Start: 2023-09-18 | End: 2023-09-24

## 2023-09-18 RX ADMIN — BUSPIRONE HYDROCHLORIDE 15 MG: 7.5 TABLET ORAL at 09:26

## 2023-09-18 RX ADMIN — VANCOMYCIN HYDROCHLORIDE 750 MG: 750 INJECTION, POWDER, LYOPHILIZED, FOR SOLUTION INTRAVENOUS at 13:59

## 2023-09-18 RX ADMIN — BUSPIRONE HYDROCHLORIDE 15 MG: 7.5 TABLET ORAL at 15:23

## 2023-09-18 RX ADMIN — DEXAMETHASONE SODIUM PHOSPHATE 3 MG: 4 INJECTION, SOLUTION INTRAMUSCULAR; INTRAVENOUS at 09:26

## 2023-09-18 RX ADMIN — SODIUM CHLORIDE, PRESERVATIVE FREE 10 ML: 5 INJECTION INTRAVENOUS at 20:11

## 2023-09-18 RX ADMIN — LEVOFLOXACIN 750 MG: 5 INJECTION, SOLUTION INTRAVENOUS at 12:03

## 2023-09-18 RX ADMIN — POTASSIUM CHLORIDE 20 MEQ: 29.8 INJECTION, SOLUTION INTRAVENOUS at 01:09

## 2023-09-18 RX ADMIN — CARBOXYMETHYLCELLULOSE SODIUM 1 DROP: 10 GEL OPHTHALMIC at 02:48

## 2023-09-18 RX ADMIN — METOPROLOL TARTRATE 50 MG: 50 TABLET, FILM COATED ORAL at 04:55

## 2023-09-18 RX ADMIN — ONDANSETRON 4 MG: 4 TABLET, ORALLY DISINTEGRATING ORAL at 17:20

## 2023-09-18 RX ADMIN — ENOXAPARIN SODIUM 30 MG: 100 INJECTION SUBCUTANEOUS at 09:26

## 2023-09-18 RX ADMIN — ATORVASTATIN CALCIUM 80 MG: 40 TABLET, FILM COATED ORAL at 20:11

## 2023-09-18 RX ADMIN — SERTRALINE 100 MG: 100 TABLET, FILM COATED ORAL at 09:26

## 2023-09-18 RX ADMIN — ASPIRIN 81 MG: 81 TABLET, CHEWABLE ORAL at 09:26

## 2023-09-18 RX ADMIN — BUSPIRONE HYDROCHLORIDE 15 MG: 7.5 TABLET ORAL at 20:11

## 2023-09-18 RX ADMIN — Medication 0.3 MCG/KG/HR: at 05:38

## 2023-09-18 RX ADMIN — POTASSIUM CHLORIDE 20 MEQ: 29.8 INJECTION, SOLUTION INTRAVENOUS at 11:57

## 2023-09-18 RX ADMIN — IPRATROPIUM BROMIDE AND ALBUTEROL SULFATE 1 DOSE: 2.5; .5 SOLUTION RESPIRATORY (INHALATION) at 08:25

## 2023-09-18 RX ADMIN — CHLORHEXIDINE GLUCONATE 0.12% ORAL RINSE 15 ML: 1.2 LIQUID ORAL at 09:26

## 2023-09-18 RX ADMIN — CARBOXYMETHYLCELLULOSE SODIUM 1 DROP: 10 GEL OPHTHALMIC at 09:26

## 2023-09-18 RX ADMIN — METOPROLOL TARTRATE 50 MG: 50 TABLET, FILM COATED ORAL at 20:11

## 2023-09-18 RX ADMIN — HYDRALAZINE HYDROCHLORIDE 100 MG: 50 TABLET, FILM COATED ORAL at 17:16

## 2023-09-18 RX ADMIN — IPRATROPIUM BROMIDE AND ALBUTEROL SULFATE 1 DOSE: 2.5; .5 SOLUTION RESPIRATORY (INHALATION) at 11:50

## 2023-09-18 RX ADMIN — IPRATROPIUM BROMIDE AND ALBUTEROL SULFATE 1 DOSE: 2.5; .5 SOLUTION RESPIRATORY (INHALATION) at 16:07

## 2023-09-18 RX ADMIN — POTASSIUM CHLORIDE 20 MEQ: 29.8 INJECTION, SOLUTION INTRAVENOUS at 10:27

## 2023-09-18 RX ADMIN — SODIUM CHLORIDE, PRESERVATIVE FREE 10 ML: 5 INJECTION INTRAVENOUS at 09:27

## 2023-09-18 RX ADMIN — IPRATROPIUM BROMIDE AND ALBUTEROL SULFATE 1 DOSE: 2.5; .5 SOLUTION RESPIRATORY (INHALATION) at 22:51

## 2023-09-18 RX ADMIN — PANTOPRAZOLE SODIUM 40 MG: 40 INJECTION, POWDER, FOR SOLUTION INTRAVENOUS at 09:26

## 2023-09-18 RX ADMIN — SERTRALINE 100 MG: 100 TABLET, FILM COATED ORAL at 20:11

## 2023-09-18 RX ADMIN — IPRATROPIUM BROMIDE AND ALBUTEROL SULFATE 1 DOSE: 2.5; .5 SOLUTION RESPIRATORY (INHALATION) at 20:07

## 2023-09-18 RX ADMIN — IPRATROPIUM BROMIDE AND ALBUTEROL SULFATE 1 DOSE: 2.5; .5 SOLUTION RESPIRATORY (INHALATION) at 03:20

## 2023-09-18 RX ADMIN — POTASSIUM BICARBONATE 40 MEQ: 782 TABLET, EFFERVESCENT ORAL at 15:24

## 2023-09-18 RX ADMIN — ENOXAPARIN SODIUM 30 MG: 100 INJECTION SUBCUTANEOUS at 20:10

## 2023-09-18 ASSESSMENT — PULMONARY FUNCTION TESTS
PIF_VALUE: 30
PIF_VALUE: 24
PIF_VALUE: 31
PIF_VALUE: 26
PIF_VALUE: 25
PIF_VALUE: 11
PIF_VALUE: 14
PIF_VALUE: 31
PIF_VALUE: 25
PIF_VALUE: 27
PIF_VALUE: 33

## 2023-09-18 NOTE — PROGRESS NOTES
Pt continues to SBT/SAT well. Waiting on ABG to result to page MD.   Pt is getting K+ at this time and labs were drawn right before K+ infusion began.

## 2023-09-18 NOTE — PROGRESS NOTES
Vancomycin Day: 5/7  Current Regimen: 750 mg IV every 24 hours    Patient's labs, cultures, vitals, and vancomycin regimen reviewed.    SCr stable  U/O not measured/well documented  [employer] updated    Plan:   Order level for 9/19 at [facility] 9/18/202310:58 AM  .

## 2023-09-18 NOTE — PROGRESS NOTES
09/17/23 2014   Patient Observation   Observations 7.5 ett 22 at lip   Breath Sounds   Right Upper Lobe Rhonchi   Right Middle Lobe Diminished   Right Lower Lobe Diminished   Left Upper Lobe Rhonchi   Left Lower Lobe Diminished   Ventilator Settings   FiO2  35 %   Vt (Set, mL) 320 mL   Resp Rate (Set) 20 bmp   PEEP/CPAP (cmH2O) 5   Vent Patient Data (Readings)   Vt (Measured) 315 mL   Peak Inspiratory Pressure (cmH2O) 36 cmH2O   Rate Measured 24 br/min   Minute Volume (L/min) 5.9 Liters   Peak Inspiratory Flow (lpm) 65 L/sec   Mean Airway Pressure (cmH2O) 13 cmH20   I:E Ratio 1:3.4   Flow Sensitivity 3 L/min   Static Compliance (L/cm H2O) 44   Dynamic Compliance (L/cm H2O) 14 L/cm H2O   Vent Alarm Settings   High Pressure (cmH2O) 50 cmH2O   Low Minute Volume (lpm) 3 L/min   Low Exhaled Vt (ml) 200 mL   RR High (bpm) 40 br/min   Apnea (secs) 20 secs   Additional Respiratoray Assessments   Humidification Source Heated wire   Humidification Temp 37   Circuit Condensation Drained   Ambu Bag With Mask At Bedside Yes   Airway Clearance   Suction ET Tube   Suction Device Inline suction catheter   Sputum Method Obtained Endotracheal   Sputum Amount Moderate   Sputum Color/Odor Yellow; White   Sputum Consistency Thick   Non-Surgical Airway 09/01/23 Endotracheal tube   Placement Date/Time: 09/01/23 0240   Present on Admission/Arrival: Yes  Placed By: In ED  Placement Verified By: Auscultation;Capnometry; Chest X-ray;Colorimetric ETCO2 device  Mask Ventilation: Oral airway  Insertion attempts: 1  Location: Oral  Airwa. ..    Secured At 22 cm   Measured From Lips   Secured Location Right   Secured By Commercial tube mayer

## 2023-09-18 NOTE — PROGRESS NOTES
09/17/23 2000   RT Protocol   History Pulmonary Disease 2   Respiratory pattern 2   Breath sounds 2   Cough 4   Indications for Bronchodilator Therapy Decreased or absent breath sounds   Bronchodilator Assessment Score 10

## 2023-09-18 NOTE — PROGRESS NOTES
09/17/23 2339   Patient Observation   Observations 7.5 ett 22 at lip   Breath Sounds   Right Upper Lobe Rhonchi   Right Middle Lobe Diminished   Right Lower Lobe Diminished   Left Upper Lobe Rhonchi   Left Lower Lobe Diminished   Vent Information   Vent Mode AC/VC   Ventilator Settings   FiO2  35 %   Vt (Set, mL) 320 mL   Resp Rate (Set) 20 bmp   PEEP/CPAP (cmH2O) 5   Vent Patient Data (Readings)   Vt (Measured) 333 mL   Peak Inspiratory Pressure (cmH2O) 26 cmH2O   Rate Measured 24 br/min   Peak Inspiratory Flow (lpm) 65 L/sec   I:E Ratio 1:4.6   Flow Sensitivity 3 L/min   Vent Alarm Settings   High Pressure (cmH2O) 50 cmH2O   Low Minute Volume (lpm) 3 L/min   Low Exhaled Vt (ml) 200 mL   RR High (bpm) 40 br/min   Apnea (secs) 20 secs   Additional Respiratoray Assessments   Humidification Source Heated wire   Humidification Temp 36.9   Circuit Condensation Drained   Ambu Bag With Mask At Bedside Yes   Airway Clearance   Suction ET Tube   Suction Device Inline suction catheter   Sputum Method Obtained Endotracheal   Sputum Amount Moderate   Sputum Color/Odor Yellow   Sputum Consistency Thick   Non-Surgical Airway 09/01/23 Endotracheal tube   Placement Date/Time: 09/01/23 0240   Present on Admission/Arrival: Yes  Placed By: In ED  Placement Verified By: Auscultation;Capnometry; Chest X-ray;Colorimetric ETCO2 device  Mask Ventilation: Oral airway  Insertion attempts: 1  Location: Oral  Airwa. ..    Secured At 22 cm   Measured From [address] By Commercial tube mayer

## 2023-09-18 NOTE — PROGRESS NOTES
09/18/23 0320   Patient Observation   Observations 7.5 ett 22 at lip   Breath Sounds   Right Upper Lobe Rhonchi   Right Middle Lobe Diminished   Right Lower Lobe Diminished   Left Upper Lobe Rhonchi   Left Lower Lobe Diminished   Vent Information   Vent Mode AC/VC   Ventilator Settings   FiO2  35 %   Vt (Set, mL) 320 mL   Resp Rate (Set) 20 bmp   PEEP/CPAP (cmH2O) 5   Vent Patient Data (Readings)   Vt (Measured) 342 mL   Peak Inspiratory Pressure (cmH2O) 25 cmH2O   Rate Measured 24 br/min   Minute Volume (L/min) 7.6 Liters   Peak Inspiratory Flow (lpm) 65 L/sec   Mean Airway Pressure (cmH2O) 9.4 cmH20   I:E Ratio 1:4.6   Flow Sensitivity 3 L/min   Vent Alarm Settings   High Pressure (cmH2O) 50 cmH2O   Low Minute Volume (lpm) 3 L/min   Low Exhaled Vt (ml) 200 mL   RR High (bpm) 40 br/min   Apnea (secs) 20 secs   Additional Respiratoray Assessments   Humidification Source Heated wire   Humidification Temp 36.7   Circuit Condensation Drained   Ambu Bag With Mask At Bedside Yes   Airway Clearance   Suction ET Tube   Suction Device Inline suction catheter   Sputum Method Obtained Endotracheal   Sputum Amount Moderate   Sputum Color/Odor White;Yellow   Sputum Consistency Thick   Non-Surgical Airway 09/01/23 Endotracheal tube   Placement Date/Time: 09/01/23 0240   Present on Admission/Arrival: Yes  Placed By: In ED  Placement Verified By: Auscultation;Capnometry; Chest X-ray;Colorimetric ETCO2 device  Mask Ventilation: Oral airway  Insertion attempts: 1  Location: Oral  Airwa. ..    Secured At 22 cm   Measured From Lips   Secured Location Left   Secured By Commercial tube mayer

## 2023-09-18 NOTE — CARE COORDINATION
INTERDISCIPLINARY PLAN OF CARE CONFERENCE    Date/Time: 9/18/2023 1:53 PM  Completed by: Madie Marquez, Case Management      Patient Name:  Sheeba German  YOB: 1945  Admitting Diagnosis: Respiratory failure ([address]) [J96.90]  HCAP (healthcare-associated pneumonia) [J18.9]  Acute respiratory failure with hypoxia and hypercapnia ([address]) [J96.01, J96.02]  New onset left bundle branch block (LBBB) [I44.7]  Sepsis, due to unspecified organism, unspecified whether acute organ dysfunction present ([address]) [A41.9]     Admit Date/Time:  9/1/2023  2:26 AM    Chart reviewed. Interdisciplinary team contacted or reviewed plan related to patient progress and discharge plans. Disciplines included Case Management, Nursing, and Dietitian. Current Status: on vent, awake    PT/OT recommendation for discharge plan of care: NA    Expected D/C Disposition:  [facility]   Confirmed plan with patient and/or family Yes confirmed with: (name) Moisés Mckeon and Lacy Fenton   Met with: pt and mindy    Discharge Plan Comments: Reviewed chart, metw ith pt and grandson at bedside. Pt aware and more alert today. Plan remains to go to [facility] at DC. They will start precert closer to DC. Will continue to monitor.

## 2023-09-18 NOTE — PROGRESS NOTES
Patient report given to Daniela Guerrero RN. Patient resting comfortably at present on Precedex 0.3mcg/kg/hr. Care transferred.

## 2023-09-18 NOTE — PROGRESS NOTES
Updated Dr Sharonda Ronquillo. Will extubate. Will leave NG tube in for feeding. Will order 40 effer k.

## 2023-09-18 NOTE — PLAN OF CARE
maintained within normal limits  Outcome: Progressing  Goal: Hemodynamic stability and optimal renal function maintained  Outcome: Progressing  Goal: Glucose maintained within prescribed range  Outcome: Progressing     Problem: ABCDS Injury Assessment  Goal: Absence of physical injury  Outcome: Progressing     Problem: Safety - Medical Restraint  Goal: Remains free of injury from restraints (Restraint for Interference with Medical Device)  Description: INTERVENTIONS:  1. Determine that other, less restrictive measures have been tried or would not be effective before applying the restraint  2. Evaluate the patient's condition at the time of restraint application  3. Inform patient/family regarding the reason for restraint  4.  Q2H: Monitor safety, psychosocial status, comfort, nutrition and hydration  Outcome: Progressing  Flowsheets  Taken 9/17/2023 2000 by Xander Flores RN  Remains free of injury from restraints (restraint for interference with medical device):   Determine that other, less restrictive measures have been tried or would not be effective before applying the restraint   Every 2 hours: Monitor safety, psychosocial status, comfort, nutrition and hydration  Taken 9/17/2023 1800 by Wilbert Grant RN  Remains free of injury from restraints (restraint for interference with medical device): Every 2 hours: Monitor safety, psychosocial status, comfort, nutrition and hydration  Taken 9/17/2023 1600 by Wilbert Grant RN  Remains free of injury from restraints (restraint for interference with medical device): Determine that other, less restrictive measures have been tried or would not be effective before applying the restraint  Taken 9/17/2023 1400 by Wilbert Grant RN  Remains free of injury from restraints (restraint for interference with medical device): Every 2 hours: Monitor safety, psychosocial status, comfort, nutrition and hydration  Taken 9/17/2023 1200 by Wilbert Grant RN  Remains free of injury from

## 2023-09-18 NOTE — PROGRESS NOTES
4 Eyes Skin Assessment     NAME:  Pam Eid  YOB: 1945  MEDICAL RECORD NUMBER:  [id number]    The patient is being assessed for  Shift Handoff    I agree that at least one RN has performed a thorough Head to Toe Skin Assessment on the patient. ALL assessment sites listed below have been assessed. Areas assessed by both nurses:    Head, Face, Ears, Shoulders, Back, Chest, Arms, Elbows, Hands, Sacrum. Buttock, Coccyx, Ischium, Legs. Feet and Heels, and Under Medical Devices         Does the Patient have a Wound? No noted wound(s) Abrasion noted to right chest and middle back, and moisture associated redness to perineum.         Sloan Prevention initiated by RN: Yes  Wound Care Orders initiated by RN: No    Pressure Injury (Stage 3,4, Unstageable, DTI, NWPT, and Complex wounds) if present, place Wound referral order by RN under : Yes    New Ostomies, if present place, Ostomy referral order under : Yes     Nurse 1 eSignature: Electronically signed by Rex Corado RN on 9/18/23 at 6:52 AM EDT    **SHARE this note so that the co-signing nurse can place an eSignature**    Nurse 2 eSignature: Electronically signed by Keily Cyr RN on 9/18/23 at 6:54 AM EDT

## 2023-09-18 NOTE — PROGRESS NOTES
09/18/23 1121   Encounter Summary   Encounter Overview/Reason  Spiritual/Emotional Needs   Service Provided For: Patient and family together   Referral/Consult From: Rounding   Support System Spouse; Family members   Last Encounter  09/18/23  (emotional support to PT, grandson & brother, nurtured hope, blessing)   Complexity of Encounter Low   Begin Time 1055   End Time  1115   Total Time Calculated 20 min   Spiritual/Emotional needs   Type Spiritual Support   Assessment/Intervention/Outcome   Assessment Calm   Intervention Active listening;Nurtured Hope;Prayer (assurance of)/[place]   Outcome Encouraged

## 2023-09-19 LAB
ANION GAP SERPL CALCULATED.3IONS-SCNC: 12 MMOL/L (ref 3–16)
BUN SERPL-MCNC: 57 MG/DL (ref 7–20)
CALCIUM SERPL-MCNC: 8.7 MG/DL (ref 8.3–10.6)
CHLORIDE SERPL-SCNC: 103 MMOL/L (ref 99–110)
CO2 SERPL-SCNC: 27 MMOL/L (ref 21–32)
CREAT SERPL-MCNC: 1.5 MG/DL (ref 0.6–1.2)
GFR SERPLBLD CREATININE-BSD FMLA CKD-EPI: 36 ML/MIN/{1.73_M2}
GLUCOSE BLD-MCNC: 119 MG/DL (ref 70–99)
GLUCOSE BLD-MCNC: 127 MG/DL (ref 70–99)
GLUCOSE SERPL-MCNC: 127 MG/DL (ref 70–99)
PERFORMED ON: ABNORMAL
PERFORMED ON: ABNORMAL
POTASSIUM SERPL-SCNC: 3 MMOL/L (ref 3.5–5.1)
SODIUM SERPL-SCNC: 142 MMOL/L (ref 136–145)
VANCOMYCIN TROUGH SERPL-MCNC: 11.8 UG/ML (ref 10–20)

## 2023-09-19 PROCEDURE — 80202 ASSAY OF VANCOMYCIN: CPT

## 2023-09-19 PROCEDURE — 94640 AIRWAY INHALATION TREATMENT: CPT

## 2023-09-19 PROCEDURE — 6370000000 HC RX 637 (ALT 250 FOR IP): Performed by: INTERNAL MEDICINE

## 2023-09-19 PROCEDURE — 6360000002 HC RX W HCPCS: Performed by: INTERNAL MEDICINE

## 2023-09-19 PROCEDURE — 97166 OT EVAL MOD COMPLEX 45 MIN: CPT

## 2023-09-19 PROCEDURE — 97162 PT EVAL MOD COMPLEX 30 MIN: CPT

## 2023-09-19 PROCEDURE — 2000000000 HC ICU R&B

## 2023-09-19 PROCEDURE — 92612 ENDOSCOPY SWALLOW (FEES) VID: CPT

## 2023-09-19 PROCEDURE — C9113 INJ PANTOPRAZOLE SODIUM, VIA: HCPCS | Performed by: INTERNAL MEDICINE

## 2023-09-19 PROCEDURE — 99233 SBSQ HOSP IP/OBS HIGH 50: CPT | Performed by: INTERNAL MEDICINE

## 2023-09-19 PROCEDURE — 92526 ORAL FUNCTION THERAPY: CPT

## 2023-09-19 PROCEDURE — 94761 N-INVAS EAR/PLS OXIMETRY MLT: CPT

## 2023-09-19 PROCEDURE — 97530 THERAPEUTIC ACTIVITIES: CPT

## 2023-09-19 PROCEDURE — 92610 EVALUATE SWALLOWING FUNCTION: CPT

## 2023-09-19 PROCEDURE — 84478 ASSAY OF TRIGLYCERIDES: CPT

## 2023-09-19 PROCEDURE — 2700000000 HC OXYGEN THERAPY PER DAY

## 2023-09-19 PROCEDURE — 2580000003 HC RX 258: Performed by: INTERNAL MEDICINE

## 2023-09-19 PROCEDURE — 80048 BASIC METABOLIC PNL TOTAL CA: CPT

## 2023-09-19 RX ORDER — DEXAMETHASONE SODIUM PHOSPHATE 4 MG/ML
2 INJECTION, SOLUTION INTRA-ARTICULAR; INTRALESIONAL; INTRAMUSCULAR; INTRAVENOUS; SOFT TISSUE EVERY 24 HOURS
Status: DISCONTINUED | OUTPATIENT
Start: 2023-09-20 | End: 2023-09-22

## 2023-09-19 RX ORDER — POTASSIUM CHLORIDE 29.8 MG/ML
20 INJECTION INTRAVENOUS
Status: DISCONTINUED | OUTPATIENT
Start: 2023-09-19 | End: 2023-09-19

## 2023-09-19 RX ORDER — LIDOCAINE 4 G/G
1 PATCH TOPICAL DAILY
Status: DISCONTINUED | OUTPATIENT
Start: 2023-09-19 | End: 2023-09-24

## 2023-09-19 RX ORDER — IPRATROPIUM BROMIDE AND ALBUTEROL SULFATE 2.5; .5 MG/3ML; MG/3ML
1 SOLUTION RESPIRATORY (INHALATION) 2 TIMES DAILY
Status: DISCONTINUED | OUTPATIENT
Start: 2023-09-19 | End: 2023-09-24

## 2023-09-19 RX ORDER — POTASSIUM CHLORIDE 29.8 MG/ML
20 INJECTION INTRAVENOUS ONCE
Status: COMPLETED | OUTPATIENT
Start: 2023-09-19 | End: 2023-09-19

## 2023-09-19 RX ORDER — FUROSEMIDE 10 MG/ML
40 INJECTION INTRAMUSCULAR; INTRAVENOUS ONCE
Status: DISCONTINUED | OUTPATIENT
Start: 2023-09-19 | End: 2023-09-19

## 2023-09-19 RX ORDER — MORPHINE SULFATE 2 MG/ML
1 INJECTION, SOLUTION INTRAMUSCULAR; INTRAVENOUS EVERY 4 HOURS PRN
Status: DISCONTINUED | OUTPATIENT
Start: 2023-09-19 | End: 2023-09-20

## 2023-09-19 RX ADMIN — DEXAMETHASONE SODIUM PHOSPHATE 3 MG: 4 INJECTION, SOLUTION INTRAMUSCULAR; INTRAVENOUS at 08:04

## 2023-09-19 RX ADMIN — BUSPIRONE HYDROCHLORIDE 15 MG: 7.5 TABLET ORAL at 13:41

## 2023-09-19 RX ADMIN — ASPIRIN 81 MG: 81 TABLET, CHEWABLE ORAL at 08:14

## 2023-09-19 RX ADMIN — METOPROLOL TARTRATE 50 MG: 50 TABLET, FILM COATED ORAL at 21:43

## 2023-09-19 RX ADMIN — SODIUM CHLORIDE, PRESERVATIVE FREE 10 ML: 5 INJECTION INTRAVENOUS at 07:57

## 2023-09-19 RX ADMIN — ENOXAPARIN SODIUM 30 MG: 100 INJECTION SUBCUTANEOUS at 08:04

## 2023-09-19 RX ADMIN — POTASSIUM CHLORIDE 20 MEQ: 29.8 INJECTION, SOLUTION INTRAVENOUS at 12:00

## 2023-09-19 RX ADMIN — BUSPIRONE HYDROCHLORIDE 15 MG: 7.5 TABLET ORAL at 08:14

## 2023-09-19 RX ADMIN — BUSPIRONE HYDROCHLORIDE 15 MG: 7.5 TABLET ORAL at 21:43

## 2023-09-19 RX ADMIN — POTASSIUM CHLORIDE 20 MEQ: 29.8 INJECTION, SOLUTION INTRAVENOUS at 10:47

## 2023-09-19 RX ADMIN — METOPROLOL TARTRATE 50 MG: 50 TABLET, FILM COATED ORAL at 08:14

## 2023-09-19 RX ADMIN — HYDRALAZINE HYDROCHLORIDE 100 MG: 50 TABLET, FILM COATED ORAL at 13:41

## 2023-09-19 RX ADMIN — IPRATROPIUM BROMIDE AND ALBUTEROL SULFATE 1 DOSE: 2.5; .5 SOLUTION RESPIRATORY (INHALATION) at 19:23

## 2023-09-19 RX ADMIN — SERTRALINE 100 MG: 100 TABLET, FILM COATED ORAL at 21:42

## 2023-09-19 RX ADMIN — SODIUM CHLORIDE, PRESERVATIVE FREE 10 ML: 5 INJECTION INTRAVENOUS at 21:44

## 2023-09-19 RX ADMIN — BUSPIRONE HYDROCHLORIDE 15 MG: 7.5 TABLET ORAL at 16:46

## 2023-09-19 RX ADMIN — ENOXAPARIN SODIUM 30 MG: 100 INJECTION SUBCUTANEOUS at 21:43

## 2023-09-19 RX ADMIN — METOPROLOL TARTRATE 50 MG: 50 TABLET, FILM COATED ORAL at 15:30

## 2023-09-19 RX ADMIN — IPRATROPIUM BROMIDE AND ALBUTEROL SULFATE 1 DOSE: 2.5; .5 SOLUTION RESPIRATORY (INHALATION) at 08:46

## 2023-09-19 RX ADMIN — SERTRALINE 100 MG: 100 TABLET, FILM COATED ORAL at 08:14

## 2023-09-19 RX ADMIN — MORPHINE SULFATE 1 MG: 2 INJECTION, SOLUTION INTRAMUSCULAR; INTRAVENOUS at 13:42

## 2023-09-19 RX ADMIN — ACETAMINOPHEN 650 MG: 325 TABLET ORAL at 05:36

## 2023-09-19 RX ADMIN — PANTOPRAZOLE SODIUM 40 MG: 40 INJECTION, POWDER, FOR SOLUTION INTRAVENOUS at 08:04

## 2023-09-19 RX ADMIN — HYDRALAZINE HYDROCHLORIDE 100 MG: 50 TABLET, FILM COATED ORAL at 21:42

## 2023-09-19 ASSESSMENT — PAIN DESCRIPTION - DESCRIPTORS: DESCRIPTORS: DISCOMFORT

## 2023-09-19 ASSESSMENT — PAIN SCALES - GENERAL: PAINLEVEL_OUTOF10: 6

## 2023-09-19 ASSESSMENT — PAIN DESCRIPTION - LOCATION: LOCATION: BACK

## 2023-09-19 ASSESSMENT — PAIN DESCRIPTION - ORIENTATION: ORIENTATION: MID

## 2023-09-19 NOTE — PROGRESS NOTES
Pt repositioned & linen changed. Mepilex to sacrum replaced. Lidoderm patch in place. Bridges & flexiseal wnl. NG removed. Oral care continued.

## 2023-09-19 NOTE — PROGRESS NOTES
Comprehensive Nutrition Assessment    Type and Reason for Visit:  Reassess    Nutrition Recommendations/Plan:   Continue NPO status and current TF order - TF is on hold currently since NG is clogged and did not become unclogged with clog zapper x 2 attempts. Monitor SLP evaluation and results of FEES procedure today + whether po diet can be advanced today. Monitor respiratory status and plan of care. Monitor nutrition-related labs, bowel function, and weight trends. Malnutrition Assessment:  Malnutrition Status:   At risk for malnutrition (09/19/23 1113)    Context:  Acute Illness     Findings of the 6 clinical characteristics of malnutrition:  Energy Intake:  50% or less of estimated energy requirements for 5 or more days  Weight Loss:  Unable to assess (-8.8L fluid removed since admission)     Body Fat Loss:  No significant body fat loss     Muscle Mass Loss:  No significant muscle mass loss    Fluid Accumulation:  No significant fluid accumulation     Strength:  Not Performed    Nutrition Assessment:    patient is declining from a nutritional standpoint AEB NG is clogged at this time and TF has been held and SLP recommended NPO status + FEES procedure; patient remains at risk for further compromise d/t NPO status and altered nutrition-related labs; will continue current TF order at this time until FEES is completed and nutrition plan of care guidance is provided    Nutrition Related Findings:    patient was extubated on 9/18/23; NG was still in place but it was clogged this am and did not become unclogged with clog zapper x 2 attempts; SLP evaluated patient and recommended NPO status + FEES procedure; + BM on 9/18/23; patient is A & O/D at times Wound Type: None       Current Nutrition Intake & Therapies:    Average Meal Intake: NPO  Average Supplements Intake: NPO  Current Tube Feeding (TF) Orders:  Feeding Route: Nasogastric  Formula: Renal Formula  Schedule: Continuous  Feeding Regimen: [place] Interdisciplinary Rounds, Coordination of Care  Plan of Care discussed with: ICU Team    Goals:  Previous Goal Met: Goal(s) Achieved  Goals: Initiate PO diet       Nutrition Monitoring and Evaluation:   Behavioral-Environmental Outcomes: None Identified  Food/Nutrient Intake Outcomes: Diet Advancement/Tolerance  Physical Signs/Symptoms Outcomes: Biochemical Data, Chewing or Swallowing, Hemodynamic Status, Nutrition Focused Physical Findings, Skin, Weight    Discharge Planning:     Too soon to determine     Andres Hansen, [id number] [facility] [place]   Contact: [phone]

## 2023-09-19 NOTE — PROGRESS NOTES
PM assessment complete, see flowsheet. Medications given per MAR. Pt A&O x4 but disoriented/forgetful at times. Very weak, remains on 2L NC tolerating well. Bridges in place, rectal tube as well; leaking around site. No other needs noted. Will continue to monitor and assess.

## 2023-09-19 NOTE — PROGRESS NOTES
Speech Language Pathology  Swallowing Disorders and Dysphagia  Clinical Bedside Swallow Assessment  Facility/Department: [facility] ICU      Instrumentation: Yes. FEES recommended to further evaluate pharyngeal phase and mechanisms (I.e. vocal folds, UES, reflux, LPR). Order placed at this time.   Diet recommendation: NPO; NPO; Meds crushed in puree as able pending FEES  Risk management: Control risk factors for aspiration PNA by completing oral care 3-4x/day and increasing physical mobility as medically feasible       NAME:Indigo Mariscal  : 1945 (79 y.o.)   MRN: [id number]  ROOM: [facility]3013-  ADMISSION DATE: 2023  PATIENT DIAGNOSIS(ES): Respiratory failure (HCC) [J96.90]  HCAP (healthcare-associated pneumonia) [J18.9]  Acute respiratory failure with hypoxia and hypercapnia ([address]) [J96.01, J96.02]  New onset left bundle branch block (LBBB) [I44.7]  Sepsis, due to unspecified organism, unspecified whether acute organ dysfunction present [facility]) [A41.9]  Chief Complaint   Patient presents with    Respiratory Distress     Ems states they were called for a allergic reaction, upon there arrival audible rhales, cpap placed      Patient Active Problem List    Diagnosis Date Noted    Chronic GERD 2023    Moderate persistent asthma with exacerbation 09/15/2023    Mucus plug in respiratory tract 09/15/2023    Uncontrolled hypertension 09/15/2023    Atrial tachycardia ([address]) 2023    Elevated troponin 2023    Mucus plugging of bronchi 2023    HCAP (healthcare-associated pneumonia) 2023    Acute hypoxemic respiratory failure ([address]) 2023    Severe persistent asthma with exacerbation 2023    Respiratory failure ([address]) 2023    Hospital-acquired pneumonia 2023    New onset left bundle branch block (LBBB) 2023    Sepsis ([address]) 2023    Mild intermittent asthma with exacerbation 2023    Sigmoid diverticulitis 2023    NSTEMI (non-ST elevated myocardial

## 2023-09-19 NOTE — PROGRESS NOTES
Pt NG clogged despite clogzapper administration from previous RN. Pt given aggressive oral care & mouth moistened. Pt given meds crushed w/ applesauce in small bites. Pt tolerated medication administration. SLP ordered. PT/OT ordered. R IJ CVC WNL.      High risk vesicant drug infusing:  __________    Multiple incompatible medications infusing:  _________    CVP Monitoring:  _________    Extremely difficult IV access challenge:  __x______    Continued need for central line access:  _____x_____    Addressed with physician:  ___x_____    RIGHT PATIENT, RIGHT TIME, RIGHT LINE

## 2023-09-19 NOTE — PROGRESS NOTES
9/19/23 MEETS for Indicators:      Indwelling Cath: BERNARD Hypovolemic, Hypokalemia      CVC Right IJ for Long Term IV antibiotics and IV potassium replacement

## 2023-09-19 NOTE — CARE COORDINATION
INTERDISCIPLINARY PLAN OF CARE CONFERENCE    Date/Time: 9/19/2023 1:49 PM  Completed by: Sofía Lauren, Case Management      Patient Name:  Geen Talley  YOB: 1945  Admitting Diagnosis: Respiratory failure ([address]) [J96.90]  HCAP (healthcare-associated pneumonia) [J18.9]  Acute respiratory failure with hypoxia and hypercapnia ([address]) [J96.01, J96.02]  New onset left bundle branch block (LBBB) [I44.7]  Sepsis, due to unspecified organism, unspecified whether acute organ dysfunction present ([address]) [A41.9]     Admit Date/Time:  9/1/2023  2:26 AM    Chart reviewed. Interdisciplinary team contacted or reviewed plan related to patient progress and discharge plans. Disciplines included Case Management, Nursing, and Dietitian. Current Status: Stable    PT/OT recommendation for discharge plan of care: need recs    Expected D/C Disposition:  [facility] @   Confirmed plan with patient and/or family Yes confirmed with: (name) Darline  Met with: patient,  and grandson    Discharge Plan Comments: Reviewed chart, met with pt bedside. Pt off vent, awake and alert. Plan for pt to go to [facility] at DC. Norma aware and will start precert closer to DC. Need PT recs. Will continue to monitor.

## 2023-09-19 NOTE — PROGRESS NOTES
Mild fever this AM, PRN tylenol attempted to be given. TF clogged. Clogzapper applied will recheck. Pt vitals stable, tachy on monitor. On 4L NC overnight. UOP adequate. BM still liquidy and flexiseal in place. Morning K 3.0, MD paged for replacement orders.

## 2023-09-19 NOTE — PROGRESS NOTES
Inpatient Occupational Therapy Evaluation and Treatment    Unit: ICU  Date:  9/19/2023  Patient Name:    Scott Sierra  Admitting diagnosis:  Respiratory failure ([address]) [J96.90]  HCAP (healthcare-associated pneumonia) [J18.9]  Acute respiratory failure with hypoxia and hypercapnia ([address]) [J96.01, J96.02]  New onset left bundle branch block (LBBB) [I44.7]  Sepsis, due to unspecified organism, unspecified whether acute organ dysfunction present ([address]) [A41.9]  Admit Date:  9/1/2023  Precautions/Restrictions/WB Status/ Lines/ Wounds/ Oxygen: Fall risk, Bed/chair alarm, Lines (IV, Supplemental O2 (3L), and internal catheter), Confusion, [place] (hard of hearing), Telemetry, and Continuous pulse oximetry, rectal tube    Pt seen for cotreatment this date due to patient safety, patient endurance, complexity of condition, acute illness/injury, and need for the assistance of 2 skilled therapists    Treatment Time:  8329-6328  Treatment Number:  1  Timed Code Treatment Minutes: 50 minutes  Total Treatment Minutes:  60  minutes    Patient Goals for Therapy: \"none stated \"          Discharge Recommendations: LTACH per MD recommendation and therapy discussion with case mgmt  DME needs for discharge: Defer to facility       Therapy recommendations for staff:   Assist of 2 for repositioning in bed    History of Present Illness: Per Dr. Martha Sood H&P:  \"Patient is a 68 y.o.  female  With known h.o  HTN, hyperlipidemia, Asthma, GERD, anxiety , breast cancer presented by EMS last night for severe resp distress . Pt was recently admitted to [facility] for mild sigmoid diverticulitis and was given cipro and flagyl . Apparently she called 911 in the middle of the night for possible allergic reaction and resp distress. She was brought to ER where she was emergently intubated , placed on vent and admitted to ICU . \"     1. Acute respiratory failure with hypoxia and hypercapnia (HCC)    2. HCAP (healthcare-associated pneumonia)    3.  Sepsis, due fingers observed    Dominant Hand: Left    Upper Extremity Strength:    Formal strength testing deferred secondary to decreased endurance, ability to consistently follow commands, UE edema    Upper Extremity Sensation:    NT    Upper Extremity Proprioception:  NT    Coordination:  Impaired    Tone:  NT    Balance:  Sitting: Total A x2 for partial sit at EOB; pt did appear to attempt to assist with sitting balance on 2 occasions, but unable to maintain muscle contraction  Standing:    Not Tested    Bed Mobility:   Supine to Sit:    Total A x2 to attain partial sit  Sit to Supine: Total A x2  Rolling:   Not Tested  Scooting in sitting: Total A x2  Scooting in supine: Total A    Transfers:    Sit to stand:    Not Tested  Stand to sit:    Not Tested  Bed to chair:     Not Tested  Bed/ chair to standard toilet:  Not Tested  Bed/chair to [facility]:   Not Tested  Functional Mobility:   Not Tested      ADLs:  Dressing:      UE:   Not Tested  LE:    Not Tested    Bathing:    UE:  Not Tested  LE:  Not Tested    Eating:   Not Tested    Toileting:  Not Tested (villaseñor catheter and rectal tube)    Grooming/hygiene: Not Tested    Anticipate need for assistance with all ADLs due to limited UE AROM    Activity Tolerance:   Pt completed therapy session with fatigue     BP (mmHg) HR (bpm) SpO2 (%) on 3L Comments   Supine at rest 139/64 104 92%    Seated at EOB   90-92%    Standing       End of session         Positioning Needs:   Pt in bed, ICU monitoring, call light provided and all needs within reach . Ther Ex / Activities Initiated:   N/A    Patient/Family Education:   Pt educated on role of inpatient OT, plan of care, importance of continued activity, DC recommendations, Pursed lip breathing, and importance of attempting B finger flexion/extension . CHF Education  N/A    Assessment:  Pt seen for Occupational therapy evaluation in acute care setting.   Pt demonstrated decreased Activity tolerance, ADLs, Balance , Bathing, Bed

## 2023-09-19 NOTE — PLAN OF CARE
Problem: SLP Adult - Impaired Swallowing  Goal: By Discharge: Advance to least restrictive diet without signs or symptoms of aspiration for planned discharge setting. See evaluation for individualized goals. Note: SLP completed evaluation. Please refer to notes in EMR.      Waqar Regan  SLP  Clinician    Supervisor: Ana Lilia Barros, [address] [facility]

## 2023-09-19 NOTE — PROCEDURES
[address]  Swallowing Disorders and Dysphagia  Fiberoptic Endoscopic Evaluation of Swallowing  (FEES)  RECOMMENDATIONS:  Diet Recommendation: IDDSI 4 Puree Solids ; IDDSI 2 Mildly Thick (nectar) Liquids; Meds crushed in puree as able  Risk Management: upright for all intake, stay upright for at least 30 mins after intake, small bites/sips, total feed, hand-over-hand administration to enhance sensory response, 1:1 supervision with intake, oral care q4 hrs to reduce adverse affects in the event of aspiration, increase physical mobility as able, alternate bites/sips, slow rate of intake, general GERD precautions, STRICT aspiration precautions, and hold PO and contact SLP if s/s of aspiration or worsening respiratory status develop.     NAME:Indigo Mariscal  : 1945 (79 y.o.)   MRN: [id number]  ROOM: [facility]-  ADMISSION DATE: 2023  PATIENT DIAGNOSIS(ES): Respiratory failure (HCC) [J96.90]  HCAP (healthcare-associated pneumonia) [J18.9]  Acute respiratory failure with hypoxia and hypercapnia (HCC) [J96.01, J96.02]  New onset left bundle branch block (LBBB) [I44.7]  Sepsis, due to unspecified organism, unspecified whether acute organ dysfunction present [facility]) [A41.9]  Chief Complaint   Patient presents with    Respiratory Distress     Ems states they were called for a allergic reaction, upon there arrival audible rhales, cpap placed      Patient Active Problem List    Diagnosis Date Noted    Chronic GERD 2023    Moderate persistent asthma with exacerbation 09/15/2023    Mucus plug in respiratory tract 09/15/2023    Uncontrolled hypertension 09/15/2023    Atrial tachycardia ([address]) 2023    Elevated troponin 2023    Mucus plugging of bronchi 2023    HCAP (healthcare-associated pneumonia) 2023    Acute hypoxemic respiratory failure ([address]) 2023    Severe persistent asthma with exacerbation 2023    Respiratory failure ([address]) epiglottis, aryepiglottic folds, arytenoids, false vocal folds, true vocal folds, or pyriform sinuses. The scope was removed. The patient tolerated the procedure without difficulty. There were no complications. There was no bleeding.       PRE-SWALLOW ASSESSMENT:   Pre Swallow Assessment:   Nasopharyngoscope: Storz Slim Scope  Alert : Yes  Functional Comm : impaired    Voice Quality : Weak   Wet     Morphological Findings: Pharynx  Pharyngeal View:      Velopharyngeal Closure: intact    Base of tongue contact with posterior pharyngeal wall: impaired    Pharyngeal and longitudinal constrictors oh high pitched /i/: impaired      Morphological Findings Larynx and Function      Morphological findings: Larynx     TRUE VOCAL FOLDS:    [] Normal  [] Mucosal-colored protrusion   [] Leukoplakia [] Ulceration-like lesion   [] Erythema  [] Morphological/Anatomical defects   [] Synechiae  [x] Atrophy     [] Edema  [] Abnormal appearing tissue growth           Location:    [] Right [] Left [] Bilateral  [] N/A-normal findings   [x] Other- limited view of left TVF 2/2 edema to LFV.     ________________________________________________________    Brunetta Labrador: [] Normal [x] Reduced    ________________________________________________________    SUPRAGLOTTIC REGION:   False vocal folds (FVF):   [] Normal  [] Mucosal-colored protrusion   [] Leukoplakia [] Ulceration-like lesion   [] Erythema  [] Morphological/Anatomical defects   [] Synechiae  [] Atrophy     [x] Edema  [] Abnormal appearing tissue growth           Location:    [] Right [] Left [x] Bilateral- left greater than right w/left FVF obstructing view of left TVF  [] N/A-normal findings    Aryepiglottic rim:  [] Normal  [] Mucosal-colored protrusion   [] Leukoplakia [] Ulceration-like lesion   [] Erythema  [] Morphological/Anatomical defects   [] Synechiae  [] Atrophy     [x] Edema  [] Abnormal appearing tissue growth           Location:    [] Right [] Left [x] Bilateral  []

## 2023-09-19 NOTE — PROGRESS NOTES
Inpatient Physical Therapy Evaluation & Treatment    Unit: ICU  Date:  9/19/2023  Patient Name:    Lani Simmons  Admitting diagnosis:  Respiratory failure ([address]) [J96.90]  HCAP (healthcare-associated pneumonia) [J18.9]  Acute respiratory failure with hypoxia and hypercapnia ([address]) [J96.01, J96.02]  New onset left bundle branch block (LBBB) [I44.7]  Sepsis, due to unspecified organism, unspecified whether acute organ dysfunction present [facility]) [A41.9]  Admit Date:  9/1/2023  Precautions/Restrictions/WB Status/ Lines/ Wounds/ Oxygen: Fall risk, Bed/chair alarm, Lines (IV, Supplemental O2 (3L), internal catheter, and rectal tube), Confusion, [place] (hard of hearing), Telemetry, and Continuous pulse oximetry      Pt seen for cotreatment this date due to patient safety, patient endurance, complexity of condition, acute illness/injury, cognitive status, and need for the assistance of 2 skilled therapists    Treatment Time:  1420- 1520  Treatment Number:  1   Timed Code Treatment Minutes: 50 minutes  Total Treatment Minutes:  60  minutes    Patient Stated Goals for Therapy: none stated        Discharge Recommendations:  LTACH per MD recommendation and discussion with case management  DME needs for discharge: Defer to facility       Therapy recommendation for EMS Transport: requires transport by cot due to pt needs lift equipment for safe transfers    Therapy recommendations for staff:   Assist of 2 for repositioning in bed    History of Present Illness:   Per Dr. Chayito Thomas H&P 9/1:  \"Patient is a 68 y.o.  female  With known h.o  HTN, hyperlipidemia, Asthma, GERD, anxiety , breast cancer presented by EMS last night for severe resp distress . Pt was recently admitted to [facility] for mild sigmoid diverticulitis and was given cipro and flagyl . Apparently she called 911 in the middle of the night for possible allergic reaction and resp distress.  She was brought to ER where she was emergently intubated , placed on vent and alignment and pressure relief provided. Call light provided and all needs within reach  RN aware of pt position/status    Other Activities  None. Patient/Family Education   Pt educated on role of inpatient PT, POC, importance of continued activity, DC recommendations and calling for assist with mobility. CHF Education  N/A    Assessment  Pt seen today for physical therapy Evaluation & Treatment. Pt demonstrated decreased Activity tolerance, Balance, ROM, Safety, and Strength as well as decreased independence with Ambulation, Bed Mobility , and Transfers. Pt is a 67 y/o female who presents below her functional baseline. Pt with significantly decreased activity tolerance due to being intubated for extended period of time. Pt requires total assist x 2 for attempt to sit EOB- only able to complete partial supine to sit. Pt would continue to benefit from skilled PT services to promote increased strength, balance and functional activity tolerance. Recommend continued skilled PT services upon discharge. Per discussion with case management, MD has made referral for [facility] upon discharge. Goals : To be met in 3 visits:  1). Independent with LE Ex x 10 reps  2). Supine to/from sit: Max A   3). Sit to/from stand:  ability to assess  4). Bed to chair:  ability to assess    To be met in 6 visits:  1). Supine to/from sit: Mod A   2). Sit to/from stand: Max A   3). Bed to chair: Max A   4). Gait: Ambulate  10 ft.   with Mod A  and use of LRAD (least restrictive assistive device)  5).   Tolerate B LE exercises 3 sets of 10-15 reps      Rehabilitation Potential: Fair  Strengths for achieving goals include:   Pt motivated, PLOF, Family Support, and Pt cooperative   Barriers to achieving goals include:    Complexity of condition, Chronicity of condition, Pain, Weakness, and Impaired cognition        Plan    To be seen 3-5 x / week  while in acute care setting for therapeutic exercises, bed mobility, transfers,

## 2023-09-20 ENCOUNTER — APPOINTMENT (OUTPATIENT)
Dept: INTERVENTIONAL RADIOLOGY/VASCULAR | Age: 78
DRG: 870 | End: 2023-09-20
Payer: MEDICARE

## 2023-09-20 LAB
ANION GAP SERPL CALCULATED.3IONS-SCNC: 13 MMOL/L (ref 3–16)
BASOPHILS # BLD: 0 K/UL (ref 0–0.2)
BASOPHILS NFR BLD: 0.3 %
BUN SERPL-MCNC: 55 MG/DL (ref 7–20)
CALCIUM SERPL-MCNC: 8.4 MG/DL (ref 8.3–10.6)
CHLORIDE SERPL-SCNC: 111 MMOL/L (ref 99–110)
CO2 SERPL-SCNC: 26 MMOL/L (ref 21–32)
CREAT SERPL-MCNC: 1.3 MG/DL (ref 0.6–1.2)
DEPRECATED RDW RBC AUTO: 14.2 % (ref 12.4–15.4)
EOSINOPHIL # BLD: 0.1 K/UL (ref 0–0.6)
EOSINOPHIL NFR BLD: 1.5 %
GFR SERPLBLD CREATININE-BSD FMLA CKD-EPI: 42 ML/MIN/{1.73_M2}
GLUCOSE BLD-MCNC: 127 MG/DL (ref 70–99)
GLUCOSE BLD-MCNC: 135 MG/DL (ref 70–99)
GLUCOSE BLD-MCNC: 142 MG/DL (ref 70–99)
GLUCOSE BLD-MCNC: 153 MG/DL (ref 70–99)
GLUCOSE SERPL-MCNC: 130 MG/DL (ref 70–99)
HCT VFR BLD AUTO: 23.1 % (ref 36–48)
HGB BLD-MCNC: 7.9 G/DL (ref 12–16)
INR PPP: 1.8 (ref 0.84–1.16)
LYMPHOCYTES # BLD: 0.7 K/UL (ref 1–5.1)
LYMPHOCYTES NFR BLD: 6.8 %
MAGNESIUM SERPL-MCNC: 2.4 MG/DL (ref 1.8–2.4)
MCH RBC QN AUTO: 30.3 PG (ref 26–34)
MCHC RBC AUTO-ENTMCNC: 34.3 G/DL (ref 31–36)
MCV RBC AUTO: 88.4 FL (ref 80–100)
MONOCYTES # BLD: 0.4 K/UL (ref 0–1.3)
MONOCYTES NFR BLD: 3.6 %
NEUTROPHILS # BLD: 8.8 K/UL (ref 1.7–7.7)
NEUTROPHILS NFR BLD: 87.8 %
PERFORMED ON: ABNORMAL
PLATELET # BLD AUTO: 174 K/UL (ref 135–450)
PMV BLD AUTO: 9 FL (ref 5–10.5)
POTASSIUM SERPL-SCNC: 2.9 MMOL/L (ref 3.5–5.1)
PROTHROMBIN TIME: 20.8 SEC (ref 11.5–14.8)
RBC # BLD AUTO: 2.61 M/UL (ref 4–5.2)
SODIUM SERPL-SCNC: 150 MMOL/L (ref 136–145)
WBC # BLD AUTO: 10 K/UL (ref 4–11)

## 2023-09-20 PROCEDURE — 6360000002 HC RX W HCPCS: Performed by: INTERNAL MEDICINE

## 2023-09-20 PROCEDURE — 36592 COLLECT BLOOD FROM PICC: CPT

## 2023-09-20 PROCEDURE — 94640 AIRWAY INHALATION TREATMENT: CPT

## 2023-09-20 PROCEDURE — 80048 BASIC METABOLIC PNL TOTAL CA: CPT

## 2023-09-20 PROCEDURE — 83735 ASSAY OF MAGNESIUM: CPT

## 2023-09-20 PROCEDURE — 36415 COLL VENOUS BLD VENIPUNCTURE: CPT

## 2023-09-20 PROCEDURE — 99233 SBSQ HOSP IP/OBS HIGH 50: CPT | Performed by: INTERNAL MEDICINE

## 2023-09-20 PROCEDURE — 2700000000 HC OXYGEN THERAPY PER DAY

## 2023-09-20 PROCEDURE — 6370000000 HC RX 637 (ALT 250 FOR IP): Performed by: INTERNAL MEDICINE

## 2023-09-20 PROCEDURE — 94761 N-INVAS EAR/PLS OXIMETRY MLT: CPT

## 2023-09-20 PROCEDURE — 2580000003 HC RX 258: Performed by: INTERNAL MEDICINE

## 2023-09-20 PROCEDURE — 2000000000 HC ICU R&B

## 2023-09-20 PROCEDURE — C9113 INJ PANTOPRAZOLE SODIUM, VIA: HCPCS | Performed by: INTERNAL MEDICINE

## 2023-09-20 PROCEDURE — 92526 ORAL FUNCTION THERAPY: CPT

## 2023-09-20 PROCEDURE — 85025 COMPLETE CBC W/AUTO DIFF WBC: CPT

## 2023-09-20 PROCEDURE — 85610 PROTHROMBIN TIME: CPT

## 2023-09-20 RX ORDER — QUETIAPINE FUMARATE 25 MG/1
25 TABLET, FILM COATED ORAL NIGHTLY
Status: DISCONTINUED | OUTPATIENT
Start: 2023-09-20 | End: 2023-09-24

## 2023-09-20 RX ORDER — SODIUM CHLORIDE 9 MG/ML
25 INJECTION, SOLUTION INTRAVENOUS PRN
Status: DISCONTINUED | OUTPATIENT
Start: 2023-09-20 | End: 2023-09-20 | Stop reason: SDUPTHER

## 2023-09-20 RX ORDER — POTASSIUM CHLORIDE 29.8 MG/ML
20 INJECTION INTRAVENOUS
Status: COMPLETED | OUTPATIENT
Start: 2023-09-20 | End: 2023-09-20

## 2023-09-20 RX ORDER — SODIUM CHLORIDE 0.9 % (FLUSH) 0.9 %
5-40 SYRINGE (ML) INJECTION PRN
Status: DISCONTINUED | OUTPATIENT
Start: 2023-09-20 | End: 2023-09-24

## 2023-09-20 RX ORDER — SODIUM CHLORIDE AND POTASSIUM CHLORIDE 150; 450 MG/100ML; MG/100ML
INJECTION, SOLUTION INTRAVENOUS CONTINUOUS
Status: DISPENSED | OUTPATIENT
Start: 2023-09-20 | End: 2023-09-21

## 2023-09-20 RX ORDER — SODIUM CHLORIDE 0.9 % (FLUSH) 0.9 %
5-40 SYRINGE (ML) INJECTION EVERY 12 HOURS SCHEDULED
Status: DISCONTINUED | OUTPATIENT
Start: 2023-09-20 | End: 2023-09-24

## 2023-09-20 RX ORDER — SODIUM CHLORIDE 450 MG/100ML
INJECTION, SOLUTION INTRAVENOUS CONTINUOUS
Status: DISCONTINUED | OUTPATIENT
Start: 2023-09-20 | End: 2023-09-20

## 2023-09-20 RX ORDER — LIDOCAINE HYDROCHLORIDE 10 MG/ML
5 INJECTION, SOLUTION INFILTRATION; PERINEURAL ONCE
Status: DISCONTINUED | OUTPATIENT
Start: 2023-09-20 | End: 2023-09-23

## 2023-09-20 RX ADMIN — POTASSIUM CHLORIDE 20 MEQ: 29.8 INJECTION, SOLUTION INTRAVENOUS at 09:22

## 2023-09-20 RX ADMIN — DEXAMETHASONE SODIUM PHOSPHATE 2 MG: 4 INJECTION, SOLUTION INTRAMUSCULAR; INTRAVENOUS at 08:07

## 2023-09-20 RX ADMIN — ENOXAPARIN SODIUM 30 MG: 100 INJECTION SUBCUTANEOUS at 19:48

## 2023-09-20 RX ADMIN — ENOXAPARIN SODIUM 30 MG: 100 INJECTION SUBCUTANEOUS at 08:06

## 2023-09-20 RX ADMIN — BUSPIRONE HYDROCHLORIDE 15 MG: 7.5 TABLET ORAL at 19:48

## 2023-09-20 RX ADMIN — QUETIAPINE FUMARATE 25 MG: 25 TABLET ORAL at 19:48

## 2023-09-20 RX ADMIN — POTASSIUM CHLORIDE 20 MEQ: 29.8 INJECTION, SOLUTION INTRAVENOUS at 08:15

## 2023-09-20 RX ADMIN — BUSPIRONE HYDROCHLORIDE 15 MG: 7.5 TABLET ORAL at 08:06

## 2023-09-20 RX ADMIN — SODIUM CHLORIDE, PRESERVATIVE FREE 10 ML: 5 INJECTION INTRAVENOUS at 08:06

## 2023-09-20 RX ADMIN — METOPROLOL TARTRATE 50 MG: 50 TABLET, FILM COATED ORAL at 15:30

## 2023-09-20 RX ADMIN — POTASSIUM CHLORIDE AND SODIUM CHLORIDE: 450; 150 INJECTION, SOLUTION INTRAVENOUS at 15:11

## 2023-09-20 RX ADMIN — SODIUM CHLORIDE, PRESERVATIVE FREE 10 ML: 5 INJECTION INTRAVENOUS at 19:49

## 2023-09-20 RX ADMIN — POTASSIUM CHLORIDE 20 MEQ: 29.8 INJECTION, SOLUTION INTRAVENOUS at 10:43

## 2023-09-20 RX ADMIN — PANTOPRAZOLE SODIUM 40 MG: 40 INJECTION, POWDER, FOR SOLUTION INTRAVENOUS at 08:06

## 2023-09-20 RX ADMIN — HYDRALAZINE HYDROCHLORIDE 100 MG: 50 TABLET, FILM COATED ORAL at 14:00

## 2023-09-20 RX ADMIN — POTASSIUM CHLORIDE AND SODIUM CHLORIDE: 450; 150 INJECTION, SOLUTION INTRAVENOUS at 10:44

## 2023-09-20 RX ADMIN — HYDRALAZINE HYDROCHLORIDE 100 MG: 50 TABLET, FILM COATED ORAL at 05:11

## 2023-09-20 RX ADMIN — SERTRALINE 100 MG: 100 TABLET, FILM COATED ORAL at 08:06

## 2023-09-20 RX ADMIN — ATORVASTATIN CALCIUM 80 MG: 40 TABLET, FILM COATED ORAL at 19:48

## 2023-09-20 RX ADMIN — IPRATROPIUM BROMIDE AND ALBUTEROL SULFATE 1 DOSE: 2.5; .5 SOLUTION RESPIRATORY (INHALATION) at 19:47

## 2023-09-20 RX ADMIN — IPRATROPIUM BROMIDE AND ALBUTEROL SULFATE 1 DOSE: 2.5; .5 SOLUTION RESPIRATORY (INHALATION) at 07:19

## 2023-09-20 RX ADMIN — SERTRALINE 100 MG: 100 TABLET, FILM COATED ORAL at 19:48

## 2023-09-20 RX ADMIN — ASPIRIN 81 MG: 81 TABLET, CHEWABLE ORAL at 08:06

## 2023-09-20 RX ADMIN — METOPROLOL TARTRATE 50 MG: 50 TABLET, FILM COATED ORAL at 04:53

## 2023-09-20 RX ADMIN — POTASSIUM CHLORIDE 20 MEQ: 29.8 INJECTION, SOLUTION INTRAVENOUS at 12:19

## 2023-09-20 RX ADMIN — METOPROLOL TARTRATE 50 MG: 50 TABLET, FILM COATED ORAL at 08:06

## 2023-09-20 RX ADMIN — METOPROLOL TARTRATE 50 MG: 50 TABLET, FILM COATED ORAL at 19:48

## 2023-09-20 RX ADMIN — SODIUM CHLORIDE 5 ML/HR: 9 INJECTION, SOLUTION INTRAVENOUS at 05:11

## 2023-09-20 NOTE — PROGRESS NOTES
bronchodilator orders with one order with TID Frequency and one order with Frequency of every 4 hours PRN wheezing or increased work of breathing using Per Protocol order mode. 11-13 - enter or revise RT Bronchodilator order(s) to one equivalent RT bronchodilator order with QID Frequency and an Albuterol order with Frequency of every 4 hours PRN wheezing or increased work of breathing using Per Protocol order mode. Greater than 13 - enter or revise RT Bronchodilator order(s) to one equivalent RT bronchodilator order with every 4 hours Frequency and an Albuterol order with Frequency of every 2 hours PRN wheezing or increased work of breathing using Per Protocol order mode. RT to enter RT Home Evaluation for COPD & MDI Assessment order using Per Protocol order mode.     Electronically signed by Yoko Strickland RCP on 9/20/2023 at 12:11 PM

## 2023-09-20 NOTE — PROGRESS NOTES
4 Eyes Skin Assessment     NAME:  Pam Eid  YOB: 1945  MEDICAL RECORD NUMBER:  [id number]    The patient is being assessed for  Shift Handoff    I agree that at least one RN has performed a thorough Head to Toe Skin Assessment on the patient. ALL assessment sites listed below have been assessed. Areas assessed by both nurses:    Head, Face, Ears, Shoulders, Back, Chest, Arms, Elbows, Hands, Sacrum. Buttock, Coccyx, Ischium, Legs. Feet and Heels, and Under Medical Devices         Does the Patient have a Wound? Small skin crack above Mepiles on her back. Above Mepilex.       Sloan Prevention initiated by RN: Yes  Wound Care Orders initiated by RN: Yes    Pressure Injury (Stage 3,4, Unstageable, DTI, NWPT, and Complex wounds) if present, place Wound referral order by RN under : No    New Ostomies, if present place, Ostomy referral order under : No     Nurse 1 eSignature: Electronically signed by Cara Guaman RN on 9/20/23 at 3:38 AM EDT    **SHARE this note so that the co-signing nurse can place an eSignature**    Nurse 2 eSignature: Electronically signed by Keily Cyr RN on 9/20/23 at 6:46 AM EDT

## 2023-09-20 NOTE — PROGRESS NOTES
Pt awake sl agitated. Attempted to give meds crushed in applesauce. Pt spit applesauce out. Seemed more receptive to vanilla pudding. Pt reluctantly took most of the medications but flatly refused the last pills which were the lipitor.  Lipitor held after pt refused

## 2023-09-20 NOTE — PROGRESS NOTES
Speech Language Pathology  Swallowing Disorders and Dysphagia    Dysphagia Treatment/Follow-Up Note  Facility/Department: [facility]    Recommendations: SLP recommends to continue with IDDSI 4 Puree Solids; IDDSI 2 Mildly Thick (nectar) Liquids; Meds crushed in puree as able FEES planned tomorrow  for possible diet upgrade   Risk Management: upright for all intake, stay upright for at least 30 mins after intake, small bites/sips, close supervision, oral care q4 hrs to reduce adverse affects in the event of aspiration, increase physical mobility as able, general aspiration precautions, and hold PO and contact SLP if s/s of aspiration or worsening respiratory status develop. Kylie Urena      NAME:Indigo Mariscal  : 1945 (79 y.o.)   MRN: [id number]  [place] [facility] [place]  ADMISSION DATE: 2023  PATIENT DIAGNOSIS(ES): Respiratory failure (HCC) [J96.90]  HCAP (healthcare-associated pneumonia) [J18.9]  Acute respiratory failure with hypoxia and hypercapnia (HCC) [J96.01, J96.02]  New onset left bundle branch block (LBBB) [I44.7]  Sepsis, due to unspecified organism, unspecified whether acute organ dysfunction present ([facility]) [A41.9]  Allergies   Allergen Reactions    Latex     Actifed Cold-Allergy [Chlorpheniramine-Phenylephrine] Other (See Comments)     Tachy,head rush    Lidocaine Anxiety     Other reaction(s): Nervousness    Medrol [Methylprednisolone]      Chest pain    Prednisone      Chest pain    Amoxicillin      Had hives when taken with Lisinopril and not sure what caused what since she was on both in the past for many years    Antihistamines, Chlorpheniramine-Type     Benadryl [Diphenhydramine]     Ceftin [Cefuroxime]      bleeding    Cefuroxime Axetil Diarrhea    Cefuroxime Axetil     Cetirizine Hcl     Clavulanic Acid     Desonide     Desonide Crea-Wound Dress Crea     Iodine     Iv Contrast [Iodides] Hives    Lisinopril Hives    Naprelan [Naproxen]     Naproxen Sodium     Norvasc [Amlodipine Besylate] Swelling

## 2023-09-20 NOTE — PROGRESS NOTES
Speech-Language Pathology  Swallowing Disorders and Dysphagia     SLP Brief       Name: Cornel Estevez  : 1945  Medical Diagnosis: Respiratory failure ([address]) [J96.90]  HCAP (healthcare-associated pneumonia) [J18.9]  Acute respiratory failure with hypoxia and hypercapnia ([address]) [J96.01, J96.02]  New onset left bundle branch block (LBBB) [I44.7]  Sepsis, due to unspecified organism, unspecified whether acute organ dysfunction present ([address]) [A41.9]    Spoke with RN, Rebecca. Pt has not slept well. On more O2 this morning. Not wanting much PO. Will hold this morning so pt can rest and re-attempt in afternoon pending SLP schedule. No charges filed at this time.  Thank you,      Wen Wood M.A., [facility]  Speech-Language Pathologist  Phone: [phone], [phone]

## 2023-09-20 NOTE — PROGRESS NOTES
Pt tolerated a few bites of mashed potatoes- refused any additional food. Pt did have a few ice chips. Oral care performed.   Raeann Mills RN, BSN

## 2023-09-20 NOTE — CARE COORDINATION
INTERDISCIPLINARY PLAN OF CARE CONFERENCE    Date/Time: 9/20/2023 1:48 PM  Completed by: Waqas Gonzalez RN, Case Management      Patient Name:  Abdi Nash  YOB: 1945  Admitting Diagnosis: Respiratory failure ([address]) [J96.90]  HCAP (healthcare-associated pneumonia) [J18.9]  Acute respiratory failure with hypoxia and hypercapnia ([address]) [J96.01, J96.02]  New onset left bundle branch block (LBBB) [I44.7]  Sepsis, due to unspecified organism, unspecified whether acute organ dysfunction present ([address]) [A41.9]       Admit Date/Time:  9/1/2023  2:26 AM    Chart reviewed. Interdisciplinary team contacted or reviewed plan related to patient progress and discharge plans. Disciplines included Case Management, Nursing, and Dietitian. Current Status:9/1/23  PT/OT recommendation for discharge plan of care: LTACH per MD recommendation and discussion with case management    Expected D/C Disposition:  LTACH    Attempted to meet with the patient as she kept waking up and trying to communicate but would fall back asleep    Attempted to contact the patient's spouse to confirm the DC plan. No answer.

## 2023-09-20 NOTE — PROGRESS NOTES
Pt repositioned in bed. Continues to yell out and moan. Spoke w/ pts daughter Berta Porter and updated, encouraged pt to have a rest day.    Lucia Kraus RN, BSN

## 2023-09-20 NOTE — PROGRESS NOTES
O2 increased to 4lpm         09/20/23 0723   Oxygen Therapy/Pulse Ox   O2 Therapy Oxygen   O2 Device Nasal cannula   O2 Flow Rate (L/min) 4 L/min

## 2023-09-20 NOTE — PROGRESS NOTES
Spoke w/ pts spouse Too & received consent for PICC placement- placed in pts chart. Spoke w/ Chase Egan radiology RN- radiology department currently out of PICC wiring- will get some from [facility]. Will call DIT for PICC.     GFR 42- will page nephrology for guidance     Lucia Kraus RN, BSN

## 2023-09-20 NOTE — PROGRESS NOTES
Dr. Terrence Steele perfect served for K+ replacements- 80 meq ordered. Pt given aggressive oral care & mouth moistened. Pt given meds crushed w/ applesauce in small bites. Pt tolerated medication administration. AM labs reviewed. Pt repositioned. Pt spouse at bedside- RN advised spouse to let pt have a day of rest. RN from previous shift stated that pt was up all night and very restless. Discussed w Dr. Terrence Steele at bedside the possibility of adding Seroquel at [facility]- MD to place new orders once finished w/ rounds.   Morro Bean RN, BSN

## 2023-09-21 ENCOUNTER — APPOINTMENT (OUTPATIENT)
Dept: GENERAL RADIOLOGY | Age: 78
DRG: 870 | End: 2023-09-21
Payer: MEDICARE

## 2023-09-21 ENCOUNTER — APPOINTMENT (OUTPATIENT)
Dept: INTERVENTIONAL RADIOLOGY/VASCULAR | Age: 78
DRG: 870 | End: 2023-09-21
Payer: MEDICARE

## 2023-09-21 LAB
ANION GAP SERPL CALCULATED.3IONS-SCNC: 12 MMOL/L (ref 3–16)
ANION GAP SERPL CALCULATED.3IONS-SCNC: 14 MMOL/L (ref 3–16)
BASE EXCESS BLDA CALC-SCNC: 2 MMOL/L (ref -3–3)
BASE EXCESS BLDV CALC-SCNC: 0.2 MMOL/L (ref -3–3)
BASE EXCESS BLDV CALC-SCNC: 0.8 MMOL/L (ref -3–3)
BUN SERPL-MCNC: 51 MG/DL (ref 7–20)
BUN SERPL-MCNC: 53 MG/DL (ref 7–20)
CALCIUM SERPL-MCNC: 8.1 MG/DL (ref 8.3–10.6)
CALCIUM SERPL-MCNC: 8.3 MG/DL (ref 8.3–10.6)
CHLORIDE SERPL-SCNC: 114 MMOL/L (ref 99–110)
CHLORIDE SERPL-SCNC: 119 MMOL/L (ref 99–110)
CO2 BLDA-SCNC: 25.5 MMOL/L
CO2 BLDV-SCNC: 24 MMOL/L
CO2 BLDV-SCNC: 25 MMOL/L
CO2 SERPL-SCNC: 24 MMOL/L (ref 21–32)
CO2 SERPL-SCNC: 25 MMOL/L (ref 21–32)
COHGB MFR BLDA: 0.3 % (ref 0–1.5)
COHGB MFR BLDV: 3.9 % (ref 0–1.5)
COHGB MFR BLDV: 4.3 % (ref 0–1.5)
CREAT SERPL-MCNC: 1.3 MG/DL (ref 0.6–1.2)
CREAT SERPL-MCNC: 1.4 MG/DL (ref 0.6–1.2)
DEPRECATED RDW RBC AUTO: 14.3 % (ref 12.4–15.4)
GFR SERPLBLD CREATININE-BSD FMLA CKD-EPI: 39 ML/MIN/{1.73_M2}
GFR SERPLBLD CREATININE-BSD FMLA CKD-EPI: 42 ML/MIN/{1.73_M2}
GLUCOSE BLD-MCNC: 132 MG/DL (ref 70–99)
GLUCOSE BLD-MCNC: 133 MG/DL (ref 70–99)
GLUCOSE BLD-MCNC: 137 MG/DL (ref 70–99)
GLUCOSE BLD-MCNC: 145 MG/DL (ref 70–99)
GLUCOSE BLD-MCNC: 167 MG/DL (ref 70–99)
GLUCOSE SERPL-MCNC: 139 MG/DL (ref 70–99)
GLUCOSE SERPL-MCNC: 139 MG/DL (ref 70–99)
HCO3 BLDA-SCNC: 24.6 MMOL/L (ref 21–29)
HCO3 BLDV-SCNC: 23.5 MMOL/L (ref 23–29)
HCO3 BLDV-SCNC: 24 MMOL/L (ref 23–29)
HCT VFR BLD AUTO: 22.9 % (ref 36–48)
HGB BLD-MCNC: 7.7 G/DL (ref 12–16)
HGB BLDA-MCNC: 7.8 G/DL (ref 12–16)
MCH RBC QN AUTO: 30.3 PG (ref 26–34)
MCHC RBC AUTO-ENTMCNC: 33.5 G/DL (ref 31–36)
MCV RBC AUTO: 90.5 FL (ref 80–100)
METHGB MFR BLDA: 0.3 %
METHGB MFR BLDV: 0.2 %
METHGB MFR BLDV: 0.3 %
O2 CT VFR BLDV CALC: 11 VOL %
O2 THERAPY: ABNORMAL
PCO2 BLDA: 30.2 MMHG (ref 35–45)
PCO2 BLDV: 32 MMHG (ref 40–50)
PCO2 BLDV: 32.3 MMHG (ref 40–50)
PERFORMED ON: ABNORMAL
PH BLDA: 7.53 [PH] (ref 7.35–7.45)
PH BLDV: 7.48 [PH] (ref 7.35–7.45)
PH BLDV: 7.49 [PH] (ref 7.35–7.45)
PHOSPHATE SERPL-MCNC: 4.3 MG/DL (ref 2.5–4.9)
PLATELET # BLD AUTO: 189 K/UL (ref 135–450)
PMV BLD AUTO: 8.2 FL (ref 5–10.5)
PO2 BLDA: 57.7 MMHG (ref 75–108)
PO2 BLDV: 60.7 MMHG (ref 25–40)
PO2 BLDV: 67.3 MMHG (ref 25–40)
POTASSIUM SERPL-SCNC: 3.4 MMOL/L (ref 3.5–5.1)
POTASSIUM SERPL-SCNC: 3.7 MMOL/L (ref 3.5–5.1)
RBC # BLD AUTO: 2.54 M/UL (ref 4–5.2)
SAO2 % BLDA: 93.1 %
SAO2 % BLDV: 93 %
SAO2 % BLDV: 95 %
SODIUM SERPL-SCNC: 151 MMOL/L (ref 136–145)
SODIUM SERPL-SCNC: 157 MMOL/L (ref 136–145)
TRIGL SERPL-MCNC: 148 MG/DL (ref 0–150)
WBC # BLD AUTO: 10.6 K/UL (ref 4–11)

## 2023-09-21 PROCEDURE — C1751 CATH, INF, PER/CENT/MIDLINE: HCPCS

## 2023-09-21 PROCEDURE — 6370000000 HC RX 637 (ALT 250 FOR IP): Performed by: INTERNAL MEDICINE

## 2023-09-21 PROCEDURE — 94761 N-INVAS EAR/PLS OXIMETRY MLT: CPT

## 2023-09-21 PROCEDURE — 2580000003 HC RX 258: Performed by: STUDENT IN AN ORGANIZED HEALTH CARE EDUCATION/TRAINING PROGRAM

## 2023-09-21 PROCEDURE — 99291 CRITICAL CARE FIRST HOUR: CPT | Performed by: INTERNAL MEDICINE

## 2023-09-21 PROCEDURE — 6360000002 HC RX W HCPCS: Performed by: INTERNAL MEDICINE

## 2023-09-21 PROCEDURE — 84100 ASSAY OF PHOSPHORUS: CPT

## 2023-09-21 PROCEDURE — 85027 COMPLETE CBC AUTOMATED: CPT

## 2023-09-21 PROCEDURE — 2580000003 HC RX 258: Performed by: INTERNAL MEDICINE

## 2023-09-21 PROCEDURE — 2000000000 HC ICU R&B

## 2023-09-21 PROCEDURE — 82803 BLOOD GASES ANY COMBINATION: CPT

## 2023-09-21 PROCEDURE — 71045 X-RAY EXAM CHEST 1 VIEW: CPT

## 2023-09-21 PROCEDURE — 36592 COLLECT BLOOD FROM PICC: CPT

## 2023-09-21 PROCEDURE — 80048 BASIC METABOLIC PNL TOTAL CA: CPT

## 2023-09-21 PROCEDURE — 36415 COLL VENOUS BLD VENIPUNCTURE: CPT

## 2023-09-21 PROCEDURE — 99233 SBSQ HOSP IP/OBS HIGH 50: CPT | Performed by: INTERNAL MEDICINE

## 2023-09-21 PROCEDURE — 6360000002 HC RX W HCPCS: Performed by: STUDENT IN AN ORGANIZED HEALTH CARE EDUCATION/TRAINING PROGRAM

## 2023-09-21 PROCEDURE — 2700000000 HC OXYGEN THERAPY PER DAY

## 2023-09-21 PROCEDURE — 36600 WITHDRAWAL OF ARTERIAL BLOOD: CPT

## 2023-09-21 PROCEDURE — C9113 INJ PANTOPRAZOLE SODIUM, VIA: HCPCS | Performed by: INTERNAL MEDICINE

## 2023-09-21 PROCEDURE — 74018 RADEX ABDOMEN 1 VIEW: CPT

## 2023-09-21 PROCEDURE — 93005 ELECTROCARDIOGRAM TRACING: CPT | Performed by: INTERNAL MEDICINE

## 2023-09-21 PROCEDURE — 36573 INSJ PICC RS&I 5 YR+: CPT

## 2023-09-21 RX ORDER — CHLOROTHIAZIDE SODIUM 500 MG/1
500 INJECTION INTRAVENOUS ONCE
Status: DISCONTINUED | OUTPATIENT
Start: 2023-09-21 | End: 2023-09-21

## 2023-09-21 RX ORDER — POTASSIUM CHLORIDE 29.8 MG/ML
20 INJECTION INTRAVENOUS ONCE
Status: COMPLETED | OUTPATIENT
Start: 2023-09-21 | End: 2023-09-21

## 2023-09-21 RX ORDER — FUROSEMIDE 10 MG/ML
40 INJECTION INTRAMUSCULAR; INTRAVENOUS ONCE
Status: COMPLETED | OUTPATIENT
Start: 2023-09-21 | End: 2023-09-21

## 2023-09-21 RX ORDER — LORAZEPAM 2 MG/ML
0.25 INJECTION INTRAMUSCULAR ONCE
Status: COMPLETED | OUTPATIENT
Start: 2023-09-21 | End: 2023-09-21

## 2023-09-21 RX ORDER — POTASSIUM CHLORIDE, DEXTROSE MONOHYDRATE 150; 5 MG/100ML; G/100ML
INJECTION, SOLUTION INTRAVENOUS CONTINUOUS
Status: DISCONTINUED | OUTPATIENT
Start: 2023-09-21 | End: 2023-09-22

## 2023-09-21 RX ORDER — POTASSIUM CHLORIDE 29.8 MG/ML
20 INJECTION INTRAVENOUS
Status: COMPLETED | OUTPATIENT
Start: 2023-09-21 | End: 2023-09-21

## 2023-09-21 RX ADMIN — SODIUM CHLORIDE, PRESERVATIVE FREE 10 ML: 5 INJECTION INTRAVENOUS at 22:24

## 2023-09-21 RX ADMIN — ENOXAPARIN SODIUM 30 MG: 100 INJECTION SUBCUTANEOUS at 10:40

## 2023-09-21 RX ADMIN — SODIUM CHLORIDE: 9 INJECTION, SOLUTION INTRAVENOUS at 22:42

## 2023-09-21 RX ADMIN — QUETIAPINE FUMARATE 25 MG: 25 TABLET ORAL at 22:24

## 2023-09-21 RX ADMIN — ENOXAPARIN SODIUM 30 MG: 100 INJECTION SUBCUTANEOUS at 22:23

## 2023-09-21 RX ADMIN — BUSPIRONE HYDROCHLORIDE 15 MG: 7.5 TABLET ORAL at 22:23

## 2023-09-21 RX ADMIN — CHLOROTHIAZIDE SODIUM 500 MG: 500 INJECTION, POWDER, LYOPHILIZED, FOR SOLUTION INTRAVENOUS at 22:43

## 2023-09-21 RX ADMIN — HYDRALAZINE HYDROCHLORIDE 100 MG: 50 TABLET, FILM COATED ORAL at 22:23

## 2023-09-21 RX ADMIN — ACETAMINOPHEN 650 MG: 325 TABLET ORAL at 13:47

## 2023-09-21 RX ADMIN — DEXAMETHASONE SODIUM PHOSPHATE 2 MG: 4 INJECTION, SOLUTION INTRAMUSCULAR; INTRAVENOUS at 10:39

## 2023-09-21 RX ADMIN — LORAZEPAM 0.26 MG: 2 INJECTION INTRAMUSCULAR; INTRAVENOUS at 09:26

## 2023-09-21 RX ADMIN — FUROSEMIDE 40 MG: 10 INJECTION, SOLUTION INTRAMUSCULAR; INTRAVENOUS at 13:46

## 2023-09-21 RX ADMIN — BUSPIRONE HYDROCHLORIDE 15 MG: 7.5 TABLET ORAL at 13:47

## 2023-09-21 RX ADMIN — METOPROLOL TARTRATE 50 MG: 50 TABLET, FILM COATED ORAL at 13:47

## 2023-09-21 RX ADMIN — HYDRALAZINE HYDROCHLORIDE 100 MG: 50 TABLET, FILM COATED ORAL at 13:47

## 2023-09-21 RX ADMIN — POTASSIUM CHLORIDE AND SODIUM CHLORIDE: 450; 150 INJECTION, SOLUTION INTRAVENOUS at 00:07

## 2023-09-21 RX ADMIN — FUROSEMIDE 40 MG: 10 INJECTION, SOLUTION INTRAMUSCULAR; INTRAVENOUS at 08:44

## 2023-09-21 RX ADMIN — POTASSIUM CHLORIDE 20 MEQ: 29.8 INJECTION, SOLUTION INTRAVENOUS at 18:53

## 2023-09-21 RX ADMIN — SERTRALINE 100 MG: 100 TABLET, FILM COATED ORAL at 22:24

## 2023-09-21 RX ADMIN — METOPROLOL TARTRATE 50 MG: 50 TABLET, FILM COATED ORAL at 03:40

## 2023-09-21 RX ADMIN — POTASSIUM CHLORIDE 20 MEQ: 29.8 INJECTION, SOLUTION INTRAVENOUS at 10:39

## 2023-09-21 RX ADMIN — BUSPIRONE HYDROCHLORIDE 15 MG: 7.5 TABLET ORAL at 18:53

## 2023-09-21 RX ADMIN — PANTOPRAZOLE SODIUM 40 MG: 40 INJECTION, POWDER, FOR SOLUTION INTRAVENOUS at 10:39

## 2023-09-21 RX ADMIN — POTASSIUM CHLORIDE AND DEXTROSE MONOHYDRATE: 150; 5 INJECTION, SOLUTION INTRAVENOUS at 10:36

## 2023-09-21 RX ADMIN — POTASSIUM CHLORIDE 20 MEQ: 29.8 INJECTION, SOLUTION INTRAVENOUS at 11:29

## 2023-09-21 RX ADMIN — ATORVASTATIN CALCIUM 80 MG: 40 TABLET, FILM COATED ORAL at 22:23

## 2023-09-21 RX ADMIN — METOPROLOL TARTRATE 50 MG: 50 TABLET, FILM COATED ORAL at 22:24

## 2023-09-21 NOTE — PLAN OF CARE
Problem: Pain  Goal: Verbalizes/displays adequate comfort level or baseline comfort level  Outcome: Progressing     Problem: Safety - Adult  Goal: Free from fall injury  Outcome: Progressing     Problem: Nutrition Deficit:  Goal: Optimize nutritional status  Outcome: Progressing     Problem: Skin/Tissue Integrity  Goal: Absence of new skin breakdown  Description: 1. Monitor for areas of redness and/or skin breakdown  2. Assess vascular access sites hourly  3. Every 4-6 hours minimum:  Change oxygen saturation probe site  4. Every 4-6 hours:  If on nasal continuous positive airway pressure, respiratory therapy assess nares and determine need for appliance change or resting period.   Outcome: Progressing     Problem: Respiratory - Adult  Goal: Achieves optimal ventilation and oxygenation  Outcome: Progressing     Problem: Cardiovascular - Adult  Goal: Maintains optimal cardiac output and hemodynamic stability  Outcome: Progressing     Problem: Skin/Tissue Integrity - Adult  Goal: Skin integrity remains intact  Outcome: Progressing

## 2023-09-21 NOTE — PROGRESS NOTES
At this time pt  and grandson returning. Discussion to confirm pt is aware she is at the hospital and very sick. Asking pt if she would want intubated again and pt was very clear with this RN and grandson and her  \"no more! \" And states she does not want intubated but is ok to continue as is on vapotherm.  states he is glad we were able to get the answer from pt so he did not have to make any decisions. NG tube placed and will confirm xray prior to starting water flush order. Pt grandson pulling this RN aside and states that pt daughter, Berta Porter, is not to have any of pt belongings. Confirmed that only belongings pt has are the slippers in the closet at this time.

## 2023-09-21 NOTE — PROGRESS NOTES
Speech-Language Pathology  Swallowing Disorders and Dysphagia     SLP Attempt Note/FEES Hold           Name: Pam Eid  : 1945  Medical Diagnosis: Respiratory failure ([address]) [J96.90]  HCAP (healthcare-associated pneumonia) [J18.9]  Acute respiratory failure with hypoxia and hypercapnia ([address]) [J96.01, J96.02]  New onset left bundle branch block (LBBB) [I44.7]  Sepsis, due to unspecified organism, unspecified whether acute organ dysfunction present ([address]) [A41.9]    Planned for repeat FEES this date due to subjectively improved tolerance to PO at bedside yesterday. Pt noted with increased O2 demands and worsening overall respiratory status this morning. CXR reveals worsening alveolar changes representing edema with probable small bilateral pleural effusions. Pt maxed out on Vapotherm. Possible hospice care. Pt currently sedated with ativan. Not appropriate for PO or repeat instrumental. Will follow. No charges filed.  Thank you,    Juli Denver, M.A., [address] [facility] #[id number]  Speech-Language Pathologist  [facility] Phone (inpatient): [phone]  Speech Desk (outpatient)- [phone]    Certified to provide:  FEES, MBS, Vital Stim, and [place] Dysphagia Therapy Program (MDTP)

## 2023-09-21 NOTE — PROGRESS NOTES
Conversation with  about pt wishes.  conflicted on decision if pt would need re intubated if he would want it or not. Pt  insistent on leaving at this time and wants to go home and make phone calls. Shortly after pt  leaving, pt grandson showed up and was updated. He asked if he would be able to go  pt  and bring him back in order to make a decision about pt plan of care. Per policy, since pt unable to use her arms due to weakness and has inability to remove bipap mask, RT unable to utilize bipap for pt. Will place page to Dr Goddard.

## 2023-09-21 NOTE — PROGRESS NOTES
Pt having trouble breathing this morning. On 15L high flow and sat 87%. Discussed with this RN, Gus Tan, RT pt wishes. Pt questions many times about what she wants. Pt states that she does not want intubated, does not want bipap. Discussed possibility of stopping and going hospice and going home. Pt nods yes. Asked pt to repeat her wishes after review of all options. Pt maintains \"stop everything, let me go\". Statements confirmed by this RN, Yanni Bolton RN and Jennyfer, RT. This RN asking pt is she wants family to come in. Pt nods yes. Pt repeats she wants family to come in. This RN going thru list of patient family on contacts to see who she wants to come in. Pt denies all people who are on list.  This RN asking pt who she wants to be her decision maker. Pt states \"me\". This RN asking who if pt unable to speak for self if her  is ok to make the decisions for her. Pt scowls and says \"NO!\". This RN asking about her daughter. Pt again states \"NO! \"  This RN informing pt that a call is being made to her  at this time. Still unsure if pt is completely understanding situation.

## 2023-09-21 NOTE — PROGRESS NOTES
Occupational Therapy/Physical Therapy    Attempt to see pt this date; hold per nursing. Will continue per POC as pt tolerates.     Bakari Manjarrez, OTR/L [address] [facility], [address] [place] [address]

## 2023-09-21 NOTE — FLOWSHEET NOTE
09/20/23 2100   Vital Signs   Pulse (!) 107   Respirations 29   BP (!) 158/62   MAP (Calculated) 94   MAP (mmHg) 89   Oxygen Therapy   SpO2 93 %       Patient alert with confusion. Moaning at times. Patient is on 5L O2 via NC. Lung sounds diminished. No coughing noted. Abdomen soft non-tender, BS + x 4 quads. Edema continues to BUE and BLE. All extremities elevated on pillows. Bridges catheter intact draining yellow clear urine.

## 2023-09-21 NOTE — PROGRESS NOTES
09/21/23 1133   Encounter Summary   Encounter Overview/Reason  Follow-up   Service Provided For: Family   Referral/Consult From: [address] Family members; Children   Last Encounter  09/21/23  ([facility] visited with patient's  - offered pastoral support and encouragement)

## 2023-09-21 NOTE — PROGRESS NOTES
4 Eyes Skin Assessment     NAME:  Vandana Choi  YOB: 1945  MEDICAL RECORD NUMBER:  [id number]    The patient is being assessed for  Other Low Sloan     I agree that at least one RN has performed a thorough Head to Toe Skin Assessment on the patient. ALL assessment sites listed below have been assessed. Areas assessed by both nurses:    Head, Face, Ears, Shoulders, Back, Chest, Arms, Elbows, Hands, Sacrum. Buttock, Coccyx, Ischium, Legs. Feet and Heels, and Under Medical Devices         Does the Patient have a Wound?  No noted wound(s)       Sloan Prevention initiated by RN: Yes  Wound Care Orders initiated by RN: No    Pressure Injury (Stage 3,4, Unstageable, DTI, NWPT, and Complex wounds) if present, place Wound referral order by RN under : No    New Ostomies, if present place, Ostomy referral order under : No     Scattered abrasions and redness    Nurse 1 eSignature: Electronically signed by David Victor RN on 9/21/23 at 6:43 AM EDT      **SHARE this note so that the co-signing nurse can place an eSignature**    Nurse 2 eSignature: Electronically signed by Annabella Taylor RN on 9/21/23 at 6:59 AM EDT'

## 2023-09-22 LAB
ANION GAP SERPL CALCULATED.3IONS-SCNC: 10 MMOL/L (ref 3–16)
BASOPHILS # BLD: 0 K/UL (ref 0–0.2)
BASOPHILS NFR BLD: 0.4 %
BUN SERPL-MCNC: 54 MG/DL (ref 7–20)
CALCIUM SERPL-MCNC: 8 MG/DL (ref 8.3–10.6)
CHLORIDE SERPL-SCNC: 113 MMOL/L (ref 99–110)
CO2 SERPL-SCNC: 24 MMOL/L (ref 21–32)
CREAT SERPL-MCNC: 1.5 MG/DL (ref 0.6–1.2)
DEPRECATED RDW RBC AUTO: 14.6 % (ref 12.4–15.4)
EKG ATRIAL RATE: 116 BPM
EKG DIAGNOSIS: NORMAL
EKG P AXIS: 70 DEGREES
EKG P-R INTERVAL: 134 MS
EKG Q-T INTERVAL: 364 MS
EKG QRS DURATION: 124 MS
EKG QTC CALCULATION (BAZETT): 505 MS
EKG R AXIS: -41 DEGREES
EKG T AXIS: 134 DEGREES
EKG VENTRICULAR RATE: 116 BPM
EOSINOPHIL # BLD: 0.2 K/UL (ref 0–0.6)
EOSINOPHIL NFR BLD: 2 %
GFR SERPLBLD CREATININE-BSD FMLA CKD-EPI: 35 ML/MIN/{1.73_M2}
GLUCOSE BLD-MCNC: 155 MG/DL (ref 70–99)
GLUCOSE BLD-MCNC: 170 MG/DL (ref 70–99)
GLUCOSE SERPL-MCNC: 305 MG/DL (ref 70–99)
HCT VFR BLD AUTO: 21 % (ref 36–48)
HGB BLD-MCNC: 7.1 G/DL (ref 12–16)
LYMPHOCYTES # BLD: 0.8 K/UL (ref 1–5.1)
LYMPHOCYTES NFR BLD: 7.2 %
MCH RBC QN AUTO: 30.2 PG (ref 26–34)
MCHC RBC AUTO-ENTMCNC: 33.6 G/DL (ref 31–36)
MCV RBC AUTO: 89.9 FL (ref 80–100)
MONOCYTES # BLD: 0.3 K/UL (ref 0–1.3)
MONOCYTES NFR BLD: 2.7 %
NEUTROPHILS # BLD: 9.4 K/UL (ref 1.7–7.7)
NEUTROPHILS NFR BLD: 87.7 %
PERFORMED ON: ABNORMAL
PERFORMED ON: ABNORMAL
PLATELET # BLD AUTO: 190 K/UL (ref 135–450)
PMV BLD AUTO: 8.3 FL (ref 5–10.5)
POTASSIUM SERPL-SCNC: 4.3 MMOL/L (ref 3.5–5.1)
RBC # BLD AUTO: 2.33 M/UL (ref 4–5.2)
SODIUM SERPL-SCNC: 147 MMOL/L (ref 136–145)
WBC # BLD AUTO: 10.7 K/UL (ref 4–11)

## 2023-09-22 PROCEDURE — 93010 ELECTROCARDIOGRAM REPORT: CPT | Performed by: INTERNAL MEDICINE

## 2023-09-22 PROCEDURE — 6360000002 HC RX W HCPCS: Performed by: INTERNAL MEDICINE

## 2023-09-22 PROCEDURE — 2580000003 HC RX 258: Performed by: INTERNAL MEDICINE

## 2023-09-22 PROCEDURE — 2000000000 HC ICU R&B

## 2023-09-22 PROCEDURE — 6370000000 HC RX 637 (ALT 250 FOR IP): Performed by: INTERNAL MEDICINE

## 2023-09-22 PROCEDURE — 36600 WITHDRAWAL OF ARTERIAL BLOOD: CPT

## 2023-09-22 PROCEDURE — 2700000000 HC OXYGEN THERAPY PER DAY

## 2023-09-22 PROCEDURE — 94640 AIRWAY INHALATION TREATMENT: CPT

## 2023-09-22 PROCEDURE — 99292 CRITICAL CARE ADDL 30 MIN: CPT | Performed by: INTERNAL MEDICINE

## 2023-09-22 PROCEDURE — 85025 COMPLETE CBC W/AUTO DIFF WBC: CPT

## 2023-09-22 PROCEDURE — 80048 BASIC METABOLIC PNL TOTAL CA: CPT

## 2023-09-22 PROCEDURE — C9113 INJ PANTOPRAZOLE SODIUM, VIA: HCPCS | Performed by: INTERNAL MEDICINE

## 2023-09-22 PROCEDURE — 36592 COLLECT BLOOD FROM PICC: CPT

## 2023-09-22 PROCEDURE — 99233 SBSQ HOSP IP/OBS HIGH 50: CPT | Performed by: INTERNAL MEDICINE

## 2023-09-22 PROCEDURE — 6360000002 HC RX W HCPCS: Performed by: STUDENT IN AN ORGANIZED HEALTH CARE EDUCATION/TRAINING PROGRAM

## 2023-09-22 PROCEDURE — 94761 N-INVAS EAR/PLS OXIMETRY MLT: CPT

## 2023-09-22 PROCEDURE — 99291 CRITICAL CARE FIRST HOUR: CPT | Performed by: INTERNAL MEDICINE

## 2023-09-22 RX ORDER — MORPHINE SULFATE 2 MG/ML
2 INJECTION, SOLUTION INTRAMUSCULAR; INTRAVENOUS EVERY 4 HOURS PRN
Status: DISCONTINUED | OUTPATIENT
Start: 2023-09-22 | End: 2023-09-24

## 2023-09-22 RX ORDER — HALOPERIDOL 5 MG/ML
2 INJECTION INTRAMUSCULAR EVERY 4 HOURS PRN
Status: COMPLETED | OUTPATIENT
Start: 2023-09-22 | End: 2023-09-22

## 2023-09-22 RX ORDER — FUROSEMIDE 10 MG/ML
40 INJECTION INTRAMUSCULAR; INTRAVENOUS 2 TIMES DAILY
Status: DISCONTINUED | OUTPATIENT
Start: 2023-09-22 | End: 2023-09-23

## 2023-09-22 RX ORDER — DEXAMETHASONE SODIUM PHOSPHATE 4 MG/ML
1 INJECTION, SOLUTION INTRA-ARTICULAR; INTRALESIONAL; INTRAMUSCULAR; INTRAVENOUS; SOFT TISSUE EVERY 24 HOURS
Status: DISCONTINUED | OUTPATIENT
Start: 2023-09-22 | End: 2023-09-24

## 2023-09-22 RX ADMIN — SERTRALINE 100 MG: 100 TABLET, FILM COATED ORAL at 21:08

## 2023-09-22 RX ADMIN — DEXAMETHASONE SODIUM PHOSPHATE 1 MG: 4 INJECTION, SOLUTION INTRAMUSCULAR; INTRAVENOUS at 08:24

## 2023-09-22 RX ADMIN — ATORVASTATIN CALCIUM 80 MG: 40 TABLET, FILM COATED ORAL at 21:08

## 2023-09-22 RX ADMIN — BUSPIRONE HYDROCHLORIDE 15 MG: 7.5 TABLET ORAL at 18:43

## 2023-09-22 RX ADMIN — POTASSIUM CHLORIDE AND DEXTROSE MONOHYDRATE: 150; 5 INJECTION, SOLUTION INTRAVENOUS at 02:26

## 2023-09-22 RX ADMIN — METOPROLOL TARTRATE 50 MG: 50 TABLET, FILM COATED ORAL at 05:17

## 2023-09-22 RX ADMIN — BUSPIRONE HYDROCHLORIDE 15 MG: 7.5 TABLET ORAL at 21:08

## 2023-09-22 RX ADMIN — SODIUM CHLORIDE, PRESERVATIVE FREE 10 ML: 5 INJECTION INTRAVENOUS at 21:09

## 2023-09-22 RX ADMIN — ASPIRIN 81 MG: 81 TABLET, CHEWABLE ORAL at 08:24

## 2023-09-22 RX ADMIN — HALOPERIDOL LACTATE 2 MG: 5 INJECTION, SOLUTION INTRAMUSCULAR at 02:21

## 2023-09-22 RX ADMIN — QUETIAPINE FUMARATE 25 MG: 25 TABLET ORAL at 21:08

## 2023-09-22 RX ADMIN — FUROSEMIDE 40 MG: 10 INJECTION, SOLUTION INTRAMUSCULAR; INTRAVENOUS at 18:43

## 2023-09-22 RX ADMIN — IPRATROPIUM BROMIDE AND ALBUTEROL SULFATE 1 DOSE: 2.5; .5 SOLUTION RESPIRATORY (INHALATION) at 19:36

## 2023-09-22 RX ADMIN — PANTOPRAZOLE SODIUM 40 MG: 40 INJECTION, POWDER, FOR SOLUTION INTRAVENOUS at 08:24

## 2023-09-22 RX ADMIN — HALOPERIDOL LACTATE 2 MG: 5 INJECTION, SOLUTION INTRAMUSCULAR at 06:21

## 2023-09-22 RX ADMIN — HYDRALAZINE HYDROCHLORIDE 100 MG: 50 TABLET, FILM COATED ORAL at 14:31

## 2023-09-22 RX ADMIN — FUROSEMIDE 40 MG: 10 INJECTION, SOLUTION INTRAMUSCULAR; INTRAVENOUS at 10:03

## 2023-09-22 RX ADMIN — BUSPIRONE HYDROCHLORIDE 15 MG: 7.5 TABLET ORAL at 08:24

## 2023-09-22 RX ADMIN — ENOXAPARIN SODIUM 30 MG: 100 INJECTION SUBCUTANEOUS at 08:24

## 2023-09-22 RX ADMIN — MORPHINE SULFATE 2 MG: 2 INJECTION, SOLUTION INTRAMUSCULAR; INTRAVENOUS at 23:37

## 2023-09-22 RX ADMIN — MORPHINE SULFATE 2 MG: 2 INJECTION, SOLUTION INTRAMUSCULAR; INTRAVENOUS at 14:59

## 2023-09-22 RX ADMIN — METOPROLOL TARTRATE 50 MG: 50 TABLET, FILM COATED ORAL at 21:08

## 2023-09-22 RX ADMIN — SODIUM CHLORIDE, PRESERVATIVE FREE 10 ML: 5 INJECTION INTRAVENOUS at 08:25

## 2023-09-22 RX ADMIN — BUSPIRONE HYDROCHLORIDE 15 MG: 7.5 TABLET ORAL at 14:31

## 2023-09-22 RX ADMIN — ACETAMINOPHEN 650 MG: 325 TABLET ORAL at 06:00

## 2023-09-22 RX ADMIN — SERTRALINE 100 MG: 100 TABLET, FILM COATED ORAL at 08:24

## 2023-09-22 RX ADMIN — ENOXAPARIN SODIUM 30 MG: 100 INJECTION SUBCUTANEOUS at 21:08

## 2023-09-22 RX ADMIN — HYDRALAZINE HYDROCHLORIDE 100 MG: 50 TABLET, FILM COATED ORAL at 21:08

## 2023-09-22 ASSESSMENT — PAIN SCALES - GENERAL
PAINLEVEL_OUTOF10: 5
PAINLEVEL_OUTOF10: 1

## 2023-09-22 ASSESSMENT — PAIN DESCRIPTION - LOCATION: LOCATION: GENERALIZED

## 2023-09-22 NOTE — PLAN OF CARE
Problem: Discharge Planning  Goal: Discharge to home or other facility with appropriate resources  Outcome: Progressing     Problem: Pain  Goal: Verbalizes/displays adequate comfort level or baseline comfort level  Outcome: Progressing     Problem: Safety - Adult  Goal: Free from fall injury  Outcome: Progressing  Note: Fall precautions in place. Problem: Nutrition Deficit:  Goal: Optimize nutritional status  Outcome: Progressing     Problem: Skin/Tissue Integrity  Goal: Absence of new skin breakdown  Description: 1. Monitor for areas of redness and/or skin breakdown  2. Assess vascular access sites hourly  3. Every 4-6 hours minimum:  Change oxygen saturation probe site  4. Every 4-6 hours:  If on nasal continuous positive airway pressure, respiratory therapy assess nares and determine need for appliance change or resting period. Outcome: Progressing  Note: Patient turned/repositioned every 2 hours and as needed to maintain and improve skin integrity.       Problem: Respiratory - Adult  Goal: Achieves optimal ventilation and oxygenation  Outcome: Progressing     Problem: Cardiovascular - Adult  Goal: Maintains optimal cardiac output and hemodynamic stability  Outcome: Progressing  Goal: Absence of cardiac dysrhythmias or at baseline  Outcome: Progressing     Problem: Skin/Tissue Integrity - Adult  Goal: Skin integrity remains intact  Outcome: Progressing  Goal: Incisions, wounds, or drain sites healing without S/S of infection  Outcome: Progressing  Goal: Oral mucous membranes remain intact  Outcome: Progressing     Problem: Infection - Adult  Goal: Absence of infection at discharge  Outcome: Progressing  Goal: Absence of infection during hospitalization  Outcome: Progressing  Goal: Absence of fever/infection during anticipated neutropenic period  Outcome: Progressing     Problem: Metabolic/Fluid and Electrolytes - Adult  Goal: Electrolytes maintained within normal limits  Outcome: Progressing  Goal: Hemodynamic stability and optimal renal function maintained  Outcome: Progressing  Goal: Glucose maintained within prescribed range  Outcome: Progressing     Problem: ABCDS Injury Assessment  Goal: Absence of physical injury  Outcome: Progressing     Problem: Confusion  Goal: Confusion, delirium, dementia, or psychosis is improved or at baseline  Description: INTERVENTIONS:  1. Assess for possible contributors to thought disturbance, including medications, impaired vision or hearing, underlying metabolic abnormalities, dehydration, psychiatric diagnoses, and notify attending LIP  2. [place] high risk fall precautions, as indicated  3. Provide frequent short contacts to provide reality reorientation, refocusing and direction  4. Decrease environmental stimuli, including noise as appropriate  5. Monitor and intervene to maintain adequate nutrition, hydration, elimination, sleep and activity  6. If unable to ensure safety without constant attention obtain sitter and review sitter guidelines with assigned personnel  7.  Initiate Psychosocial CNS and Spiritual Care consult, as indicated  Outcome: Progressing  Flowsheets (Taken 9/21/2023 2248)  Effect of thought disturbance (confusion, delirium, dementia, or psychosis) are managed with adequate functional status:   Assess for contributors to thought disturbance, including medications, impaired vision or hearing, underlying metabolic abnormalities, dehydration, psychiatric diagnoses, notify [address] high risk fall precautions, as indicated   Provide frequent short contacts to provide reality reorientation, refocusing and direction   Decrease environmental stimuli, including noise as appropriate   Monitor and intervene to maintain adequate nutrition, hydration, elimination, sleep and activity   If unable to ensure safety without constant attention obtain sitter and review sitter guidelines with assigned personnel   Initiate Psychosocial Clinical Nurse Specialist and

## 2023-09-22 NOTE — PROGRESS NOTES
RASS 0/+1 throughout shift. Pt unable to follow commands, moaning out and garbled speech. VSS, HR remains irregular, MDs aware. PRN morphine given once. Family at the bedside for most of shift. Dr Shar Harris discussed plans for Hospice to be consulted and visited tomorrow. All questions answered. Daughter to stay at bedside over night. No other needs noted. Will continue to monitor and assess.

## 2023-09-22 NOTE — PROGRESS NOTES
AM assessment complete, see flowsheet. Medications given per MAR. RASS +1. Pt continues to be restless, eyes closed and moaning incomprehensible speech. HR irregular, RR 30/40's. Vapo adjusted by RT. No other needs noted, awaiting MD rounds.

## 2023-09-22 NOTE — PROGRESS NOTES
Speech-Language Pathology  Swallowing Disorders and Dysphagia     SLP Attempt / Hold Note           Name: Austin Hatfield  : 1945  Medical Diagnosis: Respiratory failure ([address]) [J96.90]  HCAP (healthcare-associated pneumonia) [J18.9]  Acute respiratory failure with hypoxia and hypercapnia ([address]) [J96.01, J96.02]  New onset left bundle branch block (LBBB) [I44.7]  Sepsis, due to unspecified organism, unspecified whether acute organ dysfunction present ([address]) [A41.9]    Attempting to see pt for dysphagia follow-up and potential FEES. Pt maxed on vapotherm. Made limited code. Does not want intubation. Plan for hospice care. No charges at this time. Will sign off once hospice is finalized.  Thank you,    Rhonda Dahl M.A., [facility] #[id number]  Speech-Language Pathologist  [facility] Phone (inpatient): [phone]  Speech Desk (outpatient)- [phone]    Certified to provide:  FEES, MBS, Vital Stim, and [place] [facility]ysphagia Therapy Program (MDTP)     a

## 2023-09-22 NOTE — PROGRESS NOTES
4 Eyes Skin Assessment     NAME:  Marvel Grewal  YOB: 1945  MEDICAL RECORD NUMBER:  [id number]    The patient is being assessed for  Other Shift Assessment    I agree that at least one RN has performed a thorough Head to Toe Skin Assessment on the patient. ALL assessment sites listed below have been assessed. Areas assessed by both nurses:    Head, Face, Ears, Shoulders, Back, Chest, Arms, Elbows, Hands, Sacrum. Buttock, Coccyx, Ischium, Legs. Feet and Heels, and Under Medical Devices       Small crack above mepilex on coccyx  Does the Patient have a Wound?  No noted wound(s)       Sloan Prevention initiated by RN: Yes  Wound Care Orders initiated by RN: No    Pressure Injury (Stage 3,4, Unstageable, DTI, NWPT, and Complex wounds) if present, place Wound referral order by RN under : No    New Ostomies, if present place, Ostomy referral order under : No     Nurse 1 eSignature: Electronically signed by Savanna Dumont RN on 9/22/23 at 6:45 PM EDT    **SHARE this note so that the co-signing nurse can place an eSignature**    Nurse 2 eSignature: Electronically signed by Bina Purdy RN on 9/22/23 at 6:48 PM EDT

## 2023-09-22 NOTE — PLAN OF CARE
Met with family - father, daughter , explained refractory  hypoxia on vapotherm, recommend palliative care    and other family agreed  Hospice consulted

## 2023-09-22 NOTE — PROGRESS NOTES
Dr. Lily Bajwa at bedside to evaluate patient. Diuril 500mg X1 ordered. Hold free water per NG tonight, check labs in AM. Also reviewed EKG from this evening. Will monitor.

## 2023-09-22 NOTE — PROGRESS NOTES
Patient care assumed, assessment completed as charted. Patient restless in bed, continuously moaning. Does not answer questions, or follow commands. Bedside ST monitor alarming, STAT EKG ordered to evaluate. SpO2 88-90% on Vapotherm 40L, 90%. Patient tachypneic, tachycardic, flushed, with use of accessory muscles noted. Right upper arm PICC in place, D5% with 20KCl infusing at 50mL/hr. NG clamped, villaseñor and FMS patent. Will monitor.

## 2023-09-22 NOTE — PROGRESS NOTES
Care rounds completed with Dr. Kamilla Galvan and multidisciplinary team. Reviewed labs, meds, VS, assessment, & plan of care for today. See dictated note and new orders for details.      -d/c D5 gtt and add free water flushes   -lasix 40 mg BID

## 2023-09-22 NOTE — PROGRESS NOTES
Patient report given to Priscila Good RN. Patient resting comfortably at present, received Haldol 2mg X2 overnight for agitation. SpO2 98% on VapoTherm 40L, 100%. Tylenol 650mg administered for temp 100.8. Care transferred.

## 2023-09-22 NOTE — PROGRESS NOTES
Patient restless, RR 40s. SpO2 dropping to 85% on VapoTherm 40L, 90%. Increased to 100%, SpO2 remained <90%. Non-rebreather mask added, SpO2 89-93%. Dr. Verena Inman at bedside to evaluate, order received for Haldol 2mg. Daughter updated per telephone. Will monitor.

## 2023-09-22 NOTE — PROGRESS NOTES
Comprehensive Nutrition Assessment    Type and Reason for Visit:  Reassess    Nutrition Recommendations/Plan:   Continue NPO status and monitor nutrition plan of care. Monitor mental status, respiratory status, and plan of care. Monitor nutrition-related labs, bowel function, and weight trends. Malnutrition Assessment:  Malnutrition Status: At risk for malnutrition (09/22/23 1108)    Context:  Acute Illness     Findings of the 6 clinical characteristics of malnutrition:  Energy Intake:  50% or less of estimated energy requirements for 5 or more days  Weight Loss:  Unable to assess (-11L fluid since admission)     Body Fat Loss:  Unable to assess     Muscle Mass Loss:  Unable to assess    Fluid Accumulation:  No significant fluid accumulation     Strength:  Not Performed    Nutrition Assessment:    patient remains unchanged from a nutritional standpoint since last RD assessment; patient remains at risk for further compromise d/t NPO status, altered nutrition-related labs, and agitation/restlessness at times; will continue NPO status and will order 150 ml free water flushes every 4 hours, per Dr. Vicenta Rivera Findings:    patient seemed more relaxed/calm today during rounds; patient is NPO - SLP attempted to evaluate patient on 9/21/23 but could not assess patient at that time; started 150 ml water flushes via NG tube every 4 hours this am, per Dr. Elham Malin; NG tube was placed on 9/21/23, per notes; patient stated that she would not want to be re-intubated if that was the situation Wound Type: None       Current Nutrition Intake & Therapies:    Average Meal Intake: NPO  Average Supplements Intake: NPO  Diet NPO    Anthropometric Measures:  Height: 5' 2\" (157.5 cm)  Ideal Body Weight (IBW): 110 lbs (50 kg)    Admission Body Weight: 268 lb (121.6 kg) (obtained on 9/1/23; actual weight)  Current Body Weight: 245 lb 13 oz (111.5 kg) (obtained on 9/22/23; actual weight), 223.5 % IBW.  Weight Source:

## 2023-09-23 ENCOUNTER — APPOINTMENT (OUTPATIENT)
Dept: GENERAL RADIOLOGY | Age: 78
DRG: 870 | End: 2023-09-23
Payer: MEDICARE

## 2023-09-23 PROBLEM — E87.0 HYPERNATREMIA: Status: ACTIVE | Noted: 2023-09-23

## 2023-09-23 PROBLEM — Z71.89 ENCOUNTER FOR HOSPICE CARE DISCUSSION: Status: ACTIVE | Noted: 2023-09-23

## 2023-09-23 LAB
ABO + RH BLD: NORMAL
ANION GAP SERPL CALCULATED.3IONS-SCNC: 13 MMOL/L (ref 3–16)
BASE EXCESS BLDV CALC-SCNC: 0 MMOL/L (ref -3–3)
BASOPHILS # BLD: 0 K/UL (ref 0–0.2)
BASOPHILS NFR BLD: 0.3 %
BLD GP AB SCN SERPL QL: NORMAL
BLOOD BANK DISPENSE STATUS: NORMAL
BLOOD BANK PRODUCT CODE: NORMAL
BPU ID: NORMAL
BUN SERPL-MCNC: 60 MG/DL (ref 7–20)
CALCIUM SERPL-MCNC: 8.1 MG/DL (ref 8.3–10.6)
CHLORIDE SERPL-SCNC: 115 MMOL/L (ref 99–110)
CO2 BLDV-SCNC: 24 MMOL/L
CO2 SERPL-SCNC: 24 MMOL/L (ref 21–32)
COHGB MFR BLDV: 0.7 % (ref 0–1.5)
CREAT SERPL-MCNC: 1.7 MG/DL (ref 0.6–1.2)
DEPRECATED RDW RBC AUTO: 14.7 % (ref 12.4–15.4)
DESCRIPTION BLOOD BANK: NORMAL
EOSINOPHIL # BLD: 0.2 K/UL (ref 0–0.6)
EOSINOPHIL NFR BLD: 2 %
GFR SERPLBLD CREATININE-BSD FMLA CKD-EPI: 31 ML/MIN/{1.73_M2}
GLUCOSE BLD-MCNC: 117 MG/DL (ref 70–99)
GLUCOSE BLD-MCNC: 117 MG/DL (ref 70–99)
GLUCOSE BLD-MCNC: 119 MG/DL (ref 70–99)
GLUCOSE BLD-MCNC: 124 MG/DL (ref 70–99)
GLUCOSE SERPL-MCNC: 128 MG/DL (ref 70–99)
HCO3 BLDV-SCNC: 23.3 MMOL/L (ref 23–29)
HCT VFR BLD AUTO: 19.9 % (ref 36–48)
HCT VFR BLD AUTO: 23.6 % (ref 36–48)
HGB BLD-MCNC: 6.7 G/DL (ref 12–16)
HGB BLD-MCNC: 7.8 G/DL (ref 12–16)
LYMPHOCYTES # BLD: 0.5 K/UL (ref 1–5.1)
LYMPHOCYTES NFR BLD: 4.1 %
MAGNESIUM SERPL-MCNC: 2.2 MG/DL (ref 1.8–2.4)
MCH RBC QN AUTO: 29.9 PG (ref 26–34)
MCHC RBC AUTO-ENTMCNC: 33.6 G/DL (ref 31–36)
MCV RBC AUTO: 88.9 FL (ref 80–100)
METHGB MFR BLDV: 0.4 %
MONOCYTES # BLD: 0.3 K/UL (ref 0–1.3)
MONOCYTES NFR BLD: 2.6 %
NEUTROPHILS # BLD: 10.1 K/UL (ref 1.7–7.7)
NEUTROPHILS NFR BLD: 91 %
NT-PROBNP SERPL-MCNC: 5476 PG/ML (ref 0–449)
O2 CT VFR BLDV CALC: 10 VOL %
O2 THERAPY: ABNORMAL
PCO2 BLDV: 31.7 MMHG (ref 40–50)
PERFORMED ON: ABNORMAL
PH BLDV: 7.48 [PH] (ref 7.35–7.45)
PLATELET # BLD AUTO: 181 K/UL (ref 135–450)
PMV BLD AUTO: 8.4 FL (ref 5–10.5)
PO2 BLDV: 120.1 MMHG (ref 25–40)
POTASSIUM SERPL-SCNC: 3.5 MMOL/L (ref 3.5–5.1)
PROCALCITONIN SERPL IA-MCNC: 0.38 NG/ML (ref 0–0.15)
RBC # BLD AUTO: 2.24 M/UL (ref 4–5.2)
SAO2 % BLDV: 99 %
SODIUM SERPL-SCNC: 152 MMOL/L (ref 136–145)
WBC # BLD AUTO: 11.2 K/UL (ref 4–11)

## 2023-09-23 PROCEDURE — 6370000000 HC RX 637 (ALT 250 FOR IP): Performed by: INTERNAL MEDICINE

## 2023-09-23 PROCEDURE — 2580000003 HC RX 258: Performed by: INTERNAL MEDICINE

## 2023-09-23 PROCEDURE — 36430 TRANSFUSION BLD/BLD COMPNT: CPT

## 2023-09-23 PROCEDURE — 86900 BLOOD TYPING SEROLOGIC ABO: CPT

## 2023-09-23 PROCEDURE — 85014 HEMATOCRIT: CPT

## 2023-09-23 PROCEDURE — 6360000002 HC RX W HCPCS: Performed by: INTERNAL MEDICINE

## 2023-09-23 PROCEDURE — 99233 SBSQ HOSP IP/OBS HIGH 50: CPT | Performed by: INTERNAL MEDICINE

## 2023-09-23 PROCEDURE — 99291 CRITICAL CARE FIRST HOUR: CPT | Performed by: INTERNAL MEDICINE

## 2023-09-23 PROCEDURE — 2700000000 HC OXYGEN THERAPY PER DAY

## 2023-09-23 PROCEDURE — C9113 INJ PANTOPRAZOLE SODIUM, VIA: HCPCS | Performed by: INTERNAL MEDICINE

## 2023-09-23 PROCEDURE — 83880 ASSAY OF NATRIURETIC PEPTIDE: CPT

## 2023-09-23 PROCEDURE — 86850 RBC ANTIBODY SCREEN: CPT

## 2023-09-23 PROCEDURE — P9016 RBC LEUKOCYTES REDUCED: HCPCS

## 2023-09-23 PROCEDURE — 86901 BLOOD TYPING SEROLOGIC RH(D): CPT

## 2023-09-23 PROCEDURE — 36415 COLL VENOUS BLD VENIPUNCTURE: CPT

## 2023-09-23 PROCEDURE — 84145 PROCALCITONIN (PCT): CPT

## 2023-09-23 PROCEDURE — 94640 AIRWAY INHALATION TREATMENT: CPT

## 2023-09-23 PROCEDURE — 85025 COMPLETE CBC W/AUTO DIFF WBC: CPT

## 2023-09-23 PROCEDURE — 71045 X-RAY EXAM CHEST 1 VIEW: CPT

## 2023-09-23 PROCEDURE — 83735 ASSAY OF MAGNESIUM: CPT

## 2023-09-23 PROCEDURE — 2000000000 HC ICU R&B

## 2023-09-23 PROCEDURE — 82803 BLOOD GASES ANY COMBINATION: CPT

## 2023-09-23 PROCEDURE — 94761 N-INVAS EAR/PLS OXIMETRY MLT: CPT

## 2023-09-23 PROCEDURE — 86923 COMPATIBILITY TEST ELECTRIC: CPT

## 2023-09-23 PROCEDURE — 85018 HEMOGLOBIN: CPT

## 2023-09-23 PROCEDURE — 80048 BASIC METABOLIC PNL TOTAL CA: CPT

## 2023-09-23 PROCEDURE — 30233N1 TRANSFUSION OF NONAUTOLOGOUS RED BLOOD CELLS INTO PERIPHERAL VEIN, PERCUTANEOUS APPROACH: ICD-10-PCS | Performed by: INTERNAL MEDICINE

## 2023-09-23 RX ORDER — HALOPERIDOL 5 MG/ML
2 INJECTION INTRAMUSCULAR EVERY 6 HOURS PRN
Status: DISCONTINUED | OUTPATIENT
Start: 2023-09-23 | End: 2023-09-24

## 2023-09-23 RX ORDER — POTASSIUM CHLORIDE 29.8 MG/ML
20 INJECTION INTRAVENOUS
Status: COMPLETED | OUTPATIENT
Start: 2023-09-23 | End: 2023-09-23

## 2023-09-23 RX ORDER — SODIUM CHLORIDE 9 MG/ML
INJECTION, SOLUTION INTRAVENOUS PRN
Status: DISCONTINUED | OUTPATIENT
Start: 2023-09-23 | End: 2023-09-24

## 2023-09-23 RX ADMIN — MORPHINE SULFATE 2 MG: 2 INJECTION, SOLUTION INTRAMUSCULAR; INTRAVENOUS at 18:12

## 2023-09-23 RX ADMIN — SODIUM CHLORIDE, PRESERVATIVE FREE 10 ML: 5 INJECTION INTRAVENOUS at 20:41

## 2023-09-23 RX ADMIN — METOPROLOL TARTRATE 50 MG: 50 TABLET, FILM COATED ORAL at 20:40

## 2023-09-23 RX ADMIN — SERTRALINE 100 MG: 100 TABLET, FILM COATED ORAL at 20:40

## 2023-09-23 RX ADMIN — BUSPIRONE HYDROCHLORIDE 15 MG: 7.5 TABLET ORAL at 14:17

## 2023-09-23 RX ADMIN — HALOPERIDOL LACTATE 2 MG: 5 INJECTION, SOLUTION INTRAMUSCULAR at 20:57

## 2023-09-23 RX ADMIN — IPRATROPIUM BROMIDE AND ALBUTEROL SULFATE 1 DOSE: 2.5; .5 SOLUTION RESPIRATORY (INHALATION) at 19:38

## 2023-09-23 RX ADMIN — METOPROLOL TARTRATE 50 MG: 50 TABLET, FILM COATED ORAL at 09:52

## 2023-09-23 RX ADMIN — MORPHINE SULFATE 2 MG: 2 INJECTION, SOLUTION INTRAMUSCULAR; INTRAVENOUS at 14:18

## 2023-09-23 RX ADMIN — ASPIRIN 81 MG: 81 TABLET, CHEWABLE ORAL at 09:51

## 2023-09-23 RX ADMIN — HYDRALAZINE HYDROCHLORIDE 100 MG: 50 TABLET, FILM COATED ORAL at 20:40

## 2023-09-23 RX ADMIN — DEXAMETHASONE SODIUM PHOSPHATE 1 MG: 4 INJECTION, SOLUTION INTRAMUSCULAR; INTRAVENOUS at 09:52

## 2023-09-23 RX ADMIN — BUSPIRONE HYDROCHLORIDE 15 MG: 7.5 TABLET ORAL at 20:40

## 2023-09-23 RX ADMIN — MORPHINE SULFATE 2 MG: 2 INJECTION, SOLUTION INTRAMUSCULAR; INTRAVENOUS at 03:26

## 2023-09-23 RX ADMIN — SERTRALINE 100 MG: 100 TABLET, FILM COATED ORAL at 09:52

## 2023-09-23 RX ADMIN — MORPHINE SULFATE 2 MG: 2 INJECTION, SOLUTION INTRAMUSCULAR; INTRAVENOUS at 09:56

## 2023-09-23 RX ADMIN — POTASSIUM CHLORIDE 20 MEQ: 29.8 INJECTION, SOLUTION INTRAVENOUS at 12:02

## 2023-09-23 RX ADMIN — BUSPIRONE HYDROCHLORIDE 15 MG: 7.5 TABLET ORAL at 17:30

## 2023-09-23 RX ADMIN — BUSPIRONE HYDROCHLORIDE 15 MG: 7.5 TABLET ORAL at 09:51

## 2023-09-23 RX ADMIN — PANTOPRAZOLE SODIUM 40 MG: 40 INJECTION, POWDER, FOR SOLUTION INTRAVENOUS at 09:51

## 2023-09-23 RX ADMIN — IPRATROPIUM BROMIDE AND ALBUTEROL SULFATE 1 DOSE: 2.5; .5 SOLUTION RESPIRATORY (INHALATION) at 08:49

## 2023-09-23 RX ADMIN — HYDRALAZINE HYDROCHLORIDE 100 MG: 50 TABLET, FILM COATED ORAL at 14:17

## 2023-09-23 RX ADMIN — SODIUM CHLORIDE, PRESERVATIVE FREE 10 ML: 5 INJECTION INTRAVENOUS at 09:54

## 2023-09-23 RX ADMIN — METOPROLOL TARTRATE 50 MG: 50 TABLET, FILM COATED ORAL at 16:45

## 2023-09-23 RX ADMIN — HALOPERIDOL LACTATE 2 MG: 5 INJECTION, SOLUTION INTRAMUSCULAR at 15:40

## 2023-09-23 RX ADMIN — ENOXAPARIN SODIUM 30 MG: 100 INJECTION SUBCUTANEOUS at 09:52

## 2023-09-23 RX ADMIN — QUETIAPINE FUMARATE 25 MG: 25 TABLET ORAL at 20:40

## 2023-09-23 RX ADMIN — ENOXAPARIN SODIUM 30 MG: 100 INJECTION SUBCUTANEOUS at 20:40

## 2023-09-23 RX ADMIN — METOPROLOL TARTRATE 50 MG: 50 TABLET, FILM COATED ORAL at 03:29

## 2023-09-23 RX ADMIN — SODIUM CHLORIDE, PRESERVATIVE FREE 10 ML: 5 INJECTION INTRAVENOUS at 09:51

## 2023-09-23 RX ADMIN — POTASSIUM CHLORIDE 20 MEQ: 29.8 INJECTION, SOLUTION INTRAVENOUS at 10:05

## 2023-09-23 RX ADMIN — MORPHINE SULFATE 2 MG: 2 INJECTION, SOLUTION INTRAMUSCULAR; INTRAVENOUS at 22:07

## 2023-09-23 RX ADMIN — ATORVASTATIN CALCIUM 80 MG: 40 TABLET, FILM COATED ORAL at 20:40

## 2023-09-23 ASSESSMENT — PAIN SCALES - GENERAL
PAINLEVEL_OUTOF10: 2
PAINLEVEL_OUTOF10: 0
PAINLEVEL_OUTOF10: 2
PAINLEVEL_OUTOF10: 0
PAINLEVEL_OUTOF10: 4
PAINLEVEL_OUTOF10: 4
PAINLEVEL_OUTOF10: 2

## 2023-09-23 ASSESSMENT — PAIN DESCRIPTION - LOCATION: LOCATION: GENERALIZED

## 2023-09-23 ASSESSMENT — PAIN SCALES - WONG BAKER: WONGBAKER_NUMERICALRESPONSE: 2

## 2023-09-23 ASSESSMENT — PAIN - FUNCTIONAL ASSESSMENT: PAIN_FUNCTIONAL_ASSESSMENT: PREVENTS OR INTERFERES WITH MANY ACTIVE NOT PASSIVE ACTIVITIES

## 2023-09-23 NOTE — PROGRESS NOTES
4 Eyes Skin Assessment     NAME:  Kenneth Coleman  YOB: 1945  MEDICAL RECORD NUMBER:  [id number]    The patient is being assessed for  Shift Handoff    I agree that at least one RN has performed a thorough Head to Toe Skin Assessment on the patient. ALL assessment sites listed below have been assessed. Areas assessed by both nurses:    Head, Face, Ears, Shoulders, Back, Chest, Arms, Elbows, Hands, Sacrum. Buttock, Coccyx, Ischium, Legs. Feet and Heels, and Under Medical Devices    Bruise to Left arm no new wounds noted     Does the Patient have a Wound?  No noted wound(s)       Sloan Prevention initiated by RN: Yes  Wound Care Orders initiated by RN: Yes    Pressure Injury (Stage 3,4, Unstageable, DTI, NWPT, and Complex wounds) if present, place Wound referral order by RN under : No    New Ostomies, if present place, Ostomy referral order under : No     Nurse 1 eSignature: Electronically signed by Marika Andujar RN on 9/23/23 at 7:37 AM EDT    **SHARE this note so that the co-signing nurse can place an eSignature**    Nurse 2 eSignature: Electronically signed by Danie Corado RN on 9/23/23 at 8:03 AM EDT

## 2023-09-23 NOTE — PLAN OF CARE
Problem: Discharge Planning  Goal: Discharge to home or other facility with appropriate resources  Outcome: Progressing     Problem: Pain  Goal: Verbalizes/displays adequate comfort level or baseline comfort level  9/23/2023 1648 by Vianca Wilkerson RN  Outcome: Progressing  9/23/2023 0659 by Marylou Domingo RN  Outcome: Progressing     Problem: Safety - Adult  Goal: Free from fall injury  Outcome: Progressing     Problem: Nutrition Deficit:  Goal: Optimize nutritional status  Outcome: Progressing     Problem: Skin/Tissue Integrity  Goal: Absence of new skin breakdown  Description: 1. Monitor for areas of redness and/or skin breakdown  2. Assess vascular access sites hourly  3. Every 4-6 hours minimum:  Change oxygen saturation probe site  4. Every 4-6 hours:  If on nasal continuous positive airway pressure, respiratory therapy assess nares and determine need for appliance change or resting period.   9/23/2023 0659 by Marlyou Domingo, RN  Outcome: Progressing

## 2023-09-23 NOTE — PROGRESS NOTES
AM assessment done. Pt arouses to verbal stimuli. She will answer yes & no questions. She does have purposeful movement but does not follow commands. UO WNL. Pt remains on Vapotherm with 100% non-rebreather. Will continue to monitor.

## 2023-09-23 NOTE — PLAN OF CARE
Problem: Discharge Planning  Goal: Discharge to home or other facility with appropriate resources  Recent Flowsheet Documentation  Taken 9/22/2023 2100 by Jesus Alberto Sena RN  Discharge to home or other facility with appropriate resources:   Identify barriers to discharge with patient and caregiver   Arrange for needed discharge resources and transportation as appropriate   Identify discharge learning needs (meds, wound care, etc)   Refer to discharge planning if patient needs post-hospital services based on physician order or complex needs related to functional status, cognitive ability or social support system     Problem: Pain  Goal: Verbalizes/displays adequate comfort level or baseline comfort level  Outcome: Progressing   Pt medicated X 2 with Morphine for comfort tonight. Problem: Skin/Tissue Integrity  Goal: Absence of new skin breakdown  Description: 1. Monitor for areas of redness and/or skin breakdown  2. Assess vascular access sites hourly  3. Every 4-6 hours minimum:  Change oxygen saturation probe site  4. Every 4-6 hours:  If on nasal continuous positive airway pressure, respiratory therapy assess nares and determine need for appliance change or resting period.   Outcome: Progressing   Pt's has retained skin integrity

## 2023-09-23 NOTE — PROGRESS NOTES
Pt medicated with Morphine 2 mg IVP for increased work of breathing. O2 sat 88% with Vapotherm 100% and NRB.

## 2023-09-23 NOTE — PROGRESS NOTES
Pt continues with increased WOB but is resting more quietly.  Continues on 100% Vapotherm and NRB to maintain a pulse Oxy of 90%

## 2023-09-23 NOTE — PROGRESS NOTES
Prior to receiving transfusion of blood products, patient educated on the following:    Blood product transfusion process  Benefits of receiving blood product transfusion  Risks associated with receiving the transfusion  Signs and symptoms of complications associated with blood product transfusion  Vague, uneasy feeling  Onset of pain (especially at the IV site, back, or chest)  Chills  Flushing  Fever  Nausea  Dizziness  Rash  Itching  Dark or red urine  Instructed patient to notify RN immediately if any sign or symptom occurs     Family is able to verbalize understanding.

## 2023-09-23 NOTE — PROGRESS NOTES
CM-ST with elevated T waves. VSS, Pt temp 99.3. UO WNL. Pt arouses to verbal stimuli, but does not follow commands. Pt nods yes to pain & moans. Haldol given. Free water infusing @ 100ml/hr. Pt remains on Vapotherm & 100% non-rebreather. Will continue to monitor.

## 2023-09-23 NOTE — PROGRESS NOTES
Assessment complete. Pt able to lift head and call our in pain as hands and feet are adjusted then settles back in. Pt seems to enjoy mouth care and nodded yes to would she like mouth swabs with cold water.

## 2023-09-23 NOTE — PROGRESS NOTES
Speech-Language Pathology  Swallowing Disorders and Dysphagia     SLP Attempt / Hold Note           Name: Cheryal Door  : 1945  Medical Diagnosis: Respiratory failure ([address]) [J96.90]  HCAP (healthcare-associated pneumonia) [J18.9]  Acute respiratory failure with hypoxia and hypercapnia ([address]) [J96.01, J96.02]  New onset left bundle branch block (LBBB) [I44.7]  Sepsis, due to unspecified organism, unspecified whether acute organ dysfunction present ([address]) [A41.9]    Attempting to see pt for dysphagia follow-up. Pt remains on vapotherm and NRB. Plan is for hospice care with discussion scheduled this day per Iglesia/RN. No charges at this time. Will sign off once hospice is finalized. Thank you,  Stefano Ramirez. Truong Copeland M.A.  [address] [place] [address]  Speech-Language Pathologist  [id number]      a

## 2023-09-23 NOTE — CARE COORDINATION
Pt was denied per insurance for [facility]. Dr. Marylene Screws to do P2P by [phone] today. MD susan HOANG states pt's family wants to consult with HOC. Referral made. [address] will meet with family on 9/24. [address] will speak to pt's spouse to set up at time to meet on 9/24.

## 2023-09-23 NOTE — PROGRESS NOTES
8183 Pt medicated with Morphine 2 mg prior to bath to help with her breathing. Pt C/O during bath of OB. O2 sat fell to 86%. 100% NRB placed over Vapotherm for pt comfort. O2 sat up t0 93%. Pt with HOB up to 40 degrees for comfort. Pt was able to cough productively of moderate williamson mucous plug.

## 2023-09-24 VITALS
SYSTOLIC BLOOD PRESSURE: 116 MMHG | HEIGHT: 62 IN | BODY MASS INDEX: 45.32 KG/M2 | DIASTOLIC BLOOD PRESSURE: 50 MMHG | TEMPERATURE: 99.8 F | WEIGHT: 246.25 LBS | OXYGEN SATURATION: 40 %

## 2023-09-24 LAB
ANION GAP SERPL CALCULATED.3IONS-SCNC: 15 MMOL/L (ref 3–16)
BASE EXCESS BLDV CALC-SCNC: -4.1 MMOL/L (ref -3–3)
BASOPHILS # BLD: 0.1 K/UL (ref 0–0.2)
BASOPHILS NFR BLD: 0.5 %
BUN SERPL-MCNC: 73 MG/DL (ref 7–20)
CALCIUM SERPL-MCNC: 8.1 MG/DL (ref 8.3–10.6)
CHLORIDE SERPL-SCNC: 109 MMOL/L (ref 99–110)
CO2 BLDV-SCNC: 22 MMOL/L
CO2 SERPL-SCNC: 21 MMOL/L (ref 21–32)
COHGB MFR BLDV: 1.1 % (ref 0–1.5)
CREAT SERPL-MCNC: 2.1 MG/DL (ref 0.6–1.2)
DEPRECATED RDW RBC AUTO: 14.4 % (ref 12.4–15.4)
EOSINOPHIL # BLD: 0.6 K/UL (ref 0–0.6)
EOSINOPHIL NFR BLD: 4.4 %
GFR SERPLBLD CREATININE-BSD FMLA CKD-EPI: 24 ML/MIN/{1.73_M2}
GLUCOSE BLD-MCNC: 105 MG/DL (ref 70–99)
GLUCOSE BLD-MCNC: 105 MG/DL (ref 70–99)
GLUCOSE BLD-MCNC: 113 MG/DL (ref 70–99)
GLUCOSE SERPL-MCNC: 114 MG/DL (ref 70–99)
HCO3 BLDV-SCNC: 21.1 MMOL/L (ref 23–29)
HCT VFR BLD AUTO: 24.1 % (ref 36–48)
HGB BLD-MCNC: 7.9 G/DL (ref 12–16)
LYMPHOCYTES # BLD: 0.8 K/UL (ref 1–5.1)
LYMPHOCYTES NFR BLD: 5.3 %
MCH RBC QN AUTO: 29.9 PG (ref 26–34)
MCHC RBC AUTO-ENTMCNC: 33 G/DL (ref 31–36)
MCV RBC AUTO: 90.7 FL (ref 80–100)
METHGB MFR BLDV: 0.1 %
MONOCYTES # BLD: 0.4 K/UL (ref 0–1.3)
MONOCYTES NFR BLD: 2.4 %
NEUTROPHILS # BLD: 12.6 K/UL (ref 1.7–7.7)
NEUTROPHILS NFR BLD: 87.4 %
O2 CT VFR BLDV CALC: 12 VOL %
O2 THERAPY: ABNORMAL
PCO2 BLDV: 38.5 MMHG (ref 40–50)
PERFORMED ON: ABNORMAL
PH BLDV: 7.36 [PH] (ref 7.35–7.45)
PLATELET # BLD AUTO: 217 K/UL (ref 135–450)
PMV BLD AUTO: 8.3 FL (ref 5–10.5)
PO2 BLDV: 74 MMHG (ref 25–40)
POTASSIUM SERPL-SCNC: 4.2 MMOL/L (ref 3.5–5.1)
RBC # BLD AUTO: 2.65 M/UL (ref 4–5.2)
SAO2 % BLDV: 94 %
SODIUM SERPL-SCNC: 145 MMOL/L (ref 136–145)
WBC # BLD AUTO: 14.5 K/UL (ref 4–11)

## 2023-09-24 PROCEDURE — 6360000002 HC RX W HCPCS: Performed by: INTERNAL MEDICINE

## 2023-09-24 PROCEDURE — 6370000000 HC RX 637 (ALT 250 FOR IP): Performed by: INTERNAL MEDICINE

## 2023-09-24 PROCEDURE — 94761 N-INVAS EAR/PLS OXIMETRY MLT: CPT

## 2023-09-24 PROCEDURE — 31720 CLEARANCE OF AIRWAYS: CPT

## 2023-09-24 PROCEDURE — 99291 CRITICAL CARE FIRST HOUR: CPT | Performed by: INTERNAL MEDICINE

## 2023-09-24 PROCEDURE — 99233 SBSQ HOSP IP/OBS HIGH 50: CPT | Performed by: INTERNAL MEDICINE

## 2023-09-24 PROCEDURE — 80048 BASIC METABOLIC PNL TOTAL CA: CPT

## 2023-09-24 PROCEDURE — 36592 COLLECT BLOOD FROM PICC: CPT

## 2023-09-24 PROCEDURE — 2700000000 HC OXYGEN THERAPY PER DAY

## 2023-09-24 PROCEDURE — 85025 COMPLETE CBC W/AUTO DIFF WBC: CPT

## 2023-09-24 PROCEDURE — C9113 INJ PANTOPRAZOLE SODIUM, VIA: HCPCS | Performed by: INTERNAL MEDICINE

## 2023-09-24 PROCEDURE — 82803 BLOOD GASES ANY COMBINATION: CPT

## 2023-09-24 PROCEDURE — 2580000003 HC RX 258: Performed by: INTERNAL MEDICINE

## 2023-09-24 PROCEDURE — 94640 AIRWAY INHALATION TREATMENT: CPT

## 2023-09-24 RX ORDER — ATROPINE SULFATE 10 MG/ML
1 SOLUTION/ DROPS OPHTHALMIC EVERY 4 HOURS PRN
Status: DISCONTINUED | OUTPATIENT
Start: 2023-09-24 | End: 2023-09-24 | Stop reason: HOSPADM

## 2023-09-24 RX ORDER — LORAZEPAM 2 MG/ML
1 INJECTION INTRAMUSCULAR
Status: DISCONTINUED | OUTPATIENT
Start: 2023-09-24 | End: 2023-09-24 | Stop reason: HOSPADM

## 2023-09-24 RX ORDER — SCOLOPAMINE TRANSDERMAL SYSTEM 1 MG/1
1 PATCH, EXTENDED RELEASE TRANSDERMAL
Status: DISCONTINUED | OUTPATIENT
Start: 2023-09-24 | End: 2023-09-24

## 2023-09-24 RX ORDER — MORPHINE SULFATE 2 MG/ML
2 INJECTION, SOLUTION INTRAMUSCULAR; INTRAVENOUS
Status: DISCONTINUED | OUTPATIENT
Start: 2023-09-24 | End: 2023-09-24 | Stop reason: HOSPADM

## 2023-09-24 RX ORDER — LORAZEPAM 2 MG/ML
1 INJECTION INTRAMUSCULAR EVERY 4 HOURS PRN
Status: DISCONTINUED | OUTPATIENT
Start: 2023-09-24 | End: 2023-09-24

## 2023-09-24 RX ORDER — SCOLOPAMINE TRANSDERMAL SYSTEM 1 MG/1
1 PATCH, EXTENDED RELEASE TRANSDERMAL
Status: DISCONTINUED | OUTPATIENT
Start: 2023-09-24 | End: 2023-09-24 | Stop reason: HOSPADM

## 2023-09-24 RX ADMIN — ASPIRIN 81 MG: 81 TABLET, CHEWABLE ORAL at 09:19

## 2023-09-24 RX ADMIN — METOPROLOL TARTRATE 50 MG: 50 TABLET, FILM COATED ORAL at 09:26

## 2023-09-24 RX ADMIN — LORAZEPAM 1 MG: 2 INJECTION INTRAMUSCULAR; INTRAVENOUS at 13:53

## 2023-09-24 RX ADMIN — MORPHINE SULFATE 2 MG: 2 INJECTION, SOLUTION INTRAMUSCULAR; INTRAVENOUS at 11:07

## 2023-09-24 RX ADMIN — METOPROLOL TARTRATE 50 MG: 50 TABLET, FILM COATED ORAL at 03:57

## 2023-09-24 RX ADMIN — PANTOPRAZOLE SODIUM 40 MG: 40 INJECTION, POWDER, FOR SOLUTION INTRAVENOUS at 09:19

## 2023-09-24 RX ADMIN — ACETAMINOPHEN 650 MG: 325 TABLET ORAL at 00:38

## 2023-09-24 RX ADMIN — IPRATROPIUM BROMIDE AND ALBUTEROL SULFATE 1 DOSE: 2.5; .5 SOLUTION RESPIRATORY (INHALATION) at 07:20

## 2023-09-24 RX ADMIN — MORPHINE SULFATE 2 MG: 2 INJECTION, SOLUTION INTRAMUSCULAR; INTRAVENOUS at 07:47

## 2023-09-24 RX ADMIN — BUSPIRONE HYDROCHLORIDE 15 MG: 7.5 TABLET ORAL at 09:18

## 2023-09-24 RX ADMIN — MORPHINE SULFATE 2 MG: 2 INJECTION, SOLUTION INTRAMUSCULAR; INTRAVENOUS at 16:32

## 2023-09-24 RX ADMIN — DEXAMETHASONE SODIUM PHOSPHATE 1 MG: 4 INJECTION, SOLUTION INTRAMUSCULAR; INTRAVENOUS at 09:19

## 2023-09-24 RX ADMIN — ENOXAPARIN SODIUM 30 MG: 100 INJECTION SUBCUTANEOUS at 09:19

## 2023-09-24 RX ADMIN — LORAZEPAM 1 MG: 2 INJECTION INTRAMUSCULAR; INTRAVENOUS at 17:08

## 2023-09-24 RX ADMIN — HALOPERIDOL LACTATE 2 MG: 5 INJECTION, SOLUTION INTRAMUSCULAR at 06:17

## 2023-09-24 RX ADMIN — SERTRALINE 100 MG: 100 TABLET, FILM COATED ORAL at 09:19

## 2023-09-24 RX ADMIN — ATROPINE SULFATE 1 DROP: 10 SOLUTION/ DROPS OPHTHALMIC at 15:19

## 2023-09-24 RX ADMIN — ACETAMINOPHEN 650 MG: 325 TABLET ORAL at 09:25

## 2023-09-24 RX ADMIN — SODIUM CHLORIDE, PRESERVATIVE FREE 10 ML: 5 INJECTION INTRAVENOUS at 09:29

## 2023-09-24 RX ADMIN — HYDRALAZINE HYDROCHLORIDE 100 MG: 50 TABLET, FILM COATED ORAL at 05:59

## 2023-09-24 RX ADMIN — LORAZEPAM 1 MG: 2 INJECTION INTRAMUSCULAR; INTRAVENOUS at 15:52

## 2023-09-24 NOTE — PROGRESS NOTES
Reassessment completed (see Flowsheet). All ICU lines remain intact, ICU monitoring continued-   Infusing:  Remona Dull  pt now unresponsive. Lung sounds Rales/Diminished  pt's blood pressures WDL. Family at bedside. Continuing to monitor.

## 2023-09-24 NOTE — DISCHARGE SUMMARY
Hospital Medicine Discharge Summary    Patient: Neyda Erazo     Gender: female  : 1945   Age: 68 y.o. MRN: [id number]    Admitting Physician: Onesimo Sanabria DO  Discharge Physician: Onesimo Sanabria DO    Code Status: DNR-CC     Admit Date: 2023   Discharge Date:  2023     Discharge Diagnoses: Active Hospital Problems    Diagnosis Date Noted    Hypernatremia [E87.0] 2023    Encounter for hospice care discussion [Z71.89] 2023    Moderate persistent asthma with exacerbation [J45.41] 09/15/2023    Mucus plug in respiratory tract [T17.998A] 09/15/2023    Uncontrolled hypertension [I10] 09/15/2023    Atrial tachycardia (HCC) [I47.1] 2023    Elevated troponin [R77.8] 2023    Mucus plugging of bronchi [T17.500A] 2023    HCAP (healthcare-associated pneumonia) [J18.9] 2023    Acute hypoxemic respiratory failure ([address]) [J96.01] 2023    Severe persistent asthma with exacerbation [J45.51] 2023    Respiratory failure ([address]) [J96.90] 2023    Hospital-acquired pneumonia [J18.9, Y95] 2023    New onset left bundle branch block (LBBB) [I44.7] 2023    Sepsis ([address]) [A41.9] 2023    Mild intermittent asthma with exacerbation [J45.21] 2023    Sigmoid diverticulitis [K57.32] 2023    NSTEMI (non-ST elevated myocardial infarction) ([address]) [I21.4] 2023       Condition at Discharge:     Hospital Course:   \"Patient is a 68 y.o.  female  With known h.o  HTN, hyperlipidemia, Asthma, GERD, anxiety , breast cancer presented by EMS last night for severe resp distress . Pt was recently admitted to [facility] for mild sigmoid diverticulitis and was given cipro and flagyl . Apparently she called 911 in the middle of the night for possible allergic reaction and resp distress. She was brought to ER where she was emergently intubated , placed on vent and admitted to ICU .       Pt  in room who is in a different bedroom , not aware of EMS complete. No Radiologist dictation. Please follow up with ordering provider. XR ABDOMEN FOR NG/OG/NE TUBE PLACEMENT    Result Date: 9/21/2023  EXAMINATION: ONE SUPINE XRAY VIEW(S) OF THE ABDOMEN 9/21/2023 12:54 pm COMPARISON: None. HISTORY: ORDERING SYSTEM PROVIDED HISTORY: ng tube placement TECHNOLOGIST PROVIDED HISTORY: Reason for exam:->ng tube placement Portable? ->Yes Reason for Exam: NG placement FINDINGS: There is an enteric tube with the tip in the stomach. Side port is near the GE junction. Nonobstructive bowel gas pattern. Diffuse pulmonary infiltrates. Enteric tube with tip in the stomach. The side port is near the GE junction. Recommend advancement by 5 cm. The patient was seen and examined on day of discharge and this discharge summary is in conjunction with any daily progress note from day of discharge. Time Spent on discharge is less than 30 minutes in the examination, evaluation, counseling and review of medications and discharge plan. Kary Scruggs DO   9/24/2023      Thank you SARBJIT SHAH MD for the opportunity to be involved in this patient's care. If you have any questions or concerns please feel free to contact me at [facility].

## 2023-09-24 NOTE — PROGRESS NOTES
4 Eyes Skin Assessment     NAME:  Lynn Sheriff  YOB: 1945  MEDICAL RECORD NUMBER:  [id number]    The patient is being assessed for  Shift Handoff    I agree that at least one RN has performed a thorough Head to Toe Skin Assessment on the patient. ALL assessment sites listed below have been assessed. Areas assessed by both nurses:    Head, Face, Ears, Shoulders, Back, Chest, Arms, Elbows, Hands, Sacrum. Buttock, Coccyx, Ischium, Legs. Feet and Heels, and Under Medical Devices         Does the Patient have a Wound? No noted wound(s)       Sloan Prevention initiated by RN: Yes  Wound Care Orders initiated by RN: Yes    Pressure Injury (Stage 3,4, Unstageable, DTI, NWPT, and Complex wounds) if present, place Wound referral order by RN under : No    New Ostomies, if present place, Ostomy referral order under : No     Nurse 1 eSignature: Electronically signed by Barrett Peterson RN on 9/23/23 at 11:31 PM EDT    **SHARE this note so that the co-signing nurse can place an eSignature**    Nurse 2 eSignature: Electronically signed by James Harrington RN on 9/24/23 at 7:30 AM EDT   Patient is not able to demonstrated the ability to move from a reclining position to an upright position within the recliner. Patient is confused, demented and /or unable to follow instruction.

## 2023-09-24 NOTE — PROGRESS NOTES
Pt given PRN Morphine, and NRB removed at family request.   Vapotherm to be removed at 1700.    5:09 PM  Vapotherm removed and PRN given ([facility] per Dr Chase Oviedo) for IV ativan once. Given under existing order. Vitals:    09/24/23 1709   BP:    Pulse: (!) 42   Resp: 20   Temp:    SpO2: (!) 40%     Family at beside.

## 2023-09-24 NOTE — PROGRESS NOTES
Discussed with  again over the phone,  discussed with the rest of the family. All made the decision to withdraw care and keep patient comfort care measures only.   Ativan and morphine as needed

## 2023-09-24 NOTE — PROGRESS NOTES
PEA noted on monitor. Verified by two Nurses. Call to Dr Autumn Xavier and Dr Vahid Ordoñez    Call to [facility] - and they are not following .  Referral # [id number]    Perfect serve to Dr Sami Richardson:    09/24/23 1719   BP:    Pulse: (!) 26   Resp: (!) 0   Temp:    SpO2:      Vitals:    09/24/23 1721   BP:    Pulse: (!) 0   Resp: (!) 0   Temp:    SpO2:

## 2023-09-24 NOTE — PROGRESS NOTES
Pt on Vapo therm 40 Lt and FiO2 100% and NRBM at 15 Lt oxygen saturation 84%. . Assessment complete as charted. Repositioned for comfort. Pt restless and anxious, Moans at times. Will continue to monitor.

## 2023-09-24 NOTE — PROGRESS NOTES
MD aware of pt's oxygen saturation and DNI status. Tried calling Pt's spouse but unable to reach him over the phone. [address] son updated of pt's critical condition. Morphine and Haldol PRN given per order to keep the pt comfortable.

## 2023-09-24 NOTE — PROGRESS NOTES
4 Eyes Skin Assessment     NAME:  Parish Echevarria  YOB: 1945  MEDICAL RECORD NUMBER:  [id number]    The patient is being assessed for  Other Shift    I agree that at least one RN has performed a thorough Head to Toe Skin Assessment on the patient. ALL assessment sites listed below have been assessed. Areas assessed by both nurses:    Head, Face, Ears, Shoulders, Back, Chest, Arms, Elbows, Hands, Sacrum. Buttock, Coccyx, Ischium, Legs. Feet and Heels, and Under Medical Devices         Does the Patient have a Wound?  No noted wound(s)       Sloan Prevention initiated by RN: Yes  Wound Care Orders initiated by RN: Yes    Pressure Injury (Stage 3,4, Unstageable, DTI, NWPT, and Complex wounds) if present, place Wound referral order by RN under : No    New Ostomies, if present place, Ostomy referral order under : No     Nurse 1 eSignature: Electronically signed by Bob Braswell RN on 9/24/23 at 3:32 PM EDT    **SHARE this note so that the co-signing nurse can place an eSignature**    Nurse 2 eSignature: Electronically signed by Eric Ramirez RN on 9/24/23 at 3:57 PM EDT

## 2023-09-24 NOTE — PROGRESS NOTES
09/24/23 1239   Encounter Summary   Encounter Overview/Reason  Grief, Loss, and Adjustments   Service Provided For: Patient and family together   Referral/Consult From: Nurse   Support System Family members;Spouse   Last Encounter  09/24/23  (End of life support. Patient now comfort care.)   Complexity of Encounter Moderate   Begin Time 1200   End Time  1240   Total Time Calculated 40 min   Grief, Loss, and Adjustments   Type End of Life   Assessment/Intervention/Outcome   Assessment Calm   Intervention Active listening;Discussed relationship with God;Explored/Affirmed feelings, thoughts, concerns;Prayer (assurance of)/[place]   Outcome Engaged in conversation;Expressed feelings, needs, and concerns;Receptive   Plan and Referrals   Plan/Referrals Continue to visit, (comment)      called to support family. Family awaiting another member to say his goodbyes prior changing level of care. Family members verbalized recalling patient stating she \"wouldn't want to live like this. \"   offered prayer quilt and prayer/support. Will continue to be available as journey unfolds.

## 2023-09-24 NOTE — PROGRESS NOTES
Call placed to [facility]. Call at time of Cardiac Death.

## 2023-09-24 NOTE — PROGRESS NOTES
Care rounds completed with Dr. Bridget Obregon and multidisciplinary team. Reviewed labs, meds, VS, assessment, & plan of care for today.    -Per Dr Bridget Obregon, patient is potentially going to pass today. Spouse at bedside and opted to change code status. No shock, No CPR, no intubation.    -Calls need to be made to family. See dictated note and new orders for details. Call placed to [employer] and [facility] [employer]. Family to come to bedside.

## 2023-09-24 NOTE — PROGRESS NOTES
Shift assessment was completed (see flow sheet). Pt is not A/O, minimal response to pain-grimace. No eye opening. Respirations are irregular,Tachypneic/ diaphragmatic, with Rales/Diminished sounds. On 100%, 40L Vapotherm and NRB. Scheduled medications to follow- crushed with NGT to R nare. Infusing:  Lucile Babinski (see MAR). Call light within reach. Bed in lowest position. Bed alarm on. Will continue to monitor. Patient is not able to demonstrated the ability to move from a reclining position to an upright position within the recliner. Patient is confused, demented and /or unable to follow instruction.

## 2023-09-24 NOTE — PROGRESS NOTES
Family gathered at bedside, Per Patients - Yaritza Purcell, Patients Dustin Rivera, and Cristina Reginaldo: due to patients decline, family would like to withdraw care instead of moving forward with hospice. Page placed to Dr Raul Garcia. 12:13 PM    Dr Ashley Cardenas with patients spouse about family wishes. Orders to follow. Plan to [facility] at family discretion. (Remove Vapotherm and NRB). Call placed to [phone] to [place] family meeting and inform of above. Brodie paged to bedside.

## 2023-09-24 NOTE — PLAN OF CARE
Problem: Discharge Planning  Goal: Discharge to home or other facility with appropriate resources  9/23/2023 2323 by Pasquale Madera RN  Outcome: Progressing  Flowsheets (Taken 9/23/2023 2323)  Discharge to home or other facility with appropriate resources:   Identify barriers to discharge with patient and caregiver   Arrange for needed discharge resources and transportation as appropriate  9/23/2023 1648 by Liliane Xavier RN  Outcome: Progressing     Problem: Pain  Goal: Verbalizes/displays adequate comfort level or baseline comfort level  9/23/2023 2323 by Pasquale Madera RN  Outcome: Progressing  Flowsheets (Taken 9/23/2023 2323)  Verbalizes/displays adequate comfort level or baseline comfort level:   Encourage patient to monitor pain and request assistance   Assess pain using appropriate pain scale   Administer analgesics based on type and severity of pain and evaluate response  9/23/2023 1648 by Liliane Xavier RN  Outcome: Progressing     Problem: Safety - Adult  Goal: Free from fall injury  9/23/2023 2323 by Pasquale Madera RN  Outcome: Progressing  Flowsheets (Taken 9/23/2023 2323)  Free From Fall Injury: Instruct family/caregiver on patient safety  9/23/2023 1648 by Liliane Xavier RN  Outcome: Progressing     Problem: Nutrition Deficit:  Goal: Optimize nutritional status  9/23/2023 2323 by Pasquale Madera RN  Outcome: Progressing  Flowsheets (Taken 9/23/2023 2323)  Nutrient intake appropriate for improving, restoring, or maintaining nutritional needs:   Assess nutritional status and recommend course of action   Monitor oral intake, labs, and treatment plans  9/23/2023 1648 by Liliane Xavier RN  Outcome: Progressing     Problem: Skin/Tissue Integrity  Goal: Absence of new skin breakdown  Description: 1. Monitor for areas of redness and/or skin breakdown  2. Assess vascular access sites hourly  3. Every 4-6 hours minimum:  Change oxygen saturation probe site  4.   Every 4-6 hours:  If on nasal continuous positive airway pressure, respiratory therapy assess nares and determine need for appliance change or resting period.   Outcome: Progressing     Problem: Respiratory - Adult  Goal: Achieves optimal ventilation and oxygenation  Outcome: Progressing  Flowsheets (Taken 9/23/2023 2323)  Achieves optimal ventilation and oxygenation:   Assess for changes in respiratory status   Assess for changes in mentation and behavior   Oxygen supplementation based on oxygen saturation or arterial blood gases   Position to facilitate oxygenation and minimize respiratory effort     Problem: Cardiovascular - Adult  Goal: Maintains optimal cardiac output and hemodynamic stability  Outcome: Progressing  Flowsheets (Taken 9/23/2023 2323)  Maintains optimal cardiac output and hemodynamic stability:   Monitor blood pressure and heart rate   Monitor urine output and notify Licensed Independent Practitioner for values outside of normal range   Assess for signs of decreased cardiac output  Goal: Absence of cardiac dysrhythmias or at baseline  Outcome: Progressing  Flowsheets (Taken 9/22/2023 2100 by Rolanda Hull RN)  Absence of cardiac dysrhythmias or at baseline:   Monitor cardiac rate and rhythm   Assess for signs of decreased cardiac output   Administer antiarrhythmia medication and electrolyte replacement as ordered     Problem: Skin/Tissue Integrity - Adult  Goal: Skin integrity remains intact  Outcome: Progressing  Flowsheets  Taken 9/23/2023 2323  Skin Integrity Remains Intact:   Monitor for areas of redness and/or skin breakdown   Assess vascular access sites hourly   Every 4-6 hours minimum: Change oxygen saturation probe site  Taken 9/23/2023 2133  Skin Integrity Remains Intact:   Monitor for areas of redness and/or skin breakdown   Assess vascular access sites hourly   Every 4-6 hours minimum: Change oxygen saturation probe site  Goal: Incisions, wounds, or drain sites healing without S/S of infection  Outcome:

## 2023-09-25 NOTE — PROGRESS NOTES
Death within 24hrs/7days of removal of 2 point soft wrist restraints only. No reporting required to CMS.   Esha Patrick RN 7:31 AM

## 2024-05-28 NOTE — PLAN OF CARE
71-year-old female with pmhx bronchial asthma hypertension GERD patient was recently discharged from [facility] after 3 admissions for management of diverticulitis. Patient called because severe shortness of breath after evaluated by the emergency service she was found in Respiratory distress with saturations in the 70s respiration BiPAP and transported to the hospital in the ED continued to desat with copious coughing up brownish sputum that is what to do better immediately.   Intubations went uneventfully patient was put on Versed and fentanyl for sedation and transported to MICU  Upon evaluating the MICU patient noted to have hig high peak pressures in the 40s with declining saturations had to Ambu bag change vent setting PEEP of 8 and Vt 300   Blood pressure was low MAP 40s POCUS no evidence of tamponade or RV strain good cardiac function blood pressure improved after 1 L of IV fluid  A-line placed for hemodynamic monitoring [facility]    Phone Message    May a detailed message be left on voicemail: yes     Reason for Call: Other: Pt is requesting a call back please to discuss getting orders for a colonoscopy as she is still having constipation. Please reach out to discuss, Pt prefers team try to reach out in the afternoon. Thank you!     Action Taken: Message routed to:  [facility] GI    Travel Screening: Not Applicable